# Patient Record
Sex: MALE | Race: WHITE | Employment: OTHER | URBAN - METROPOLITAN AREA
[De-identification: names, ages, dates, MRNs, and addresses within clinical notes are randomized per-mention and may not be internally consistent; named-entity substitution may affect disease eponyms.]

---

## 2017-09-18 ENCOUNTER — HOSPITAL ENCOUNTER (OUTPATIENT)
Facility: HOSPITAL | Age: 62
Setting detail: OBSERVATION
Discharge: HOME | End: 2017-09-19
Attending: EMERGENCY MEDICINE | Admitting: HOSPITALIST

## 2017-09-18 ENCOUNTER — APPOINTMENT (OUTPATIENT)
Dept: ULTRASOUND IMAGING | Facility: HOSPITAL | Age: 62
End: 2017-09-18
Attending: HOSPITALIST

## 2017-09-18 ENCOUNTER — APPOINTMENT (OUTPATIENT)
Dept: MRI IMAGING | Facility: HOSPITAL | Age: 62
End: 2017-09-18
Attending: EMERGENCY MEDICINE

## 2017-09-18 ENCOUNTER — APPOINTMENT (OUTPATIENT)
Dept: GENERAL RADIOLOGY | Facility: HOSPITAL | Age: 62
End: 2017-09-18
Attending: EMERGENCY MEDICINE

## 2017-09-18 DIAGNOSIS — M54.59 INTRACTABLE LOW BACK PAIN: Primary | ICD-10-CM

## 2017-09-18 LAB
ANION GAP SERPL CALC-SCNC: 7 MMOL/L (ref 0–18)
BASOPHILS # BLD: 0 K/UL (ref 0–0.2)
BASOPHILS NFR BLD: 1 %
BUN SERPL-MCNC: 18 MG/DL (ref 8–20)
BUN/CREAT SERPL: 22.8 (ref 10–20)
CALCIUM SERPL-MCNC: 8.6 MG/DL (ref 8.5–10.5)
CHLORIDE SERPL-SCNC: 103 MMOL/L (ref 95–110)
CO2 SERPL-SCNC: 20 MMOL/L (ref 22–32)
CREAT SERPL-MCNC: 0.79 MG/DL (ref 0.5–1.5)
EOSINOPHIL # BLD: 0 K/UL (ref 0–0.7)
EOSINOPHIL NFR BLD: 1 %
ERYTHROCYTE [DISTWIDTH] IN BLOOD BY AUTOMATED COUNT: 13.1 % (ref 11–15)
GLUCOSE SERPL-MCNC: 100 MG/DL (ref 70–99)
HCT VFR BLD AUTO: 47.3 % (ref 41–52)
HGB BLD-MCNC: 15.5 G/DL (ref 13.5–17.5)
LYMPHOCYTES # BLD: 1.2 K/UL (ref 1–4)
LYMPHOCYTES NFR BLD: 16 %
MCH RBC QN AUTO: 30.8 PG (ref 27–32)
MCHC RBC AUTO-ENTMCNC: 32.7 G/DL (ref 32–37)
MCV RBC AUTO: 94 FL (ref 80–100)
MONOCYTES # BLD: 0.5 K/UL (ref 0–1)
MONOCYTES NFR BLD: 7 %
NEUTROPHILS # BLD AUTO: 6 K/UL (ref 1.8–7.7)
NEUTROPHILS NFR BLD: 77 %
OSMOLALITY UR CALC.SUM OF ELEC: 272 MOSM/KG (ref 275–295)
PLATELET # BLD AUTO: 125 K/UL (ref 140–400)
PMV BLD AUTO: 9.2 FL (ref 7.4–10.3)
RBC # BLD AUTO: 5.03 M/UL (ref 4.5–5.9)
SODIUM SERPL-SCNC: 130 MMOL/L (ref 136–144)
WBC # BLD AUTO: 7.8 K/UL (ref 4–11)

## 2017-09-18 PROCEDURE — 85025 COMPLETE CBC W/AUTO DIFF WBC: CPT | Performed by: EMERGENCY MEDICINE

## 2017-09-18 PROCEDURE — 96374 THER/PROPH/DIAG INJ IV PUSH: CPT

## 2017-09-18 PROCEDURE — 72148 MRI LUMBAR SPINE W/O DYE: CPT | Performed by: EMERGENCY MEDICINE

## 2017-09-18 PROCEDURE — 72100 X-RAY EXAM L-S SPINE 2/3 VWS: CPT | Performed by: EMERGENCY MEDICINE

## 2017-09-18 PROCEDURE — 80048 BASIC METABOLIC PNL TOTAL CA: CPT | Performed by: EMERGENCY MEDICINE

## 2017-09-18 PROCEDURE — 99285 EMERGENCY DEPT VISIT HI MDM: CPT

## 2017-09-18 RX ORDER — POLYETHYLENE GLYCOL 3350 17 G/17G
17 POWDER, FOR SOLUTION ORAL DAILY PRN
Status: DISCONTINUED | OUTPATIENT
Start: 2017-09-18 | End: 2017-09-19

## 2017-09-18 RX ORDER — METHYLPREDNISOLONE 4 MG/1
12 TABLET ORAL
Status: COMPLETED | OUTPATIENT
Start: 2017-09-18 | End: 2017-09-18

## 2017-09-18 RX ORDER — HEPARIN SODIUM 5000 [USP'U]/ML
5000 INJECTION, SOLUTION INTRAVENOUS; SUBCUTANEOUS EVERY 8 HOURS SCHEDULED
Status: DISCONTINUED | OUTPATIENT
Start: 2017-09-18 | End: 2017-09-19

## 2017-09-18 RX ORDER — METHYLPREDNISOLONE 4 MG/1
4 TABLET ORAL
Status: DISCONTINUED | OUTPATIENT
Start: 2017-09-23 | End: 2017-09-19

## 2017-09-18 RX ORDER — SODIUM CHLORIDE 0.9 % (FLUSH) 0.9 %
3 SYRINGE (ML) INJECTION AS NEEDED
Status: DISCONTINUED | OUTPATIENT
Start: 2017-09-18 | End: 2017-09-19

## 2017-09-18 RX ORDER — METHYLPREDNISOLONE SODIUM SUCCINATE 125 MG/2ML
60 INJECTION, POWDER, LYOPHILIZED, FOR SOLUTION INTRAMUSCULAR; INTRAVENOUS ONCE
Status: COMPLETED | OUTPATIENT
Start: 2017-09-18 | End: 2017-09-18

## 2017-09-18 RX ORDER — TRAMADOL HYDROCHLORIDE 50 MG/1
25 TABLET ORAL ONCE
Status: COMPLETED | OUTPATIENT
Start: 2017-09-18 | End: 2017-09-18

## 2017-09-18 RX ORDER — HYDROCODONE BITARTRATE AND ACETAMINOPHEN 5; 325 MG/1; MG/1
1 TABLET ORAL EVERY 4 HOURS PRN
Status: DISCONTINUED | OUTPATIENT
Start: 2017-09-18 | End: 2017-09-18

## 2017-09-18 RX ORDER — HYDROMORPHONE HYDROCHLORIDE 1 MG/ML
0.2 INJECTION, SOLUTION INTRAMUSCULAR; INTRAVENOUS; SUBCUTANEOUS EVERY 2 HOUR PRN
Status: DISCONTINUED | OUTPATIENT
Start: 2017-09-18 | End: 2017-09-19

## 2017-09-18 RX ORDER — METHYLPREDNISOLONE 4 MG/1
4 TABLET ORAL 2 TIMES DAILY
Status: DISCONTINUED | OUTPATIENT
Start: 2017-09-22 | End: 2017-09-19

## 2017-09-18 RX ORDER — METHYLPREDNISOLONE 4 MG/1
4 TABLET ORAL
Status: DISCONTINUED | OUTPATIENT
Start: 2017-09-21 | End: 2017-09-19

## 2017-09-18 RX ORDER — TRAMADOL HYDROCHLORIDE 50 MG/1
25 TABLET ORAL EVERY 6 HOURS PRN
Status: DISCONTINUED | OUTPATIENT
Start: 2017-09-18 | End: 2017-09-18

## 2017-09-18 RX ORDER — HYDROCODONE BITARTRATE AND ACETAMINOPHEN 5; 325 MG/1; MG/1
2 TABLET ORAL EVERY 4 HOURS PRN
Status: DISCONTINUED | OUTPATIENT
Start: 2017-09-18 | End: 2017-09-18

## 2017-09-18 RX ORDER — TRAMADOL HYDROCHLORIDE 50 MG/1
50 TABLET ORAL EVERY 6 HOURS PRN
Status: DISCONTINUED | OUTPATIENT
Start: 2017-09-18 | End: 2017-09-19

## 2017-09-18 RX ORDER — METHYLPREDNISOLONE 4 MG/1
4 TABLET ORAL
Status: DISCONTINUED | OUTPATIENT
Start: 2017-09-19 | End: 2017-09-19

## 2017-09-18 RX ORDER — HYDROMORPHONE HYDROCHLORIDE 1 MG/ML
0.5 INJECTION, SOLUTION INTRAMUSCULAR; INTRAVENOUS; SUBCUTANEOUS
Status: DISCONTINUED | OUTPATIENT
Start: 2017-09-18 | End: 2017-09-19

## 2017-09-18 RX ORDER — BISACODYL 10 MG
10 SUPPOSITORY, RECTAL RECTAL
Status: DISCONTINUED | OUTPATIENT
Start: 2017-09-18 | End: 2017-09-19

## 2017-09-18 RX ORDER — TRAMADOL HYDROCHLORIDE 50 MG/1
25 TABLET ORAL EVERY 6 HOURS PRN
Status: DISCONTINUED | OUTPATIENT
Start: 2017-09-18 | End: 2017-09-19

## 2017-09-18 RX ORDER — ONDANSETRON 2 MG/ML
4 INJECTION INTRAMUSCULAR; INTRAVENOUS EVERY 6 HOURS PRN
Status: DISCONTINUED | OUTPATIENT
Start: 2017-09-18 | End: 2017-09-19

## 2017-09-18 RX ORDER — METHYLPREDNISOLONE 4 MG/1
4 TABLET ORAL
Status: DISCONTINUED | OUTPATIENT
Start: 2017-09-20 | End: 2017-09-19

## 2017-09-18 RX ORDER — TRAMADOL HYDROCHLORIDE 50 MG/1
100 TABLET ORAL EVERY 6 HOURS PRN
Status: DISCONTINUED | OUTPATIENT
Start: 2017-09-18 | End: 2017-09-19

## 2017-09-18 RX ORDER — METHYLPREDNISOLONE 4 MG/1
8 TABLET ORAL
Status: DISCONTINUED | OUTPATIENT
Start: 2017-09-19 | End: 2017-09-19

## 2017-09-18 RX ORDER — ACETAMINOPHEN 325 MG/1
650 TABLET ORAL EVERY 4 HOURS PRN
Status: DISCONTINUED | OUTPATIENT
Start: 2017-09-18 | End: 2017-09-19

## 2017-09-18 RX ORDER — DOCUSATE SODIUM 100 MG/1
100 CAPSULE, LIQUID FILLED ORAL 2 TIMES DAILY
Status: DISCONTINUED | OUTPATIENT
Start: 2017-09-18 | End: 2017-09-19

## 2017-09-18 NOTE — PLAN OF CARE
Problem: PAIN - ADULT  Goal: Verbalizes/displays adequate comfort level or patient's stated pain goal  INTERVENTIONS:  - Encourage pt to monitor pain and request assistance  - Assess pain using appropriate pain scale  - Administer analgesics based on type for coordinating discharge planning if the patient needs post-hospital services based on physician/LIP order or complex needs related to functional status, cognitive ability or social support system  Outcome: Not Progressing

## 2017-09-18 NOTE — ED INITIAL ASSESSMENT (HPI)
Patient states he was in his [place] at work reaching for something and his back pain started, states he has hx of back pain in same area, denies numbness or pain radiating anywhere else

## 2017-09-19 ENCOUNTER — SURGERY (OUTPATIENT)
Age: 62
End: 2017-09-19

## 2017-09-19 ENCOUNTER — APPOINTMENT (OUTPATIENT)
Dept: ULTRASOUND IMAGING | Facility: HOSPITAL | Age: 62
End: 2017-09-19
Attending: HOSPITALIST

## 2017-09-19 ENCOUNTER — PRIOR ORIGINAL RECORDS (OUTPATIENT)
Dept: OTHER | Age: 62
End: 2017-09-19

## 2017-09-19 ENCOUNTER — APPOINTMENT (OUTPATIENT)
Dept: GENERAL RADIOLOGY | Facility: HOSPITAL | Age: 62
End: 2017-09-19
Attending: ANESTHESIOLOGY

## 2017-09-19 VITALS
RESPIRATION RATE: 18 BRPM | BODY MASS INDEX: 25.3 KG/M2 | HEIGHT: 65 IN | OXYGEN SATURATION: 98 % | TEMPERATURE: 98 F | SYSTOLIC BLOOD PRESSURE: 127 MMHG | WEIGHT: 151.88 LBS | HEART RATE: 67 BPM | DIASTOLIC BLOOD PRESSURE: 76 MMHG

## 2017-09-19 LAB
ANION GAP SERPL CALC-SCNC: 9 MMOL/L (ref 0–18)
BASOPHILS # BLD: 0 K/UL (ref 0–0.2)
BASOPHILS NFR BLD: 0 %
BUN SERPL-MCNC: 16 MG/DL (ref 8–20)
BUN/CREAT SERPL: 21.3 (ref 10–20)
CALCIUM SERPL-MCNC: 8.9 MG/DL (ref 8.5–10.5)
CHLORIDE SERPL-SCNC: 100 MMOL/L (ref 95–110)
CO2 SERPL-SCNC: 23 MMOL/L (ref 22–32)
CREAT SERPL-MCNC: 0.75 MG/DL (ref 0.5–1.5)
EOSINOPHIL # BLD: 0 K/UL (ref 0–0.7)
EOSINOPHIL NFR BLD: 0 %
ERYTHROCYTE [DISTWIDTH] IN BLOOD BY AUTOMATED COUNT: 12.6 % (ref 11–15)
GLUCOSE SERPL-MCNC: 137 MG/DL (ref 70–99)
HCT VFR BLD AUTO: 42.6 % (ref 41–52)
HGB BLD-MCNC: 14.6 G/DL (ref 13.5–17.5)
LYMPHOCYTES # BLD: 0.5 K/UL (ref 1–4)
LYMPHOCYTES NFR BLD: 5 %
MAGNESIUM SERPL-MCNC: 2.2 MG/DL (ref 1.8–2.5)
MCH RBC QN AUTO: 31 PG (ref 27–32)
MCHC RBC AUTO-ENTMCNC: 34.3 G/DL (ref 32–37)
MCV RBC AUTO: 90.4 FL (ref 80–100)
MONOCYTES # BLD: 0.1 K/UL (ref 0–1)
MONOCYTES NFR BLD: 2 %
NEUTROPHILS # BLD AUTO: 9.2 K/UL (ref 1.8–7.7)
NEUTROPHILS NFR BLD: 93 %
OSMOLALITY UR CALC.SUM OF ELEC: 277 MOSM/KG (ref 275–295)
PLATELET # BLD AUTO: 133 K/UL (ref 140–400)
PMV BLD AUTO: 9.7 FL (ref 7.4–10.3)
POTASSIUM SERPL-SCNC: 4.1 MMOL/L (ref 3.3–5.1)
RBC # BLD AUTO: 4.72 M/UL (ref 4.5–5.9)
SODIUM SERPL-SCNC: 132 MMOL/L (ref 136–144)
WBC # BLD AUTO: 9.8 K/UL (ref 4–11)

## 2017-09-19 PROCEDURE — 80048 BASIC METABOLIC PNL TOTAL CA: CPT | Performed by: HOSPITALIST

## 2017-09-19 PROCEDURE — 83735 ASSAY OF MAGNESIUM: CPT | Performed by: HOSPITALIST

## 2017-09-19 PROCEDURE — B01BZZZ FLUOROSCOPY OF SPINAL CORD: ICD-10-PCS | Performed by: ANESTHESIOLOGY

## 2017-09-19 PROCEDURE — 97161 PT EVAL LOW COMPLEX 20 MIN: CPT

## 2017-09-19 PROCEDURE — 3E0R33Z INTRODUCTION OF ANTI-INFLAMMATORY INTO SPINAL CANAL, PERCUTANEOUS APPROACH: ICD-10-PCS | Performed by: ANESTHESIOLOGY

## 2017-09-19 PROCEDURE — 85025 COMPLETE CBC W/AUTO DIFF WBC: CPT | Performed by: HOSPITALIST

## 2017-09-19 PROCEDURE — 97165 OT EVAL LOW COMPLEX 30 MIN: CPT

## 2017-09-19 PROCEDURE — 76001 XR C-ARM FLUORO >1 HOUR  (CPT=76001): CPT | Performed by: ANESTHESIOLOGY

## 2017-09-19 PROCEDURE — 93880 EXTRACRANIAL BILAT STUDY: CPT | Performed by: HOSPITALIST

## 2017-09-19 RX ORDER — LIDOCAINE HYDROCHLORIDE 10 MG/ML
INJECTION, SOLUTION EPIDURAL; INFILTRATION; INTRACAUDAL; PERINEURAL AS NEEDED
Status: DISCONTINUED | OUTPATIENT
Start: 2017-09-19 | End: 2017-09-19 | Stop reason: HOSPADM

## 2017-09-19 RX ORDER — METHYLPREDNISOLONE 4 MG/1
TABLET ORAL
Qty: 21 TABLET | Refills: 0 | Status: SHIPPED | OUTPATIENT
Start: 2017-09-19 | End: 2017-09-19

## 2017-09-19 RX ORDER — METHYLPREDNISOLONE ACETATE 80 MG/ML
INJECTION, SUSPENSION INTRA-ARTICULAR; INTRALESIONAL; INTRAMUSCULAR; SOFT TISSUE AS NEEDED
Status: DISCONTINUED | OUTPATIENT
Start: 2017-09-19 | End: 2017-09-19 | Stop reason: HOSPADM

## 2017-09-19 RX ORDER — DOCUSATE SODIUM 100 MG/1
100 CAPSULE, LIQUID FILLED ORAL 2 TIMES DAILY
Status: DISCONTINUED | OUTPATIENT
Start: 2017-09-19 | End: 2017-09-19

## 2017-09-19 RX ORDER — PSEUDOEPHEDRINE HCL 30 MG
100 TABLET ORAL 2 TIMES DAILY PRN
Qty: 60 CAPSULE | Refills: 0 | Status: SHIPPED | OUTPATIENT
Start: 2017-09-19 | End: 2017-09-21

## 2017-09-19 RX ORDER — SODIUM CHLORIDE 9 MG/ML
INJECTION, SOLUTION INTRAVENOUS
Status: DISCONTINUED
Start: 2017-09-19 | End: 2017-09-19

## 2017-09-19 RX ORDER — TRAMADOL HYDROCHLORIDE 50 MG/1
25 TABLET ORAL EVERY 6 HOURS PRN
Qty: 30 TABLET | Refills: 0 | Status: SHIPPED | OUTPATIENT
Start: 2017-09-19 | End: 2017-10-03

## 2017-09-19 NOTE — CHRONIC PAIN
Jake Bobby is a 58 man with a 6 year history of low back pain that occurred after a fall. Pain was intermittent and resolved with conservative measures such as rest and anti-inflammatory medications.   He is a  in his own business and that [employer] his intralaminar epidural steroid injection for later today.     As always we appreciate your consult Center for pain management

## 2017-09-19 NOTE — ED PROVIDER NOTES
Patient Seen in: [facility] 5sw/se    History   Patient presents with:  Back Pain (musculoskeletal)    Stated Complaint: back pain    HPI    Patient is here complaining of severe low back pain.   He states he has a history of back discomfort but wa use: Yes           3.5 - 7.0 oz/week     Standard drinks or equivalent: 7 - 14 per week      Review of Systems    Positive for stated complaint: back pain  Other systems are as noted in HPI. Constitutional and vital signs reviewed.       All other systems Glucose 100 (*)     Sodium 130 (*)     CO2 20 (*)     BUN/CREA Ratio 22.8 (*)     Calculated Osmolality 272 (*)     All other components within normal limits   BASIC METABOLIC PANEL (8) - Abnormal; Notable for the following:     Glucose 137 (*)     Sodium is unable to get to the bathroom. I discussed this case with the hospitalist for the [place] [address] and he will be admitted for observation and have an MRI performed.     Pulse Ox: 98%, Normal,     Cardiac Monitor: Pulse Readings from Last 1 Encounte

## 2017-09-19 NOTE — DISCHARGE PLANNING
SW received MDO to eval for home needs, social support and ability to obtain meds. SW met w/ pt to discuss eventual discharge needs. Pt lives at home w/ his mother in [place].  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

## 2017-09-19 NOTE — PHYSICAL THERAPY NOTE
PHYSICAL THERAPY QUICK EVALUATION - INPATIENT    Room Number: 564/564-A  Evaluation Date: 9/19/2017  Presenting Problem: Intractable low back pain (+ disc protrusion L4-5 with severe canal narrowing)  Physician Order: PT Eval and Treat    Problem List  P ASSESSMENT  Ratin  Location: lower back  Management Techniques: Body mechanics; Activity promotion;Repositioning   RANGE OF MOTION AND STRENGTH ASSESSMENT  Upper extremity ROM and strength are within functional limits     Lower extremity ROM is within f provided on back mechanics and sparing techniques with mobility; handouts provided. Patient with verbal understanding stating he had previous knowledge of these from when his mother had surgery.  Patient demonstrates bed mobility, transfers, ambulation, and

## 2017-09-19 NOTE — PROCEDURES
Epidural steroid injection  Lumbar 45 interlaminar  All risk explained and accepted   Sterile prep dress  l 45 identified under fluoroscopic guidance   Local lidocine 1% 5 cc  19 gauge tuohy needle jarett on 1st pass  Neg aspiration neg heme neg  Paraesthesia

## 2017-09-19 NOTE — PLAN OF CARE
Problem: Patient/Family Goals  Goal: Patient/Family Long Term Goal  Patient's Long Term Goal: Return home    Interventions:  - Medication taken as order  -Pain assessment and reassessment  -Fall precautions  -Progress activity as tolerated  - See additiona toileting schedule   Outcome: Progressing  No falls  Safety measures met  Frequent rounding   Calls appropriately     Problem: DISCHARGE PLANNING  Goal: Discharge to home or other facility with appropriate resources  INTERVENTIONS:  - Identify barriers to

## 2017-09-19 NOTE — OCCUPATIONAL THERAPY NOTE
OCCUPATIONAL THERAPY QUICK EVALUATION - INPATIENT    Room Number: 564/564-A  Evaluation Date: 9/19/2017     Type of Evaluation: Initial  Presenting Problem: back pain    Physician Order: IP Consult to Occupational Therapy  Reason for Therapy:  ADL/IADL Dys planning on going back to work next week. I really don't need to go to OP therapy do I? \"    Patient self-stated goal is to return to work w/o restriction    OBJECTIVE  Precautions: Spine  Fall Risk: Standard fall risk    WEIGHT BEARING RESTRICTION  Weight Treatment coordinated w/ PT. Pt received in bed, alert and oriented x 4. On initial contact pt transitioned supine to sit at eob w/ vc to log roll. Pt performing sit to stand transfers w/ modified independence.  Pt ambulating in the anderson w/o ad, no lob or i

## 2017-09-19 NOTE — PROGRESS NOTES
[facility] Hospitalist Progress Note     CC: Hospital Follow up    PCP: Rsoie Clark MD       Assessment/Plan:   Patient is a 58year old male with PMH sig for herniated lumbar disc, cervical spine compression fracture, herniated cervical disc, OA, MR, palpitat pressure 136/83, pulse 79, temperature 98.1 °F (36.7 °C), temperature source Oral, resp. rate 18, height 165.1 cm (5' 5\"), weight 151 lb 14.4 oz (68.9 kg), SpO2 98 %.     Temp:  [97.4 °F (36.3 °C)-98.6 °F (37 °C)] 98.1 °F (36.7 °C)  Pulse:  [58-79] 79  Res potentially contact the transiting right L5 nerve root. Correlate for bilateral L4 and right L5 radiculopathies. Milder degenerative change is present at L2-3 and L3-4.        Us Carotid Doppler Bilat - Diag Img (cpt=93880)    Result Date: 9/19/2017  CONC

## 2017-09-19 NOTE — H&P
[facility] Hospitalist H&P       CC: Patient presents with:  Back Pain (musculoskeletal)       PCP: Clyde Monreal MD    ASSESSMENT / PLAN:   Patient is a 58year old male with PMH sig for herniated lumbar disc, cervical spine compression fracture, herniated cerv when he heard a popping noise and immediately developed severe b/l low back pain. States RLE more difficult to lift due to worsening pressure in his low back when he does so. Otherwise, no overt LE weakness. No changes in ability to urinate.  No saddle anes Systems  Pertinent positives and negatives noted above in HPI, all other systems reviewed and are negative     OBJECTIVE:  /65 (BP Location: Right arm)   Pulse 73   Temp 98.6 °F (37 °C) (Oral)   Resp 20   Ht 165.1 cm (5' 5\")   Wt 151 lb 14.4 oz (68. CREATSERUM 0.79 09/18/2017   BUN 18 09/18/2017    09/18/2017   K  09/18/2017   Comment:   Test not reported due to hemolysis. Test reordered by laboratory.       09/18/2017   CO2 20 09/18/2017    09/18/2017   CA 8.6 09/18/2017

## 2017-09-19 NOTE — PLAN OF CARE
Problem: Patient/Family Goals  Goal: Patient/Family Long Term Goal  Patient's Long Term Goal: Return home    Interventions:  - Medication taken as order  -Pain assessment and reassessment  -Fall precautions  -Progress activity as tolerated  - See additiona socks are on; call light is within reach.     Problem: DISCHARGE PLANNING  Goal: Discharge to home or other facility with appropriate resources  INTERVENTIONS:  - Identify barriers to discharge w/pt and caregiver  - Include patient/family/discharge partner

## 2017-09-20 NOTE — DISCHARGE SUMMARY
[facility] Hospitalist Discharge Summary   Patient ID:  Darian Lopez  [id number]  58year old  2/22/1955    Admit date: 9/18/2017  Discharge date: 9/19/2017  Primary Care Physician: Sarah Pierce MD   Attending Physician: No att. providers found   Consults: f/u as for outpatient PT and with Dr. Macario Holman when insurance issues worked out     B/l minimal carotid stenosis/dizziness with head movements  -per pt has hx carotid stenosis  -carotid u/s here with <50% stenosis b/l     Palpitations  -was on inderal, pt sto TraMADol HCl 50 MG Tabs  Commonly known as:  ULTRAM  Take 0.5 tablets (25 mg total) by mouth every 6 (six) hours as needed for Pain.         CONTINUE taking these medications    Propranolol HCl 10 MG Tabs  Commonly known as:  INDERAL  TAKE 1 TABLET BY DAMARIS

## 2017-09-21 ENCOUNTER — PRIOR ORIGINAL RECORDS (OUTPATIENT)
Dept: OTHER | Age: 62
End: 2017-09-21

## 2017-09-21 PROBLEM — I65.23 CAROTID STENOSIS, BILATERAL: Status: ACTIVE | Noted: 2017-09-21

## 2017-09-22 ENCOUNTER — OFFICE VISIT (OUTPATIENT)
Dept: PHYSICAL THERAPY | Facility: HOSPITAL | Age: 62
End: 2017-09-22
Attending: INTERNAL MEDICINE

## 2017-09-22 DIAGNOSIS — M51.26 HERNIATED LUMBAR INTERVERTEBRAL DISC: ICD-10-CM

## 2017-09-22 PROCEDURE — 97110 THERAPEUTIC EXERCISES: CPT | Performed by: PHYSICAL THERAPIST

## 2017-09-22 PROCEDURE — 97161 PT EVAL LOW COMPLEX 20 MIN: CPT | Performed by: PHYSICAL THERAPIST

## 2017-09-22 NOTE — PROGRESS NOTES
LUMBAR SPINE EVALUATION:   Referring Physician: Dr. Hodan Zaidi  Diagnosis: Herniated lumbar intervertebral disc (M51.26)     Evaluation Date: 9/22/2017  Visit # 1  Scheduled Visits 8  Insurance Authorized visits   Date of Onset: 9/18/17            Patient OBJECTIVE:   Observation/Posture: poor B rounded shoulders, forward head, slouched sitting in chair  Response to OC posture: dec, B  Response to slouch posture:  NE      Strength   Right Left Comments   Hip Flexion (L2)    4/5   4/5    Knee Extension (L3) be seen for 2x/week or a total of 10 visits over a 90 day period. Treatment will include: Manual Therapy; Therapeutic Exercises; Neuromuscular Re-education; Therapeutic Activity; Ultrasound;  Electrical Stim; Traction; Patient education: Home exercise prog

## 2017-09-25 ENCOUNTER — OFFICE VISIT (OUTPATIENT)
Dept: PHYSICAL THERAPY | Facility: HOSPITAL | Age: 62
End: 2017-09-25
Attending: INTERNAL MEDICINE

## 2017-09-25 DIAGNOSIS — M51.26 HERNIATED LUMBAR INTERVERTEBRAL DISC: ICD-10-CM

## 2017-09-25 PROCEDURE — 97530 THERAPEUTIC ACTIVITIES: CPT | Performed by: PHYSICAL THERAPIST

## 2017-09-25 PROCEDURE — 97110 THERAPEUTIC EXERCISES: CPT | Performed by: PHYSICAL THERAPIST

## 2017-09-25 NOTE — PROGRESS NOTES
Dx: Herniated lumbar intervertebral disc (M51.26)              Visit # 2  Fall Risk: standard         Scheduled Visits 8  Precautions: n/a      Insurance Authorized visits   Self pay       Next MD visit: none scheduled     Evaluation Date 9/22/17  [facility]

## 2017-09-28 ENCOUNTER — APPOINTMENT (OUTPATIENT)
Dept: PHYSICAL THERAPY | Facility: HOSPITAL | Age: 62
End: 2017-09-28
Attending: INTERNAL MEDICINE

## 2017-10-09 ENCOUNTER — OFFICE VISIT (OUTPATIENT)
Dept: PHYSICAL THERAPY | Facility: HOSPITAL | Age: 62
End: 2017-10-09
Attending: INTERNAL MEDICINE

## 2017-10-09 ENCOUNTER — PRIOR ORIGINAL RECORDS (OUTPATIENT)
Dept: OTHER | Age: 62
End: 2017-10-09

## 2017-10-09 NOTE — PROGRESS NOTES
Pt arrived almost 2 hours before appt. Pt states that he was wondering if I could take him early and chat. I spoke to pt for 10 minutes.  Pt states that he is feeling pretty good and doing the HEP 2 times per day, but has been slacking on the sitting postur DPT, cert MDT        Electronically signed by therapist: Madeline Schwarz PT

## 2017-12-10 ENCOUNTER — HOSPITAL ENCOUNTER (EMERGENCY)
Facility: HOSPITAL | Age: 62
Discharge: HOME | End: 2017-12-10
Attending: EMERGENCY MEDICINE

## 2017-12-10 ENCOUNTER — APPOINTMENT (OUTPATIENT)
Dept: GENERAL RADIOLOGY | Facility: HOSPITAL | Age: 62
End: 2017-12-10
Attending: EMERGENCY MEDICINE

## 2017-12-10 VITALS
RESPIRATION RATE: 18 BRPM | HEART RATE: 82 BPM | DIASTOLIC BLOOD PRESSURE: 87 MMHG | OXYGEN SATURATION: 97 % | TEMPERATURE: 98 F | SYSTOLIC BLOOD PRESSURE: 128 MMHG

## 2017-12-10 DIAGNOSIS — G89.29 ACUTE EXACERBATION OF CHRONIC LOW BACK PAIN: Primary | ICD-10-CM

## 2017-12-10 DIAGNOSIS — M54.50 ACUTE EXACERBATION OF CHRONIC LOW BACK PAIN: Primary | ICD-10-CM

## 2017-12-10 PROCEDURE — 81003 URINALYSIS AUTO W/O SCOPE: CPT | Performed by: EMERGENCY MEDICINE

## 2017-12-10 PROCEDURE — 72100 X-RAY EXAM L-S SPINE 2/3 VWS: CPT | Performed by: EMERGENCY MEDICINE

## 2017-12-10 PROCEDURE — 81003 URINALYSIS AUTO W/O SCOPE: CPT

## 2017-12-10 PROCEDURE — 99283 EMERGENCY DEPT VISIT LOW MDM: CPT

## 2017-12-10 NOTE — ED PROVIDER NOTES
Patient Seen in: [facility] AND [facility] Emergency Department    History   Patient presents with:  Back Pain (musculoskeletal)    Stated Complaint:     HPI    Patient presents to the emergency department with complaint of back pain.   Patient has history of omer Family History   Problem Relation Age of Onset   • Heart Disorder Father    • Diabetes Father    • Psychiatric Father      dementia   • Heart Disorder Mother      CAD with stent   • Tosin Ramirez Mother        Smoking status: Never Smoker between first and second toe no edema noted able to lift up both legs up against gravity with minimal pain. No tenderness across the low back no extremity edema  Skin:  Warm, dry, well perfused. Good skin turgor. No rashes seen.   Neurology:  Moving all

## 2017-12-10 NOTE — ED NOTES
Pt here for low back pain. Pt states he was seen here in September for a herniated disc in his back. Pt states he has been doing well after receiving injections. Pt states he had to take a tramadol 50 mg today because the back pain was intense.  Pt states h

## 2018-03-22 PROBLEM — I77.9 BILATERAL CAROTID ARTERY DISEASE (HCC): Status: ACTIVE | Noted: 2018-03-22

## 2018-03-22 PROBLEM — I77.9 BILATERAL CAROTID ARTERY DISEASE: Status: ACTIVE | Noted: 2018-03-22

## 2018-03-23 PROCEDURE — 81003 URINALYSIS AUTO W/O SCOPE: CPT | Performed by: INTERNAL MEDICINE

## 2018-03-31 ENCOUNTER — PRIOR ORIGINAL RECORDS (OUTPATIENT)
Dept: OTHER | Age: 63
End: 2018-03-31

## 2018-04-05 LAB
ALBUMIN: 3.7 G/DL
ALBUMIN: 3.7 G/DL
ALKALINE PHOSPHATATE(ALK PHOS): 52 IU/L
ALKALINE PHOSPHATATE(ALK PHOS): 57 IU/L
ALT (SGPT): 30 U/L
AST (SGOT): 18 U/L
BILIRUBIN TOTAL: 0.32 MG/DL
BILIRUBIN TOTAL: 0.63 MG/DL
BUN: 16 MG/DL
BUN: 18 MG/DL
BUN: 21 MG/DL
BUN: 22 MG/DL
CALCIUM: 8.6 MG/DL
CALCIUM: 8.9 MG/DL
CALCIUM: 9 MG/DL
CALCIUM: 9.2 MG/DL
CHLORIDE: 100 MEQ/L
CHLORIDE: 103 MEQ/L
CHLORIDE: 106 MEQ/L
CHLORIDE: 108 MEQ/L
CREATININE, SERUM: 0.75 MG/DL
CREATININE, SERUM: 0.79 MG/DL
CREATININE, SERUM: 0.85 MG/DL
CREATININE, SERUM: 1 MG/DL
GLUCOSE: 100 MG/DL
GLUCOSE: 101 MG/DL
GLUCOSE: 137 MG/DL
GLUCOSE: 93 MG/DL
HEMATOCRIT: 42.3 %
HEMATOCRIT: 42.6 %
HEMATOCRIT: 47.3 %
HEMOGLOBIN: 14.2 G/DL
HEMOGLOBIN: 14.6 G/DL
HEMOGLOBIN: 15.5 G/DL
MAGNESIUM: 1.9 MG/DL
MAGNESIUM: 2.2 MG/DL
PLATELETS: 125 K/UL
PLATELETS: 133 K/UL
PLATELETS: 152 K/UL
POTASSIUM, SERUM: 3.8 MEQ/L
POTASSIUM, SERUM: 4.1 MEQ/L
POTASSIUM, SERUM: 4.3 MEQ/L
PROTEIN, TOTAL: 6.8 G/DL
PROTEIN, TOTAL: 7.1 G/DL
RED BLOOD COUNT: 4.72 X 10-6/U
RED BLOOD COUNT: 4.77 X 10-6/U
RED BLOOD COUNT: 5.03 X 10-6/U
SGOT (AST): 17 IU/L
SGPT (ALT): 25 IU/L
SODIUM: 130 MEQ/L
SODIUM: 132 MEQ/L
SODIUM: 141 MEQ/L
SODIUM: 143 MEQ/L
THYROID STIMULATING HORMONE: 1.02 MLU/L
VITAMIN D 25-OH: 26.17 NG/ML
WHITE BLOOD COUNT: 6.68 X 10-3/U
WHITE BLOOD COUNT: 7.8 X 10-3/U
WHITE BLOOD COUNT: 9.8 X 10-3/U

## 2018-04-14 ENCOUNTER — HOSPITAL (OUTPATIENT)
Dept: OTHER | Age: 63
End: 2018-04-14
Attending: EMERGENCY MEDICINE

## 2018-04-27 ENCOUNTER — PRIOR ORIGINAL RECORDS (OUTPATIENT)
Dept: OTHER | Age: 63
End: 2018-04-27

## 2018-05-07 ENCOUNTER — APPOINTMENT (OUTPATIENT)
Dept: GENERAL RADIOLOGY | Facility: HOSPITAL | Age: 63
End: 2018-05-07
Attending: NURSE PRACTITIONER
Payer: MEDICAID

## 2018-05-07 ENCOUNTER — HOSPITAL ENCOUNTER (EMERGENCY)
Facility: HOSPITAL | Age: 63
Discharge: HOME | End: 2018-05-07
Payer: MEDICAID

## 2018-05-07 VITALS
HEART RATE: 68 BPM | WEIGHT: 150 LBS | TEMPERATURE: 97 F | SYSTOLIC BLOOD PRESSURE: 121 MMHG | OXYGEN SATURATION: 100 % | BODY MASS INDEX: 23.54 KG/M2 | RESPIRATION RATE: 18 BRPM | DIASTOLIC BLOOD PRESSURE: 84 MMHG | HEIGHT: 67 IN

## 2018-05-07 DIAGNOSIS — M54.9 BACK PAIN WITHOUT RADIATION: Primary | ICD-10-CM

## 2018-05-07 PROCEDURE — 72100 X-RAY EXAM L-S SPINE 2/3 VWS: CPT | Performed by: NURSE PRACTITIONER

## 2018-05-07 PROCEDURE — 99285 EMERGENCY DEPT VISIT HI MDM: CPT

## 2018-05-07 RX ORDER — TRAMADOL HYDROCHLORIDE 50 MG/1
50 TABLET ORAL ONCE
Status: COMPLETED | OUTPATIENT
Start: 2018-05-07 | End: 2018-05-07

## 2018-05-07 NOTE — ED PROVIDER NOTES
Patient Seen in: [facility] AND [facility] Emergency Department    History   Patient presents with:  Back Pain (musculoskeletal)    Stated Complaint: lower back pain    HPI    40-year-old male, with a history of chronic lower back pain and disc herniations as we HISTORY      Comment: right foot screw  No date: OTHER SURGICAL HISTORY      Comment: multiple lipoma approx 30 yr ago  2012: OTHER SURGICAL HISTORY      Comment: left knee meniscus  No date: REPAIR ING HERNIA,5+Y/O,REDUCIBL        Smoking status: Never Sm cap refill      ED Course   Labs Reviewed - No data to display    ED Course as of May 07 1848  ------------------------------------------------------------      MDM     Patient is adamant that I speak with Dr. Scotty Marks states he did contact the office and

## 2018-05-07 NOTE — CM/SW NOTE
Met with patient at the bedside to assist in scheduling an appointment for an [facility] for Back Pain.    Patient states he feels like his disc is about to \"POP OUT\" Patient has a long standing history of back pain with [place]

## 2018-05-07 NOTE — ED NOTES
Pt reports hx of lumbar back pain and feels like \"my disc is about to pop out, I need another cortisone shot. 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 Pain is non radiating cms intact.

## 2018-05-07 NOTE — ED INITIAL ASSESSMENT (HPI)
Presents to ED with a c/o lower back pain, hx of herniated lumbar disc states he thinks \"disc is bulging\". Ambulatory in triage.

## 2018-05-08 ENCOUNTER — PRIOR ORIGINAL RECORDS (OUTPATIENT)
Dept: OTHER | Age: 63
End: 2018-05-08

## 2018-05-08 ENCOUNTER — TELEPHONE (OUTPATIENT)
Dept: PAIN CLINIC | Facility: HOSPITAL | Age: 63
End: 2018-05-08

## 2018-05-08 ENCOUNTER — HOSPITAL ENCOUNTER (OUTPATIENT)
Facility: HOSPITAL | Age: 63
Setting detail: OBSERVATION
Discharge: HOME | End: 2018-05-09
Attending: EMERGENCY MEDICINE | Admitting: HOSPITALIST
Payer: MEDICAID

## 2018-05-08 DIAGNOSIS — M54.50 ACUTE EXACERBATION OF CHRONIC LOW BACK PAIN: Primary | ICD-10-CM

## 2018-05-08 DIAGNOSIS — G89.29 ACUTE EXACERBATION OF CHRONIC LOW BACK PAIN: Primary | ICD-10-CM

## 2018-05-08 PROCEDURE — 99285 EMERGENCY DEPT VISIT HI MDM: CPT

## 2018-05-08 PROCEDURE — 96374 THER/PROPH/DIAG INJ IV PUSH: CPT

## 2018-05-08 PROCEDURE — 85025 COMPLETE CBC W/AUTO DIFF WBC: CPT | Performed by: EMERGENCY MEDICINE

## 2018-05-08 PROCEDURE — 96375 TX/PRO/DX INJ NEW DRUG ADDON: CPT

## 2018-05-08 PROCEDURE — 81003 URINALYSIS AUTO W/O SCOPE: CPT | Performed by: EMERGENCY MEDICINE

## 2018-05-08 PROCEDURE — A4216 STERILE WATER/SALINE, 10 ML: HCPCS | Performed by: HOSPITALIST

## 2018-05-08 PROCEDURE — 80048 BASIC METABOLIC PNL TOTAL CA: CPT | Performed by: EMERGENCY MEDICINE

## 2018-05-08 RX ORDER — PROPRANOLOL HYDROCHLORIDE 10 MG/1
10 TABLET ORAL
Status: DISCONTINUED | OUTPATIENT
Start: 2018-05-08 | End: 2018-05-08

## 2018-05-08 RX ORDER — HYDROCODONE BITARTRATE AND ACETAMINOPHEN 5; 325 MG/1; MG/1
1 TABLET ORAL EVERY 6 HOURS PRN
Status: DISCONTINUED | OUTPATIENT
Start: 2018-05-08 | End: 2018-05-09

## 2018-05-08 RX ORDER — SODIUM PHOSPHATE, DIBASIC AND SODIUM PHOSPHATE, MONOBASIC 7; 19 G/133ML; G/133ML
1 ENEMA RECTAL ONCE AS NEEDED
Status: DISCONTINUED | OUTPATIENT
Start: 2018-05-08 | End: 2018-05-09

## 2018-05-08 RX ORDER — ONDANSETRON 2 MG/ML
4 INJECTION INTRAMUSCULAR; INTRAVENOUS EVERY 6 HOURS PRN
Status: DISCONTINUED | OUTPATIENT
Start: 2018-05-08 | End: 2018-05-09

## 2018-05-08 RX ORDER — METHYLPREDNISOLONE 4 MG/1
4 TABLET ORAL
Status: DISCONTINUED | OUTPATIENT
Start: 2018-05-09 | End: 2018-05-08

## 2018-05-08 RX ORDER — SODIUM CHLORIDE 0.9 % (FLUSH) 0.9 %
3 SYRINGE (ML) INJECTION AS NEEDED
Status: DISCONTINUED | OUTPATIENT
Start: 2018-05-08 | End: 2018-05-09

## 2018-05-08 RX ORDER — METHYLPREDNISOLONE 4 MG/1
4 TABLET ORAL
Status: DISCONTINUED | OUTPATIENT
Start: 2018-05-10 | End: 2018-05-08

## 2018-05-08 RX ORDER — METHYLPREDNISOLONE 4 MG/1
4 TABLET ORAL
Status: DISCONTINUED | OUTPATIENT
Start: 2018-05-11 | End: 2018-05-08

## 2018-05-08 RX ORDER — BISACODYL 10 MG
10 SUPPOSITORY, RECTAL RECTAL
Status: DISCONTINUED | OUTPATIENT
Start: 2018-05-08 | End: 2018-05-09

## 2018-05-08 RX ORDER — DIAZEPAM 10 MG/1
5 TABLET ORAL EVERY 6 HOURS
Status: DISCONTINUED | OUTPATIENT
Start: 2018-05-08 | End: 2018-05-09

## 2018-05-08 RX ORDER — ONDANSETRON 2 MG/ML
4 INJECTION INTRAMUSCULAR; INTRAVENOUS ONCE
Status: COMPLETED | OUTPATIENT
Start: 2018-05-08 | End: 2018-05-08

## 2018-05-08 RX ORDER — METHYLPREDNISOLONE 4 MG/1
4 TABLET ORAL
Status: DISCONTINUED | OUTPATIENT
Start: 2018-05-13 | End: 2018-05-08

## 2018-05-08 RX ORDER — DIAZEPAM 10 MG/1
5 TABLET ORAL EVERY 6 HOURS PRN
Status: DISCONTINUED | OUTPATIENT
Start: 2018-05-08 | End: 2018-05-08

## 2018-05-08 RX ORDER — POLYETHYLENE GLYCOL 3350 17 G/17G
17 POWDER, FOR SOLUTION ORAL DAILY PRN
Status: DISCONTINUED | OUTPATIENT
Start: 2018-05-08 | End: 2018-05-09

## 2018-05-08 RX ORDER — METHYLPREDNISOLONE 4 MG/1
12 TABLET ORAL
Status: DISCONTINUED | OUTPATIENT
Start: 2018-05-08 | End: 2018-05-08

## 2018-05-08 RX ORDER — DOCUSATE SODIUM 100 MG/1
100 CAPSULE, LIQUID FILLED ORAL 2 TIMES DAILY
Status: DISCONTINUED | OUTPATIENT
Start: 2018-05-08 | End: 2018-05-09

## 2018-05-08 RX ORDER — METHYLPREDNISOLONE 4 MG/1
4 TABLET ORAL 2 TIMES DAILY
Status: DISCONTINUED | OUTPATIENT
Start: 2018-05-12 | End: 2018-05-08

## 2018-05-08 RX ORDER — LIDOCAINE 50 MG/G
1 PATCH TOPICAL
Status: DISCONTINUED | OUTPATIENT
Start: 2018-05-08 | End: 2018-05-09

## 2018-05-08 RX ORDER — KETOROLAC TROMETHAMINE 30 MG/ML
30 INJECTION, SOLUTION INTRAMUSCULAR; INTRAVENOUS EVERY 6 HOURS
Status: DISCONTINUED | OUTPATIENT
Start: 2018-05-08 | End: 2018-05-09

## 2018-05-08 RX ORDER — METHYLPREDNISOLONE 4 MG/1
8 TABLET ORAL
Status: DISCONTINUED | OUTPATIENT
Start: 2018-05-09 | End: 2018-05-08

## 2018-05-08 RX ORDER — METHYLPREDNISOLONE 4 MG/1
8 TABLET ORAL
Status: DISCONTINUED | OUTPATIENT
Start: 2018-05-08 | End: 2018-05-08

## 2018-05-08 RX ORDER — ACETAMINOPHEN 325 MG/1
650 TABLET ORAL EVERY 6 HOURS PRN
Status: DISCONTINUED | OUTPATIENT
Start: 2018-05-08 | End: 2018-05-09

## 2018-05-08 RX ORDER — HYDROMORPHONE HYDROCHLORIDE 1 MG/ML
0.5 INJECTION, SOLUTION INTRAMUSCULAR; INTRAVENOUS; SUBCUTANEOUS ONCE
Status: COMPLETED | OUTPATIENT
Start: 2018-05-08 | End: 2018-05-08

## 2018-05-08 NOTE — ED PROVIDER NOTES
[facility] Emergency Department    History   Patient presents with:  Back Pain (musculoskeletal)    Stated Complaint: back pain    HPI    The patient is a 22-year-old male with a history of lumbar disc disease who presents with lo Smokeless tobacco: Never Used                      Alcohol use: Yes           3.5 - 7.0 oz/week     Standard drinks or equivalent: 7 - 14 per week     Comment: social       Review of Systems    Positive for stated complaint: back pain  Other systems are as Differential diagnosis includes bulging lumbar disc, stenosis, muscle strain    ED Course     Labs Reviewed   URINALYSIS WITH CULTURE REFLEX - Abnormal; Notable for the following:        Result Value    Ketones Urine Trace (*)     Ascorbic Acid Urine 40  ( Patient unable to ambulate due to severe pain. Unable to see physiatry us for over a week. Patient cannot tolerate the pain.   Will be admitted for IV pain control and further management case discussed with [facility] hospitalist neuro exam and vital signs stable

## 2018-05-08 NOTE — ED INITIAL ASSESSMENT (HPI)
Pt here per [facility] with c/o chronic low back pain. Pt was seen here yesterday for samething.  Denies numbness or tingling

## 2018-05-08 NOTE — PROGRESS NOTES
Patient became very agitated when RN came in with po valium and iv torodol. Patient was upset because MD did not come back like she promised the patient.  Patient refused to take valium due to unsure of how he would react to the medication, and did not want

## 2018-05-08 NOTE — PROGRESS NOTES
Note sent to me in error, and is blank. This is not my patient. I have never seen him. He was referred to physicians at [address] [place] [address] last month.

## 2018-05-08 NOTE — PLAN OF CARE
Problem: Patient Centered Care  Goal: Patient preferences are identified and integrated in the patient's plan of care  Interventions:  - What would you like us to know as we care for you?  Does not want to take medication that he has not had in the past or as appropriate  Outcome: Progressing      Problem: SAFETY ADULT - FALL  Goal: Free from fall injury  INTERVENTIONS:  - Assess pt frequently for physical needs  - Identify cognitive and physical deficits and behaviors that affect risk of falls.   - [place]

## 2018-05-08 NOTE — H&P
[facility] HOSPITALIST HISTORY AND PHYSICAL     CC: Patient presents with:  Back Pain (musculoskeletal)       PCP: Jannie Peñaloza MD    History of Present Illness:   Patient is a 61year old male with PMH sig for DJD here with lower back pain- he reports flares e docusate sodium 100 mg BID   [START ON 5/9/2018] methylPREDNISolone 4 mg TID CC   methylPREDNISolone 12 mg TID CC and HS   diazepam 5 mg Q6H          Smoking status: Never Smoker    Smokeless tobacco: Never Used    Alcohol use Yes  3.5 - 7.0 oz/week    7 bony structures. Minimal vertebral body spurring is noted. Otherwise no significant subluxation, fracture or visible bony lesion. DISC SPACES: There is narrowing of  the L4-L5 disc space. Otherwise no significant disc space narrowing.  SACROILIAC JOINTS: N

## 2018-05-08 NOTE — TELEPHONE ENCOUNTER
Patient called yesterday 5-7 at 430 pm (we were closed) from [facility] ED stating that he was in a lot of pain and wanted someone to see him.

## 2018-05-09 ENCOUNTER — SURGERY (OUTPATIENT)
Age: 63
End: 2018-05-09

## 2018-05-09 ENCOUNTER — APPOINTMENT (OUTPATIENT)
Dept: MRI IMAGING | Facility: HOSPITAL | Age: 63
End: 2018-05-09
Attending: NURSE PRACTITIONER
Payer: MEDICAID

## 2018-05-09 ENCOUNTER — ANESTHESIA (OUTPATIENT)
Dept: SURGERY | Facility: HOSPITAL | Age: 63
End: 2018-05-09

## 2018-05-09 ENCOUNTER — APPOINTMENT (OUTPATIENT)
Dept: GENERAL RADIOLOGY | Facility: HOSPITAL | Age: 63
End: 2018-05-09
Attending: ANESTHESIOLOGY
Payer: MEDICAID

## 2018-05-09 VITALS
HEART RATE: 59 BPM | OXYGEN SATURATION: 98 % | RESPIRATION RATE: 18 BRPM | HEIGHT: 67 IN | SYSTOLIC BLOOD PRESSURE: 115 MMHG | WEIGHT: 148.69 LBS | BODY MASS INDEX: 23.34 KG/M2 | DIASTOLIC BLOOD PRESSURE: 65 MMHG | TEMPERATURE: 98 F

## 2018-05-09 PROCEDURE — 3E0R33Z INTRODUCTION OF ANTI-INFLAMMATORY INTO SPINAL CANAL, PERCUTANEOUS APPROACH: ICD-10-PCS | Performed by: ANESTHESIOLOGY

## 2018-05-09 PROCEDURE — 97116 GAIT TRAINING THERAPY: CPT

## 2018-05-09 PROCEDURE — 72148 MRI LUMBAR SPINE W/O DYE: CPT | Performed by: NURSE PRACTITIONER

## 2018-05-09 PROCEDURE — 77003 FLUOROGUIDE FOR SPINE INJECT: CPT | Performed by: ANESTHESIOLOGY

## 2018-05-09 PROCEDURE — 97162 PT EVAL MOD COMPLEX 30 MIN: CPT

## 2018-05-09 PROCEDURE — 97530 THERAPEUTIC ACTIVITIES: CPT

## 2018-05-09 RX ORDER — METHYLPREDNISOLONE ACETATE 80 MG/ML
INJECTION, SUSPENSION INTRA-ARTICULAR; INTRALESIONAL; INTRAMUSCULAR; SOFT TISSUE AS NEEDED
Status: DISCONTINUED | OUTPATIENT
Start: 2018-05-09 | End: 2018-05-09 | Stop reason: HOSPADM

## 2018-05-09 RX ORDER — LIDOCAINE HYDROCHLORIDE 10 MG/ML
INJECTION, SOLUTION EPIDURAL; INFILTRATION; INTRACAUDAL; PERINEURAL AS NEEDED
Status: DISCONTINUED | OUTPATIENT
Start: 2018-05-09 | End: 2018-05-09 | Stop reason: HOSPADM

## 2018-05-09 NOTE — PLAN OF CARE
Problem: Patient Centered Care  Goal: Patient preferences are identified and integrated in the patient's plan of care  Interventions:  - What would you like us to know as we care for you?  Does not want to take medication that he has not had in the past or as appropriate  Outcome: Progressing      Problem: SAFETY ADULT - FALL  Goal: Free from fall injury  INTERVENTIONS:  - Assess pt frequently for physical needs  - Identify cognitive and physical deficits and behaviors that affect risk of falls.   - [place]

## 2018-05-09 NOTE — PROGRESS NOTES
Patient discharged home today. Patient iv removed. Patient understands to follow up with PCP. Patient asked about the medication dr. Raghav Varma ordered (torodol) so RN did write down the medication so patient can research more about the medication.  RN offered to

## 2018-05-09 NOTE — PHYSICAL THERAPY NOTE
PHYSICAL THERAPY QUICK EVALUATION - INPATIENT    Room Number: 388/537-U  Evaluation Date: 5/9/2018  Presenting Problem: p/w severe LBP  Physician Order: See Comment for Specific Order (back pain)    Problem List  Principal Problem:    Acute exacerbation want one more day. I think I'll be better then. \" \"I'm afraid it's gonna pop out if I do something. \"    OBJECTIVE  Precautions: None  Fall Risk: Standard fall risk    WEIGHT BEARING RESTRICTION  Weight Bearing Restriction: None     PAIN ASSESSMENT  Ratin position in prone if helpful for pain, use hot pack to relax spinal musculature 20-30 at a time. Patient educated on bending strategies, able to perform with good mechanics avoiding straining positions after demo and verbal cues.      Patient End of Session physical therapy this past September. Patient left supine in bed, all needs in place, RN aware. Pt to d/c from acute PT at this time, achieved all goals for safe d/c home.  Pt expressed concerns regarding his insurance coverage, discussed with social work,

## 2018-05-09 NOTE — PLAN OF CARE
Problem: Patient Centered Care  Goal: Patient preferences are identified and integrated in the patient's plan of care  Interventions:  - What would you like us to know as we care for you?  Does not want to take medication that he has not had in the past or injury  INTERVENTIONS:  - Assess pt frequently for physical needs  - Identify cognitive and physical deficits and behaviors that affect risk of falls.   - [place] fall precautions as indicated by assessment.  - Educate pt/family on patient safety includin

## 2018-05-09 NOTE — PROCEDURES
[facility] HOSP - [facility]  Procedure Report    Zoe Jamesrikemal Patient Status:  Observation    1955 MRN [id number]   [place] [address] Attending Quang Cuba MD   Hosp Day # 0 PCP Davi Red MD     Date of Admissio

## 2018-05-09 NOTE — H&P
History & Physical Examination    Patient Name: Jose L Ngay  MRN: [id number]  CSN: [id number]  YOB: 1955    Diagnosis: lumbar radiculopathy    Present Illness: pain med      No prescriptions prior to admission.     Current Facility-Admin cardiovascular condition 11-16-09    ECHO with ef 60%, mild pulmonic and tricuspid insuff, mild MR   • Vitamin D deficiency 4/28/2014     Past Surgical History:  2006: COLONOSCOPY,DIAGNOSTIC      Comment:  normal, EGD as well   No date: OTHER SURGICAL HIST

## 2018-05-09 NOTE — PROGRESS NOTES
Patient stated he has issues sometimes after getting a steroid injection and starts to have palpitations and coughing, patient is worried he will have one of those tonights if dc, Dr. Steph Agarwal was on the unit and RN notified MD aware.

## 2018-05-09 NOTE — PROGRESS NOTES
Patient is sitting at the edge of the bed currently eating, seems comfortable but states he is not able to walk.  Patient will be seen by PT at 1 pm

## 2018-05-09 NOTE — DISCHARGE SUMMARY
[facility] Internal Medicine Discharge Summary   Patient ID:  Solomon Grace  [id number]  61year old  2/22/1955    Admit date: 5/8/2018    Discharge date and time: 5/9/2018     Attending Physician: Ayesha Almaguer MD     Primary Care Physician: Nury Whiteside, ([id number])    Result Date: 5/9/2018  PROCEDURE: MRI SPINE LUMBAR (CPT=72148)  COMPARISON: [facility], XR LUMBAR SPINE (MIN 2 VIEWS) (CPT=72100), 5/07/2018, 17:35. [facility], MRI SPINE LUMBAR ([id number]), 9/18/2017, 15:07. fissure at L4-L5, which results in mild spinal canal stenosis as well as mild to moderate right and mild left lateral recess stenosis at this level. No additional significant neural compromise throughout the lumbar spine.  2. Probable hemangioma within the

## 2018-05-09 NOTE — PROGRESS NOTES
[facility] HOSP - [facility]  Report of Consultation    Jupiter John Xiao Patient Status:  Observation    1955 MRN [id number]   Location [address] [place] [address] Attending Nabil Pedraza MD   Hosp Day # 0 PCP Romy Carlisle MD     Date of Admission:   lipoma approx 30 yr ago  2012: OTHER SURGICAL HISTORY      Comment: left knee meniscus  No date: REPAIR ING HERNIA,5+Y/O,REDUCIBL  Family History   Problem Relation Age of Onset   • Heart Disorder Father    • Diabetes Father    • Psychiatric Father      de luis albertor, answers questions appropriately   Head: normocephalic, atraumatic  Eyes: anicteric; no injection  Ears: no obvious deformities noted   Nose: externally grossly within normal limits, no unusual discharge or rhinorrhea   Throat: lips grossly within hernia     Palpitations     Stenosis, cervical spine     Cervical radiculopathy     Left knee pain     Refused influenza vaccine     Vitamin D deficiency     Intractable low back pain     Carotid stenosis, bilateral     Bilateral carotid artery disease (HC

## 2018-05-12 ENCOUNTER — ANESTHESIA EVENT (OUTPATIENT)
Dept: SURGERY | Facility: HOSPITAL | Age: 63
End: 2018-05-12

## 2018-05-31 PROBLEM — M51.369 BULGE OF LUMBAR DISC WITHOUT MYELOPATHY: Status: ACTIVE | Noted: 2018-05-31

## 2018-05-31 PROBLEM — M51.36 BULGE OF LUMBAR DISC WITHOUT MYELOPATHY: Status: ACTIVE | Noted: 2018-05-31

## 2018-05-31 PROBLEM — M54.16 LUMBAR RADICULITIS: Status: ACTIVE | Noted: 2018-05-31

## 2018-05-31 PROBLEM — M51.26 BULGE OF LUMBAR DISC WITHOUT MYELOPATHY: Status: ACTIVE | Noted: 2018-05-31

## 2018-06-04 ENCOUNTER — HOSPITAL (OUTPATIENT)
Dept: OTHER | Age: 63
End: 2018-06-04
Attending: RADIOLOGY

## 2018-06-12 LAB
BUN: 15 MG/DL
CALCIUM: 9.3 MG/DL
CHLORIDE: 103 MEQ/L
CREATININE, SERUM: 0.87 MG/DL
GLUCOSE: 99 MG/DL
HEMATOCRIT: 43.4 %
HEMOGLOBIN: 14.8 G/DL
PLATELETS: 153 K/UL
POTASSIUM, SERUM: 3.9 MEQ/L
RED BLOOD COUNT: 4.79 X 10-6/U
SODIUM: 138 MEQ/L
WHITE BLOOD COUNT: 7.1 X 10-3/U

## 2018-06-16 ENCOUNTER — HOSPITAL (OUTPATIENT)
Dept: OTHER | Age: 63
End: 2018-06-16
Attending: INTERNAL MEDICINE

## 2018-06-16 ENCOUNTER — PRIOR ORIGINAL RECORDS (OUTPATIENT)
Dept: OTHER | Age: 63
End: 2018-06-16

## 2018-06-16 LAB
CHOLEST SERPL-MCNC: 182 MG/DL
CHOLEST/HDLC SERPL: 2.5 {RATIO}
HDLC SERPL-MCNC: 72 MG/DL
LDLC SERPL CALC-MCNC: 100 MG/DL
LENGTH OF FAST TIME PATIENT: NORMAL HR
NONHDLC SERPL-MCNC: 110 MG/DL
TRIGLYCERIDE (TRIGP): 48 MG/DL

## 2018-06-18 LAB
CHOLESTEROL, TOTAL: 182 MG/DL
HDL CHOLESTEROL: 72 MG/DL
LDL CHOLESTEROL: 100 MG/DL
NON-HDL CHOLESTEROL: 110 MG/DL
TRIGLYCERIDES: 48 MG/DL

## 2018-06-21 ENCOUNTER — PRIOR ORIGINAL RECORDS (OUTPATIENT)
Dept: OTHER | Age: 63
End: 2018-06-21

## 2018-06-21 ENCOUNTER — MYAURORA ACCOUNT LINK (OUTPATIENT)
Dept: OTHER | Age: 63
End: 2018-06-21

## 2018-07-12 ENCOUNTER — HOSPITAL (OUTPATIENT)
Dept: OTHER | Age: 63
End: 2018-07-12
Attending: INTERNAL MEDICINE

## 2018-07-27 ENCOUNTER — PRIOR ORIGINAL RECORDS (OUTPATIENT)
Dept: OTHER | Age: 63
End: 2018-07-27

## 2018-07-27 ENCOUNTER — CHARTING TRANS (OUTPATIENT)
Dept: OTHER | Age: 63
End: 2018-07-27

## 2018-07-30 ENCOUNTER — PRIOR ORIGINAL RECORDS (OUTPATIENT)
Dept: OTHER | Age: 63
End: 2018-07-30

## 2018-10-03 ENCOUNTER — CHARTING TRANS (OUTPATIENT)
Dept: OTHER | Age: 63
End: 2018-10-03

## 2018-10-31 VITALS
BODY MASS INDEX: 24.27 KG/M2 | OXYGEN SATURATION: 98 % | HEIGHT: 66 IN | WEIGHT: 151 LBS | DIASTOLIC BLOOD PRESSURE: 68 MMHG | HEART RATE: 85 BPM | SYSTOLIC BLOOD PRESSURE: 120 MMHG

## 2018-11-26 ENCOUNTER — CHARTING TRANS (OUTPATIENT)
Dept: OTHER | Age: 63
End: 2018-11-26

## 2018-11-27 VITALS
HEIGHT: 66 IN | BODY MASS INDEX: 24.27 KG/M2 | SYSTOLIC BLOOD PRESSURE: 129 MMHG | HEART RATE: 73 BPM | DIASTOLIC BLOOD PRESSURE: 78 MMHG | WEIGHT: 151 LBS

## 2019-01-01 ENCOUNTER — EXTERNAL RECORD (OUTPATIENT)
Dept: HEALTH INFORMATION MANAGEMENT | Facility: OTHER | Age: 64
End: 2019-01-01

## 2019-01-14 VITALS
RESPIRATION RATE: 12 BRPM | BODY MASS INDEX: 24.11 KG/M2 | HEART RATE: 64 BPM | WEIGHT: 150 LBS | DIASTOLIC BLOOD PRESSURE: 74 MMHG | SYSTOLIC BLOOD PRESSURE: 124 MMHG | HEIGHT: 66 IN | TEMPERATURE: 97.2 F

## 2019-02-07 ENCOUNTER — TELEPHONE (OUTPATIENT)
Dept: SCHEDULING | Age: 64
End: 2019-02-07

## 2019-02-13 ENCOUNTER — HOSPITAL (OUTPATIENT)
Dept: OTHER | Age: 64
End: 2019-02-13
Attending: INTERNAL MEDICINE

## 2019-02-13 ENCOUNTER — OFFICE VISIT (OUTPATIENT)
Dept: INTERNAL MEDICINE | Age: 64
End: 2019-02-13

## 2019-02-13 VITALS
SYSTOLIC BLOOD PRESSURE: 114 MMHG | BODY MASS INDEX: 23.91 KG/M2 | WEIGHT: 148.15 LBS | HEART RATE: 72 BPM | OXYGEN SATURATION: 98 % | DIASTOLIC BLOOD PRESSURE: 60 MMHG

## 2019-02-13 DIAGNOSIS — R06.02 SOB (SHORTNESS OF BREATH): Primary | ICD-10-CM

## 2019-02-13 PROCEDURE — 99213 OFFICE O/P EST LOW 20 MIN: CPT | Performed by: INTERNAL MEDICINE

## 2019-02-13 SDOH — HEALTH STABILITY: MENTAL HEALTH: HOW OFTEN DO YOU HAVE A DRINK CONTAINING ALCOHOL?: NEVER

## 2019-02-13 ASSESSMENT — PATIENT HEALTH QUESTIONNAIRE - PHQ9: 2. FEELING DOWN, DEPRESSED OR HOPELESS: NOT AT ALL

## 2019-02-28 VITALS
HEIGHT: 65 IN | HEART RATE: 64 BPM | BODY MASS INDEX: 24.83 KG/M2 | SYSTOLIC BLOOD PRESSURE: 110 MMHG | DIASTOLIC BLOOD PRESSURE: 70 MMHG | WEIGHT: 149 LBS

## 2019-02-28 VITALS
HEIGHT: 65 IN | HEART RATE: 80 BPM | DIASTOLIC BLOOD PRESSURE: 80 MMHG | WEIGHT: 149 LBS | BODY MASS INDEX: 24.83 KG/M2 | SYSTOLIC BLOOD PRESSURE: 130 MMHG

## 2019-03-02 ENCOUNTER — TELEPHONE (OUTPATIENT)
Dept: SCHEDULING | Age: 64
End: 2019-03-02

## 2019-03-04 ENCOUNTER — TELEPHONE (OUTPATIENT)
Dept: SCHEDULING | Age: 64
End: 2019-03-04

## 2019-03-04 ENCOUNTER — TELEPHONE (OUTPATIENT)
Dept: NEUROSURGERY | Age: 64
End: 2019-03-04

## 2019-03-05 ENCOUNTER — PRIOR ORIGINAL RECORDS (OUTPATIENT)
Dept: HEALTH INFORMATION MANAGEMENT | Facility: OTHER | Age: 64
End: 2019-03-05

## 2019-03-05 ENCOUNTER — APPOINTMENT (OUTPATIENT)
Dept: NEUROSURGERY | Age: 64
End: 2019-03-05

## 2019-03-11 ENCOUNTER — TELEPHONE (OUTPATIENT)
Dept: NEUROSURGERY | Age: 64
End: 2019-03-11

## 2019-03-11 ENCOUNTER — APPOINTMENT (OUTPATIENT)
Dept: NEUROSURGERY | Age: 64
End: 2019-03-11

## 2019-03-12 ENCOUNTER — OFFICE VISIT (OUTPATIENT)
Dept: NEUROSURGERY | Age: 64
End: 2019-03-12

## 2019-03-12 DIAGNOSIS — G89.29 CHRONIC BILATERAL LOW BACK PAIN WITHOUT SCIATICA: Primary | ICD-10-CM

## 2019-03-12 DIAGNOSIS — M54.50 CHRONIC BILATERAL LOW BACK PAIN WITHOUT SCIATICA: Primary | ICD-10-CM

## 2019-03-12 PROCEDURE — 99204 OFFICE O/P NEW MOD 45 MIN: CPT | Performed by: PHYSICIAN ASSISTANT

## 2019-03-12 SDOH — HEALTH STABILITY: MENTAL HEALTH: HOW OFTEN DO YOU HAVE A DRINK CONTAINING ALCOHOL?: NEVER

## 2019-03-12 ASSESSMENT — PAIN SCALES - GENERAL: PAINLEVEL: 3-4

## 2019-03-12 ASSESSMENT — ENCOUNTER SYMPTOMS: BACK PAIN: 1

## 2019-03-26 ENCOUNTER — TELEPHONE (OUTPATIENT)
Dept: NEUROSURGERY | Age: 64
End: 2019-03-26

## 2019-03-26 DIAGNOSIS — G89.29 CHRONIC BILATERAL LOW BACK PAIN WITHOUT SCIATICA: Primary | ICD-10-CM

## 2019-03-26 DIAGNOSIS — M54.50 CHRONIC BILATERAL LOW BACK PAIN WITHOUT SCIATICA: Primary | ICD-10-CM

## 2019-04-06 ENCOUNTER — HOSPITAL (OUTPATIENT)
Dept: OTHER | Age: 64
End: 2019-04-06
Attending: PHYSICIAN ASSISTANT

## 2019-04-08 ENCOUNTER — TELEPHONE (OUTPATIENT)
Dept: NEUROSURGERY | Age: 64
End: 2019-04-08

## 2019-04-16 ENCOUNTER — OFFICE VISIT (OUTPATIENT)
Dept: PAIN MANAGEMENT | Age: 64
End: 2019-04-16

## 2019-04-16 DIAGNOSIS — M47.816 FACET ARTHRITIS OF LUMBAR REGION: Primary | ICD-10-CM

## 2019-04-16 PROBLEM — M51.36 LUMBAR DEGENERATIVE DISC DISEASE: Status: ACTIVE | Noted: 2019-04-16

## 2019-04-16 PROBLEM — M51.369 LUMBAR DEGENERATIVE DISC DISEASE: Status: ACTIVE | Noted: 2019-04-16

## 2019-04-16 PROCEDURE — 99204 OFFICE O/P NEW MOD 45 MIN: CPT | Performed by: ANESTHESIOLOGY

## 2019-04-16 SDOH — HEALTH STABILITY: MENTAL HEALTH: HOW OFTEN DO YOU HAVE A DRINK CONTAINING ALCOHOL?: NEVER

## 2019-04-16 ASSESSMENT — PAIN SCALES - GENERAL: PAINLEVEL: 1-2

## 2019-04-17 ASSESSMENT — ENCOUNTER SYMPTOMS
ALLERGIC/IMMUNOLOGIC NEGATIVE: 1
BACK PAIN: 1
CONSTITUTIONAL NEGATIVE: 1
HEMATOLOGIC/LYMPHATIC NEGATIVE: 1
RESPIRATORY NEGATIVE: 1
NERVOUS/ANXIOUS: 1
NEUROLOGICAL NEGATIVE: 1
GASTROINTESTINAL NEGATIVE: 1
ENDOCRINE NEGATIVE: 1
EYES NEGATIVE: 1

## 2019-05-08 ENCOUNTER — HOSPITAL (OUTPATIENT)
Dept: OTHER | Age: 64
End: 2019-05-08

## 2019-06-10 ENCOUNTER — OFFICE VISIT (OUTPATIENT)
Dept: PAIN MANAGEMENT | Age: 64
End: 2019-06-10

## 2019-06-10 DIAGNOSIS — M47.816 FACET ARTHRITIS OF LUMBAR REGION: Primary | ICD-10-CM

## 2019-06-10 PROCEDURE — 99213 OFFICE O/P EST LOW 20 MIN: CPT | Performed by: ANESTHESIOLOGY

## 2019-06-10 ASSESSMENT — PAIN SCALES - GENERAL: PAINLEVEL: 1-2

## 2019-06-22 ENCOUNTER — TELEPHONE (OUTPATIENT)
Dept: SCHEDULING | Age: 64
End: 2019-06-22

## 2019-06-23 PROBLEM — G56.00 CARPAL TUNNEL SYNDROME: Status: ACTIVE | Noted: 2018-07-27

## 2019-06-23 PROBLEM — I77.9 BILATERAL CAROTID ARTERY DISEASE (CMD): Status: ACTIVE | Noted: 2018-03-22

## 2019-06-23 ASSESSMENT — ENCOUNTER SYMPTOMS
HEMOPTYSIS: 0
SUSPICIOUS LESIONS: 0
COUGH: 0
HEMATOCHEZIA: 0
CHILLS: 0
WEIGHT GAIN: 0
BACK PAIN: 1
BRUISES/BLEEDS EASILY: 0
ALLERGIC/IMMUNOLOGIC COMMENTS: NO NEW FOOD ALLERGIES
WEIGHT LOSS: 0
FEVER: 0

## 2019-06-24 ENCOUNTER — OFFICE VISIT (OUTPATIENT)
Dept: CARDIOLOGY | Age: 64
End: 2019-06-24

## 2019-06-24 VITALS
DIASTOLIC BLOOD PRESSURE: 70 MMHG | SYSTOLIC BLOOD PRESSURE: 120 MMHG | HEART RATE: 68 BPM | BODY MASS INDEX: 24.96 KG/M2 | WEIGHT: 150 LBS

## 2019-06-24 DIAGNOSIS — I49.3 PVC'S (PREMATURE VENTRICULAR CONTRACTIONS): ICD-10-CM

## 2019-06-24 DIAGNOSIS — M51.36 LUMBAR DEGENERATIVE DISC DISEASE: ICD-10-CM

## 2019-06-24 DIAGNOSIS — R00.2 PALPITATIONS: Primary | ICD-10-CM

## 2019-06-24 DIAGNOSIS — I77.9 BILATERAL CAROTID ARTERY DISEASE, UNSPECIFIED TYPE (CMD): ICD-10-CM

## 2019-06-24 PROCEDURE — 99214 OFFICE O/P EST MOD 30 MIN: CPT | Performed by: INTERNAL MEDICINE

## 2019-06-24 PROCEDURE — 93000 ELECTROCARDIOGRAM COMPLETE: CPT | Performed by: INTERNAL MEDICINE

## 2019-06-24 SDOH — HEALTH STABILITY: PHYSICAL HEALTH: ON AVERAGE, HOW MANY DAYS PER WEEK DO YOU ENGAGE IN MODERATE TO STRENUOUS EXERCISE (LIKE A BRISK WALK)?: 0 DAYS

## 2019-06-24 SDOH — HEALTH STABILITY: MENTAL HEALTH
STRESS IS WHEN SOMEONE FEELS TENSE, NERVOUS, ANXIOUS, OR CAN'T SLEEP AT NIGHT BECAUSE THEIR MIND IS TROUBLED. HOW STRESSED ARE YOU?: NOT AT ALL

## 2019-06-24 SDOH — HEALTH STABILITY: MENTAL HEALTH: HOW OFTEN DO YOU HAVE A DRINK CONTAINING ALCOHOL?: NEVER

## 2019-06-24 SDOH — HEALTH STABILITY: PHYSICAL HEALTH: ON AVERAGE, HOW MANY MINUTES DO YOU ENGAGE IN EXERCISE AT THIS LEVEL?: 0 MIN

## 2019-07-03 ENCOUNTER — HOSPITAL (OUTPATIENT)
Dept: OTHER | Age: 64
End: 2019-07-03

## 2019-07-03 PROCEDURE — 64493 INJ PARAVERT F JNT L/S 1 LEV: CPT | Performed by: ANESTHESIOLOGY

## 2019-07-03 PROCEDURE — 64494 INJ PARAVERT F JNT L/S 2 LEV: CPT | Performed by: ANESTHESIOLOGY

## 2019-07-03 PROCEDURE — 64495 INJ PARAVERT F JNT L/S 3 LEV: CPT | Performed by: ANESTHESIOLOGY

## 2019-07-15 ENCOUNTER — TELEPHONE (OUTPATIENT)
Dept: CARDIOLOGY | Age: 64
End: 2019-07-15

## 2019-08-19 ENCOUNTER — TELEPHONE (OUTPATIENT)
Dept: CARDIOLOGY | Age: 64
End: 2019-08-19

## 2019-09-09 ENCOUNTER — OFFICE VISIT (OUTPATIENT)
Dept: INTERNAL MEDICINE | Age: 64
End: 2019-09-09

## 2019-09-09 ENCOUNTER — TELEPHONE (OUTPATIENT)
Dept: SCHEDULING | Age: 64
End: 2019-09-09

## 2019-09-09 VITALS
HEIGHT: 65 IN | TEMPERATURE: 98.4 F | WEIGHT: 145 LBS | DIASTOLIC BLOOD PRESSURE: 78 MMHG | SYSTOLIC BLOOD PRESSURE: 126 MMHG | BODY MASS INDEX: 24.16 KG/M2

## 2019-09-09 DIAGNOSIS — G62.9 NEUROPATHY: Primary | ICD-10-CM

## 2019-09-09 DIAGNOSIS — F43.29 STRESS AND ADJUSTMENT REACTION: ICD-10-CM

## 2019-09-09 PROCEDURE — 99213 OFFICE O/P EST LOW 20 MIN: CPT | Performed by: INTERNAL MEDICINE

## 2019-09-11 ENCOUNTER — HOSPITAL (OUTPATIENT)
Dept: OTHER | Age: 64
End: 2019-09-11
Attending: ANESTHESIOLOGY

## 2019-10-15 ENCOUNTER — OFFICE VISIT (OUTPATIENT)
Dept: PAIN MANAGEMENT | Age: 64
End: 2019-10-15

## 2019-10-15 ENCOUNTER — TELEPHONE (OUTPATIENT)
Dept: NEUROSURGERY | Age: 64
End: 2019-10-15

## 2019-10-15 ENCOUNTER — TELEPHONE (OUTPATIENT)
Dept: CARDIOLOGY | Age: 64
End: 2019-10-15

## 2019-10-15 DIAGNOSIS — M47.816 FACET ARTHRITIS OF LUMBAR REGION: Primary | ICD-10-CM

## 2019-10-15 DIAGNOSIS — M51.36 LUMBAR DEGENERATIVE DISC DISEASE: ICD-10-CM

## 2019-10-15 PROCEDURE — 99214 OFFICE O/P EST MOD 30 MIN: CPT | Performed by: ANESTHESIOLOGY

## 2019-10-15 RX ORDER — TRAMADOL HYDROCHLORIDE 50 MG/1
50 TABLET ORAL PRN
COMMUNITY
End: 2020-03-24 | Stop reason: SDUPTHER

## 2019-10-15 ASSESSMENT — PAIN SCALES - GENERAL: PAINLEVEL: 7-8

## 2019-10-23 ENCOUNTER — TELEPHONE (OUTPATIENT)
Dept: PAIN MANAGEMENT | Age: 64
End: 2019-10-23

## 2019-10-23 ENCOUNTER — PREP FOR CASE (OUTPATIENT)
Dept: PAIN MANAGEMENT | Age: 64
End: 2019-10-23

## 2019-10-23 DIAGNOSIS — M51.36 LUMBAR DEGENERATIVE DISC DISEASE: Primary | ICD-10-CM

## 2019-11-01 ENCOUNTER — TELEPHONE (OUTPATIENT)
Dept: PAIN MANAGEMENT | Age: 64
End: 2019-11-01

## 2019-11-06 ENCOUNTER — TELEPHONE (OUTPATIENT)
Dept: SCHEDULING | Age: 64
End: 2019-11-06

## 2019-11-06 ENCOUNTER — HOSPITAL ENCOUNTER (OUTPATIENT)
Dept: GENERAL RADIOLOGY | Age: 64
Discharge: HOME | End: 2019-11-06
Attending: ANESTHESIOLOGY

## 2019-11-06 ENCOUNTER — HOSPITAL ENCOUNTER (OUTPATIENT)
Dept: PAIN MANAGEMENT | Age: 64
Discharge: HOME | End: 2019-11-06
Attending: ANESTHESIOLOGY

## 2019-11-06 VITALS
DIASTOLIC BLOOD PRESSURE: 75 MMHG | RESPIRATION RATE: 16 BRPM | HEART RATE: 58 BPM | OXYGEN SATURATION: 99 % | SYSTOLIC BLOOD PRESSURE: 137 MMHG | TEMPERATURE: 98 F

## 2019-11-06 DIAGNOSIS — R52 PAIN: ICD-10-CM

## 2019-11-06 DIAGNOSIS — M51.36 LUMBAR DEGENERATIVE DISC DISEASE: ICD-10-CM

## 2019-11-06 PROCEDURE — 64493 INJ PARAVERT F JNT L/S 1 LEV: CPT | Performed by: ANESTHESIOLOGY

## 2019-11-06 PROCEDURE — 76000 FLUOROSCOPY <1 HR PHYS/QHP: CPT

## 2019-11-06 PROCEDURE — 10002801 HB RX 250 W/O HCPCS: Performed by: ANESTHESIOLOGY

## 2019-11-06 PROCEDURE — 64495 INJ PARAVERT F JNT L/S 3 LEV: CPT | Performed by: ANESTHESIOLOGY

## 2019-11-06 PROCEDURE — 10002800 HB RX 250 W HCPCS: Performed by: ANESTHESIOLOGY

## 2019-11-06 PROCEDURE — 13000068 HB PAIN MANAGEMENT GROUP 3

## 2019-11-06 PROCEDURE — 10002805 HB CONTRAST AGENT: Performed by: ANESTHESIOLOGY

## 2019-11-06 PROCEDURE — 64494 INJ PARAVERT F JNT L/S 2 LEV: CPT | Performed by: ANESTHESIOLOGY

## 2019-11-06 RX ORDER — METHYLPREDNISOLONE ACETATE 80 MG/ML
80 INJECTION, SUSPENSION INTRA-ARTICULAR; INTRALESIONAL; INTRAMUSCULAR; SOFT TISSUE ONCE
Status: COMPLETED | OUTPATIENT
Start: 2019-11-06 | End: 2019-11-06

## 2019-11-06 RX ORDER — ROPIVACAINE HYDROCHLORIDE 2 MG/ML
8 INJECTION, SOLUTION EPIDURAL; INFILTRATION; PERINEURAL ONCE
Status: COMPLETED | OUTPATIENT
Start: 2019-11-06 | End: 2019-11-06

## 2019-11-06 RX ADMIN — ROPIVACAINE HYDROCHLORIDE 16 MG: 2 INJECTION, SOLUTION EPIDURAL; INFILTRATION at 09:27

## 2019-11-06 RX ADMIN — METHYLPREDNISOLONE ACETATE 40 MG: 80 INJECTION, SUSPENSION INTRA-ARTICULAR; INTRALESIONAL; INTRAMUSCULAR; SOFT TISSUE at 09:26

## 2019-11-06 RX ADMIN — IOHEXOL 1 ML: 300 INJECTION, SOLUTION INTRAVENOUS at 09:27

## 2019-11-06 SDOH — HEALTH STABILITY: MENTAL HEALTH: HOW OFTEN DO YOU HAVE A DRINK CONTAINING ALCOHOL?: NEVER

## 2019-11-06 ASSESSMENT — PAIN SCALES - GENERAL: PAINLEVEL_OUTOF10: 6

## 2019-11-06 ASSESSMENT — LIFESTYLE VARIABLES: HOW OFTEN DO YOU HAVE A DRINK CONTAINING ALCOHOL: 2 TO 4 TIMES PER MONTH

## 2019-12-18 ENCOUNTER — HOSPITAL (OUTPATIENT)
Dept: OTHER | Age: 64
End: 2019-12-18

## 2019-12-18 ENCOUNTER — TELEPHONE (OUTPATIENT)
Dept: NEUROSURGERY | Age: 64
End: 2019-12-18

## 2019-12-18 ENCOUNTER — TELEPHONE (OUTPATIENT)
Dept: PAIN MANAGEMENT | Age: 64
End: 2019-12-18

## 2019-12-19 ENCOUNTER — TELEPHONE (OUTPATIENT)
Dept: SCHEDULING | Age: 64
End: 2019-12-19

## 2019-12-19 ENCOUNTER — OFFICE VISIT (OUTPATIENT)
Dept: INTERNAL MEDICINE | Age: 64
End: 2019-12-19

## 2019-12-19 VITALS
HEIGHT: 65 IN | WEIGHT: 145 LBS | TEMPERATURE: 98 F | DIASTOLIC BLOOD PRESSURE: 64 MMHG | BODY MASS INDEX: 24.16 KG/M2 | HEART RATE: 78 BPM | SYSTOLIC BLOOD PRESSURE: 112 MMHG

## 2019-12-19 DIAGNOSIS — M85.88 OSTEOPENIA OF LUMBAR SPINE: Primary | ICD-10-CM

## 2019-12-19 PROCEDURE — 99213 OFFICE O/P EST LOW 20 MIN: CPT | Performed by: INTERNAL MEDICINE

## 2019-12-19 RX ORDER — CELECOXIB 200 MG/1
200 CAPSULE ORAL DAILY
Qty: 30 CAPSULE | Refills: 5 | Status: SHIPPED | OUTPATIENT
Start: 2019-12-19 | End: 2020-09-10 | Stop reason: CLARIF

## 2019-12-19 RX ORDER — TRAMADOL HYDROCHLORIDE 50 MG/1
50 TABLET ORAL EVERY 6 HOURS PRN
Qty: 30 TABLET | Refills: 0 | Status: SHIPPED | OUTPATIENT
Start: 2019-12-19 | End: 2020-09-10 | Stop reason: CLARIF

## 2019-12-19 RX ORDER — CELECOXIB 100 MG/1
100 CAPSULE ORAL DAILY
Qty: 30 CAPSULE | Refills: 5 | Status: SHIPPED | OUTPATIENT
Start: 2019-12-19 | End: 2020-09-10 | Stop reason: CLARIF

## 2019-12-19 SDOH — HEALTH STABILITY: MENTAL HEALTH: HOW OFTEN DO YOU HAVE A DRINK CONTAINING ALCOHOL?: NEVER

## 2019-12-19 ASSESSMENT — PATIENT HEALTH QUESTIONNAIRE - PHQ9
1. LITTLE INTEREST OR PLEASURE IN DOING THINGS: NOT AT ALL
SUM OF ALL RESPONSES TO PHQ9 QUESTIONS 1 AND 2: 0
2. FEELING DOWN, DEPRESSED OR HOPELESS: NOT AT ALL
SUM OF ALL RESPONSES TO PHQ9 QUESTIONS 1 AND 2: 0

## 2019-12-20 ENCOUNTER — TELEPHONE (OUTPATIENT)
Dept: SCHEDULING | Age: 64
End: 2019-12-20

## 2019-12-20 ENCOUNTER — APPOINTMENT (OUTPATIENT)
Dept: NEUROSURGERY | Age: 64
End: 2019-12-20

## 2019-12-23 ENCOUNTER — TELEPHONE (OUTPATIENT)
Dept: SCHEDULING | Age: 64
End: 2019-12-23

## 2019-12-24 ENCOUNTER — TELEPHONE (OUTPATIENT)
Dept: INTERNAL MEDICINE | Age: 64
End: 2019-12-24

## 2019-12-24 ENCOUNTER — TELEPHONE (OUTPATIENT)
Dept: SCHEDULING | Age: 64
End: 2019-12-24

## 2020-01-01 ENCOUNTER — EXTERNAL RECORD (OUTPATIENT)
Dept: HEALTH INFORMATION MANAGEMENT | Facility: OTHER | Age: 65
End: 2020-01-01

## 2020-01-06 ENCOUNTER — TELEPHONE (OUTPATIENT)
Dept: SCHEDULING | Age: 65
End: 2020-01-06

## 2020-01-07 ENCOUNTER — TELEPHONE (OUTPATIENT)
Dept: SCHEDULING | Age: 65
End: 2020-01-07

## 2020-01-07 ENCOUNTER — HOSPITAL (OUTPATIENT)
Dept: OTHER | Age: 65
End: 2020-01-07

## 2020-01-08 ENCOUNTER — TELEPHONE (OUTPATIENT)
Dept: NEUROSURGERY | Age: 65
End: 2020-01-08

## 2020-01-08 ENCOUNTER — TELEPHONE (OUTPATIENT)
Dept: SCHEDULING | Age: 65
End: 2020-01-08

## 2020-01-10 ENCOUNTER — TELEPHONE (OUTPATIENT)
Dept: SCHEDULING | Age: 65
End: 2020-01-10

## 2020-01-10 ENCOUNTER — TELEPHONE (OUTPATIENT)
Dept: NEUROSURGERY | Age: 65
End: 2020-01-10

## 2020-01-10 ENCOUNTER — OFFICE VISIT (OUTPATIENT)
Dept: NEUROSURGERY | Age: 65
End: 2020-01-10

## 2020-01-10 ENCOUNTER — TELEPHONE (OUTPATIENT)
Dept: PAIN MANAGEMENT | Age: 65
End: 2020-01-10

## 2020-01-10 VITALS
TEMPERATURE: 97.4 F | SYSTOLIC BLOOD PRESSURE: 138 MMHG | HEART RATE: 60 BPM | RESPIRATION RATE: 16 BRPM | DIASTOLIC BLOOD PRESSURE: 81 MMHG

## 2020-01-10 DIAGNOSIS — M51.26 HNP (HERNIATED NUCLEUS PULPOSUS), LUMBAR: ICD-10-CM

## 2020-01-10 DIAGNOSIS — M54.50 CHRONIC BILATERAL LOW BACK PAIN WITHOUT SCIATICA: Primary | ICD-10-CM

## 2020-01-10 DIAGNOSIS — G89.29 CHRONIC BILATERAL LOW BACK PAIN WITHOUT SCIATICA: Primary | ICD-10-CM

## 2020-01-10 PROCEDURE — 99213 OFFICE O/P EST LOW 20 MIN: CPT | Performed by: PHYSICIAN ASSISTANT

## 2020-01-10 RX ORDER — NAPROXEN 500 MG/1
500 TABLET ORAL 2 TIMES DAILY WITH MEALS
COMMUNITY
End: 2020-03-24 | Stop reason: SDUPTHER

## 2020-01-10 RX ORDER — FAMOTIDINE 20 MG/1
10-20 TABLET, FILM COATED ORAL 2 TIMES DAILY PRN
Status: ON HOLD | COMMUNITY
End: 2020-09-30

## 2020-01-10 ASSESSMENT — ENCOUNTER SYMPTOMS
BACK PAIN: 1
WEAKNESS: 0
NUMBNESS: 0

## 2020-01-13 ENCOUNTER — TELEPHONE (OUTPATIENT)
Dept: INTERNAL MEDICINE | Age: 65
End: 2020-01-13

## 2020-01-13 ENCOUNTER — TELEPHONE (OUTPATIENT)
Dept: SCHEDULING | Age: 65
End: 2020-01-13

## 2020-01-13 RX ORDER — NAPROXEN 500 MG/1
500 TABLET ORAL 2 TIMES DAILY PRN
Qty: 30 TABLET | Refills: 1 | Status: SHIPPED | OUTPATIENT
Start: 2020-01-13 | End: 2020-02-10 | Stop reason: SDUPTHER

## 2020-01-14 ENCOUNTER — HOSPITAL (OUTPATIENT)
Dept: OTHER | Age: 65
End: 2020-01-14
Attending: PHYSICIAN ASSISTANT

## 2020-01-14 ENCOUNTER — OFFICE VISIT (OUTPATIENT)
Dept: INTERNAL MEDICINE | Age: 65
End: 2020-01-14

## 2020-01-14 VITALS
BODY MASS INDEX: 23.76 KG/M2 | HEIGHT: 65 IN | DIASTOLIC BLOOD PRESSURE: 64 MMHG | HEART RATE: 70 BPM | SYSTOLIC BLOOD PRESSURE: 110 MMHG | OXYGEN SATURATION: 94 % | TEMPERATURE: 98 F | WEIGHT: 142.64 LBS

## 2020-01-14 DIAGNOSIS — M54.50 CHRONIC BILATERAL LOW BACK PAIN WITHOUT SCIATICA: Primary | ICD-10-CM

## 2020-01-14 DIAGNOSIS — G89.29 CHRONIC BILATERAL LOW BACK PAIN WITHOUT SCIATICA: Primary | ICD-10-CM

## 2020-01-14 PROCEDURE — 99213 OFFICE O/P EST LOW 20 MIN: CPT | Performed by: INTERNAL MEDICINE

## 2020-01-14 SDOH — HEALTH STABILITY: MENTAL HEALTH: HOW OFTEN DO YOU HAVE A DRINK CONTAINING ALCOHOL?: NEVER

## 2020-01-16 ENCOUNTER — TELEPHONE (OUTPATIENT)
Dept: NEUROSURGERY | Age: 65
End: 2020-01-16

## 2020-01-16 ENCOUNTER — TELEPHONE (OUTPATIENT)
Dept: SCHEDULING | Age: 65
End: 2020-01-16

## 2020-01-17 ENCOUNTER — TELEPHONE (OUTPATIENT)
Dept: SCHEDULING | Age: 65
End: 2020-01-17

## 2020-01-20 ENCOUNTER — TELEPHONE (OUTPATIENT)
Dept: NEUROSURGERY | Age: 65
End: 2020-01-20

## 2020-01-20 ENCOUNTER — TELEPHONE (OUTPATIENT)
Dept: SCHEDULING | Age: 65
End: 2020-01-20

## 2020-01-20 ENCOUNTER — HOSPITAL (OUTPATIENT)
Dept: OTHER | Age: 65
End: 2020-01-20
Attending: INTERNAL MEDICINE

## 2020-01-21 ENCOUNTER — TELEPHONE (OUTPATIENT)
Dept: NEUROSURGERY | Age: 65
End: 2020-01-21

## 2020-01-22 ENCOUNTER — TELEPHONE (OUTPATIENT)
Dept: PAIN MANAGEMENT | Age: 65
End: 2020-01-22

## 2020-01-22 ENCOUNTER — IMAGING SERVICES (OUTPATIENT)
Dept: GENERAL RADIOLOGY | Age: 65
End: 2020-01-22

## 2020-01-22 ENCOUNTER — OFFICE VISIT (OUTPATIENT)
Dept: NEUROSURGERY | Age: 65
End: 2020-01-22

## 2020-01-22 ENCOUNTER — TELEPHONE (OUTPATIENT)
Dept: NEUROSURGERY | Age: 65
End: 2020-01-22

## 2020-01-22 ENCOUNTER — APPOINTMENT (OUTPATIENT)
Dept: NEUROSURGERY | Age: 65
End: 2020-01-22

## 2020-01-22 VITALS
RESPIRATION RATE: 16 BRPM | DIASTOLIC BLOOD PRESSURE: 87 MMHG | HEIGHT: 65 IN | TEMPERATURE: 97.6 F | SYSTOLIC BLOOD PRESSURE: 148 MMHG | WEIGHT: 150 LBS | HEART RATE: 70 BPM | BODY MASS INDEX: 24.99 KG/M2

## 2020-01-22 DIAGNOSIS — M54.50 CHRONIC BILATERAL LOW BACK PAIN WITHOUT SCIATICA: Primary | ICD-10-CM

## 2020-01-22 DIAGNOSIS — G89.29 CHRONIC BILATERAL LOW BACK PAIN WITHOUT SCIATICA: Primary | ICD-10-CM

## 2020-01-22 PROCEDURE — 72110 X-RAY EXAM L-2 SPINE 4/>VWS: CPT | Performed by: INTERNAL MEDICINE

## 2020-01-22 PROCEDURE — 99213 OFFICE O/P EST LOW 20 MIN: CPT | Performed by: NEUROLOGICAL SURGERY

## 2020-01-23 ENCOUNTER — TELEPHONE (OUTPATIENT)
Dept: SCHEDULING | Age: 65
End: 2020-01-23

## 2020-01-23 ENCOUNTER — E-ADVICE (OUTPATIENT)
Dept: INTERNAL MEDICINE | Age: 65
End: 2020-01-23

## 2020-01-23 ENCOUNTER — TELEPHONE (OUTPATIENT)
Dept: PAIN MANAGEMENT | Age: 65
End: 2020-01-23

## 2020-01-23 DIAGNOSIS — M85.88 OSTEOPENIA OF LUMBAR SPINE: Primary | ICD-10-CM

## 2020-01-24 ENCOUNTER — TELEPHONE (OUTPATIENT)
Dept: NEUROSURGERY | Age: 65
End: 2020-01-24

## 2020-01-24 ENCOUNTER — ANTI-COAG (OUTPATIENT)
Dept: LAB | Age: 65
End: 2020-01-24

## 2020-01-24 DIAGNOSIS — M85.88 OSTEOPENIA OF LUMBAR SPINE: ICD-10-CM

## 2020-01-24 LAB — 25(OH)D3+25(OH)D2 SERPL-MCNC: 16.2 NG/ML (ref 30–100)

## 2020-01-24 PROCEDURE — 36415 COLL VENOUS BLD VENIPUNCTURE: CPT | Performed by: INTERNAL MEDICINE

## 2020-01-24 PROCEDURE — 82306 VITAMIN D 25 HYDROXY: CPT | Performed by: INTERNAL MEDICINE

## 2020-01-27 ENCOUNTER — TELEPHONE (OUTPATIENT)
Dept: PAIN MANAGEMENT | Age: 65
End: 2020-01-27

## 2020-01-27 DIAGNOSIS — Z00.00 LABORATORY EXAMINATION ORDERED AS PART OF A ROUTINE GENERAL MEDICAL EXAMINATION: ICD-10-CM

## 2020-01-27 DIAGNOSIS — M51.26 HNP (HERNIATED NUCLEUS PULPOSUS), LUMBAR: ICD-10-CM

## 2020-01-27 DIAGNOSIS — M47.816 FACET ARTHRITIS OF LUMBAR REGION: ICD-10-CM

## 2020-01-27 DIAGNOSIS — M51.36 LUMBAR DEGENERATIVE DISC DISEASE: Primary | ICD-10-CM

## 2020-01-28 ENCOUNTER — HOSPITAL (OUTPATIENT)
Dept: OTHER | Age: 65
End: 2020-01-28
Attending: NEUROLOGICAL SURGERY

## 2020-01-29 LAB
ALBUMIN SERPL-MCNC: 4.5 G/DL (ref 3.6–5.1)
ALBUMIN/GLOB SERPL: 1.8 {RATIO} (ref 1–2.4)
ALP SERPL-CCNC: 53 UNITS/L (ref 45–117)
ALT SERPL-CCNC: 22 UNITS/L
ANION GAP SERPL CALC-SCNC: 11 MMOL/L (ref 10–20)
AST SERPL-CCNC: 16 UNITS/L
BILIRUB SERPL-MCNC: 0.5 MG/DL (ref 0.2–1)
BUN SERPL-MCNC: 29 MG/DL (ref 6–20)
BUN/CREAT SERPL: 34 (ref 7–25)
CALCIUM SERPL-MCNC: 9.1 MG/DL (ref 8.4–10.2)
CHLORIDE SERPL-SCNC: 106 MMOL/L (ref 98–107)
CO2 SERPL-SCNC: 26 MMOL/L (ref 21–32)
CREAT SERPL-MCNC: 0.86 MG/DL (ref 0.67–1.17)
FASTING STATUS PATIENT QL REPORTED: ABNORMAL HRS
GLOBULIN SER-MCNC: 2.5 G/DL (ref 2–4)
GLUCOSE SERPL-MCNC: 127 MG/DL (ref 65–99)
POTASSIUM SERPL-SCNC: 4.3 MMOL/L (ref 3.4–5.1)
PROT SERPL-MCNC: 7 G/DL (ref 6.4–8.2)
SODIUM SERPL-SCNC: 139 MMOL/L (ref 135–145)

## 2020-01-29 PROCEDURE — 80053 COMPREHEN METABOLIC PANEL: CPT | Performed by: INTERNAL MEDICINE

## 2020-01-30 ENCOUNTER — TELEPHONE (OUTPATIENT)
Dept: SCHEDULING | Age: 65
End: 2020-01-30

## 2020-01-31 ENCOUNTER — OFFICE VISIT (OUTPATIENT)
Dept: PAIN MANAGEMENT | Age: 65
End: 2020-01-31

## 2020-01-31 DIAGNOSIS — M51.36 LUMBAR DEGENERATIVE DISC DISEASE: Primary | ICD-10-CM

## 2020-01-31 PROCEDURE — 99213 OFFICE O/P EST LOW 20 MIN: CPT | Performed by: ANESTHESIOLOGY

## 2020-01-31 ASSESSMENT — PAIN SCALES - GENERAL: PAINLEVEL: 1-2

## 2020-02-06 ENCOUNTER — TELEPHONE (OUTPATIENT)
Dept: SCHEDULING | Age: 65
End: 2020-02-06

## 2020-02-06 ENCOUNTER — TELEPHONE (OUTPATIENT)
Dept: NEUROSURGERY | Age: 65
End: 2020-02-06

## 2020-02-06 ENCOUNTER — TELEPHONE (OUTPATIENT)
Dept: PAIN MANAGEMENT | Age: 65
End: 2020-02-06

## 2020-02-07 ENCOUNTER — TELEPHONE (OUTPATIENT)
Dept: SCHEDULING | Age: 65
End: 2020-02-07

## 2020-02-11 RX ORDER — NAPROXEN 500 MG/1
500 TABLET ORAL 2 TIMES DAILY WITH MEALS
OUTPATIENT
Start: 2020-02-11

## 2020-02-11 RX ORDER — NAPROXEN 500 MG/1
TABLET ORAL
Qty: 30 TABLET | Refills: 0 | Status: SHIPPED | OUTPATIENT
Start: 2020-02-11 | End: 2020-03-24

## 2020-02-12 ENCOUNTER — PREP FOR CASE (OUTPATIENT)
Dept: PAIN MANAGEMENT | Age: 65
End: 2020-02-12

## 2020-02-12 ENCOUNTER — OFFICE VISIT (OUTPATIENT)
Dept: PAIN MANAGEMENT | Age: 65
End: 2020-02-12

## 2020-02-12 DIAGNOSIS — M47.816 FACET ARTHRITIS OF LUMBAR REGION: Primary | ICD-10-CM

## 2020-02-12 DIAGNOSIS — M51.36 LUMBAR DEGENERATIVE DISC DISEASE: Primary | ICD-10-CM

## 2020-02-12 PROCEDURE — 99214 OFFICE O/P EST MOD 30 MIN: CPT | Performed by: ANESTHESIOLOGY

## 2020-02-12 ASSESSMENT — PAIN SCALES - GENERAL: PAINLEVEL: 1-2

## 2020-02-13 ENCOUNTER — TELEPHONE (OUTPATIENT)
Dept: PAIN MANAGEMENT | Age: 65
End: 2020-02-13

## 2020-02-14 ENCOUNTER — TELEPHONE (OUTPATIENT)
Dept: PAIN MANAGEMENT | Age: 65
End: 2020-02-14

## 2020-02-18 ENCOUNTER — TELEPHONE (OUTPATIENT)
Dept: PAIN MANAGEMENT | Age: 65
End: 2020-02-18

## 2020-02-19 ENCOUNTER — APPOINTMENT (OUTPATIENT)
Dept: PAIN MANAGEMENT | Age: 65
End: 2020-02-19
Attending: ANESTHESIOLOGY

## 2020-02-26 ENCOUNTER — TELEPHONE (OUTPATIENT)
Dept: PAIN MANAGEMENT | Age: 65
End: 2020-02-26

## 2020-02-28 ENCOUNTER — TELEPHONE (OUTPATIENT)
Dept: PAIN MANAGEMENT | Age: 65
End: 2020-02-28

## 2020-03-04 ENCOUNTER — HOSPITAL ENCOUNTER (OUTPATIENT)
Dept: GENERAL RADIOLOGY | Age: 65
Discharge: HOME | End: 2020-03-04
Attending: ANESTHESIOLOGY

## 2020-03-04 ENCOUNTER — HOSPITAL ENCOUNTER (OUTPATIENT)
Dept: PAIN MANAGEMENT | Age: 65
Discharge: HOME | End: 2020-03-04
Attending: ANESTHESIOLOGY

## 2020-03-04 VITALS
RESPIRATION RATE: 16 BRPM | TEMPERATURE: 97.7 F | OXYGEN SATURATION: 100 % | SYSTOLIC BLOOD PRESSURE: 134 MMHG | DIASTOLIC BLOOD PRESSURE: 86 MMHG | HEART RATE: 62 BPM

## 2020-03-04 DIAGNOSIS — M47.816 FACET ARTHRITIS OF LUMBAR REGION: ICD-10-CM

## 2020-03-04 DIAGNOSIS — R52 PAIN: ICD-10-CM

## 2020-03-04 PROCEDURE — 13000068 HB PAIN MANAGEMENT GROUP 3

## 2020-03-04 PROCEDURE — 10002801 HB RX 250 W/O HCPCS: Performed by: ANESTHESIOLOGY

## 2020-03-04 PROCEDURE — 64494 INJ PARAVERT F JNT L/S 2 LEV: CPT | Performed by: ANESTHESIOLOGY

## 2020-03-04 PROCEDURE — 64493 INJ PARAVERT F JNT L/S 1 LEV: CPT | Performed by: ANESTHESIOLOGY

## 2020-03-04 PROCEDURE — 10002800 HB RX 250 W HCPCS: Performed by: ANESTHESIOLOGY

## 2020-03-04 PROCEDURE — 10002805 HB CONTRAST AGENT: Performed by: ANESTHESIOLOGY

## 2020-03-04 PROCEDURE — 76000 FLUOROSCOPY <1 HR PHYS/QHP: CPT

## 2020-03-04 PROCEDURE — 64495 INJ PARAVERT F JNT L/S 3 LEV: CPT | Performed by: ANESTHESIOLOGY

## 2020-03-04 RX ORDER — ROPIVACAINE HYDROCHLORIDE 2 MG/ML
3 INJECTION, SOLUTION EPIDURAL; INFILTRATION; PERINEURAL ONCE
Status: COMPLETED | OUTPATIENT
Start: 2020-03-04 | End: 2020-03-04

## 2020-03-04 RX ORDER — MELATONIN
2000 DAILY
COMMUNITY
End: 2020-10-16 | Stop reason: SDUPTHER

## 2020-03-04 RX ORDER — METHYLPREDNISOLONE ACETATE 80 MG/ML
80 INJECTION, SUSPENSION INTRA-ARTICULAR; INTRALESIONAL; INTRAMUSCULAR; SOFT TISSUE ONCE
Status: COMPLETED | OUTPATIENT
Start: 2020-03-04 | End: 2020-03-04

## 2020-03-04 RX ADMIN — IOHEXOL 0.5 ML: 300 INJECTION, SOLUTION INTRAVENOUS at 09:37

## 2020-03-04 RX ADMIN — ROPIVACAINE HYDROCHLORIDE 6 MG: 2 INJECTION, SOLUTION EPIDURAL; INFILTRATION at 09:38

## 2020-03-04 RX ADMIN — METHYLPREDNISOLONE ACETATE 80 MG: 80 INJECTION, SUSPENSION INTRA-ARTICULAR; INTRALESIONAL; INTRAMUSCULAR; SOFT TISSUE at 09:39

## 2020-03-04 SDOH — HEALTH STABILITY: MENTAL HEALTH: HOW OFTEN DO YOU HAVE A DRINK CONTAINING ALCOHOL?: NEVER

## 2020-03-04 ASSESSMENT — PAIN SCALES - PAIN ASSESSMENT IN ADVANCED DEMENTIA (PAINAD)
CONSOLABILITY: NO NEED TO CONSOLE
BODYLANGUAGE: TENSE, DISTRESSED, FIDGETING

## 2020-03-04 ASSESSMENT — PAIN SCALES - GENERAL
PAINLEVEL_OUTOF10: 4
PAINLEVEL_OUTOF10: 0
PAINLEVEL_OUTOF10: 3

## 2020-03-04 ASSESSMENT — LIFESTYLE VARIABLES
HOW MANY STANDARD DRINKS CONTAINING ALCOHOL DO YOU HAVE ON A TYPICAL DAY: 0,1 OR 2
AUDIT-C TOTAL SCORE: 2
HOW OFTEN DO YOU HAVE A DRINK CONTAINING ALCOHOL: 2 TO 4 TIMES PER MONTH

## 2020-03-10 ENCOUNTER — HOSPITAL ENCOUNTER (OUTPATIENT)
Dept: PHYSICAL MEDICINE AND REHAB | Age: 65
Discharge: STILL ADMITTED OR ADMINISTRATIVE | End: 2020-03-10
Attending: NEUROLOGICAL SURGERY

## 2020-03-10 PROCEDURE — 97110 THERAPEUTIC EXERCISES: CPT | Performed by: PHYSICAL THERAPIST

## 2020-03-10 PROCEDURE — 97140 MANUAL THERAPY 1/> REGIONS: CPT | Performed by: PHYSICAL THERAPIST

## 2020-03-10 PROCEDURE — G0283 ELEC STIM OTHER THAN WOUND: HCPCS | Performed by: PHYSICAL THERAPIST

## 2020-03-10 ASSESSMENT — ENCOUNTER SYMPTOMS: PAIN SEVERITY NOW: 2

## 2020-03-13 ENCOUNTER — HOSPITAL ENCOUNTER (OUTPATIENT)
Dept: PHYSICAL MEDICINE AND REHAB | Age: 65
Discharge: STILL ADMITTED OR ADMINISTRATIVE | End: 2020-03-13
Attending: NEUROLOGICAL SURGERY

## 2020-03-13 DIAGNOSIS — G89.29 CHRONIC BILATERAL LOW BACK PAIN WITHOUT SCIATICA: ICD-10-CM

## 2020-03-13 DIAGNOSIS — M54.50 CHRONIC BILATERAL LOW BACK PAIN WITHOUT SCIATICA: ICD-10-CM

## 2020-03-13 PROCEDURE — G0283 ELEC STIM OTHER THAN WOUND: HCPCS | Performed by: PHYSICAL THERAPIST

## 2020-03-13 PROCEDURE — 97110 THERAPEUTIC EXERCISES: CPT | Performed by: PHYSICAL THERAPIST

## 2020-03-13 ASSESSMENT — ENCOUNTER SYMPTOMS: PAIN SEVERITY NOW: 2

## 2020-03-17 ENCOUNTER — HOSPITAL ENCOUNTER (OUTPATIENT)
Dept: PHYSICAL MEDICINE AND REHAB | Age: 65
Discharge: STILL ADMITTED OR ADMINISTRATIVE | End: 2020-03-17

## 2020-03-17 DIAGNOSIS — M51.36 LUMBAR DEGENERATIVE DISC DISEASE: ICD-10-CM

## 2020-03-17 PROCEDURE — G0283 ELEC STIM OTHER THAN WOUND: HCPCS | Performed by: PHYSICAL THERAPIST

## 2020-03-17 PROCEDURE — 97110 THERAPEUTIC EXERCISES: CPT | Performed by: PHYSICAL THERAPIST

## 2020-03-17 ASSESSMENT — ENCOUNTER SYMPTOMS
QUALITY: ACHE
PAIN SEVERITY NOW: 4
QUALITY: RADIATING
PAIN SCALE AT HIGHEST: 7
SUBJECTIVE PAIN PROGRESSION: IMPROVED
ALLEVIATING FACTORS: CHANGE IN POSITION
PAIN SCALE AT LOWEST: 0

## 2020-03-19 ENCOUNTER — HOSPITAL ENCOUNTER (OUTPATIENT)
Dept: PHYSICAL MEDICINE AND REHAB | Age: 65
Discharge: STILL ADMITTED OR ADMINISTRATIVE | End: 2020-03-19

## 2020-03-19 DIAGNOSIS — M54.50 CHRONIC BILATERAL LOW BACK PAIN WITHOUT SCIATICA: ICD-10-CM

## 2020-03-19 DIAGNOSIS — G89.29 CHRONIC BILATERAL LOW BACK PAIN WITHOUT SCIATICA: ICD-10-CM

## 2020-03-19 PROCEDURE — 97110 THERAPEUTIC EXERCISES: CPT | Performed by: PHYSICAL THERAPIST

## 2020-03-19 PROCEDURE — G0283 ELEC STIM OTHER THAN WOUND: HCPCS | Performed by: PHYSICAL THERAPIST

## 2020-03-19 ASSESSMENT — ENCOUNTER SYMPTOMS: PAIN SEVERITY NOW: 2

## 2020-03-24 ENCOUNTER — APPOINTMENT (OUTPATIENT)
Dept: PHYSICAL MEDICINE AND REHAB | Age: 65
End: 2020-03-24

## 2020-03-25 ENCOUNTER — OFFICE VISIT (OUTPATIENT)
Dept: PAIN MANAGEMENT | Age: 65
End: 2020-03-25

## 2020-03-25 DIAGNOSIS — M51.36 LUMBAR DEGENERATIVE DISC DISEASE: Primary | ICD-10-CM

## 2020-03-25 PROCEDURE — 99441 TELEPHONE E&M BY PHYSICIAN EST PT NOT ORIG PREV 7 DAYS 5-10 MIN: CPT | Performed by: ANESTHESIOLOGY

## 2020-03-26 ENCOUNTER — HOSPITAL ENCOUNTER (OUTPATIENT)
Dept: PHYSICAL MEDICINE AND REHAB | Age: 65
Discharge: STILL ADMITTED OR ADMINISTRATIVE | End: 2020-03-26

## 2020-03-26 DIAGNOSIS — M54.50 CHRONIC BILATERAL LOW BACK PAIN WITHOUT SCIATICA: ICD-10-CM

## 2020-03-26 DIAGNOSIS — G89.29 CHRONIC BILATERAL LOW BACK PAIN WITHOUT SCIATICA: ICD-10-CM

## 2020-03-26 PROCEDURE — G0283 ELEC STIM OTHER THAN WOUND: HCPCS | Performed by: PHYSICAL THERAPIST

## 2020-03-26 PROCEDURE — 97530 THERAPEUTIC ACTIVITIES: CPT | Performed by: PHYSICAL THERAPIST

## 2020-03-26 ASSESSMENT — ENCOUNTER SYMPTOMS: PAIN SEVERITY NOW: 4

## 2020-04-09 ENCOUNTER — APPOINTMENT (OUTPATIENT)
Dept: PHYSICAL MEDICINE AND REHAB | Age: 65
End: 2020-04-09
Attending: NEUROLOGICAL SURGERY

## 2020-04-09 ENCOUNTER — HOSPITAL ENCOUNTER (OUTPATIENT)
Dept: PHYSICAL MEDICINE AND REHAB | Age: 65
Discharge: STILL ADMITTED OR ADMINISTRATIVE | End: 2020-04-09
Attending: NEUROLOGICAL SURGERY

## 2020-04-09 PROCEDURE — 97110 THERAPEUTIC EXERCISES: CPT | Performed by: PHYSICAL THERAPIST

## 2020-04-09 PROCEDURE — 97535 SELF CARE MNGMENT TRAINING: CPT | Performed by: PHYSICAL THERAPIST

## 2020-04-09 ASSESSMENT — ENCOUNTER SYMPTOMS: PAIN SEVERITY NOW: 0

## 2020-04-14 ENCOUNTER — TELEPHONE (OUTPATIENT)
Dept: NEUROSURGERY | Age: 65
End: 2020-04-14

## 2020-04-14 ENCOUNTER — TELEPHONE (OUTPATIENT)
Dept: SCHEDULING | Age: 65
End: 2020-04-14

## 2020-04-15 ENCOUNTER — OFFICE VISIT (OUTPATIENT)
Dept: INTERNAL MEDICINE | Age: 65
End: 2020-04-15

## 2020-04-15 DIAGNOSIS — E55.9 VITAMIN D DEFICIENCY: ICD-10-CM

## 2020-04-15 DIAGNOSIS — M48.02 CERVICAL SPINAL STENOSIS: Primary | ICD-10-CM

## 2020-04-15 PROCEDURE — 99442 TELEPHONE E&M BY PHYSICIAN EST PT NOT ORIG PREV 7 DAYS 11-20 MIN: CPT | Performed by: INTERNAL MEDICINE

## 2020-04-15 SDOH — HEALTH STABILITY: PHYSICAL HEALTH: ON AVERAGE, HOW MANY DAYS PER WEEK DO YOU ENGAGE IN MODERATE TO STRENUOUS EXERCISE (LIKE A BRISK WALK)?: 0 DAYS

## 2020-04-15 SDOH — HEALTH STABILITY: MENTAL HEALTH: HOW OFTEN DO YOU HAVE A DRINK CONTAINING ALCOHOL?: NEVER

## 2020-04-15 SDOH — HEALTH STABILITY: PHYSICAL HEALTH: ON AVERAGE, HOW MANY MINUTES DO YOU ENGAGE IN EXERCISE AT THIS LEVEL?: 0 MIN

## 2020-04-23 ENCOUNTER — NURSE TRIAGE (OUTPATIENT)
Dept: SCHEDULING | Age: 65
End: 2020-04-23

## 2020-04-23 ENCOUNTER — TELEPHONE (OUTPATIENT)
Dept: SCHEDULING | Age: 65
End: 2020-04-23

## 2020-04-24 ENCOUNTER — TELEPHONE (OUTPATIENT)
Dept: SCHEDULING | Age: 65
End: 2020-04-24

## 2020-04-25 ENCOUNTER — HOSPITAL ENCOUNTER (OUTPATIENT)
Dept: MRI IMAGING | Age: 65
Discharge: HOME | End: 2020-04-25
Attending: INTERNAL MEDICINE

## 2020-04-25 ENCOUNTER — LAB SERVICES (OUTPATIENT)
Dept: LAB | Age: 65
End: 2020-04-25

## 2020-04-25 DIAGNOSIS — E55.9 VITAMIN D DEFICIENCY: ICD-10-CM

## 2020-04-25 DIAGNOSIS — M48.02 CERVICAL SPINAL STENOSIS: ICD-10-CM

## 2020-04-25 LAB — 25(OH)D3+25(OH)D2 SERPL-MCNC: 24.6 NG/ML (ref 30–100)

## 2020-04-25 PROCEDURE — 72141 MRI NECK SPINE W/O DYE: CPT

## 2020-04-27 ENCOUNTER — TELEPHONE (OUTPATIENT)
Dept: SCHEDULING | Age: 65
End: 2020-04-27

## 2020-04-28 ENCOUNTER — TELEPHONE (OUTPATIENT)
Dept: NEUROSURGERY | Age: 65
End: 2020-04-28

## 2020-04-28 DIAGNOSIS — R29.898 DECREASED STRENGTH OF UPPER EXTREMITY: Primary | ICD-10-CM

## 2020-04-30 ENCOUNTER — APPOINTMENT (OUTPATIENT)
Dept: PHYSICAL MEDICINE AND REHAB | Age: 65
End: 2020-04-30
Attending: NEUROLOGICAL SURGERY

## 2020-05-06 ENCOUNTER — HOSPITAL ENCOUNTER (OUTPATIENT)
Dept: ULTRASOUND IMAGING | Age: 65
Discharge: HOME | End: 2020-05-06
Attending: INTERNAL MEDICINE

## 2020-05-06 ENCOUNTER — TELEPHONE (OUTPATIENT)
Dept: CARDIOLOGY | Age: 65
End: 2020-05-06

## 2020-05-06 ENCOUNTER — APPOINTMENT (OUTPATIENT)
Dept: ULTRASOUND IMAGING | Age: 65
End: 2020-05-06
Attending: INTERNAL MEDICINE

## 2020-05-06 DIAGNOSIS — I77.9 BILATERAL CAROTID ARTERY DISEASE (CMD): ICD-10-CM

## 2020-05-06 PROBLEM — R42 DIZZINESS: Status: ACTIVE | Noted: 2020-05-06

## 2020-05-06 PROCEDURE — 93880 EXTRACRANIAL BILAT STUDY: CPT

## 2020-05-19 ENCOUNTER — APPOINTMENT (OUTPATIENT)
Dept: MRI IMAGING | Age: 65
End: 2020-05-19
Attending: INTERNAL MEDICINE

## 2020-06-22 ENCOUNTER — TELEPHONE (OUTPATIENT)
Dept: SCHEDULING | Age: 65
End: 2020-06-22

## 2020-06-29 ENCOUNTER — APPOINTMENT (OUTPATIENT)
Dept: CARDIOLOGY | Age: 65
End: 2020-06-29

## 2020-08-05 NOTE — ED INITIAL ASSESSMENT (HPI)
Low back pain PATIENT INSTRUCTIONS    Treatment:  After Injection Instruction  You have been given an injection (shot) at the [facility] department.  Injections are given into joints, tendons, or soft tissue for the treatment of pain and inflammation.  The medicine is a corticosteroid, but is often called a steroid or cortisone shot.  The steroid used may be Dexamethasone or Depo-Medrol.  A fast acting pain killer such as Lidocaine may be injected with the steroid to dull the pain for a few hours.  A long acting anesthetic, such as Marcaine, may also be injected with the steroid.  It may last up to 30 hours or wear off as soon as 6 hours.      · The steroid begins to work in 24 to 48 hours and may take 2 weeks to reach full effect.    · Take all of your regular medications, including pain medicine (unless instructed otherwise).    · Some increased pain may occur in the first 24 hours after the injection.  You may apply a cold pack or ice to the injected area for 15 to 20 minutes every 1 to 2 hours for 24 hours to lessen the pain.    · For people with diabetes, your blood sugar may be increased for 1 to 2 days. If you have any questions regarding your blood sugar, please contact your primary care physician.      Call your Orthopaedic physician if you develop any of the following symptoms:  · Fever  · Increased redness of injection site  · If it is not better in 2 weeks  ·     Follow-Up:  Please make an appointment with  Dr. Antwon Foster in 6 WEEKS          Referral:       Time left: 8/5/2020 10:21 AM     Next appointment:        Location:        Provider:         Please note: 24 hour notice for cancellation of appointment is required.    You may receive a survey in the mail, or via the e-mail address that you have provided.  We would appreciate if you could fill out the survey and provide us with any feedback on your experience regarding your visit today. Thank you for allowing us to provide you with your  health care needs.     Do not hesitate to call if you are experiencing severe pain, worsening or change in your pain, have symptoms of infection (fever, warmth, redness, increased drainage), or have any other problem that concerns you ~ [phone] (or [phone] after hours).    Please remember when requesting refills on pain medication that the request should be made by Thursday at the latest. [facility] is open Monday-Friday, 8am-5pm, and closed on the weekends.  No narcotic refills will be filled after hours.    Additional Educational Resources:  For additional resources regarding your symptoms, diagnosis, or further health information, please visit the Health Resources section on [email or web address] or the Online Health Resources section in [email or web address].

## 2020-08-19 ENCOUNTER — TELEPHONE (OUTPATIENT)
Dept: SCHEDULING | Age: 65
End: 2020-08-19

## 2020-08-19 ENCOUNTER — OFFICE VISIT (OUTPATIENT)
Dept: INTERNAL MEDICINE | Age: 65
End: 2020-08-19

## 2020-08-19 ENCOUNTER — HOSPITAL ENCOUNTER (OUTPATIENT)
Dept: GENERAL RADIOLOGY | Age: 65
Discharge: HOME | End: 2020-08-19

## 2020-08-19 VITALS
SYSTOLIC BLOOD PRESSURE: 110 MMHG | WEIGHT: 149.25 LBS | BODY MASS INDEX: 24.87 KG/M2 | DIASTOLIC BLOOD PRESSURE: 64 MMHG | TEMPERATURE: 96.3 F | HEIGHT: 65 IN | HEART RATE: 71 BPM | OXYGEN SATURATION: 97 %

## 2020-08-19 DIAGNOSIS — Z12.12 SCREENING FOR COLORECTAL CANCER: Primary | ICD-10-CM

## 2020-08-19 DIAGNOSIS — Z12.11 SCREENING FOR COLORECTAL CANCER: Primary | ICD-10-CM

## 2020-08-19 DIAGNOSIS — R07.89 MUSCULAR CHEST PAIN: ICD-10-CM

## 2020-08-19 PROCEDURE — 99213 OFFICE O/P EST LOW 20 MIN: CPT | Performed by: INTERNAL MEDICINE

## 2020-08-19 PROCEDURE — 71046 X-RAY EXAM CHEST 2 VIEWS: CPT

## 2020-08-19 RX ORDER — NAPROXEN 500 MG/1
500 TABLET ORAL 2 TIMES DAILY
Qty: 20 TABLET | Refills: 2 | OUTPATIENT
Start: 2020-08-19 | End: 2020-09-16

## 2020-08-19 SDOH — HEALTH STABILITY: MENTAL HEALTH: HOW OFTEN DO YOU HAVE A DRINK CONTAINING ALCOHOL?: NEVER

## 2020-08-19 SDOH — HEALTH STABILITY: PHYSICAL HEALTH: ON AVERAGE, HOW MANY DAYS PER WEEK DO YOU ENGAGE IN MODERATE TO STRENUOUS EXERCISE (LIKE A BRISK WALK)?: 7 DAYS

## 2020-08-19 SDOH — HEALTH STABILITY: PHYSICAL HEALTH: ON AVERAGE, HOW MANY MINUTES DO YOU ENGAGE IN EXERCISE AT THIS LEVEL?: 30 MIN

## 2020-08-19 ASSESSMENT — ACTIVITIES OF DAILY LIVING (ADL)
ADL_SHORT_OF_BREATH: NO
SENSORY_SUPPORT_DEVICES: EYEGLASSES
NEEDS_ASSIST: NO
ADL_SCORE: 12
RECENT_DECLINE_ADL: NO
ADL_BEFORE_ADMISSION: INDEPENDENT

## 2020-08-19 ASSESSMENT — PATIENT HEALTH QUESTIONNAIRE - PHQ9
1. LITTLE INTEREST OR PLEASURE IN DOING THINGS: NOT AT ALL
2. FEELING DOWN, DEPRESSED OR HOPELESS: NOT AT ALL
CLINICAL INTERPRETATION OF PHQ2 SCORE: NO FURTHER SCREENING NEEDED
SUM OF ALL RESPONSES TO PHQ9 QUESTIONS 1 AND 2: 0
CLINICAL INTERPRETATION OF PHQ9 SCORE: NO FURTHER SCREENING NEEDED
SUM OF ALL RESPONSES TO PHQ9 QUESTIONS 1 AND 2: 0

## 2020-08-19 ASSESSMENT — COGNITIVE AND FUNCTIONAL STATUS - GENERAL
ARE YOU DEAF OR DO YOU HAVE SERIOUS DIFFICULTY  HEARING: NO
ARE YOU BLIND OR DO YOU HAVE SERIOUS DIFFICULTY SEEING, EVEN WHEN WEARING GLASSES: NO

## 2020-09-01 ENCOUNTER — TELEPHONE (OUTPATIENT)
Dept: SCHEDULING | Age: 65
End: 2020-09-01

## 2020-09-06 PROBLEM — R73.9 HYPERGLYCEMIA: Status: ACTIVE | Noted: 2020-09-06

## 2020-09-06 PROBLEM — R93.89 ABNORMAL CHEST X-RAY: Status: ACTIVE | Noted: 2020-09-06

## 2020-09-06 ASSESSMENT — ENCOUNTER SYMPTOMS
SUSPICIOUS LESIONS: 0
BRUISES/BLEEDS EASILY: 0
HEMOPTYSIS: 0
WEIGHT LOSS: 0
FEVER: 0
ALLERGIC/IMMUNOLOGIC COMMENTS: NO NEW FOOD ALLERGIES
COUGH: 0
BACK PAIN: 1
CHILLS: 0
HEMATOCHEZIA: 0
WEIGHT GAIN: 0

## 2020-09-09 ENCOUNTER — TELEPHONE (OUTPATIENT)
Dept: CARDIOLOGY | Age: 65
End: 2020-09-09

## 2020-09-09 ENCOUNTER — LAB SERVICES (OUTPATIENT)
Dept: LAB | Age: 65
End: 2020-09-09

## 2020-09-09 DIAGNOSIS — I49.3 PVC'S (PREMATURE VENTRICULAR CONTRACTIONS): ICD-10-CM

## 2020-09-09 DIAGNOSIS — I77.9 BILATERAL CAROTID ARTERY DISEASE, UNSPECIFIED TYPE (CMD): ICD-10-CM

## 2020-09-09 DIAGNOSIS — R00.2 PALPITATIONS: ICD-10-CM

## 2020-09-09 LAB
ALBUMIN SERPL-MCNC: 3.8 G/DL (ref 3.6–5.1)
ALBUMIN/GLOB SERPL: 1.2 {RATIO} (ref 1–2.4)
ALP SERPL-CCNC: 87 UNITS/L (ref 45–117)
ALT SERPL-CCNC: 25 UNITS/L
ANION GAP SERPL CALC-SCNC: 8 MMOL/L (ref 10–20)
AST SERPL-CCNC: 18 UNITS/L
BASOPHILS # BLD: 0 K/MCL (ref 0–0.3)
BASOPHILS NFR BLD: 0 %
BILIRUB SERPL-MCNC: 0.3 MG/DL (ref 0.2–1)
BUN SERPL-MCNC: 18 MG/DL (ref 6–20)
BUN/CREAT SERPL: 22 (ref 7–25)
CALCIUM SERPL-MCNC: 8.9 MG/DL (ref 8.4–10.2)
CHLORIDE SERPL-SCNC: 107 MMOL/L (ref 98–107)
CHOLEST SERPL-MCNC: 165 MG/DL
CHOLEST/HDLC SERPL: 3 {RATIO}
CO2 SERPL-SCNC: 30 MMOL/L (ref 21–32)
CREAT SERPL-MCNC: 0.84 MG/DL (ref 0.67–1.17)
DIFFERENTIAL METHOD BLD: NORMAL
EOSINOPHIL # BLD: 0.1 K/MCL (ref 0.1–0.5)
EOSINOPHIL NFR BLD: 1 %
ERYTHROCYTE [DISTWIDTH] IN BLOOD: 12.2 % (ref 11–15)
GLOBULIN SER-MCNC: 3.2 G/DL (ref 2–4)
GLUCOSE SERPL-MCNC: 94 MG/DL (ref 65–99)
HCT VFR BLD CALC: 41.9 % (ref 39–51)
HDLC SERPL-MCNC: 55 MG/DL
HGB BLD-MCNC: 13.9 G/DL (ref 13–17)
IMM GRANULOCYTES # BLD AUTO: 0 K/MCL (ref 0–0.2)
IMM GRANULOCYTES NFR BLD: 0 %
LDLC SERPL CALC-MCNC: 91 MG/DL
LENGTH OF FAST TIME PATIENT: 12 HRS
LENGTH OF FAST TIME PATIENT: 12 HRS
LYMPHOCYTES # BLD: 1.3 K/MCL (ref 1–4)
LYMPHOCYTES NFR BLD: 13 %
MCH RBC QN AUTO: 30.1 PG (ref 26–34)
MCHC RBC AUTO-ENTMCNC: 33.2 G/DL (ref 32–36.5)
MCV RBC AUTO: 90.7 FL (ref 78–100)
MONOCYTES # BLD: 0.6 K/MCL (ref 0.3–0.9)
MONOCYTES NFR BLD: 7 %
NEUTROPHILS # BLD: 7.7 K/MCL (ref 1.8–7.7)
NEUTROPHILS NFR BLD: 79 %
NONHDLC SERPL-MCNC: 110 MG/DL
NRBC BLD MANUAL-RTO: 0 /100 WBC
PLATELET # BLD: 150 K/MCL (ref 140–450)
POTASSIUM SERPL-SCNC: 4.5 MMOL/L (ref 3.4–5.1)
PROT SERPL-MCNC: 7 G/DL (ref 6.4–8.2)
RBC # BLD: 4.62 MIL/MCL (ref 4.5–5.9)
SODIUM SERPL-SCNC: 141 MMOL/L (ref 135–145)
TRIGL SERPL-MCNC: 97 MG/DL
WBC # BLD: 9.8 K/MCL (ref 4.2–11)

## 2020-09-10 ENCOUNTER — OFFICE VISIT (OUTPATIENT)
Dept: CARDIOLOGY | Age: 65
End: 2020-09-10

## 2020-09-10 VITALS
WEIGHT: 145 LBS | RESPIRATION RATE: 16 BRPM | HEART RATE: 80 BPM | DIASTOLIC BLOOD PRESSURE: 80 MMHG | SYSTOLIC BLOOD PRESSURE: 132 MMHG | BODY MASS INDEX: 24.16 KG/M2 | HEIGHT: 65 IN

## 2020-09-10 DIAGNOSIS — R93.89 ABNORMAL CHEST X-RAY: ICD-10-CM

## 2020-09-10 DIAGNOSIS — I49.3 PVC'S (PREMATURE VENTRICULAR CONTRACTIONS): ICD-10-CM

## 2020-09-10 DIAGNOSIS — I25.10 ATHEROSCLEROSIS OF NATIVE CORONARY ARTERY OF NATIVE HEART WITHOUT ANGINA PECTORIS: ICD-10-CM

## 2020-09-10 DIAGNOSIS — R42 DIZZINESS: ICD-10-CM

## 2020-09-10 DIAGNOSIS — R73.9 HYPERGLYCEMIA: ICD-10-CM

## 2020-09-10 DIAGNOSIS — I77.9 BILATERAL CAROTID ARTERY DISEASE, UNSPECIFIED TYPE (CMD): ICD-10-CM

## 2020-09-10 DIAGNOSIS — R93.1 ELEVATED CORONARY ARTERY CALCIUM SCORE: ICD-10-CM

## 2020-09-10 DIAGNOSIS — R00.2 PALPITATIONS: Primary | ICD-10-CM

## 2020-09-10 PROCEDURE — 99214 OFFICE O/P EST MOD 30 MIN: CPT | Performed by: INTERNAL MEDICINE

## 2020-09-10 SDOH — HEALTH STABILITY: PHYSICAL HEALTH: ON AVERAGE, HOW MANY DAYS PER WEEK DO YOU ENGAGE IN MODERATE TO STRENUOUS EXERCISE (LIKE A BRISK WALK)?: 0 DAYS

## 2020-09-10 SDOH — HEALTH STABILITY: PHYSICAL HEALTH: ON AVERAGE, HOW MANY MINUTES DO YOU ENGAGE IN EXERCISE AT THIS LEVEL?: 0 MIN

## 2020-09-10 SDOH — HEALTH STABILITY: MENTAL HEALTH: HOW OFTEN DO YOU HAVE A DRINK CONTAINING ALCOHOL?: NEVER

## 2020-09-10 ASSESSMENT — PATIENT HEALTH QUESTIONNAIRE - PHQ9
2. FEELING DOWN, DEPRESSED OR HOPELESS: NOT AT ALL
CLINICAL INTERPRETATION OF PHQ9 SCORE: NO FURTHER SCREENING NEEDED
SUM OF ALL RESPONSES TO PHQ9 QUESTIONS 1 AND 2: 0
SUM OF ALL RESPONSES TO PHQ9 QUESTIONS 1 AND 2: 0
CLINICAL INTERPRETATION OF PHQ2 SCORE: NO FURTHER SCREENING NEEDED
1. LITTLE INTEREST OR PLEASURE IN DOING THINGS: NOT AT ALL

## 2020-09-11 ENCOUNTER — APPOINTMENT (OUTPATIENT)
Dept: CARDIOLOGY | Age: 65
End: 2020-09-11

## 2020-09-16 ENCOUNTER — APPOINTMENT (OUTPATIENT)
Dept: CT IMAGING | Age: 65
End: 2020-09-16
Attending: EMERGENCY MEDICINE

## 2020-09-16 ENCOUNTER — HOSPITAL ENCOUNTER (OUTPATIENT)
Dept: PAIN MANAGEMENT | Age: 65
Discharge: HOME | End: 2020-09-16
Attending: ANESTHESIOLOGY

## 2020-09-16 ENCOUNTER — HOSPITAL ENCOUNTER (EMERGENCY)
Age: 65
Discharge: HOME | End: 2020-09-16
Attending: EMERGENCY MEDICINE

## 2020-09-16 ENCOUNTER — PREP FOR CASE (OUTPATIENT)
Dept: PAIN MANAGEMENT | Age: 65
End: 2020-09-16

## 2020-09-16 ENCOUNTER — HOSPITAL ENCOUNTER (OUTPATIENT)
Dept: GENERAL RADIOLOGY | Age: 65
Discharge: HOME | End: 2020-09-16
Attending: ANESTHESIOLOGY

## 2020-09-16 ENCOUNTER — OFFICE VISIT (OUTPATIENT)
Dept: PAIN MANAGEMENT | Age: 65
End: 2020-09-16

## 2020-09-16 ENCOUNTER — TELEPHONE (OUTPATIENT)
Dept: PAIN MANAGEMENT | Age: 65
End: 2020-09-16

## 2020-09-16 VITALS
RESPIRATION RATE: 16 BRPM | SYSTOLIC BLOOD PRESSURE: 135 MMHG | HEART RATE: 87 BPM | TEMPERATURE: 98.2 F | OXYGEN SATURATION: 98 % | DIASTOLIC BLOOD PRESSURE: 73 MMHG

## 2020-09-16 VITALS
OXYGEN SATURATION: 98 % | RESPIRATION RATE: 16 BRPM | TEMPERATURE: 98.2 F | DIASTOLIC BLOOD PRESSURE: 73 MMHG | HEART RATE: 87 BPM | SYSTOLIC BLOOD PRESSURE: 135 MMHG

## 2020-09-16 VITALS
TEMPERATURE: 98.2 F | DIASTOLIC BLOOD PRESSURE: 88 MMHG | WEIGHT: 147.71 LBS | BODY MASS INDEX: 24.58 KG/M2 | SYSTOLIC BLOOD PRESSURE: 134 MMHG | RESPIRATION RATE: 18 BRPM | HEART RATE: 89 BPM | OXYGEN SATURATION: 98 %

## 2020-09-16 DIAGNOSIS — M54.50 ACUTE LOW BACK PAIN WITHOUT SCIATICA, UNSPECIFIED BACK PAIN LATERALITY: ICD-10-CM

## 2020-09-16 DIAGNOSIS — M51.26 LUMBAR HERNIATED DISC: Primary | ICD-10-CM

## 2020-09-16 DIAGNOSIS — M51.36 LUMBAR DEGENERATIVE DISC DISEASE: Primary | ICD-10-CM

## 2020-09-16 DIAGNOSIS — M54.16 LUMBAR RADICULOPATHY: Primary | ICD-10-CM

## 2020-09-16 DIAGNOSIS — R52 PAIN: ICD-10-CM

## 2020-09-16 LAB
ANION GAP SERPL CALC-SCNC: 10 MMOL/L (ref 10–20)
BASOPHILS # BLD: 0 K/MCL (ref 0–0.3)
BASOPHILS NFR BLD: 1 %
BUN SERPL-MCNC: 21 MG/DL (ref 6–20)
BUN/CREAT SERPL: 21 (ref 7–25)
CALCIUM SERPL-MCNC: 8.9 MG/DL (ref 8.4–10.2)
CHLORIDE SERPL-SCNC: 106 MMOL/L (ref 98–107)
CO2 SERPL-SCNC: 28 MMOL/L (ref 21–32)
CREAT SERPL-MCNC: 0.99 MG/DL (ref 0.67–1.17)
DIFFERENTIAL METHOD BLD: NORMAL
EOSINOPHIL # BLD: 0.2 K/MCL (ref 0.1–0.5)
EOSINOPHIL NFR BLD: 2 %
ERYTHROCYTE [DISTWIDTH] IN BLOOD: 12.3 % (ref 11–15)
GLUCOSE SERPL-MCNC: 100 MG/DL (ref 65–99)
HCT VFR BLD CALC: 41.6 % (ref 39–51)
HGB BLD-MCNC: 14.1 G/DL (ref 13–17)
IMM GRANULOCYTES # BLD AUTO: 0 K/MCL (ref 0–0.2)
IMM GRANULOCYTES NFR BLD: 0 %
LYMPHOCYTES # BLD: 1.4 K/MCL (ref 1–4)
LYMPHOCYTES NFR BLD: 22 %
MCH RBC QN AUTO: 30.4 PG (ref 26–34)
MCHC RBC AUTO-ENTMCNC: 33.9 G/DL (ref 32–36.5)
MCV RBC AUTO: 89.7 FL (ref 78–100)
MONOCYTES # BLD: 0.6 K/MCL (ref 0.3–0.9)
MONOCYTES NFR BLD: 10 %
NEUTROPHILS # BLD: 4.1 K/MCL (ref 1.8–7.7)
NEUTROPHILS NFR BLD: 65 %
NRBC BLD MANUAL-RTO: 0 /100 WBC
PLATELET # BLD: 146 K/MCL (ref 140–450)
POTASSIUM SERPL-SCNC: 3.7 MMOL/L (ref 3.4–5.1)
RBC # BLD: 4.64 MIL/MCL (ref 4.5–5.9)
SODIUM SERPL-SCNC: 140 MMOL/L (ref 135–145)
WBC # BLD: 6.3 K/MCL (ref 4.2–11)

## 2020-09-16 PROCEDURE — 72131 CT LUMBAR SPINE W/O DYE: CPT

## 2020-09-16 PROCEDURE — 10002800 HB RX 250 W HCPCS: Performed by: EMERGENCY MEDICINE

## 2020-09-16 PROCEDURE — 99284 EMERGENCY DEPT VISIT MOD MDM: CPT

## 2020-09-16 PROCEDURE — 80048 BASIC METABOLIC PNL TOTAL CA: CPT

## 2020-09-16 PROCEDURE — 96375 TX/PRO/DX INJ NEW DRUG ADDON: CPT

## 2020-09-16 PROCEDURE — 85025 COMPLETE CBC W/AUTO DIFF WBC: CPT

## 2020-09-16 PROCEDURE — 99213 OFFICE O/P EST LOW 20 MIN: CPT | Performed by: ANESTHESIOLOGY

## 2020-09-16 PROCEDURE — 96374 THER/PROPH/DIAG INJ IV PUSH: CPT

## 2020-09-16 RX ORDER — ORPHENADRINE CITRATE 30 MG/ML
60 INJECTION INTRAMUSCULAR; INTRAVENOUS EVERY 12 HOURS PRN
Status: DISCONTINUED | OUTPATIENT
Start: 2020-09-16 | End: 2020-09-16 | Stop reason: HOSPADM

## 2020-09-16 RX ORDER — DEXAMETHASONE SODIUM PHOSPHATE 10 MG/ML
10 INJECTION, SOLUTION INTRAMUSCULAR; INTRAVENOUS ONCE
Status: COMPLETED | OUTPATIENT
Start: 2020-09-16 | End: 2020-09-16

## 2020-09-16 RX ORDER — TRAMADOL HYDROCHLORIDE 50 MG/1
50 TABLET ORAL EVERY 6 HOURS PRN
Qty: 20 TABLET | Refills: 0 | Status: SHIPPED | OUTPATIENT
Start: 2020-09-16 | End: 2021-12-27

## 2020-09-16 RX ORDER — BACLOFEN 10 MG/1
10 TABLET ORAL 3 TIMES DAILY PRN
Qty: 20 TABLET | Refills: 0 | Status: SHIPPED | OUTPATIENT
Start: 2020-09-16 | End: 2020-10-07 | Stop reason: ALTCHOICE

## 2020-09-16 RX ORDER — NAPROXEN 500 MG/1
500 TABLET ORAL 2 TIMES DAILY PRN
Qty: 20 TABLET | Refills: 0 | Status: SHIPPED | OUTPATIENT
Start: 2020-09-16 | End: 2020-11-13 | Stop reason: SDUPTHER

## 2020-09-16 RX ORDER — METHYLPREDNISOLONE ACETATE 80 MG/ML
80 INJECTION, SUSPENSION INTRA-ARTICULAR; INTRALESIONAL; INTRAMUSCULAR; SOFT TISSUE ONCE
Status: DISCONTINUED | OUTPATIENT
Start: 2020-09-16 | End: 2020-09-18 | Stop reason: HOSPADM

## 2020-09-16 RX ADMIN — DEXAMETHASONE SODIUM PHOSPHATE 10 MG: 10 INJECTION, SOLUTION INTRAMUSCULAR; INTRAVENOUS at 05:29

## 2020-09-16 RX ADMIN — ORPHENADRINE CITRATE 60 MG: 30 INJECTION INTRAMUSCULAR; INTRAVENOUS at 05:29

## 2020-09-16 RX ADMIN — KETOROLAC TROMETHAMINE 30 MG: 30 INJECTION, SOLUTION INTRAMUSCULAR; INTRAVENOUS at 05:29

## 2020-09-16 RX ADMIN — FENTANYL CITRATE 100 MCG: 50 INJECTION INTRAMUSCULAR; INTRAVENOUS at 06:44

## 2020-09-16 SDOH — HEALTH STABILITY: MENTAL HEALTH: HOW OFTEN DO YOU HAVE A DRINK CONTAINING ALCOHOL?: NEVER

## 2020-09-16 ASSESSMENT — PAIN DESCRIPTION - PAIN TYPE: TYPE: CHRONIC PAIN

## 2020-09-16 ASSESSMENT — PAIN SCALES - GENERAL
PAINLEVEL_OUTOF10: 8
PAINLEVEL_OUTOF10: 10

## 2020-09-17 ENCOUNTER — TELEPHONE (OUTPATIENT)
Dept: SCHEDULING | Age: 65
End: 2020-09-17

## 2020-09-20 ENCOUNTER — TELEPHONE (OUTPATIENT)
Dept: SCHEDULING | Age: 65
End: 2020-09-20

## 2020-09-22 ENCOUNTER — TELEPHONE (OUTPATIENT)
Dept: PAIN MANAGEMENT | Age: 65
End: 2020-09-22

## 2020-09-23 ENCOUNTER — HOSPITAL ENCOUNTER (OUTPATIENT)
Dept: GENERAL RADIOLOGY | Age: 65
Discharge: HOME | End: 2020-09-23
Attending: ANESTHESIOLOGY

## 2020-09-23 ENCOUNTER — HOSPITAL ENCOUNTER (OUTPATIENT)
Dept: PAIN MANAGEMENT | Age: 65
Discharge: HOME | End: 2020-09-23
Attending: ANESTHESIOLOGY

## 2020-09-23 ENCOUNTER — TELEPHONE (OUTPATIENT)
Dept: PAIN MANAGEMENT | Age: 65
End: 2020-09-23

## 2020-09-23 VITALS
RESPIRATION RATE: 14 BRPM | SYSTOLIC BLOOD PRESSURE: 133 MMHG | DIASTOLIC BLOOD PRESSURE: 72 MMHG | HEART RATE: 69 BPM | TEMPERATURE: 98.4 F | OXYGEN SATURATION: 99 %

## 2020-09-23 DIAGNOSIS — R52 PAIN: ICD-10-CM

## 2020-09-23 DIAGNOSIS — M54.16 LUMBAR RADICULOPATHY: ICD-10-CM

## 2020-09-23 PROCEDURE — 62323 NJX INTERLAMINAR LMBR/SAC: CPT | Performed by: ANESTHESIOLOGY

## 2020-09-23 PROCEDURE — 76000 FLUOROSCOPY <1 HR PHYS/QHP: CPT

## 2020-09-23 PROCEDURE — 10002801 HB RX 250 W/O HCPCS: Performed by: ANESTHESIOLOGY

## 2020-09-23 PROCEDURE — 10002805 HB CONTRAST AGENT: Performed by: ANESTHESIOLOGY

## 2020-09-23 PROCEDURE — 13000067 HB PAIN MANAGEMENT GROUP 2

## 2020-09-23 RX ORDER — METHYLPREDNISOLONE ACETATE 80 MG/ML
80 INJECTION, SUSPENSION INTRA-ARTICULAR; INTRALESIONAL; INTRAMUSCULAR; SOFT TISSUE ONCE
Status: COMPLETED | OUTPATIENT
Start: 2020-09-23 | End: 2020-09-23

## 2020-09-23 RX ADMIN — METHYLPREDNISOLONE ACETATE 80 MG: 80 INJECTION, SUSPENSION INTRA-ARTICULAR; INTRALESIONAL; INTRAMUSCULAR; SOFT TISSUE at 12:54

## 2020-09-23 RX ADMIN — IOHEXOL 0.5 ML: 300 INJECTION, SOLUTION INTRAVENOUS at 12:51

## 2020-09-23 SDOH — HEALTH STABILITY: MENTAL HEALTH: HOW OFTEN DO YOU HAVE A DRINK CONTAINING ALCOHOL?: NEVER

## 2020-09-23 ASSESSMENT — PAIN SCALES - GENERAL
PAINLEVEL_OUTOF10: 8
PAINLEVEL_OUTOF10: 8

## 2020-09-25 ENCOUNTER — TELEPHONE (OUTPATIENT)
Dept: PAIN MANAGEMENT | Age: 65
End: 2020-09-25

## 2020-09-29 ENCOUNTER — APPOINTMENT (OUTPATIENT)
Dept: CT IMAGING | Age: 65
End: 2020-09-29
Attending: EMERGENCY MEDICINE

## 2020-09-29 ENCOUNTER — HOSPITAL ENCOUNTER (OUTPATIENT)
Age: 65
Setting detail: OBSERVATION
Discharge: HOME | End: 2020-09-30
Attending: EMERGENCY MEDICINE | Admitting: INTERNAL MEDICINE

## 2020-09-29 ENCOUNTER — APPOINTMENT (OUTPATIENT)
Dept: GENERAL RADIOLOGY | Age: 65
End: 2020-09-29
Attending: EMERGENCY MEDICINE

## 2020-09-29 DIAGNOSIS — R00.2 PALPITATIONS: Primary | ICD-10-CM

## 2020-09-29 LAB
ANION GAP SERPL CALC-SCNC: 12 MMOL/L (ref 10–20)
BASOPHILS # BLD: 0.1 K/MCL (ref 0–0.3)
BASOPHILS NFR BLD: 1 %
BUN SERPL-MCNC: 21 MG/DL (ref 6–20)
BUN/CREAT SERPL: 25 (ref 7–25)
CALCIUM SERPL-MCNC: 9 MG/DL (ref 8.4–10.2)
CHLORIDE SERPL-SCNC: 106 MMOL/L (ref 98–107)
CO2 SERPL-SCNC: 27 MMOL/L (ref 21–32)
CREAT SERPL-MCNC: 0.85 MG/DL (ref 0.67–1.17)
D DIMER PPP FEU-MCNC: 0.53 MG/L (FEU)
DIFFERENTIAL METHOD BLD: NORMAL
EOSINOPHIL # BLD: 0.1 K/MCL (ref 0.1–0.5)
EOSINOPHIL NFR BLD: 1 %
ERYTHROCYTE [DISTWIDTH] IN BLOOD: 12.1 % (ref 11–15)
GLUCOSE SERPL-MCNC: 103 MG/DL (ref 65–99)
HCT VFR BLD CALC: 41.1 % (ref 39–51)
HGB BLD-MCNC: 13.9 G/DL (ref 13–17)
IMM GRANULOCYTES # BLD AUTO: 0 K/MCL (ref 0–0.2)
IMM GRANULOCYTES NFR BLD: 0 %
LYMPHOCYTES # BLD: 1.8 K/MCL (ref 1–4)
LYMPHOCYTES NFR BLD: 22 %
MAGNESIUM SERPL-MCNC: 2.2 MG/DL (ref 1.7–2.4)
MCH RBC QN AUTO: 29.8 PG (ref 26–34)
MCHC RBC AUTO-ENTMCNC: 33.8 G/DL (ref 32–36.5)
MCV RBC AUTO: 88 FL (ref 78–100)
MONOCYTES # BLD: 0.6 K/MCL (ref 0.3–0.9)
MONOCYTES NFR BLD: 7 %
NEUTROPHILS # BLD: 5.8 K/MCL (ref 1.8–7.7)
NEUTROPHILS NFR BLD: 69 %
NRBC BLD MANUAL-RTO: 0 /100 WBC
PLATELET # BLD: 162 K/MCL (ref 140–450)
POTASSIUM SERPL-SCNC: 3.6 MMOL/L (ref 3.4–5.1)
RBC # BLD: 4.67 MIL/MCL (ref 4.5–5.9)
SODIUM SERPL-SCNC: 141 MMOL/L (ref 135–145)
TROPONIN I SERPL HS-MCNC: <0.02 NG/ML
WBC # BLD: 8.3 K/MCL (ref 4.2–11)

## 2020-09-29 PROCEDURE — G0378 HOSPITAL OBSERVATION PER HR: HCPCS

## 2020-09-29 PROCEDURE — 71275 CT ANGIOGRAPHY CHEST: CPT

## 2020-09-29 PROCEDURE — 84484 ASSAY OF TROPONIN QUANT: CPT

## 2020-09-29 PROCEDURE — 36415 COLL VENOUS BLD VENIPUNCTURE: CPT

## 2020-09-29 PROCEDURE — 83735 ASSAY OF MAGNESIUM: CPT

## 2020-09-29 PROCEDURE — 10002805 HB CONTRAST AGENT: Performed by: EMERGENCY MEDICINE

## 2020-09-29 PROCEDURE — 71045 X-RAY EXAM CHEST 1 VIEW: CPT

## 2020-09-29 PROCEDURE — 80048 BASIC METABOLIC PNL TOTAL CA: CPT

## 2020-09-29 PROCEDURE — 87635 SARS-COV-2 COVID-19 AMP PRB: CPT

## 2020-09-29 PROCEDURE — 85379 FIBRIN DEGRADATION QUANT: CPT

## 2020-09-29 PROCEDURE — 85025 COMPLETE CBC W/AUTO DIFF WBC: CPT

## 2020-09-29 PROCEDURE — C9803 HOPD COVID-19 SPEC COLLECT: HCPCS

## 2020-09-29 PROCEDURE — 99220 INITIAL OBSERVATION CARE,LEVL III: CPT | Performed by: INTERNAL MEDICINE

## 2020-09-29 PROCEDURE — 99285 EMERGENCY DEPT VISIT HI MDM: CPT

## 2020-09-29 PROCEDURE — 93005 ELECTROCARDIOGRAM TRACING: CPT | Performed by: EMERGENCY MEDICINE

## 2020-09-29 RX ADMIN — IOHEXOL 70 ML: 350 INJECTION, SOLUTION INTRAVENOUS at 21:30

## 2020-09-29 SDOH — HEALTH STABILITY: MENTAL HEALTH: HOW OFTEN DO YOU HAVE A DRINK CONTAINING ALCOHOL?: NEVER

## 2020-09-29 ASSESSMENT — PAIN SCALES - GENERAL: PAINLEVEL_OUTOF10: 0

## 2020-09-29 ASSESSMENT — ENCOUNTER SYMPTOMS
FEVER: 0
WEAKNESS: 0
SHORTNESS OF BREATH: 0
HALLUCINATIONS: 0
ABDOMINAL PAIN: 0
HEADACHES: 0
NERVOUS/ANXIOUS: 1
SORE THROAT: 0
DIARRHEA: 0
CHILLS: 0
EYE PAIN: 0
WHEEZING: 0
CONFUSION: 0
COUGH: 0
NAUSEA: 0
LIGHT-HEADEDNESS: 1
VOMITING: 0
NUMBNESS: 0
DIZZINESS: 0

## 2020-09-30 ENCOUNTER — APPOINTMENT (OUTPATIENT)
Dept: CARDIOLOGY | Age: 65
End: 2020-09-30
Attending: INTERNAL MEDICINE

## 2020-09-30 ENCOUNTER — APPOINTMENT (OUTPATIENT)
Dept: CARDIOLOGY | Age: 65
End: 2020-09-30
Attending: NURSE PRACTITIONER

## 2020-09-30 VITALS
BODY MASS INDEX: 24.02 KG/M2 | DIASTOLIC BLOOD PRESSURE: 71 MMHG | HEIGHT: 65 IN | SYSTOLIC BLOOD PRESSURE: 118 MMHG | HEART RATE: 65 BPM | WEIGHT: 144.18 LBS | RESPIRATION RATE: 16 BRPM | OXYGEN SATURATION: 96 % | TEMPERATURE: 98.4 F

## 2020-09-30 LAB
ANION GAP SERPL CALC-SCNC: 8 MMOL/L (ref 10–20)
ATRIAL RATE (BPM): 86
BASOPHILS # BLD: 0.1 K/MCL (ref 0–0.3)
BASOPHILS NFR BLD: 1 %
BUN SERPL-MCNC: 18 MG/DL (ref 6–20)
BUN/CREAT SERPL: 22 (ref 7–25)
CALCIUM SERPL-MCNC: 8.9 MG/DL (ref 8.4–10.2)
CHLORIDE SERPL-SCNC: 106 MMOL/L (ref 98–107)
CO2 SERPL-SCNC: 28 MMOL/L (ref 21–32)
CREAT SERPL-MCNC: 0.81 MG/DL (ref 0.67–1.17)
DIFFERENTIAL METHOD BLD: NORMAL
EOSINOPHIL # BLD: 0.1 K/MCL (ref 0.1–0.5)
EOSINOPHIL NFR BLD: 1 %
ERYTHROCYTE [DISTWIDTH] IN BLOOD: 12.2 % (ref 11–15)
GLUCOSE SERPL-MCNC: 88 MG/DL (ref 65–99)
HCT VFR BLD CALC: 40.8 % (ref 39–51)
HGB BLD-MCNC: 13.6 G/DL (ref 13–17)
IMM GRANULOCYTES # BLD AUTO: 0 K/MCL (ref 0–0.2)
IMM GRANULOCYTES NFR BLD: 0 %
LYMPHOCYTES # BLD: 1.7 K/MCL (ref 1–4)
LYMPHOCYTES NFR BLD: 25 %
MCH RBC QN AUTO: 30 PG (ref 26–34)
MCHC RBC AUTO-ENTMCNC: 33.3 G/DL (ref 32–36.5)
MCV RBC AUTO: 90.1 FL (ref 78–100)
MONOCYTES # BLD: 0.5 K/MCL (ref 0.3–0.9)
MONOCYTES NFR BLD: 8 %
NEUTROPHILS # BLD: 4.5 K/MCL (ref 1.8–7.7)
NEUTROPHILS NFR BLD: 65 %
NRBC BLD MANUAL-RTO: 0 /100 WBC
P AXIS (DEGREES): 73
PLATELET # BLD: 150 K/MCL (ref 140–450)
POTASSIUM SERPL-SCNC: 3.8 MMOL/L (ref 3.4–5.1)
PR-INTERVAL (MSEC): 142
QRS-INTERVAL (MSEC): 92
QT-INTERVAL (MSEC): 358
QTC: 428
R AXIS (DEGREES): -48
RBC # BLD: 4.53 MIL/MCL (ref 4.5–5.9)
REPORT TEXT: NORMAL
SARS-COV-2 RNA RESP QL NAA+PROBE: NOT DETECTED
SERVICE CMNT-IMP: NORMAL
SODIUM SERPL-SCNC: 138 MMOL/L (ref 135–145)
SPECIMEN SOURCE: NORMAL
T AXIS (DEGREES): 44
VENTRICULAR RATE EKG/MIN (BPM): 86
WBC # BLD: 6.9 K/MCL (ref 4.2–11)

## 2020-09-30 PROCEDURE — 93270 REMOTE 30 DAY ECG REV/REPORT: CPT

## 2020-09-30 PROCEDURE — 85025 COMPLETE CBC W/AUTO DIFF WBC: CPT

## 2020-09-30 PROCEDURE — 80048 BASIC METABOLIC PNL TOTAL CA: CPT

## 2020-09-30 PROCEDURE — G0378 HOSPITAL OBSERVATION PER HR: HCPCS

## 2020-09-30 PROCEDURE — 93306 TTE W/DOPPLER COMPLETE: CPT

## 2020-09-30 PROCEDURE — 36415 COLL VENOUS BLD VENIPUNCTURE: CPT

## 2020-09-30 PROCEDURE — 93306 TTE W/DOPPLER COMPLETE: CPT | Performed by: INTERNAL MEDICINE

## 2020-09-30 PROCEDURE — 99217 OBSERVATION CARE DISCHARGE: CPT | Performed by: INTERNAL MEDICINE

## 2020-09-30 RX ORDER — PROCHLORPERAZINE MALEATE 5 MG/1
5 TABLET ORAL EVERY 4 HOURS PRN
Status: DISCONTINUED | OUTPATIENT
Start: 2020-09-30 | End: 2020-09-30 | Stop reason: HOSPADM

## 2020-09-30 RX ORDER — ENOXAPARIN SODIUM 100 MG/ML
40 INJECTION SUBCUTANEOUS DAILY
Status: DISCONTINUED | OUTPATIENT
Start: 2020-09-30 | End: 2020-09-30 | Stop reason: HOSPADM

## 2020-09-30 RX ORDER — ONDANSETRON 2 MG/ML
4 INJECTION INTRAMUSCULAR; INTRAVENOUS 2 TIMES DAILY PRN
Status: DISCONTINUED | OUTPATIENT
Start: 2020-09-30 | End: 2020-09-30 | Stop reason: HOSPADM

## 2020-09-30 RX ORDER — 0.9 % SODIUM CHLORIDE 0.9 %
2 VIAL (ML) INJECTION EVERY 12 HOURS SCHEDULED
Status: DISCONTINUED | OUTPATIENT
Start: 2020-09-30 | End: 2020-09-30 | Stop reason: HOSPADM

## 2020-09-30 RX ORDER — ACETAMINOPHEN 325 MG/1
650 TABLET ORAL EVERY 4 HOURS PRN
Status: DISCONTINUED | OUTPATIENT
Start: 2020-09-30 | End: 2020-09-30 | Stop reason: HOSPADM

## 2020-09-30 RX ORDER — TRAZODONE HYDROCHLORIDE 50 MG/1
50 TABLET ORAL NIGHTLY PRN
Status: DISCONTINUED | OUTPATIENT
Start: 2020-09-30 | End: 2020-09-30 | Stop reason: HOSPADM

## 2020-09-30 SDOH — HEALTH STABILITY: MENTAL HEALTH: HOW OFTEN DO YOU HAVE A DRINK CONTAINING ALCOHOL?: NEVER

## 2020-09-30 ASSESSMENT — ACTIVITIES OF DAILY LIVING (ADL)
DESCRIBE HOW PAIN IMPACTS YOUR LIFE: NONE/CAN MANAGE
ADL_SCORE: 12
ADL_SHORT_OF_BREATH: NO
CHRONIC_PAIN_PRESENT: YES, CHRONIC
RECENT_DECLINE_ADL: NO
ADL_BEFORE_ADMISSION: INDEPENDENT

## 2020-09-30 ASSESSMENT — LIFESTYLE VARIABLES
HOW OFTEN DO YOU HAVE A DRINK CONTAINING ALCOHOL: 2 TO 4 TIMES PER MONTH
AUDIT-C TOTAL SCORE: 2
ALCOHOL_USE_STATUS: NO OR LOW RISK WITH VALIDATED TOOL
HOW OFTEN DO YOU HAVE 6 OR MORE DRINKS ON ONE OCCASION: NEVER
HOW MANY STANDARD DRINKS CONTAINING ALCOHOL DO YOU HAVE ON A TYPICAL DAY: 0,1 OR 2

## 2020-09-30 ASSESSMENT — PATIENT HEALTH QUESTIONNAIRE - PHQ9
SUM OF ALL RESPONSES TO PHQ9 QUESTIONS 1 AND 2: 2
CLINICAL INTERPRETATION OF PHQ2 SCORE: NO FURTHER SCREENING NEEDED
IS PATIENT ABLE TO COMPLETE PHQ2 OR PHQ9: YES
SUM OF ALL RESPONSES TO PHQ9 QUESTIONS 1 AND 2: 2
CLINICAL INTERPRETATION OF PHQ9 SCORE: NO FURTHER SCREENING NEEDED
2. FEELING DOWN, DEPRESSED OR HOPELESS: SEVERAL DAYS
1. LITTLE INTEREST OR PLEASURE IN DOING THINGS: SEVERAL DAYS

## 2020-09-30 ASSESSMENT — COGNITIVE AND FUNCTIONAL STATUS - GENERAL
DO YOU HAVE DIFFICULTY DRESSING OR BATHING: NO
ARE YOU DEAF OR DO YOU HAVE SERIOUS DIFFICULTY  HEARING: NO
BECAUSE OF A PHYSICAL, MENTAL, OR EMOTIONAL CONDITION, DO YOU HAVE SERIOUS DIFFICULTY CONCENTRATING, REMEMBERING OR MAKING DECISIONS: NO
DO YOU HAVE SERIOUS DIFFICULTY WALKING OR CLIMBING STAIRS: NO
BECAUSE OF A PHYSICAL, MENTAL, OR EMOTIONAL CONDITION, DO YOU HAVE DIFFICULTY DOING ERRANDS ALONE: NO
ARE YOU BLIND OR DO YOU HAVE SERIOUS DIFFICULTY SEEING, EVEN WHEN WEARING GLASSES: NO

## 2020-09-30 ASSESSMENT — COLUMBIA-SUICIDE SEVERITY RATING SCALE - C-SSRS
6. HAVE YOU EVER DONE ANYTHING, STARTED TO DO ANYTHING, OR PREPARED TO DO ANYTHING TO END YOUR LIFE?: NO
2. HAVE YOU ACTUALLY HAD ANY THOUGHTS OF KILLING YOURSELF?: NO
1. WITHIN THE PAST MONTH, HAVE YOU WISHED YOU WERE DEAD OR WISHED YOU COULD GO TO SLEEP AND NOT WAKE UP?: NO
IS THE PATIENT ABLE TO COMPLETE C-SSRS: YES

## 2020-09-30 ASSESSMENT — PAIN SCALES - GENERAL
PAINLEVEL_OUTOF10: 0
PAINLEVEL_OUTOF10: 0

## 2020-10-01 ENCOUNTER — TELEPHONE (OUTPATIENT)
Dept: SCHEDULING | Age: 65
End: 2020-10-01

## 2020-10-02 ENCOUNTER — TELEPHONE (OUTPATIENT)
Dept: CARDIOLOGY | Age: 65
End: 2020-10-02

## 2020-10-02 ENCOUNTER — TELEPHONE (OUTPATIENT)
Dept: SCHEDULING | Age: 65
End: 2020-10-02

## 2020-10-06 ENCOUNTER — TELEPHONE (OUTPATIENT)
Dept: CARDIOLOGY | Age: 65
End: 2020-10-06

## 2020-10-06 PROBLEM — M48.061 SPINAL STENOSIS OF LUMBAR REGION: Status: ACTIVE | Noted: 2018-04-03

## 2020-10-07 ENCOUNTER — OFFICE VISIT (OUTPATIENT)
Dept: INTERNAL MEDICINE | Age: 65
End: 2020-10-07

## 2020-10-07 ENCOUNTER — TELEPHONE (OUTPATIENT)
Dept: SCHEDULING | Age: 65
End: 2020-10-07

## 2020-10-07 VITALS
OXYGEN SATURATION: 95 % | HEART RATE: 97 BPM | WEIGHT: 141.98 LBS | BODY MASS INDEX: 23.65 KG/M2 | DIASTOLIC BLOOD PRESSURE: 62 MMHG | HEIGHT: 65 IN | TEMPERATURE: 98.2 F | SYSTOLIC BLOOD PRESSURE: 120 MMHG

## 2020-10-07 DIAGNOSIS — H93.13 TINNITUS OF BOTH EARS: ICD-10-CM

## 2020-10-07 DIAGNOSIS — I49.3 PVC'S (PREMATURE VENTRICULAR CONTRACTIONS): Primary | ICD-10-CM

## 2020-10-07 DIAGNOSIS — Z28.21 REFUSED INFLUENZA VACCINE: ICD-10-CM

## 2020-10-07 PROCEDURE — 99214 OFFICE O/P EST MOD 30 MIN: CPT | Performed by: INTERNAL MEDICINE

## 2020-10-07 ASSESSMENT — ENCOUNTER SYMPTOMS
PSYCHIATRIC NEGATIVE: 1
EYES NEGATIVE: 1
CONSTITUTIONAL NEGATIVE: 1
ENDOCRINE NEGATIVE: 1
NEUROLOGICAL NEGATIVE: 1
HEMATOLOGIC/LYMPHATIC NEGATIVE: 1
ALLERGIC/IMMUNOLOGIC NEGATIVE: 1
RESPIRATORY NEGATIVE: 1
GASTROINTESTINAL NEGATIVE: 1

## 2020-10-07 ASSESSMENT — PATIENT HEALTH QUESTIONNAIRE - PHQ9
CLINICAL INTERPRETATION OF PHQ2 SCORE: NO FURTHER SCREENING NEEDED
CLINICAL INTERPRETATION OF PHQ9 SCORE: NO FURTHER SCREENING NEEDED
SUM OF ALL RESPONSES TO PHQ9 QUESTIONS 1 AND 2: 0
1. LITTLE INTEREST OR PLEASURE IN DOING THINGS: NOT AT ALL
SUM OF ALL RESPONSES TO PHQ9 QUESTIONS 1 AND 2: 0
2. FEELING DOWN, DEPRESSED OR HOPELESS: NOT AT ALL

## 2020-10-10 ENCOUNTER — TELEPHONE (OUTPATIENT)
Dept: SCHEDULING | Age: 65
End: 2020-10-10

## 2020-10-16 ENCOUNTER — TELEPHONE (OUTPATIENT)
Dept: INTERNAL MEDICINE | Age: 65
End: 2020-10-16

## 2020-10-16 RX ORDER — MELATONIN
2000 DAILY
Qty: 60 TABLET | Refills: 3 | Status: SHIPPED | OUTPATIENT
Start: 2020-10-16 | End: 2021-03-09 | Stop reason: SDUPTHER

## 2020-10-16 RX ORDER — MELATONIN
2000 DAILY
Qty: 60 TABLET | Refills: 3 | Status: SHIPPED | OUTPATIENT
Start: 2020-10-16 | End: 2020-10-16 | Stop reason: SDUPTHER

## 2020-10-24 ENCOUNTER — TELEPHONE (OUTPATIENT)
Dept: SCHEDULING | Age: 65
End: 2020-10-24

## 2020-11-10 ENCOUNTER — TELEPHONE (OUTPATIENT)
Dept: CARDIOLOGY | Age: 65
End: 2020-11-10

## 2020-11-11 RX ORDER — PROPRANOLOL HYDROCHLORIDE 10 MG/1
10 TABLET ORAL DAILY PRN
Qty: 30 TABLET | Refills: 0 | Status: SHIPPED | OUTPATIENT
Start: 2020-11-11 | End: 2020-12-11

## 2020-11-13 ENCOUNTER — TELEPHONE (OUTPATIENT)
Dept: SCHEDULING | Age: 65
End: 2020-11-13

## 2020-11-13 RX ORDER — NAPROXEN 500 MG/1
500 TABLET ORAL 2 TIMES DAILY PRN
Qty: 30 TABLET | Refills: 2 | Status: SHIPPED | OUTPATIENT
Start: 2020-11-13 | End: 2020-11-14 | Stop reason: DRUGHIGH

## 2020-11-14 ENCOUNTER — TELEPHONE (OUTPATIENT)
Dept: SCHEDULING | Age: 65
End: 2020-11-14

## 2020-11-14 ENCOUNTER — E-ADVICE (OUTPATIENT)
Dept: INTERNAL MEDICINE | Age: 65
End: 2020-11-14

## 2020-11-14 DIAGNOSIS — Z20.822 SUSPECTED COVID-19 VIRUS INFECTION: Primary | ICD-10-CM

## 2020-11-14 RX ORDER — NAPROXEN 250 MG/1
250 TABLET ORAL 2 TIMES DAILY WITH MEALS
Qty: 30 TABLET | Refills: 3 | Status: SHIPPED | OUTPATIENT
Start: 2020-11-14 | End: 2021-06-01

## 2020-11-16 ENCOUNTER — TELEPHONE (OUTPATIENT)
Dept: SCHEDULING | Age: 65
End: 2020-11-16

## 2020-11-30 ENCOUNTER — TELEPHONE (OUTPATIENT)
Dept: CARDIOLOGY | Age: 65
End: 2020-11-30

## 2020-11-30 DIAGNOSIS — R42 DIZZINESS: ICD-10-CM

## 2020-11-30 DIAGNOSIS — R00.2 PALPITATIONS: Primary | ICD-10-CM

## 2020-12-11 RX ORDER — PROPRANOLOL HYDROCHLORIDE 10 MG/1
TABLET ORAL
Qty: 30 TABLET | Refills: 0 | Status: SHIPPED | OUTPATIENT
Start: 2020-12-11 | End: 2021-01-06 | Stop reason: SDUPTHER

## 2020-12-14 ENCOUNTER — LAB SERVICES (OUTPATIENT)
Dept: LAB | Age: 65
End: 2020-12-14

## 2020-12-14 DIAGNOSIS — R42 DIZZINESS: ICD-10-CM

## 2020-12-14 DIAGNOSIS — R73.9 HYPERGLYCEMIA: ICD-10-CM

## 2020-12-14 DIAGNOSIS — I77.9 BILATERAL CAROTID ARTERY DISEASE, UNSPECIFIED TYPE (CMD): ICD-10-CM

## 2020-12-14 DIAGNOSIS — R93.1 ELEVATED CORONARY ARTERY CALCIUM SCORE: ICD-10-CM

## 2020-12-14 DIAGNOSIS — I25.10 ATHEROSCLEROSIS OF NATIVE CORONARY ARTERY OF NATIVE HEART WITHOUT ANGINA PECTORIS: ICD-10-CM

## 2020-12-14 DIAGNOSIS — R00.2 PALPITATIONS: ICD-10-CM

## 2020-12-14 DIAGNOSIS — R93.89 ABNORMAL CHEST X-RAY: ICD-10-CM

## 2020-12-14 LAB
ALT SERPL-CCNC: 23 UNITS/L
ANION GAP SERPL CALC-SCNC: 6 MMOL/L (ref 10–20)
AST SERPL-CCNC: 20 UNITS/L
BUN SERPL-MCNC: 20 MG/DL (ref 6–20)
BUN/CREAT SERPL: 23 (ref 7–25)
CALCIUM SERPL-MCNC: 8.4 MG/DL (ref 8.4–10.2)
CHLORIDE SERPL-SCNC: 109 MMOL/L (ref 98–107)
CHOLEST SERPL-MCNC: 161 MG/DL
CHOLEST/HDLC SERPL: 2.6 {RATIO}
CO2 SERPL-SCNC: 29 MMOL/L (ref 21–32)
CREAT SERPL-MCNC: 0.87 MG/DL (ref 0.67–1.17)
FASTING DURATION TIME PATIENT: 12 HOURS
FASTING DURATION TIME PATIENT: 12 HOURS
GFR SERPLBLD BASED ON 1.73 SQ M-ARVRAT: >90 ML/MIN/1.73M2
GLUCOSE SERPL-MCNC: 101 MG/DL (ref 65–99)
HDLC SERPL-MCNC: 62 MG/DL
LDLC SERPL CALC-MCNC: 89 MG/DL
NONHDLC SERPL-MCNC: 99 MG/DL
POTASSIUM SERPL-SCNC: 4.1 MMOL/L (ref 3.4–5.1)
SODIUM SERPL-SCNC: 140 MMOL/L (ref 135–145)
TRIGL SERPL-MCNC: 51 MG/DL

## 2020-12-14 PROCEDURE — 84450 TRANSFERASE (AST) (SGOT): CPT | Performed by: CLINICAL MEDICAL LABORATORY

## 2020-12-14 PROCEDURE — 36415 COLL VENOUS BLD VENIPUNCTURE: CPT | Performed by: CLINICAL MEDICAL LABORATORY

## 2020-12-14 PROCEDURE — 84460 ALANINE AMINO (ALT) (SGPT): CPT | Performed by: CLINICAL MEDICAL LABORATORY

## 2020-12-14 PROCEDURE — 80048 BASIC METABOLIC PNL TOTAL CA: CPT | Performed by: CLINICAL MEDICAL LABORATORY

## 2020-12-14 PROCEDURE — 80061 LIPID PANEL: CPT | Performed by: CLINICAL MEDICAL LABORATORY

## 2020-12-15 ASSESSMENT — ENCOUNTER SYMPTOMS
HEMOPTYSIS: 0
ALLERGIC/IMMUNOLOGIC COMMENTS: NO NEW FOOD ALLERGIES
COUGH: 0
SUSPICIOUS LESIONS: 0
HEMATOCHEZIA: 0
BACK PAIN: 1
CHILLS: 0
FEVER: 0
WEIGHT GAIN: 0
BRUISES/BLEEDS EASILY: 0
WEIGHT LOSS: 0

## 2020-12-17 ENCOUNTER — OFFICE VISIT (OUTPATIENT)
Dept: CARDIOLOGY | Age: 65
End: 2020-12-17

## 2020-12-17 ENCOUNTER — TELEPHONE (OUTPATIENT)
Dept: CARDIOLOGY | Age: 65
End: 2020-12-17

## 2020-12-17 VITALS
HEART RATE: 72 BPM | WEIGHT: 143 LBS | BODY MASS INDEX: 22.98 KG/M2 | DIASTOLIC BLOOD PRESSURE: 60 MMHG | SYSTOLIC BLOOD PRESSURE: 104 MMHG | RESPIRATION RATE: 16 BRPM | HEIGHT: 66 IN

## 2020-12-17 DIAGNOSIS — R73.9 HYPERGLYCEMIA: ICD-10-CM

## 2020-12-17 DIAGNOSIS — I49.3 PVC'S (PREMATURE VENTRICULAR CONTRACTIONS): Primary | ICD-10-CM

## 2020-12-17 DIAGNOSIS — I77.9 BILATERAL CAROTID ARTERY DISEASE, UNSPECIFIED TYPE (CMD): ICD-10-CM

## 2020-12-17 DIAGNOSIS — I25.10 ATHEROSCLEROSIS OF NATIVE CORONARY ARTERY OF NATIVE HEART WITHOUT ANGINA PECTORIS: ICD-10-CM

## 2020-12-17 DIAGNOSIS — R93.1 ELEVATED CORONARY ARTERY CALCIUM SCORE: ICD-10-CM

## 2020-12-17 PROCEDURE — 99214 OFFICE O/P EST MOD 30 MIN: CPT | Performed by: INTERNAL MEDICINE

## 2020-12-17 SDOH — HEALTH STABILITY: MENTAL HEALTH: HOW OFTEN DO YOU HAVE A DRINK CONTAINING ALCOHOL?: NEVER

## 2020-12-17 ASSESSMENT — PATIENT HEALTH QUESTIONNAIRE - PHQ9
SUM OF ALL RESPONSES TO PHQ9 QUESTIONS 1 AND 2: 0
CLINICAL INTERPRETATION OF PHQ9 SCORE: NO FURTHER SCREENING NEEDED
CLINICAL INTERPRETATION OF PHQ2 SCORE: NO FURTHER SCREENING NEEDED
SUM OF ALL RESPONSES TO PHQ9 QUESTIONS 1 AND 2: 0
2. FEELING DOWN, DEPRESSED OR HOPELESS: NOT AT ALL
1. LITTLE INTEREST OR PLEASURE IN DOING THINGS: NOT AT ALL

## 2020-12-19 ENCOUNTER — TELEPHONE (OUTPATIENT)
Dept: SCHEDULING | Age: 65
End: 2020-12-19

## 2021-01-01 ENCOUNTER — EXTERNAL RECORD (OUTPATIENT)
Dept: HEALTH INFORMATION MANAGEMENT | Facility: OTHER | Age: 66
End: 2021-01-01

## 2021-01-13 RX ORDER — PROPRANOLOL HYDROCHLORIDE 10 MG/1
10 TABLET ORAL DAILY PRN
Qty: 30 TABLET | Refills: 11 | Status: SHIPPED | OUTPATIENT
Start: 2021-01-13 | End: 2022-03-11

## 2021-01-13 RX ORDER — PROPRANOLOL HYDROCHLORIDE 10 MG/1
TABLET ORAL
Qty: 30 TABLET | Refills: 0 | OUTPATIENT
Start: 2021-01-13

## 2021-02-08 ENCOUNTER — TELEPHONE (OUTPATIENT)
Dept: NEUROLOGY | Facility: CLINIC | Age: 66
End: 2021-02-08

## 2021-02-08 ENCOUNTER — OFFICE VISIT (OUTPATIENT)
Dept: NEUROLOGY | Facility: CLINIC | Age: 66
End: 2021-02-08
Payer: MEDICARE

## 2021-02-08 VITALS
HEIGHT: 66 IN | HEART RATE: 74 BPM | RESPIRATION RATE: 16 BRPM | SYSTOLIC BLOOD PRESSURE: 122 MMHG | DIASTOLIC BLOOD PRESSURE: 70 MMHG | WEIGHT: 141 LBS | BODY MASS INDEX: 22.66 KG/M2

## 2021-02-08 DIAGNOSIS — I65.23 CAROTID STENOSIS, BILATERAL: Primary | ICD-10-CM

## 2021-02-08 DIAGNOSIS — R42 LIGHTHEADEDNESS: ICD-10-CM

## 2021-02-08 DIAGNOSIS — H90.42 SENSORINEURAL HEARING LOSS (SNHL) OF LEFT EAR WITH UNRESTRICTED HEARING OF RIGHT EAR: ICD-10-CM

## 2021-02-08 DIAGNOSIS — R42 DIZZINESS: ICD-10-CM

## 2021-02-08 PROCEDURE — 99204 OFFICE O/P NEW MOD 45 MIN: CPT | Performed by: OTHER

## 2021-02-08 RX ORDER — PROPRANOLOL HYDROCHLORIDE 10 MG/1
10 TABLET ORAL 3 TIMES DAILY
COMMUNITY

## 2021-02-08 NOTE — TELEPHONE ENCOUNTER
Pt saw Dr. Shayy Ibrahim for an office visit today and has more symptoms and MRI questions that he forget to address during the visit. Pt wants to let Dr. Shayy Ibrahim know. Call pt to discuss.

## 2021-02-08 NOTE — PROGRESS NOTES
72 R[facility]      {Chief complaint:  He gets lightheaded    Any \"negative situations\" and also could be positional.   The feeling slowly creeps in and thinks he is going to blackout and sometimes associated with \"fullness in his head\". Has not passed out. Procedure Laterality Date   • Colonoscopy,diagnostic  2006     normal, EGD as well    • Other surgical history      right foot screw   • Other surgical history      multiple lipoma approx 30 yr ago   • Other surgical history  2012    left knee meniscus   • Rich  2/8/2021, Time completed 9:36 AM

## 2021-02-08 NOTE — TELEPHONE ENCOUNTER
S. Pt wanted to add additional information from visit. B. Dx: Carotid stenosis    A: Wants to confirm you are imaging the executive part of the brain-that looks at the multitasking area. He thinks the inner ear the Hypercolsus.   His PT exercises aggrav

## 2021-02-11 DIAGNOSIS — Z23 NEED FOR VACCINATION: ICD-10-CM

## 2021-02-16 ENCOUNTER — HOSPITAL ENCOUNTER (OUTPATIENT)
Dept: MRI IMAGING | Age: 66
Discharge: HOME | End: 2021-02-16
Attending: Other
Payer: MEDICARE

## 2021-02-16 DIAGNOSIS — I65.23 CAROTID STENOSIS, BILATERAL: ICD-10-CM

## 2021-02-16 DIAGNOSIS — H90.42 SENSORINEURAL HEARING LOSS (SNHL) OF LEFT EAR WITH UNRESTRICTED HEARING OF RIGHT EAR: ICD-10-CM

## 2021-02-16 DIAGNOSIS — R42 LIGHTHEADEDNESS: ICD-10-CM

## 2021-02-16 DIAGNOSIS — R42 DIZZINESS: ICD-10-CM

## 2021-02-16 LAB — CREAT BLD-MCNC: 0.8 MG/DL

## 2021-02-16 PROCEDURE — A9575 INJ GADOTERATE MEGLUMI 0.1ML: HCPCS | Performed by: OTHER

## 2021-02-16 PROCEDURE — 70553 MRI BRAIN STEM W/O & W/DYE: CPT | Performed by: OTHER

## 2021-02-16 PROCEDURE — 70549 MR ANGIOGRAPH NECK W/O&W/DYE: CPT | Performed by: OTHER

## 2021-02-16 PROCEDURE — 82565 ASSAY OF CREATININE: CPT

## 2021-02-19 ENCOUNTER — TELEPHONE (OUTPATIENT)
Dept: NEUROLOGY | Facility: CLINIC | Age: 66
End: 2021-02-19

## 2021-02-19 ENCOUNTER — OFFICE VISIT (OUTPATIENT)
Dept: NEUROLOGY | Facility: CLINIC | Age: 66
End: 2021-02-19
Payer: MEDICARE

## 2021-02-19 VITALS
HEART RATE: 74 BPM | DIASTOLIC BLOOD PRESSURE: 70 MMHG | RESPIRATION RATE: 16 BRPM | SYSTOLIC BLOOD PRESSURE: 120 MMHG | WEIGHT: 141 LBS | BODY MASS INDEX: 22.66 KG/M2 | HEIGHT: 66 IN

## 2021-02-19 DIAGNOSIS — R42 DIZZINESS: ICD-10-CM

## 2021-02-19 DIAGNOSIS — R20.9 SKIN SENSATION DISTURBANCE: ICD-10-CM

## 2021-02-19 DIAGNOSIS — R42 LIGHTHEADEDNESS: Primary | ICD-10-CM

## 2021-02-19 PROCEDURE — 99213 OFFICE O/P EST LOW 20 MIN: CPT | Performed by: OTHER

## 2021-02-19 NOTE — PROGRESS NOTES
[address] [place] [address] with [facility]  2/19/2021    2:19 PM      Since his last visit, his sensation of burning in now only in his nose lucy oral region.   Still gets aural hypersensitivyt when hyperextending his

## 2021-02-19 NOTE — TELEPHONE ENCOUNTER
Pt's 1998 imaging showed two bone spurs \"poking\" his carotid artery. He forgot to ask at today's visit if the recent MRA showed the same thing. Please call pt.

## 2021-03-16 ENCOUNTER — TELEPHONE (OUTPATIENT)
Dept: NEUROLOGY | Facility: CLINIC | Age: 66
End: 2021-03-16

## 2021-03-16 NOTE — TELEPHONE ENCOUNTER
Patient is calling because Dr. Ilene Rodriguez referred him to Dr. Neida Rubi (orthopedic surgeon). He is concerned that he is seeing ortho surgeon versus a neurosurgeon. Please call to clarify most appropriate surgeon to schedule with.

## 2021-03-16 NOTE — TELEPHONE ENCOUNTER
RN spoke to the patient and reassured him he should follow the recommendation from Dr. Annette Casas. Advised if he wants him to see a different specialist he will advise accordingly.

## 2021-03-17 NOTE — TELEPHONE ENCOUNTER
Pt called again and stated orthopedic surgeon may have looked at old images. Call pt on 3/18/21 to clarify and if has any questions and concerns.

## 2021-03-17 NOTE — TELEPHONE ENCOUNTER
Pt saw orthopedic surgeon and getting conflicting reports that he does not have a herniated disc. Pt is frustrated and confused of his actual diagnosis. Call pt tomorrow on 3/18/21 to discuss.

## 2021-03-31 ENCOUNTER — TELEPHONE (OUTPATIENT)
Dept: SCHEDULING | Age: 66
End: 2021-03-31

## 2021-04-02 ENCOUNTER — LAB SERVICES (OUTPATIENT)
Dept: LAB | Age: 66
End: 2021-04-02

## 2021-04-02 PROCEDURE — 86769 SARS-COV-2 COVID-19 ANTIBODY: CPT | Performed by: INTERNAL MEDICINE

## 2021-04-02 PROCEDURE — 36415 COLL VENOUS BLD VENIPUNCTURE: CPT | Performed by: INTERNAL MEDICINE

## 2021-04-03 LAB
SARS-COV-2 IGG SERPL QL IA: POSITIVE
SARS-COV-2 N IGG SERPL QL IA: 1.51

## 2021-04-08 ENCOUNTER — TELEPHONE (OUTPATIENT)
Dept: SCHEDULING | Age: 66
End: 2021-04-08

## 2021-04-09 ENCOUNTER — TELEPHONE (OUTPATIENT)
Dept: SCHEDULING | Age: 66
End: 2021-04-09

## 2021-04-12 ENCOUNTER — TELEPHONE (OUTPATIENT)
Dept: SCHEDULING | Age: 66
End: 2021-04-12

## 2021-04-21 ENCOUNTER — TELEPHONE (OUTPATIENT)
Dept: SCHEDULING | Age: 66
End: 2021-04-21

## 2021-04-26 ENCOUNTER — LAB SERVICES (OUTPATIENT)
Dept: LAB | Age: 66
End: 2021-04-26

## 2021-04-26 ENCOUNTER — TELEPHONE (OUTPATIENT)
Dept: SCHEDULING | Age: 66
End: 2021-04-26

## 2021-04-26 ENCOUNTER — OFFICE VISIT (OUTPATIENT)
Dept: INTERNAL MEDICINE | Age: 66
End: 2021-04-26

## 2021-04-26 VITALS
WEIGHT: 148 LBS | OXYGEN SATURATION: 96 % | HEART RATE: 68 BPM | TEMPERATURE: 99.1 F | DIASTOLIC BLOOD PRESSURE: 72 MMHG | BODY MASS INDEX: 23.89 KG/M2 | SYSTOLIC BLOOD PRESSURE: 124 MMHG | RESPIRATION RATE: 16 BRPM

## 2021-04-26 DIAGNOSIS — T14.8XXA SKIN ABRASION: Primary | ICD-10-CM

## 2021-04-26 DIAGNOSIS — R25.2 CRAMPS, MUSCLE, GENERAL: ICD-10-CM

## 2021-04-26 LAB
ALBUMIN SERPL-MCNC: 3.8 G/DL (ref 3.6–5.1)
ALBUMIN/GLOB SERPL: 1.1 {RATIO} (ref 1–2.4)
ALP SERPL-CCNC: 62 UNITS/L (ref 45–117)
ALT SERPL-CCNC: 23 UNITS/L
ANION GAP SERPL CALC-SCNC: 9 MMOL/L (ref 10–20)
AST SERPL-CCNC: 20 UNITS/L
BASOPHILS # BLD: 0.1 K/MCL (ref 0–0.3)
BASOPHILS NFR BLD: 1 %
BILIRUB SERPL-MCNC: 0.4 MG/DL (ref 0.2–1)
BUN SERPL-MCNC: 24 MG/DL (ref 6–20)
BUN/CREAT SERPL: 24 (ref 7–25)
CALCIUM SERPL-MCNC: 9.3 MG/DL (ref 8.4–10.2)
CHLORIDE SERPL-SCNC: 109 MMOL/L (ref 98–107)
CO2 SERPL-SCNC: 26 MMOL/L (ref 21–32)
CREAT SERPL-MCNC: 0.98 MG/DL (ref 0.67–1.17)
DEPRECATED RDW RBC: 40.6 FL (ref 39–50)
EOSINOPHIL # BLD: 0.1 K/MCL (ref 0–0.5)
EOSINOPHIL NFR BLD: 2 %
ERYTHROCYTE [DISTWIDTH] IN BLOOD: 12.1 % (ref 11–15)
FASTING DURATION TIME PATIENT: 0 HOURS
GFR SERPLBLD BASED ON 1.73 SQ M-ARVRAT: 80 ML/MIN/1.73M2
GLOBULIN SER-MCNC: 3.4 G/DL (ref 2–4)
GLUCOSE SERPL-MCNC: 93 MG/DL (ref 65–99)
HCT VFR BLD CALC: 42 % (ref 39–51)
HGB BLD-MCNC: 14.2 G/DL (ref 13–17)
IMM GRANULOCYTES # BLD AUTO: 0 K/MCL (ref 0–0.2)
IMM GRANULOCYTES # BLD: 0 %
LYMPHOCYTES # BLD: 1.4 K/MCL (ref 1–4)
LYMPHOCYTES NFR BLD: 23 %
MCH RBC QN AUTO: 30.8 PG (ref 26–34)
MCHC RBC AUTO-ENTMCNC: 33.8 G/DL (ref 32–36.5)
MCV RBC AUTO: 91.1 FL (ref 78–100)
MONOCYTES # BLD: 0.5 K/MCL (ref 0.3–0.9)
MONOCYTES NFR BLD: 9 %
NEUTROPHILS # BLD: 4.1 K/MCL (ref 1.8–7.7)
NEUTROPHILS NFR BLD: 65 %
NRBC BLD MANUAL-RTO: 0 /100 WBC
PLATELET # BLD AUTO: 148 K/MCL (ref 140–450)
POTASSIUM SERPL-SCNC: 4.1 MMOL/L (ref 3.4–5.1)
PROT SERPL-MCNC: 7.2 G/DL (ref 6.4–8.2)
RBC # BLD: 4.61 MIL/MCL (ref 4.5–5.9)
SODIUM SERPL-SCNC: 140 MMOL/L (ref 135–145)
WBC # BLD: 6.1 K/MCL (ref 4.2–11)

## 2021-04-26 PROCEDURE — 90471 IMMUNIZATION ADMIN: CPT | Performed by: INTERNAL MEDICINE

## 2021-04-26 PROCEDURE — 85025 COMPLETE CBC W/AUTO DIFF WBC: CPT | Performed by: INTERNAL MEDICINE

## 2021-04-26 PROCEDURE — 90715 TDAP VACCINE 7 YRS/> IM: CPT

## 2021-04-26 PROCEDURE — 80053 COMPREHEN METABOLIC PANEL: CPT | Performed by: INTERNAL MEDICINE

## 2021-04-26 PROCEDURE — 99213 OFFICE O/P EST LOW 20 MIN: CPT | Performed by: INTERNAL MEDICINE

## 2021-04-26 PROCEDURE — 36415 COLL VENOUS BLD VENIPUNCTURE: CPT | Performed by: INTERNAL MEDICINE

## 2021-04-26 RX ORDER — PROPRANOLOL HYDROCHLORIDE 10 MG/1
10 TABLET ORAL
COMMUNITY
End: 2021-06-01

## 2021-05-18 ENCOUNTER — TELEPHONE (OUTPATIENT)
Dept: SCHEDULING | Age: 66
End: 2021-05-18

## 2021-05-26 VITALS
SYSTOLIC BLOOD PRESSURE: 120 MMHG | BODY MASS INDEX: 24.99 KG/M2 | BODY MASS INDEX: 24.99 KG/M2 | WEIGHT: 150 LBS | DIASTOLIC BLOOD PRESSURE: 70 MMHG | HEIGHT: 65 IN | SYSTOLIC BLOOD PRESSURE: 120 MMHG | HEART RATE: 66 BPM | WEIGHT: 150 LBS | DIASTOLIC BLOOD PRESSURE: 72 MMHG | DIASTOLIC BLOOD PRESSURE: 66 MMHG | SYSTOLIC BLOOD PRESSURE: 152 MMHG | TEMPERATURE: 97.7 F | HEIGHT: 65 IN | DIASTOLIC BLOOD PRESSURE: 67 MMHG | HEIGHT: 65 IN | HEIGHT: 65 IN | HEART RATE: 72 BPM | WEIGHT: 150 LBS | BODY MASS INDEX: 24.99 KG/M2 | SYSTOLIC BLOOD PRESSURE: 115 MMHG | HEART RATE: 71 BPM | DIASTOLIC BLOOD PRESSURE: 70 MMHG | HEART RATE: 80 BPM | SYSTOLIC BLOOD PRESSURE: 108 MMHG | WEIGHT: 150 LBS | WEIGHT: 145 LBS | HEART RATE: 78 BPM | BODY MASS INDEX: 24.16 KG/M2 | HEIGHT: 65 IN | DIASTOLIC BLOOD PRESSURE: 83 MMHG | HEART RATE: 74 BPM | HEIGHT: 65 IN | OXYGEN SATURATION: 99 % | SYSTOLIC BLOOD PRESSURE: 116 MMHG | BODY MASS INDEX: 24.99 KG/M2 | WEIGHT: 150 LBS | BODY MASS INDEX: 24.99 KG/M2 | RESPIRATION RATE: 16 BRPM | TEMPERATURE: 98 F

## 2021-06-01 ENCOUNTER — TELEPHONE (OUTPATIENT)
Dept: SCHEDULING | Age: 66
End: 2021-06-01

## 2021-06-01 ENCOUNTER — OFFICE VISIT (OUTPATIENT)
Dept: INTERNAL MEDICINE | Age: 66
End: 2021-06-01

## 2021-06-01 ENCOUNTER — HOSPITAL ENCOUNTER (OUTPATIENT)
Dept: GENERAL RADIOLOGY | Age: 66
Discharge: HOME | End: 2021-06-01

## 2021-06-01 VITALS
SYSTOLIC BLOOD PRESSURE: 100 MMHG | HEART RATE: 61 BPM | OXYGEN SATURATION: 99 % | BODY MASS INDEX: 23.4 KG/M2 | TEMPERATURE: 97.6 F | WEIGHT: 145 LBS | DIASTOLIC BLOOD PRESSURE: 66 MMHG

## 2021-06-01 DIAGNOSIS — Z91.048 ALLERGY TO MOLD: ICD-10-CM

## 2021-06-01 DIAGNOSIS — R05.9 COUGH: ICD-10-CM

## 2021-06-01 DIAGNOSIS — Z91.048 ALLERGY TO MOLD: Primary | ICD-10-CM

## 2021-06-01 PROCEDURE — 71046 X-RAY EXAM CHEST 2 VIEWS: CPT

## 2021-06-01 PROCEDURE — 99213 OFFICE O/P EST LOW 20 MIN: CPT | Performed by: INTERNAL MEDICINE

## 2021-06-02 ENCOUNTER — TELEPHONE (OUTPATIENT)
Dept: FAMILY MEDICINE | Age: 66
End: 2021-06-02

## 2021-06-03 ENCOUNTER — TELEPHONE (OUTPATIENT)
Dept: CARDIOLOGY | Age: 66
End: 2021-06-03

## 2021-06-08 ASSESSMENT — ENCOUNTER SYMPTOMS
HEMATOCHEZIA: 0
FEVER: 0
BRUISES/BLEEDS EASILY: 0
CHILLS: 0
SUSPICIOUS LESIONS: 0
COUGH: 0
WEIGHT LOSS: 0
ALLERGIC/IMMUNOLOGIC COMMENTS: NO NEW FOOD ALLERGIES
BACK PAIN: 1
HEMOPTYSIS: 0
WEIGHT GAIN: 0

## 2021-06-10 ENCOUNTER — LAB SERVICES (OUTPATIENT)
Dept: LAB | Age: 66
End: 2021-06-10

## 2021-06-10 ENCOUNTER — OFFICE VISIT (OUTPATIENT)
Dept: CARDIOLOGY | Age: 66
End: 2021-06-10

## 2021-06-10 ENCOUNTER — TELEPHONE (OUTPATIENT)
Dept: INTERNAL MEDICINE | Age: 66
End: 2021-06-10

## 2021-06-10 VITALS
HEART RATE: 72 BPM | WEIGHT: 146 LBS | RESPIRATION RATE: 16 BRPM | DIASTOLIC BLOOD PRESSURE: 64 MMHG | SYSTOLIC BLOOD PRESSURE: 120 MMHG | BODY MASS INDEX: 23.46 KG/M2 | HEIGHT: 66 IN

## 2021-06-10 DIAGNOSIS — I77.9 BILATERAL CAROTID ARTERY DISEASE, UNSPECIFIED TYPE (CMD): Primary | ICD-10-CM

## 2021-06-10 DIAGNOSIS — I25.10 ATHEROSCLEROSIS OF NATIVE CORONARY ARTERY OF NATIVE HEART WITHOUT ANGINA PECTORIS: ICD-10-CM

## 2021-06-10 DIAGNOSIS — G56.00 CARPAL TUNNEL SYNDROME, UNSPECIFIED LATERALITY: ICD-10-CM

## 2021-06-10 PROCEDURE — 99214 OFFICE O/P EST MOD 30 MIN: CPT | Performed by: INTERNAL MEDICINE

## 2021-06-10 PROCEDURE — 87081 CULTURE SCREEN ONLY: CPT | Performed by: INTERNAL MEDICINE

## 2021-06-10 SDOH — HEALTH STABILITY: PHYSICAL HEALTH: ON AVERAGE, HOW MANY DAYS PER WEEK DO YOU ENGAGE IN MODERATE TO STRENUOUS EXERCISE (LIKE A BRISK WALK)?: 0 DAYS

## 2021-06-10 ASSESSMENT — PATIENT HEALTH QUESTIONNAIRE - PHQ9
CLINICAL INTERPRETATION OF PHQ9 SCORE: NO FURTHER SCREENING NEEDED
2. FEELING DOWN, DEPRESSED OR HOPELESS: NOT AT ALL
1. LITTLE INTEREST OR PLEASURE IN DOING THINGS: NOT AT ALL
SUM OF ALL RESPONSES TO PHQ9 QUESTIONS 1 AND 2: 0
SUM OF ALL RESPONSES TO PHQ9 QUESTIONS 1 AND 2: 0
CLINICAL INTERPRETATION OF PHQ2 SCORE: NO FURTHER SCREENING NEEDED

## 2021-06-12 LAB — S PYO SPEC QL CULT: NORMAL

## 2021-06-16 ENCOUNTER — TELEPHONE (OUTPATIENT)
Dept: SCHEDULING | Age: 66
End: 2021-06-16

## 2021-06-17 RX ORDER — AZITHROMYCIN 250 MG/1
TABLET, FILM COATED ORAL
Qty: 6 TABLET | Refills: 0 | Status: SHIPPED | OUTPATIENT
Start: 2021-06-17 | End: 2021-06-22

## 2021-06-21 ENCOUNTER — TELEPHONE (OUTPATIENT)
Dept: SCHEDULING | Age: 66
End: 2021-06-21

## 2021-06-21 DIAGNOSIS — J39.2 THROAT IRRITATION: Primary | ICD-10-CM

## 2021-07-05 ENCOUNTER — HOSPITAL ENCOUNTER (OUTPATIENT)
Dept: CT IMAGING | Age: 66
Discharge: HOME | End: 2021-07-05
Attending: SPECIALIST

## 2021-07-05 DIAGNOSIS — J32.9 SINUSITIS: ICD-10-CM

## 2021-07-05 PROCEDURE — 70486 CT MAXILLOFACIAL W/O DYE: CPT

## 2021-07-06 ENCOUNTER — TELEPHONE (OUTPATIENT)
Dept: NEUROLOGY | Facility: CLINIC | Age: 66
End: 2021-07-06

## 2021-07-06 NOTE — TELEPHONE ENCOUNTER
Pt has questions to address in regards to seeing an ear specialist in the past.  Pt also has other questions. Pt asked to speak with Dr. Daniela Horne.     Call pt

## 2021-07-07 ENCOUNTER — OFFICE VISIT (OUTPATIENT)
Dept: NEUROLOGY | Facility: CLINIC | Age: 66
End: 2021-07-07
Payer: MEDICARE

## 2021-07-07 VITALS
HEIGHT: 66 IN | DIASTOLIC BLOOD PRESSURE: 73 MMHG | WEIGHT: 143 LBS | HEART RATE: 83 BPM | BODY MASS INDEX: 22.98 KG/M2 | SYSTOLIC BLOOD PRESSURE: 121 MMHG | RESPIRATION RATE: 16 BRPM

## 2021-07-07 DIAGNOSIS — M54.59 INTRACTABLE LOW BACK PAIN: ICD-10-CM

## 2021-07-07 DIAGNOSIS — R42 LIGHTHEADEDNESS: Primary | ICD-10-CM

## 2021-07-07 PROCEDURE — 99213 OFFICE O/P EST LOW 20 MIN: CPT | Performed by: OTHER

## 2021-07-07 NOTE — PROGRESS NOTES
[facility] with Valarie Hagen  2/22/1955  Primary Care Provider:  Baljinder Vasquez MD    7/7/2021  Accompanied visit:      (x) No.      77year old yo patient being seen for:   Ruth Martel changes    NEURO  NEURO  Able to relate events with fluent speech and intact comprehension  CN 2-12 OK  No motor and sensory focal deficit  Gait and coordination OK  Reflexes symmetric and no pathologic reflexes seen          INTERPRETATION of RELEVANT LAB writing.   Services rendered are only within the scope of direct medical care

## 2021-09-10 ENCOUNTER — TELEPHONE (OUTPATIENT)
Dept: SCHEDULING | Age: 66
End: 2021-09-10

## 2021-09-10 ENCOUNTER — LAB SERVICES (OUTPATIENT)
Dept: LAB | Age: 66
End: 2021-09-10

## 2021-09-10 ENCOUNTER — OFFICE VISIT (OUTPATIENT)
Dept: INTERNAL MEDICINE | Age: 66
End: 2021-09-10

## 2021-09-10 VITALS
RESPIRATION RATE: 16 BRPM | BODY MASS INDEX: 23.63 KG/M2 | HEIGHT: 66 IN | WEIGHT: 147 LBS | OXYGEN SATURATION: 97 % | DIASTOLIC BLOOD PRESSURE: 68 MMHG | TEMPERATURE: 97.6 F | HEART RATE: 91 BPM | SYSTOLIC BLOOD PRESSURE: 118 MMHG

## 2021-09-10 DIAGNOSIS — R50.9 FEVER, UNSPECIFIED FEVER CAUSE: Primary | ICD-10-CM

## 2021-09-10 DIAGNOSIS — R35.0 FREQUENCY OF URINATION: ICD-10-CM

## 2021-09-10 DIAGNOSIS — R05.9 COUGH: ICD-10-CM

## 2021-09-10 LAB
APPEARANCE UR: CLEAR
BILIRUB UR QL STRIP: NEGATIVE
COLOR UR: YELLOW
GLUCOSE UR STRIP-MCNC: NEGATIVE MG/DL
HGB UR QL STRIP: NEGATIVE
KETONES UR STRIP-MCNC: NEGATIVE MG/DL
LEUKOCYTE ESTERASE UR QL STRIP: NEGATIVE
NITRITE UR QL STRIP: NEGATIVE
PH UR STRIP: 6 [PH] (ref 5–7)
PROT UR STRIP-MCNC: NEGATIVE MG/DL
SP GR UR STRIP: 1.02 (ref 1–1.03)
UROBILINOGEN UR STRIP-MCNC: 0.2 MG/DL

## 2021-09-10 PROCEDURE — 99213 OFFICE O/P EST LOW 20 MIN: CPT | Performed by: INTERNAL MEDICINE

## 2021-09-10 PROCEDURE — 81003 URINALYSIS AUTO W/O SCOPE: CPT | Performed by: INTERNAL MEDICINE

## 2021-09-10 RX ORDER — AZITHROMYCIN 250 MG/1
TABLET, FILM COATED ORAL
Qty: 6 TABLET | Refills: 0 | Status: SHIPPED | OUTPATIENT
Start: 2021-09-10 | End: 2021-09-15

## 2021-09-10 ASSESSMENT — PAIN SCALES - GENERAL: PAINLEVEL: 0

## 2021-09-13 ENCOUNTER — TELEPHONE (OUTPATIENT)
Dept: SCHEDULING | Age: 66
End: 2021-09-13

## 2021-10-06 ENCOUNTER — TELEPHONE (OUTPATIENT)
Dept: SCHEDULING | Age: 66
End: 2021-10-06

## 2021-10-08 ENCOUNTER — OFFICE VISIT (OUTPATIENT)
Dept: INTERNAL MEDICINE | Age: 66
End: 2021-10-08

## 2021-10-08 ENCOUNTER — LAB SERVICES (OUTPATIENT)
Dept: LAB | Age: 66
End: 2021-10-08

## 2021-10-08 VITALS
DIASTOLIC BLOOD PRESSURE: 66 MMHG | OXYGEN SATURATION: 98 % | RESPIRATION RATE: 14 BRPM | TEMPERATURE: 96.8 F | HEART RATE: 64 BPM | SYSTOLIC BLOOD PRESSURE: 126 MMHG | HEIGHT: 66 IN | WEIGHT: 146.39 LBS | BODY MASS INDEX: 23.53 KG/M2

## 2021-10-08 DIAGNOSIS — Z00.00 PHYSICAL EXAM, ROUTINE: Primary | ICD-10-CM

## 2021-10-08 DIAGNOSIS — K21.9 GASTROESOPHAGEAL REFLUX DISEASE WITHOUT ESOPHAGITIS: ICD-10-CM

## 2021-10-08 DIAGNOSIS — Z00.00 PHYSICAL EXAM, ROUTINE: ICD-10-CM

## 2021-10-08 DIAGNOSIS — Z12.11 SPECIAL SCREENING FOR MALIGNANT NEOPLASMS, COLON: ICD-10-CM

## 2021-10-08 LAB
ALBUMIN SERPL-MCNC: 3.6 G/DL (ref 3.6–5.1)
ALBUMIN/GLOB SERPL: 1.1 {RATIO} (ref 1–2.4)
ALP SERPL-CCNC: 58 UNITS/L (ref 45–117)
ALT SERPL-CCNC: 23 UNITS/L
ANION GAP SERPL CALC-SCNC: 5 MMOL/L (ref 10–20)
APPEARANCE UR: CLEAR
AST SERPL-CCNC: 18 UNITS/L
BACTERIA #/AREA URNS HPF: NORMAL /HPF
BASOPHILS # BLD: 0 K/MCL (ref 0–0.3)
BASOPHILS NFR BLD: 1 %
BILIRUB SERPL-MCNC: 0.5 MG/DL (ref 0.2–1)
BILIRUB UR QL STRIP: NEGATIVE
BUN SERPL-MCNC: 18 MG/DL (ref 6–20)
BUN/CREAT SERPL: 23 (ref 7–25)
CALCIUM SERPL-MCNC: 8.8 MG/DL (ref 8.4–10.2)
CHLORIDE SERPL-SCNC: 109 MMOL/L (ref 98–107)
CHOLEST SERPL-MCNC: 166 MG/DL
CHOLEST/HDLC SERPL: 2.6 {RATIO}
CO2 SERPL-SCNC: 28 MMOL/L (ref 21–32)
COLOR UR: YELLOW
CREAT SERPL-MCNC: 0.79 MG/DL (ref 0.67–1.17)
DEPRECATED RDW RBC: 41.1 FL (ref 39–50)
EOSINOPHIL # BLD: 0.2 K/MCL (ref 0–0.5)
EOSINOPHIL NFR BLD: 4 %
ERYTHROCYTE [DISTWIDTH] IN BLOOD: 12.3 % (ref 11–15)
FASTING DURATION TIME PATIENT: 11 HOURS (ref 0–999)
FASTING DURATION TIME PATIENT: 11 HOURS (ref 0–999)
GFR SERPLBLD BASED ON 1.73 SQ M-ARVRAT: >90 ML/MIN
GLOBULIN SER-MCNC: 3.2 G/DL (ref 2–4)
GLUCOSE SERPL-MCNC: 91 MG/DL (ref 70–99)
GLUCOSE UR STRIP-MCNC: NEGATIVE MG/DL
HCT VFR BLD CALC: 41.6 % (ref 39–51)
HDLC SERPL-MCNC: 63 MG/DL
HGB BLD-MCNC: 13.8 G/DL (ref 13–17)
HGB UR QL STRIP: NEGATIVE
HYALINE CASTS #/AREA URNS LPF: NORMAL /LPF
IMM GRANULOCYTES # BLD AUTO: 0 K/MCL (ref 0–0.2)
IMM GRANULOCYTES # BLD: 0 %
KETONES UR STRIP-MCNC: NEGATIVE MG/DL
LDLC SERPL CALC-MCNC: 90 MG/DL
LEUKOCYTE ESTERASE UR QL STRIP: NEGATIVE
LYMPHOCYTES # BLD: 1 K/MCL (ref 1–4)
LYMPHOCYTES NFR BLD: 21 %
MCH RBC QN AUTO: 30.5 PG (ref 26–34)
MCHC RBC AUTO-ENTMCNC: 33.2 G/DL (ref 32–36.5)
MCV RBC AUTO: 91.8 FL (ref 78–100)
MONOCYTES # BLD: 0.4 K/MCL (ref 0.3–0.9)
MONOCYTES NFR BLD: 9 %
MUCOUS THREADS URNS QL MICRO: PRESENT
NEUTROPHILS # BLD: 3.2 K/MCL (ref 1.8–7.7)
NEUTROPHILS NFR BLD: 65 %
NITRITE UR QL STRIP: NEGATIVE
NONHDLC SERPL-MCNC: 103 MG/DL
NRBC BLD MANUAL-RTO: 0 /100 WBC
PH UR STRIP: 5 [PH] (ref 5–7)
PLATELET # BLD AUTO: 139 K/MCL (ref 140–450)
POTASSIUM SERPL-SCNC: 4.3 MMOL/L (ref 3.4–5.1)
PROT SERPL-MCNC: 6.8 G/DL (ref 6.4–8.2)
PROT UR STRIP-MCNC: NEGATIVE MG/DL
PSA SERPL-MCNC: 5.46 NG/ML
RBC # BLD: 4.53 MIL/MCL (ref 4.5–5.9)
RBC #/AREA URNS HPF: NORMAL /HPF
S PYO AG THROAT QL: NEGATIVE
SODIUM SERPL-SCNC: 138 MMOL/L (ref 135–145)
SP GR UR STRIP: 1.02 (ref 1–1.03)
SQUAMOUS #/AREA URNS HPF: NORMAL /HPF
TRIGL SERPL-MCNC: 63 MG/DL
TSH SERPL-ACNC: 1.04 MCUNITS/ML (ref 0.35–5)
UROBILINOGEN UR STRIP-MCNC: 0.2 MG/DL
WBC # BLD: 4.9 K/MCL (ref 4.2–11)
WBC #/AREA URNS HPF: NORMAL /HPF

## 2021-10-08 PROCEDURE — 80053 COMPREHEN METABOLIC PANEL: CPT | Performed by: INTERNAL MEDICINE

## 2021-10-08 PROCEDURE — 85025 COMPLETE CBC W/AUTO DIFF WBC: CPT | Performed by: INTERNAL MEDICINE

## 2021-10-08 PROCEDURE — 82270 OCCULT BLOOD FECES: CPT | Performed by: INTERNAL MEDICINE

## 2021-10-08 PROCEDURE — 81003 URINALYSIS AUTO W/O SCOPE: CPT | Performed by: INTERNAL MEDICINE

## 2021-10-08 PROCEDURE — G0438 PPPS, INITIAL VISIT: HCPCS | Performed by: INTERNAL MEDICINE

## 2021-10-08 PROCEDURE — 36415 COLL VENOUS BLD VENIPUNCTURE: CPT | Performed by: INTERNAL MEDICINE

## 2021-10-08 PROCEDURE — G0103 PSA SCREENING: HCPCS | Performed by: INTERNAL MEDICINE

## 2021-10-08 PROCEDURE — 84443 ASSAY THYROID STIM HORMONE: CPT | Performed by: INTERNAL MEDICINE

## 2021-10-08 PROCEDURE — 80061 LIPID PANEL: CPT | Performed by: INTERNAL MEDICINE

## 2021-10-08 ASSESSMENT — PAIN SCALES - GENERAL: PAINLEVEL: 0

## 2021-10-08 ASSESSMENT — COGNITIVE AND FUNCTIONAL STATUS - GENERAL
DO YOU HAVE SERIOUS DIFFICULTY WALKING OR CLIMBING STAIRS: NO
BECAUSE OF A PHYSICAL, MENTAL, OR EMOTIONAL CONDITION, DO YOU HAVE DIFFICULTY DOING ERRANDS ALONE: NO
DO YOU HAVE DIFFICULTY DRESSING OR BATHING: NO
BECAUSE OF A PHYSICAL, MENTAL, OR EMOTIONAL CONDITION, DO YOU HAVE SERIOUS DIFFICULTY CONCENTRATING, REMEMBERING OR MAKING DECISIONS: NO

## 2021-10-29 ENCOUNTER — TELEPHONE (OUTPATIENT)
Dept: SCHEDULING | Age: 66
End: 2021-10-29

## 2021-11-04 ENCOUNTER — TELEPHONE (OUTPATIENT)
Dept: SCHEDULING | Age: 66
End: 2021-11-04

## 2021-11-04 ENCOUNTER — APPOINTMENT (OUTPATIENT)
Dept: INTERNAL MEDICINE | Age: 66
End: 2021-11-04

## 2021-11-05 ENCOUNTER — OFFICE VISIT (OUTPATIENT)
Dept: INTERNAL MEDICINE | Age: 66
End: 2021-11-05

## 2021-11-05 ENCOUNTER — TELEPHONE (OUTPATIENT)
Dept: SCHEDULING | Age: 66
End: 2021-11-05

## 2021-11-05 VITALS
TEMPERATURE: 96.1 F | RESPIRATION RATE: 16 BRPM | OXYGEN SATURATION: 98 % | WEIGHT: 144 LBS | SYSTOLIC BLOOD PRESSURE: 140 MMHG | BODY MASS INDEX: 23.14 KG/M2 | HEIGHT: 66 IN | DIASTOLIC BLOOD PRESSURE: 70 MMHG | HEART RATE: 66 BPM

## 2021-11-05 DIAGNOSIS — N28.1 BILATERAL RENAL CYSTS: ICD-10-CM

## 2021-11-05 DIAGNOSIS — M54.50 CHRONIC MIDLINE LOW BACK PAIN WITHOUT SCIATICA: Primary | ICD-10-CM

## 2021-11-05 DIAGNOSIS — G89.29 CHRONIC MIDLINE LOW BACK PAIN WITHOUT SCIATICA: Primary | ICD-10-CM

## 2021-11-05 PROCEDURE — 99213 OFFICE O/P EST LOW 20 MIN: CPT | Performed by: INTERNAL MEDICINE

## 2021-11-05 RX ORDER — PSEUDOEPHEDRINE HCL 30 MG
100 TABLET ORAL
COMMUNITY
Start: 2021-11-03 | End: 2021-12-27

## 2021-11-05 RX ORDER — SENNOSIDES A AND B 8.6 MG/1
1-2 TABLET, FILM COATED ORAL
COMMUNITY
Start: 2021-11-03 | End: 2021-12-27

## 2021-11-05 RX ORDER — HYDROCODONE BITARTRATE AND ACETAMINOPHEN 5; 325 MG/1; MG/1
1 TABLET ORAL EVERY 6 HOURS PRN
COMMUNITY
Start: 2021-11-03 | End: 2021-12-27

## 2021-11-05 ASSESSMENT — PATIENT HEALTH QUESTIONNAIRE - PHQ9
1. LITTLE INTEREST OR PLEASURE IN DOING THINGS: NOT AT ALL
SUM OF ALL RESPONSES TO PHQ9 QUESTIONS 1 AND 2: 0
SUM OF ALL RESPONSES TO PHQ9 QUESTIONS 1 AND 2: 0
2. FEELING DOWN, DEPRESSED OR HOPELESS: NOT AT ALL
SUM OF ALL RESPONSES TO PHQ9 QUESTIONS 1 AND 2: 0
CLINICAL INTERPRETATION OF PHQ9 SCORE: NO FURTHER SCREENING NEEDED
CLINICAL INTERPRETATION OF PHQ2 SCORE: NO FURTHER SCREENING NEEDED

## 2021-11-05 ASSESSMENT — PAIN SCALES - GENERAL: PAINLEVEL: 8

## 2021-11-08 ENCOUNTER — TELEPHONE (OUTPATIENT)
Dept: SCHEDULING | Age: 66
End: 2021-11-08

## 2021-11-09 ENCOUNTER — TELEPHONE (OUTPATIENT)
Dept: SCHEDULING | Age: 66
End: 2021-11-09

## 2021-11-09 DIAGNOSIS — M54.50 CHRONIC BILATERAL LOW BACK PAIN WITHOUT SCIATICA: Primary | ICD-10-CM

## 2021-11-09 DIAGNOSIS — G89.29 CHRONIC BILATERAL LOW BACK PAIN WITHOUT SCIATICA: Primary | ICD-10-CM

## 2021-11-09 DIAGNOSIS — M51.36 LUMBAR DEGENERATIVE DISC DISEASE: ICD-10-CM

## 2021-11-09 DIAGNOSIS — M48.061 SPINAL STENOSIS OF LUMBAR REGION, UNSPECIFIED WHETHER NEUROGENIC CLAUDICATION PRESENT: ICD-10-CM

## 2021-11-09 RX ORDER — HYDROCODONE BITARTRATE AND ACETAMINOPHEN 5; 325 MG/1; MG/1
1 TABLET ORAL EVERY 6 HOURS PRN
Qty: 20 TABLET | Status: CANCELLED | OUTPATIENT
Start: 2021-11-09

## 2021-11-10 ENCOUNTER — TELEPHONE (OUTPATIENT)
Dept: SCHEDULING | Age: 66
End: 2021-11-10

## 2021-11-11 ENCOUNTER — TELEPHONE (OUTPATIENT)
Dept: SCHEDULING | Age: 66
End: 2021-11-11

## 2021-11-16 ENCOUNTER — TELEPHONE (OUTPATIENT)
Dept: SCHEDULING | Age: 66
End: 2021-11-16

## 2021-11-30 ENCOUNTER — TELEPHONE (OUTPATIENT)
Dept: CARDIOLOGY | Age: 66
End: 2021-11-30

## 2021-12-21 ENCOUNTER — LAB SERVICES (OUTPATIENT)
Dept: LAB | Age: 66
End: 2021-12-21

## 2021-12-21 DIAGNOSIS — I25.10 ATHEROSCLEROSIS OF NATIVE CORONARY ARTERY OF NATIVE HEART WITHOUT ANGINA PECTORIS: ICD-10-CM

## 2021-12-21 DIAGNOSIS — I77.9 BILATERAL CAROTID ARTERY DISEASE, UNSPECIFIED TYPE (CMD): ICD-10-CM

## 2021-12-21 LAB
CHOLEST SERPL-MCNC: 210 MG/DL
CHOLEST/HDLC SERPL: 2.7 {RATIO}
FASTING DURATION TIME PATIENT: 12 HOURS (ref 0–999)
HDLC SERPL-MCNC: 79 MG/DL
LDLC SERPL CALC-MCNC: 120 MG/DL
NONHDLC SERPL-MCNC: 131 MG/DL
TRIGL SERPL-MCNC: 57 MG/DL

## 2021-12-21 PROCEDURE — 80061 LIPID PANEL: CPT | Performed by: PSYCHIATRY & NEUROLOGY

## 2021-12-21 PROCEDURE — 36415 COLL VENOUS BLD VENIPUNCTURE: CPT | Performed by: PSYCHIATRY & NEUROLOGY

## 2021-12-24 ENCOUNTER — HOSPITAL ENCOUNTER (OUTPATIENT)
Dept: ULTRASOUND IMAGING | Age: 66
Discharge: HOME | End: 2021-12-24
Attending: INTERNAL MEDICINE

## 2021-12-24 DIAGNOSIS — N28.1 BILATERAL RENAL CYSTS: ICD-10-CM

## 2021-12-24 PROCEDURE — 76775 US EXAM ABDO BACK WALL LIM: CPT

## 2021-12-24 ASSESSMENT — ENCOUNTER SYMPTOMS
HEMOPTYSIS: 0
HEMATOCHEZIA: 0
WEIGHT LOSS: 0
SUSPICIOUS LESIONS: 0
BRUISES/BLEEDS EASILY: 0
CHILLS: 0
FEVER: 0
ALLERGIC/IMMUNOLOGIC COMMENTS: NO NEW FOOD ALLERGIES
WEIGHT GAIN: 0
BACK PAIN: 1
COUGH: 0

## 2021-12-27 ENCOUNTER — OFFICE VISIT (OUTPATIENT)
Dept: CARDIOLOGY | Age: 66
End: 2021-12-27

## 2021-12-27 VITALS
BODY MASS INDEX: 23.14 KG/M2 | DIASTOLIC BLOOD PRESSURE: 62 MMHG | WEIGHT: 144 LBS | SYSTOLIC BLOOD PRESSURE: 120 MMHG | HEART RATE: 64 BPM

## 2021-12-27 DIAGNOSIS — R93.1 ELEVATED CORONARY ARTERY CALCIUM SCORE: ICD-10-CM

## 2021-12-27 DIAGNOSIS — R73.9 HYPERGLYCEMIA: Primary | ICD-10-CM

## 2021-12-27 DIAGNOSIS — I25.10 ATHEROSCLEROSIS OF NATIVE CORONARY ARTERY OF NATIVE HEART WITHOUT ANGINA PECTORIS: ICD-10-CM

## 2021-12-27 PROCEDURE — 99214 OFFICE O/P EST MOD 30 MIN: CPT | Performed by: INTERNAL MEDICINE

## 2021-12-27 SDOH — HEALTH STABILITY: PHYSICAL HEALTH: ON AVERAGE, HOW MANY MINUTES DO YOU ENGAGE IN EXERCISE AT THIS LEVEL?: 0 MIN

## 2021-12-27 SDOH — HEALTH STABILITY: PHYSICAL HEALTH: ON AVERAGE, HOW MANY DAYS PER WEEK DO YOU ENGAGE IN MODERATE TO STRENUOUS EXERCISE (LIKE A BRISK WALK)?: 0 DAYS

## 2021-12-27 ASSESSMENT — PATIENT HEALTH QUESTIONNAIRE - PHQ9
SUM OF ALL RESPONSES TO PHQ9 QUESTIONS 1 AND 2: 0
CLINICAL INTERPRETATION OF PHQ2 SCORE: NO FURTHER SCREENING NEEDED
1. LITTLE INTEREST OR PLEASURE IN DOING THINGS: NOT AT ALL
SUM OF ALL RESPONSES TO PHQ9 QUESTIONS 1 AND 2: 0
2. FEELING DOWN, DEPRESSED OR HOPELESS: NOT AT ALL

## 2021-12-30 ASSESSMENT — PATIENT HEALTH QUESTIONNAIRE - PHQ9
SUM OF ALL RESPONSES TO PHQ9 QUESTIONS 1 AND 2: 0
SUM OF ALL RESPONSES TO PHQ9 QUESTIONS 1 AND 2: 0
CLINICAL INTERPRETATION OF PHQ2 SCORE: NO FURTHER SCREENING NEEDED
1. LITTLE INTEREST OR PLEASURE IN DOING THINGS: NOT AT ALL
2. FEELING DOWN, DEPRESSED OR HOPELESS: NOT AT ALL

## 2022-02-11 ENCOUNTER — EXTERNAL RECORD (OUTPATIENT)
Dept: HEALTH INFORMATION MANAGEMENT | Facility: OTHER | Age: 67
End: 2022-02-11

## 2022-03-11 RX ORDER — PROPRANOLOL HYDROCHLORIDE 10 MG/1
TABLET ORAL
Qty: 30 TABLET | Refills: 5 | Status: SHIPPED | OUTPATIENT
Start: 2022-03-11 | End: 2022-11-28

## 2022-03-22 ENCOUNTER — OFFICE VISIT (OUTPATIENT)
Dept: INTERNAL MEDICINE | Age: 67
End: 2022-03-22

## 2022-03-22 VITALS
WEIGHT: 152.9 LBS | DIASTOLIC BLOOD PRESSURE: 70 MMHG | OXYGEN SATURATION: 99 % | BODY MASS INDEX: 24.57 KG/M2 | HEIGHT: 66 IN | HEART RATE: 67 BPM | SYSTOLIC BLOOD PRESSURE: 120 MMHG | TEMPERATURE: 96.7 F

## 2022-03-22 DIAGNOSIS — M54.50 CHRONIC MIDLINE LOW BACK PAIN WITHOUT SCIATICA: Primary | ICD-10-CM

## 2022-03-22 DIAGNOSIS — G89.29 CHRONIC MIDLINE LOW BACK PAIN WITHOUT SCIATICA: Primary | ICD-10-CM

## 2022-03-22 PROCEDURE — 99213 OFFICE O/P EST LOW 20 MIN: CPT | Performed by: INTERNAL MEDICINE

## 2022-03-22 RX ORDER — CYCLOBENZAPRINE HCL 5 MG
5 TABLET ORAL 3 TIMES DAILY PRN
Qty: 20 TABLET | Refills: 1 | Status: SHIPPED | OUTPATIENT
Start: 2022-03-22 | End: 2022-05-04

## 2022-03-22 ASSESSMENT — PAIN SCALES - GENERAL: PAINLEVEL: 2

## 2022-03-23 ENCOUNTER — NURSE TRIAGE (OUTPATIENT)
Dept: SCHEDULING | Age: 67
End: 2022-03-23

## 2022-03-24 ENCOUNTER — TELEPHONE (OUTPATIENT)
Dept: SCHEDULING | Age: 67
End: 2022-03-24

## 2022-04-07 ENCOUNTER — TELEPHONE (OUTPATIENT)
Dept: SCHEDULING | Age: 67
End: 2022-04-07

## 2022-04-09 ENCOUNTER — APPOINTMENT (OUTPATIENT)
Dept: GENERAL RADIOLOGY | Age: 67
End: 2022-04-09

## 2022-04-09 ENCOUNTER — HOSPITAL ENCOUNTER (OUTPATIENT)
Dept: GENERAL RADIOLOGY | Age: 67
Discharge: HOME | End: 2022-04-09
Attending: EMERGENCY MEDICINE

## 2022-04-09 ENCOUNTER — WALK IN (OUTPATIENT)
Dept: URGENT CARE | Age: 67
End: 2022-04-09
Attending: EMERGENCY MEDICINE

## 2022-04-09 VITALS
TEMPERATURE: 97.9 F | SYSTOLIC BLOOD PRESSURE: 122 MMHG | OXYGEN SATURATION: 98 % | WEIGHT: 145 LBS | DIASTOLIC BLOOD PRESSURE: 74 MMHG | HEART RATE: 72 BPM | BODY MASS INDEX: 23.3 KG/M2 | RESPIRATION RATE: 16 BRPM

## 2022-04-09 DIAGNOSIS — T14.90XA INJURY: ICD-10-CM

## 2022-04-09 DIAGNOSIS — S62.653A CLOSED NONDISPLACED FRACTURE OF MIDDLE PHALANX OF LEFT MIDDLE FINGER, INITIAL ENCOUNTER: Primary | ICD-10-CM

## 2022-04-09 PROCEDURE — 99212 OFFICE O/P EST SF 10 MIN: CPT

## 2022-04-09 PROCEDURE — 73140 X-RAY EXAM OF FINGER(S): CPT

## 2022-04-09 PROCEDURE — 29130 APPL FINGER SPLINT STATIC: CPT

## 2022-04-09 ASSESSMENT — PAIN SCALES - GENERAL
PAINLEVEL: 2
PAINLEVEL_OUTOF10: 2

## 2022-04-12 ENCOUNTER — TELEPHONE (OUTPATIENT)
Dept: NEUROLOGY | Facility: CLINIC | Age: 67
End: 2022-04-12

## 2022-04-12 NOTE — TELEPHONE ENCOUNTER
Pt stated his symptoms have increased. Pt insurance is out of network, [facility] [employer] and advised unable to schedule appt at this time as advised by supervisor. Pt was persistent to speak with Dr. Marilynn John and advised will have RN call him to discuss increased symptoms. Call pt to discuss after 2;00pm today.

## 2022-04-13 NOTE — TELEPHONE ENCOUNTER
Patient calling back, advised he missed RN call yesterday. Patient states he would like to speak to Dr. Mau Reyez directly if possible. Advised RN may need to call first to get current symptoms in order to prepare call for Dr. Mau Reyez. PT understood.

## 2022-04-16 ENCOUNTER — TELEPHONE (OUTPATIENT)
Dept: SCHEDULING | Age: 67
End: 2022-04-16

## 2022-04-18 ENCOUNTER — OFFICE VISIT (OUTPATIENT)
Dept: INTERNAL MEDICINE | Age: 67
End: 2022-04-18

## 2022-04-18 ENCOUNTER — LAB SERVICES (OUTPATIENT)
Dept: LAB | Age: 67
End: 2022-04-18

## 2022-04-18 VITALS
OXYGEN SATURATION: 95 % | HEIGHT: 66 IN | HEART RATE: 86 BPM | BODY MASS INDEX: 24.75 KG/M2 | DIASTOLIC BLOOD PRESSURE: 72 MMHG | SYSTOLIC BLOOD PRESSURE: 100 MMHG | WEIGHT: 154 LBS

## 2022-04-18 DIAGNOSIS — R97.20 ELEVATED PSA: ICD-10-CM

## 2022-04-18 DIAGNOSIS — Z00.00 ENCOUNTER FOR GENERAL ADULT MEDICAL EXAMINATION WITHOUT ABNORMAL FINDINGS: Primary | ICD-10-CM

## 2022-04-18 DIAGNOSIS — R43.1 HYPEROSMIA: ICD-10-CM

## 2022-04-18 DIAGNOSIS — R68.81 EARLY SATIETY: Primary | ICD-10-CM

## 2022-04-18 LAB — PSA SERPL-MCNC: 6.99 NG/ML

## 2022-04-18 PROCEDURE — 36415 COLL VENOUS BLD VENIPUNCTURE: CPT | Performed by: INTERNAL MEDICINE

## 2022-04-18 PROCEDURE — 99214 OFFICE O/P EST MOD 30 MIN: CPT | Performed by: INTERNAL MEDICINE

## 2022-04-18 PROCEDURE — G0103 PSA SCREENING: HCPCS | Performed by: INTERNAL MEDICINE

## 2022-04-18 RX ORDER — TRAMADOL HYDROCHLORIDE 50 MG/1
50 TABLET ORAL 2 TIMES DAILY PRN
COMMUNITY
Start: 2022-04-01 | End: 2023-02-03

## 2022-04-18 ASSESSMENT — COGNITIVE AND FUNCTIONAL STATUS - GENERAL
DO YOU HAVE DIFFICULTY DRESSING OR BATHING: NO
BECAUSE OF A PHYSICAL, MENTAL, OR EMOTIONAL CONDITION, DO YOU HAVE DIFFICULTY DOING ERRANDS ALONE: NO
BECAUSE OF A PHYSICAL, MENTAL, OR EMOTIONAL CONDITION, DO YOU HAVE SERIOUS DIFFICULTY CONCENTRATING, REMEMBERING OR MAKING DECISIONS: NO
DO YOU HAVE SERIOUS DIFFICULTY WALKING OR CLIMBING STAIRS: NO

## 2022-04-18 ASSESSMENT — PATIENT HEALTH QUESTIONNAIRE - PHQ9
2. FEELING DOWN, DEPRESSED OR HOPELESS: NOT AT ALL
CLINICAL INTERPRETATION OF PHQ2 SCORE: NO FURTHER SCREENING NEEDED
SUM OF ALL RESPONSES TO PHQ9 QUESTIONS 1 AND 2: 0
1. LITTLE INTEREST OR PLEASURE IN DOING THINGS: NOT AT ALL
SUM OF ALL RESPONSES TO PHQ9 QUESTIONS 1 AND 2: 0

## 2022-04-19 ENCOUNTER — LAB SERVICES (OUTPATIENT)
Dept: LAB | Age: 67
End: 2022-04-19

## 2022-04-19 ENCOUNTER — TELEPHONE (OUTPATIENT)
Dept: INTERNAL MEDICINE | Age: 67
End: 2022-04-19

## 2022-04-19 ENCOUNTER — TELEPHONE (OUTPATIENT)
Dept: SCHEDULING | Age: 67
End: 2022-04-19

## 2022-04-19 DIAGNOSIS — R73.9 HYPERGLYCEMIA: ICD-10-CM

## 2022-04-19 DIAGNOSIS — I25.10 ATHEROSCLEROSIS OF NATIVE CORONARY ARTERY OF NATIVE HEART WITHOUT ANGINA PECTORIS: ICD-10-CM

## 2022-04-19 DIAGNOSIS — R93.1 ELEVATED CORONARY ARTERY CALCIUM SCORE: ICD-10-CM

## 2022-04-19 LAB
ANION GAP SERPL CALC-SCNC: 8 MMOL/L (ref 10–20)
BUN SERPL-MCNC: 20 MG/DL (ref 6–20)
BUN/CREAT SERPL: 22 (ref 7–25)
CALCIUM SERPL-MCNC: 8.9 MG/DL (ref 8.4–10.2)
CHLORIDE SERPL-SCNC: 106 MMOL/L (ref 98–107)
CO2 SERPL-SCNC: 28 MMOL/L (ref 21–32)
CREAT SERPL-MCNC: 0.89 MG/DL (ref 0.67–1.17)
FASTING DURATION TIME PATIENT: 0 HOURS (ref 0–999)
GFR SERPLBLD BASED ON 1.73 SQ M-ARVRAT: 88 ML/MIN
GLUCOSE SERPL-MCNC: 105 MG/DL (ref 70–99)
POTASSIUM SERPL-SCNC: 4.1 MMOL/L (ref 3.4–5.1)
SODIUM SERPL-SCNC: 138 MMOL/L (ref 135–145)

## 2022-04-19 PROCEDURE — 36415 COLL VENOUS BLD VENIPUNCTURE: CPT | Performed by: INTERNAL MEDICINE

## 2022-04-19 PROCEDURE — 80048 BASIC METABOLIC PNL TOTAL CA: CPT | Performed by: INTERNAL MEDICINE

## 2022-04-20 ENCOUNTER — HOSPITAL ENCOUNTER (OUTPATIENT)
Dept: CT IMAGING | Age: 67
Discharge: HOME | End: 2022-04-20
Attending: INTERNAL MEDICINE

## 2022-04-20 ENCOUNTER — TELEPHONE (OUTPATIENT)
Dept: SCHEDULING | Age: 67
End: 2022-04-20

## 2022-04-20 DIAGNOSIS — R68.81 EARLY SATIETY: ICD-10-CM

## 2022-04-20 PROCEDURE — 74160 CT ABDOMEN W/CONTRAST: CPT

## 2022-04-20 PROCEDURE — 10002805 HB CONTRAST AGENT: Performed by: INTERNAL MEDICINE

## 2022-04-20 PROCEDURE — G1004 CDSM NDSC: HCPCS

## 2022-04-20 RX ADMIN — IOHEXOL 85 ML: 350 INJECTION, SOLUTION INTRAVENOUS at 08:54

## 2022-04-21 ENCOUNTER — TELEPHONE (OUTPATIENT)
Dept: SCHEDULING | Age: 67
End: 2022-04-21

## 2022-04-22 ENCOUNTER — TELEPHONE (OUTPATIENT)
Dept: CARDIOLOGY | Age: 67
End: 2022-04-22

## 2022-05-04 ENCOUNTER — OFFICE VISIT (OUTPATIENT)
Dept: CARDIOLOGY | Age: 67
End: 2022-05-04

## 2022-05-04 VITALS
HEIGHT: 66 IN | RESPIRATION RATE: 16 BRPM | DIASTOLIC BLOOD PRESSURE: 61 MMHG | BODY MASS INDEX: 24.44 KG/M2 | WEIGHT: 152.1 LBS | HEART RATE: 76 BPM | SYSTOLIC BLOOD PRESSURE: 127 MMHG

## 2022-05-04 DIAGNOSIS — R00.2 PALPITATIONS: Primary | ICD-10-CM

## 2022-05-04 DIAGNOSIS — I49.3 PVC'S (PREMATURE VENTRICULAR CONTRACTIONS): ICD-10-CM

## 2022-05-04 DIAGNOSIS — I77.1 CELIAC ARTERY STENOSIS (CMD): ICD-10-CM

## 2022-05-04 PROBLEM — I77.4 CELIAC ARTERY STENOSIS (CMD): Status: ACTIVE | Noted: 2022-05-04

## 2022-05-04 PROCEDURE — 99214 OFFICE O/P EST MOD 30 MIN: CPT | Performed by: INTERNAL MEDICINE

## 2022-05-04 SDOH — HEALTH STABILITY: PHYSICAL HEALTH: ON AVERAGE, HOW MANY DAYS PER WEEK DO YOU ENGAGE IN MODERATE TO STRENUOUS EXERCISE (LIKE A BRISK WALK)?: 0 DAYS

## 2022-05-04 ASSESSMENT — PATIENT HEALTH QUESTIONNAIRE - PHQ9
SUM OF ALL RESPONSES TO PHQ9 QUESTIONS 1 AND 2: 0
1. LITTLE INTEREST OR PLEASURE IN DOING THINGS: NOT AT ALL
2. FEELING DOWN, DEPRESSED OR HOPELESS: NOT AT ALL
SUM OF ALL RESPONSES TO PHQ9 QUESTIONS 1 AND 2: 0
CLINICAL INTERPRETATION OF PHQ2 SCORE: NO FURTHER SCREENING NEEDED

## 2022-05-06 ENCOUNTER — TELEPHONE (OUTPATIENT)
Dept: CARDIOLOGY | Age: 67
End: 2022-05-06

## 2022-05-11 ASSESSMENT — ENCOUNTER SYMPTOMS
HEMOPTYSIS: 0
CHILLS: 0
WEIGHT LOSS: 0
WEIGHT GAIN: 0
BACK PAIN: 1
ALLERGIC/IMMUNOLOGIC COMMENTS: NO NEW FOOD ALLERGIES
COUGH: 0
SUSPICIOUS LESIONS: 0
BRUISES/BLEEDS EASILY: 0
HEMATOCHEZIA: 0
FEVER: 0

## 2022-05-12 ENCOUNTER — EXTERNAL RECORD (OUTPATIENT)
Dept: HEALTH INFORMATION MANAGEMENT | Facility: OTHER | Age: 67
End: 2022-05-12

## 2022-05-13 ENCOUNTER — OFFICE VISIT (OUTPATIENT)
Dept: CARDIOLOGY | Age: 67
End: 2022-05-13

## 2022-05-13 VITALS
HEIGHT: 66 IN | WEIGHT: 153 LBS | DIASTOLIC BLOOD PRESSURE: 70 MMHG | BODY MASS INDEX: 24.59 KG/M2 | HEART RATE: 72 BPM | SYSTOLIC BLOOD PRESSURE: 128 MMHG

## 2022-05-13 DIAGNOSIS — R93.1 ELEVATED CORONARY ARTERY CALCIUM SCORE: Primary | ICD-10-CM

## 2022-05-13 DIAGNOSIS — I25.10 ATHEROSCLEROSIS OF NATIVE CORONARY ARTERY OF NATIVE HEART WITHOUT ANGINA PECTORIS: ICD-10-CM

## 2022-05-13 DIAGNOSIS — I77.1 CELIAC ARTERY STENOSIS (CMD): ICD-10-CM

## 2022-05-13 PROCEDURE — 99214 OFFICE O/P EST MOD 30 MIN: CPT | Performed by: INTERNAL MEDICINE

## 2022-05-13 ASSESSMENT — PATIENT HEALTH QUESTIONNAIRE - PHQ9
SUM OF ALL RESPONSES TO PHQ9 QUESTIONS 1 AND 2: 0
1. LITTLE INTEREST OR PLEASURE IN DOING THINGS: NOT AT ALL
2. FEELING DOWN, DEPRESSED OR HOPELESS: NOT AT ALL
CLINICAL INTERPRETATION OF PHQ2 SCORE: NO FURTHER SCREENING NEEDED
SUM OF ALL RESPONSES TO PHQ9 QUESTIONS 1 AND 2: 0

## 2022-06-06 ENCOUNTER — TELEPHONE (OUTPATIENT)
Dept: CARDIOLOGY | Age: 67
End: 2022-06-06

## 2022-06-07 ENCOUNTER — EXTERNAL RECORD (OUTPATIENT)
Dept: OTHER | Age: 67
End: 2022-06-07

## 2022-06-09 RX ORDER — MELATONIN
Qty: 60 TABLET | Refills: 3 | Status: SHIPPED | OUTPATIENT
Start: 2022-06-09 | End: 2023-01-19

## 2022-06-13 ENCOUNTER — TELEPHONE (OUTPATIENT)
Dept: SCHEDULING | Age: 67
End: 2022-06-13

## 2022-06-30 ENCOUNTER — APPOINTMENT (OUTPATIENT)
Dept: CARDIOLOGY | Age: 67
End: 2022-06-30

## 2022-07-13 ENCOUNTER — TELEPHONE (OUTPATIENT)
Dept: INTERNAL MEDICINE | Age: 67
End: 2022-07-13

## 2022-07-14 ENCOUNTER — HOSPITAL ENCOUNTER (OUTPATIENT)
Dept: LAB | Age: 67
Discharge: HOME | End: 2022-07-14
Attending: UROLOGY

## 2022-07-14 DIAGNOSIS — R97.20 ELEVATED PROSTATE SPECIFIC ANTIGEN (PSA): Primary | ICD-10-CM

## 2022-07-14 PROCEDURE — 84153 ASSAY OF PSA TOTAL: CPT | Performed by: CLINICAL MEDICAL LABORATORY

## 2022-07-14 PROCEDURE — 36415 COLL VENOUS BLD VENIPUNCTURE: CPT | Performed by: CLINICAL MEDICAL LABORATORY

## 2022-07-15 LAB — PSA SERPL-MCNC: 3.49 NG/ML

## 2022-07-18 ENCOUNTER — TELEPHONE (OUTPATIENT)
Dept: SCHEDULING | Age: 67
End: 2022-07-18

## 2022-07-18 ENCOUNTER — EXTERNAL RECORD (OUTPATIENT)
Dept: HEALTH INFORMATION MANAGEMENT | Facility: OTHER | Age: 67
End: 2022-07-18

## 2022-07-18 ENCOUNTER — TELEPHONE (OUTPATIENT)
Dept: NEUROLOGY | Facility: CLINIC | Age: 67
End: 2022-07-18

## 2022-07-18 NOTE — TELEPHONE ENCOUNTER
RN called the patient and referred Dr. Jeremy Bentley with [facility] or any of the Neurologists with [facility]. RN advised to call our office with any further questions.

## 2022-07-18 NOTE — TELEPHONE ENCOUNTER
Pt called [facility] office who will take patient's insurance but will not take any new patients until December. Patient wanted our nurse to know he will be dead by then.

## 2022-07-18 NOTE — TELEPHONE ENCOUNTER
Called patient to inform that he may also call [facility] and schedule with any neurologist there. Patient does not want to do this, states they would schedule him with any new neurologist and he wants specific names. Suggested that patient call his insurance company to determine what otherproviders may be covered. Patient becomes irritated, states, \"I am NOT going to do that. .. Nuvia Mota \"  Patient is looking for earlier appointment and more specific names for referral.   Informed that we would like to help patient, asked how we can help. Patient will call this office with any further requests.

## 2022-07-18 NOTE — TELEPHONE ENCOUNTER
Pt stated he has out of network [employer] and asking for a few neurologists referral from Dr. Zachary Ly.     Call pt to advise

## 2022-07-19 ENCOUNTER — LAB SERVICES (OUTPATIENT)
Dept: LAB | Age: 67
End: 2022-07-19

## 2022-07-19 ENCOUNTER — OFFICE VISIT (OUTPATIENT)
Dept: INTERNAL MEDICINE | Age: 67
End: 2022-07-19

## 2022-07-19 ENCOUNTER — TELEPHONE (OUTPATIENT)
Dept: SCHEDULING | Age: 67
End: 2022-07-19

## 2022-07-19 VITALS
OXYGEN SATURATION: 96 % | SYSTOLIC BLOOD PRESSURE: 131 MMHG | HEART RATE: 77 BPM | WEIGHT: 151.01 LBS | BODY MASS INDEX: 23.7 KG/M2 | TEMPERATURE: 98.4 F | HEIGHT: 67 IN | DIASTOLIC BLOOD PRESSURE: 80 MMHG

## 2022-07-19 DIAGNOSIS — M81.0 OSTEOPOROSIS WITHOUT CURRENT PATHOLOGICAL FRACTURE, UNSPECIFIED OSTEOPOROSIS TYPE: ICD-10-CM

## 2022-07-19 DIAGNOSIS — G89.29 CHRONIC MIDLINE LOW BACK PAIN WITHOUT SCIATICA: ICD-10-CM

## 2022-07-19 DIAGNOSIS — M54.50 CHRONIC MIDLINE LOW BACK PAIN WITHOUT SCIATICA: ICD-10-CM

## 2022-07-19 DIAGNOSIS — M54.50 CHRONIC MIDLINE LOW BACK PAIN WITHOUT SCIATICA: Primary | ICD-10-CM

## 2022-07-19 DIAGNOSIS — G89.29 CHRONIC MIDLINE LOW BACK PAIN WITHOUT SCIATICA: Primary | ICD-10-CM

## 2022-07-19 LAB
25(OH)D3+25(OH)D2 SERPL-MCNC: 45 NG/ML (ref 30–100)
ALBUMIN SERPL-MCNC: 3.6 G/DL (ref 3.6–5.1)
ALBUMIN/GLOB SERPL: 1.1 {RATIO} (ref 1–2.4)
ALP SERPL-CCNC: 65 UNITS/L (ref 45–117)
ALT SERPL-CCNC: 23 UNITS/L
ANION GAP SERPL CALC-SCNC: 9 MMOL/L (ref 7–19)
AST SERPL-CCNC: 13 UNITS/L
BILIRUB SERPL-MCNC: 0.4 MG/DL (ref 0.2–1)
BUN SERPL-MCNC: 22 MG/DL (ref 6–20)
BUN/CREAT SERPL: 31 (ref 7–25)
CALCIUM SERPL-MCNC: 9 MG/DL (ref 8.4–10.2)
CHLORIDE SERPL-SCNC: 107 MMOL/L (ref 97–110)
CO2 SERPL-SCNC: 27 MMOL/L (ref 21–32)
CREAT SERPL-MCNC: 0.7 MG/DL (ref 0.67–1.17)
FASTING DURATION TIME PATIENT: ABNORMAL H
GFR SERPLBLD BASED ON 1.73 SQ M-ARVRAT: >90 ML/MIN
GLOBULIN SER-MCNC: 3.2 G/DL (ref 2–4)
GLUCOSE SERPL-MCNC: 97 MG/DL (ref 70–99)
POTASSIUM SERPL-SCNC: 4.3 MMOL/L (ref 3.4–5.1)
PROT SERPL-MCNC: 6.8 G/DL (ref 6.4–8.2)
SODIUM SERPL-SCNC: 139 MMOL/L (ref 135–145)

## 2022-07-19 PROCEDURE — 80053 COMPREHEN METABOLIC PANEL: CPT | Performed by: INTERNAL MEDICINE

## 2022-07-19 PROCEDURE — 82306 VITAMIN D 25 HYDROXY: CPT | Performed by: INTERNAL MEDICINE

## 2022-07-19 PROCEDURE — 36415 COLL VENOUS BLD VENIPUNCTURE: CPT | Performed by: INTERNAL MEDICINE

## 2022-07-19 PROCEDURE — 99213 OFFICE O/P EST LOW 20 MIN: CPT | Performed by: INTERNAL MEDICINE

## 2022-07-19 ASSESSMENT — PATIENT HEALTH QUESTIONNAIRE - PHQ9
1. LITTLE INTEREST OR PLEASURE IN DOING THINGS: NOT AT ALL
2. FEELING DOWN, DEPRESSED OR HOPELESS: NOT AT ALL
SUM OF ALL RESPONSES TO PHQ9 QUESTIONS 1 AND 2: 0
SUM OF ALL RESPONSES TO PHQ9 QUESTIONS 1 AND 2: 0
CLINICAL INTERPRETATION OF PHQ2 SCORE: NO FURTHER SCREENING NEEDED

## 2022-07-19 ASSESSMENT — PAIN SCALES - GENERAL: PAINLEVEL: 0

## 2022-08-01 ENCOUNTER — TELEPHONE (OUTPATIENT)
Dept: SCHEDULING | Age: 67
End: 2022-08-01

## 2022-08-08 ENCOUNTER — TELEPHONE (OUTPATIENT)
Dept: SCHEDULING | Age: 67
End: 2022-08-08

## 2022-08-09 ENCOUNTER — OFFICE VISIT (OUTPATIENT)
Dept: INTERNAL MEDICINE | Age: 67
End: 2022-08-09

## 2022-08-09 VITALS
SYSTOLIC BLOOD PRESSURE: 134 MMHG | BODY MASS INDEX: 23.7 KG/M2 | TEMPERATURE: 97.5 F | DIASTOLIC BLOOD PRESSURE: 77 MMHG | HEART RATE: 57 BPM | HEIGHT: 67 IN | WEIGHT: 151.01 LBS | OXYGEN SATURATION: 100 %

## 2022-08-09 DIAGNOSIS — K21.00 GASTROESOPHAGEAL REFLUX DISEASE WITH ESOPHAGITIS WITHOUT HEMORRHAGE: Primary | ICD-10-CM

## 2022-08-09 PROCEDURE — 99213 OFFICE O/P EST LOW 20 MIN: CPT | Performed by: INTERNAL MEDICINE

## 2022-08-09 ASSESSMENT — PAIN SCALES - GENERAL: PAINLEVEL: 0

## 2022-09-08 ENCOUNTER — TELEPHONE (OUTPATIENT)
Dept: SCHEDULING | Age: 67
End: 2022-09-08

## 2022-09-12 ENCOUNTER — OFFICE VISIT (OUTPATIENT)
Dept: INTERNAL MEDICINE | Age: 67
End: 2022-09-12

## 2022-09-12 ENCOUNTER — TELEPHONE (OUTPATIENT)
Dept: SCHEDULING | Age: 67
End: 2022-09-12

## 2022-09-12 ENCOUNTER — HOSPITAL ENCOUNTER (OUTPATIENT)
Dept: GENERAL RADIOLOGY | Age: 67
Discharge: HOME | End: 2022-09-12

## 2022-09-12 ENCOUNTER — TELEPHONE (OUTPATIENT)
Dept: INTERNAL MEDICINE | Age: 67
End: 2022-09-12

## 2022-09-12 VITALS
DIASTOLIC BLOOD PRESSURE: 81 MMHG | SYSTOLIC BLOOD PRESSURE: 151 MMHG | WEIGHT: 151 LBS | TEMPERATURE: 97.6 F | OXYGEN SATURATION: 71 % | BODY MASS INDEX: 23.7 KG/M2 | HEART RATE: 65 BPM | HEIGHT: 67 IN

## 2022-09-12 DIAGNOSIS — G89.29 CHRONIC MIDLINE LOW BACK PAIN WITHOUT SCIATICA: ICD-10-CM

## 2022-09-12 DIAGNOSIS — M54.6 ACUTE RIGHT-SIDED THORACIC BACK PAIN: ICD-10-CM

## 2022-09-12 DIAGNOSIS — M54.50 CHRONIC MIDLINE LOW BACK PAIN WITHOUT SCIATICA: ICD-10-CM

## 2022-09-12 DIAGNOSIS — M54.6 ACUTE RIGHT-SIDED THORACIC BACK PAIN: Primary | ICD-10-CM

## 2022-09-12 PROCEDURE — 72100 X-RAY EXAM L-S SPINE 2/3 VWS: CPT

## 2022-09-12 PROCEDURE — 99213 OFFICE O/P EST LOW 20 MIN: CPT | Performed by: INTERNAL MEDICINE

## 2022-09-12 PROCEDURE — 71100 X-RAY EXAM RIBS UNI 2 VIEWS: CPT

## 2022-09-12 ASSESSMENT — PAIN SCALES - GENERAL: PAINLEVEL: 10

## 2022-09-12 ASSESSMENT — PATIENT HEALTH QUESTIONNAIRE - PHQ9
SUM OF ALL RESPONSES TO PHQ9 QUESTIONS 1 AND 2: 0
SUM OF ALL RESPONSES TO PHQ9 QUESTIONS 1 AND 2: 0
CLINICAL INTERPRETATION OF PHQ2 SCORE: NO FURTHER SCREENING NEEDED
2. FEELING DOWN, DEPRESSED OR HOPELESS: NOT AT ALL
1. LITTLE INTEREST OR PLEASURE IN DOING THINGS: NOT AT ALL

## 2022-09-14 ENCOUNTER — HOSPITAL ENCOUNTER (OUTPATIENT)
Dept: MRI IMAGING | Age: 67
Discharge: HOME | End: 2022-09-14
Attending: INTERNAL MEDICINE

## 2022-09-14 ENCOUNTER — TELEPHONE (OUTPATIENT)
Dept: SCHEDULING | Age: 67
End: 2022-09-14

## 2022-09-14 DIAGNOSIS — M54.6 ACUTE RIGHT-SIDED THORACIC BACK PAIN: ICD-10-CM

## 2022-09-14 PROCEDURE — 72157 MRI CHEST SPINE W/O & W/DYE: CPT

## 2022-09-14 PROCEDURE — A9585 GADOBUTROL INJECTION: HCPCS | Performed by: INTERNAL MEDICINE

## 2022-09-14 PROCEDURE — G1004 CDSM NDSC: HCPCS

## 2022-09-14 PROCEDURE — 10002805 HB CONTRAST AGENT: Performed by: INTERNAL MEDICINE

## 2022-09-14 RX ORDER — GADOBUTROL 604.72 MG/ML
7.5 INJECTION INTRAVENOUS ONCE
Status: COMPLETED | OUTPATIENT
Start: 2022-09-14 | End: 2022-09-14

## 2022-09-14 RX ADMIN — GADOBUTROL 7.5 ML: 604.72 INJECTION INTRAVENOUS at 17:06

## 2022-09-19 ENCOUNTER — OFFICE VISIT (OUTPATIENT)
Dept: INTERNAL MEDICINE | Age: 67
End: 2022-09-19

## 2022-09-19 VITALS
RESPIRATION RATE: 16 BRPM | HEIGHT: 67 IN | WEIGHT: 151 LBS | SYSTOLIC BLOOD PRESSURE: 110 MMHG | HEART RATE: 64 BPM | OXYGEN SATURATION: 99 % | TEMPERATURE: 98.1 F | DIASTOLIC BLOOD PRESSURE: 73 MMHG | BODY MASS INDEX: 23.7 KG/M2

## 2022-09-19 DIAGNOSIS — M89.8X1 CHRONIC SCAPULAR PAIN: ICD-10-CM

## 2022-09-19 DIAGNOSIS — Z12.11 SPECIAL SCREENING FOR MALIGNANT NEOPLASMS, COLON: Primary | ICD-10-CM

## 2022-09-19 DIAGNOSIS — R42 DIZZINESS: ICD-10-CM

## 2022-09-19 DIAGNOSIS — G89.29 CHRONIC SCAPULAR PAIN: ICD-10-CM

## 2022-09-19 PROCEDURE — 99213 OFFICE O/P EST LOW 20 MIN: CPT | Performed by: INTERNAL MEDICINE

## 2022-09-22 ENCOUNTER — APPOINTMENT (OUTPATIENT)
Dept: MRI IMAGING | Age: 67
End: 2022-09-22
Attending: INTERNAL MEDICINE

## 2022-09-23 ENCOUNTER — TELEPHONE (OUTPATIENT)
Dept: INTERNAL MEDICINE | Age: 67
End: 2022-09-23

## 2022-09-26 ENCOUNTER — TELEPHONE (OUTPATIENT)
Dept: FAMILY MEDICINE | Age: 67
End: 2022-09-26

## 2022-09-26 ENCOUNTER — TELEPHONE (OUTPATIENT)
Dept: INTERNAL MEDICINE | Age: 67
End: 2022-09-26

## 2022-09-27 ENCOUNTER — TELEPHONE (OUTPATIENT)
Dept: SCHEDULING | Age: 67
End: 2022-09-27

## 2022-09-27 DIAGNOSIS — G89.29 CHRONIC SCAPULAR PAIN: ICD-10-CM

## 2022-09-27 DIAGNOSIS — M51.36 LUMBAR DEGENERATIVE DISC DISEASE: ICD-10-CM

## 2022-09-27 DIAGNOSIS — M54.50 CHRONIC BILATERAL LOW BACK PAIN WITHOUT SCIATICA: Primary | ICD-10-CM

## 2022-09-27 DIAGNOSIS — M47.816 FACET ARTHRITIS OF LUMBAR REGION: ICD-10-CM

## 2022-09-27 DIAGNOSIS — G89.29 CHRONIC BILATERAL LOW BACK PAIN WITHOUT SCIATICA: Primary | ICD-10-CM

## 2022-09-27 DIAGNOSIS — M48.061 SPINAL STENOSIS OF LUMBAR REGION, UNSPECIFIED WHETHER NEUROGENIC CLAUDICATION PRESENT: ICD-10-CM

## 2022-09-27 DIAGNOSIS — M89.8X1 CHRONIC SCAPULAR PAIN: ICD-10-CM

## 2022-09-29 ENCOUNTER — APPOINTMENT (OUTPATIENT)
Dept: MRI IMAGING | Age: 67
End: 2022-09-29
Attending: INTERNAL MEDICINE

## 2022-10-04 ENCOUNTER — TELEPHONE (OUTPATIENT)
Dept: INTERNAL MEDICINE | Age: 67
End: 2022-10-04

## 2022-10-04 DIAGNOSIS — R13.10 DYSPHAGIA, UNSPECIFIED TYPE: Primary | ICD-10-CM

## 2022-10-05 ENCOUNTER — TELEPHONE (OUTPATIENT)
Dept: CARDIOLOGY | Age: 67
End: 2022-10-05

## 2022-10-12 ENCOUNTER — TELEPHONE (OUTPATIENT)
Dept: INTERNAL MEDICINE | Age: 67
End: 2022-10-12

## 2022-10-12 ENCOUNTER — TELEPHONE (OUTPATIENT)
Dept: SCHEDULING | Age: 67
End: 2022-10-12

## 2022-10-12 ENCOUNTER — HOSPITAL ENCOUNTER (OUTPATIENT)
Dept: GENERAL RADIOLOGY | Age: 67
Discharge: HOME | End: 2022-10-12

## 2022-10-12 DIAGNOSIS — M54.50 LOW BACK PAIN: ICD-10-CM

## 2022-10-12 PROCEDURE — 72114 X-RAY EXAM L-S SPINE BENDING: CPT

## 2022-10-13 ENCOUNTER — HOSPITAL ENCOUNTER (OUTPATIENT)
Dept: GENERAL RADIOLOGY | Age: 67
Discharge: HOME | End: 2022-10-13

## 2022-10-13 ENCOUNTER — TELEPHONE (OUTPATIENT)
Dept: SCHEDULING | Age: 67
End: 2022-10-13

## 2022-10-13 DIAGNOSIS — G89.29 CHRONIC SCAPULAR PAIN: ICD-10-CM

## 2022-10-13 DIAGNOSIS — M89.8X1 CHRONIC SCAPULAR PAIN: ICD-10-CM

## 2022-10-13 PROCEDURE — 73010 X-RAY EXAM OF SHOULDER BLADE: CPT

## 2022-10-21 ENCOUNTER — TELEPHONE (OUTPATIENT)
Dept: SCHEDULING | Age: 67
End: 2022-10-21

## 2022-10-24 ENCOUNTER — EXTERNAL RECORD (OUTPATIENT)
Dept: HEALTH INFORMATION MANAGEMENT | Facility: OTHER | Age: 67
End: 2022-10-24

## 2022-10-24 ENCOUNTER — LAB SERVICES (OUTPATIENT)
Dept: LAB | Age: 67
End: 2022-10-24

## 2022-10-24 DIAGNOSIS — G89.29 CHRONIC SCAPULAR PAIN: ICD-10-CM

## 2022-10-24 DIAGNOSIS — M89.8X1 CHRONIC SCAPULAR PAIN: ICD-10-CM

## 2022-10-24 LAB
BASOPHILS # BLD: 0.1 K/MCL (ref 0–0.3)
BASOPHILS NFR BLD: 1 %
DEPRECATED RDW RBC: 41.2 FL (ref 39–50)
EOSINOPHIL # BLD: 0.1 K/MCL (ref 0–0.5)
EOSINOPHIL NFR BLD: 2 %
ERYTHROCYTE [DISTWIDTH] IN BLOOD: 12.4 % (ref 11–15)
ERYTHROCYTE [SEDIMENTATION RATE] IN BLOOD BY WESTERGREN METHOD: 11 MM/HR (ref 0–20)
HCT VFR BLD CALC: 42.1 % (ref 39–51)
HGB BLD-MCNC: 14.3 G/DL (ref 13–17)
IMM GRANULOCYTES # BLD AUTO: 0 K/MCL (ref 0–0.2)
IMM GRANULOCYTES # BLD: 1 %
LYMPHOCYTES # BLD: 1.6 K/MCL (ref 1–4)
LYMPHOCYTES NFR BLD: 24 %
MCH RBC QN AUTO: 31 PG (ref 26–34)
MCHC RBC AUTO-ENTMCNC: 34 G/DL (ref 32–36.5)
MCV RBC AUTO: 91.3 FL (ref 78–100)
MONOCYTES # BLD: 0.6 K/MCL (ref 0.3–0.9)
MONOCYTES NFR BLD: 9 %
NEUTROPHILS # BLD: 4.3 K/MCL (ref 1.8–7.7)
NEUTROPHILS NFR BLD: 63 %
NRBC BLD MANUAL-RTO: 0 /100 WBC
PLATELET # BLD AUTO: 168 K/MCL (ref 140–450)
RBC # BLD: 4.61 MIL/MCL (ref 4.5–5.9)
WBC # BLD: 6.6 K/MCL (ref 4.2–11)

## 2022-10-24 PROCEDURE — 85025 COMPLETE CBC W/AUTO DIFF WBC: CPT | Performed by: INTERNAL MEDICINE

## 2022-10-24 PROCEDURE — 85652 RBC SED RATE AUTOMATED: CPT | Performed by: INTERNAL MEDICINE

## 2022-10-24 PROCEDURE — 36415 COLL VENOUS BLD VENIPUNCTURE: CPT | Performed by: INTERNAL MEDICINE

## 2022-10-26 ENCOUNTER — HOSPITAL ENCOUNTER (OUTPATIENT)
Dept: GENERAL RADIOLOGY | Age: 67
Discharge: HOME | End: 2022-10-26
Attending: INTERNAL MEDICINE

## 2022-10-26 DIAGNOSIS — R13.10 DYSPHAGIA: ICD-10-CM

## 2022-10-26 PROCEDURE — 92611 MOTION FLUOROSCOPY/SWALLOW: CPT

## 2022-10-26 PROCEDURE — 74230 X-RAY XM SWLNG FUNCJ C+: CPT

## 2022-11-06 ENCOUNTER — TELEPHONE (OUTPATIENT)
Dept: SCHEDULING | Age: 67
End: 2022-11-06

## 2022-11-07 ENCOUNTER — ORDER TRANSCRIPTION (OUTPATIENT)
Dept: PHYSICAL THERAPY | Facility: HOSPITAL | Age: 67
End: 2022-11-07

## 2022-11-07 DIAGNOSIS — M54.50 LOW BACK PAIN: Primary | ICD-10-CM

## 2022-11-09 ENCOUNTER — APPOINTMENT (OUTPATIENT)
Dept: CARDIOLOGY | Age: 67
End: 2022-11-09

## 2022-11-15 ENCOUNTER — OFFICE VISIT (OUTPATIENT)
Dept: PHYSICAL THERAPY | Facility: HOSPITAL | Age: 67
End: 2022-11-15
Attending: ANESTHESIOLOGY
Payer: MEDICARE

## 2022-11-15 DIAGNOSIS — M54.50 LOW BACK PAIN: ICD-10-CM

## 2022-11-15 PROCEDURE — 97162 PT EVAL MOD COMPLEX 30 MIN: CPT

## 2022-11-15 PROCEDURE — 97112 NEUROMUSCULAR REEDUCATION: CPT

## 2022-11-16 ENCOUNTER — TELEPHONE (OUTPATIENT)
Dept: CARDIOLOGY | Age: 67
End: 2022-11-16

## 2022-11-17 ENCOUNTER — ANESTHESIA EVENT (OUTPATIENT)
Dept: GASTROENTEROLOGY | Age: 67
End: 2022-11-17

## 2022-11-17 ENCOUNTER — ANESTHESIA (OUTPATIENT)
Dept: GASTROENTEROLOGY | Age: 67
End: 2022-11-17

## 2022-11-17 ENCOUNTER — HOSPITAL ENCOUNTER (OUTPATIENT)
Dept: GASTROENTEROLOGY | Age: 67
Discharge: HOME | End: 2022-11-17
Attending: INTERNAL MEDICINE

## 2022-11-17 VITALS
OXYGEN SATURATION: 99 % | WEIGHT: 147.93 LBS | SYSTOLIC BLOOD PRESSURE: 132 MMHG | DIASTOLIC BLOOD PRESSURE: 69 MMHG | RESPIRATION RATE: 6 BRPM | HEIGHT: 66 IN | TEMPERATURE: 96.8 F | BODY MASS INDEX: 23.77 KG/M2 | HEART RATE: 64 BPM

## 2022-11-17 DIAGNOSIS — R13.19 OTHER DYSPHAGIA: ICD-10-CM

## 2022-11-17 DIAGNOSIS — Z12.11 ENCOUNTER FOR SCREENING COLONOSCOPY: ICD-10-CM

## 2022-11-17 PROCEDURE — 13000008 HB ANESTHESIA MAC OUTSIDE OR

## 2022-11-17 PROCEDURE — 10002807 HB RX 258: Performed by: ANESTHESIOLOGY

## 2022-11-17 PROCEDURE — 10002800 HB RX 250 W HCPCS: Performed by: ANESTHESIOLOGY

## 2022-11-17 PROCEDURE — 10002807 HB RX 258: Performed by: INTERNAL MEDICINE

## 2022-11-17 PROCEDURE — 13000024 HB GI COMPLEX CASE S/U + 1ST 15 MIN

## 2022-11-17 PROCEDURE — 10004451 HB PACU RECOVERY 1ST 30 MINUTES

## 2022-11-17 PROCEDURE — 13000001 HB PHASE II RECOVERY EA 30 MINUTES

## 2022-11-17 RX ORDER — SODIUM CHLORIDE 9 MG/ML
INJECTION, SOLUTION INTRAVENOUS CONTINUOUS
Status: DISCONTINUED | OUTPATIENT
Start: 2022-11-17 | End: 2022-11-19 | Stop reason: HOSPADM

## 2022-11-17 RX ORDER — SODIUM CHLORIDE, SODIUM LACTATE, POTASSIUM CHLORIDE, CALCIUM CHLORIDE 600; 310; 30; 20 MG/100ML; MG/100ML; MG/100ML; MG/100ML
INJECTION, SOLUTION INTRAVENOUS CONTINUOUS PRN
Status: DISCONTINUED | OUTPATIENT
Start: 2022-11-17 | End: 2022-11-17

## 2022-11-17 RX ADMIN — SODIUM CHLORIDE, POTASSIUM CHLORIDE, SODIUM LACTATE AND CALCIUM CHLORIDE: 600; 310; 30; 20 INJECTION, SOLUTION INTRAVENOUS at 08:20

## 2022-11-17 RX ADMIN — PROPOFOL 100 MCG/KG/MIN: 10 INJECTION, EMULSION INTRAVENOUS at 09:13

## 2022-11-17 RX ADMIN — SODIUM CHLORIDE: 9 INJECTION, SOLUTION INTRAVENOUS at 08:41

## 2022-11-17 ASSESSMENT — ACTIVITIES OF DAILY LIVING (ADL)
NEEDS_ASSIST: NO
HISTORY OF FALLING IN THE LAST YEAR (PRIOR TO ADMISSION): NO
ADL_SCORE: 12
ADL_BEFORE_ADMISSION: INDEPENDENT
ADL_SHORT_OF_BREATH: NO

## 2022-11-17 ASSESSMENT — PAIN SCALES - GENERAL
PAINLEVEL_OUTOF10: 0

## 2022-11-22 ENCOUNTER — OFFICE VISIT (OUTPATIENT)
Dept: PHYSICAL THERAPY | Facility: HOSPITAL | Age: 67
End: 2022-11-22
Attending: ANESTHESIOLOGY
Payer: MEDICARE

## 2022-11-22 PROCEDURE — 97110 THERAPEUTIC EXERCISES: CPT

## 2022-11-22 PROCEDURE — 97112 NEUROMUSCULAR REEDUCATION: CPT

## 2022-11-22 NOTE — PROGRESS NOTES
Rocio Partida Xiao    2/22/1955  Referring Physician:  Delmy Salamanca  Diagnosis: Low back pain (M54.50)    Initial Evaluation Date: 11/15/2022  Date of Surgery: None for this diagnosis     Insurance type and authorized visits: Medicaid, TBD  Plan of care expires: For orders: 2/13/2023; For insurance: TBD    Precautions/Hx: OA, B carotid stenosis, compression fx c-spine, GERD, mitral regurgitation, medial meniscus tear, palpitations,     SUBJECTIVE:     Pt reports that he was told not to go back to the pain specialist in [facility] by his insurance. He continues to have pain going across the lower back and noted a strong pain when trying to get off of the toilet. He states that he will wake in the morning without pain, but then starts having pain quickly. Visit count 2/13/2023 1/8 2/8    Date 11/15/2022 11/22/2022     LBP/B SI      Pain Range 3-4 to 8/10 0 to 8/10    Pain Ave 5-6/10 5-6/10       OBJECTIVE:     Treatment performed this date:    Therapeutic Exercise:  Visit #   1/8 2/8   Position Exercise HEP 11/15/2022 11/22/2022    NuStep  Seat 11, Arms 11, Resist 3   X 5 min   Supine Sciatic nerve glide leg pumps H  X          Sidelying Hesch side glide fixation 5 min H  X          Neuro TNE  X beginning level TNE, brain decision on pain, stress effects X see assessment     H = HEP. Pt given copies of this exercise for home program.  X = Exercises done this date - pt verbalized understanding and demonstrated competence. All exercises done B unless otherwise indicated. Pt advised to discontinue exercises that increase pain and to call or return to therapist to discuss. Each intervention above is specifically prescribed to address the patients identified impairments, activity limitations, and participation restrictions. ASSESSMENT:     Pt continues to have limited tolerance for mobility demonstrating very small ROM prior to c/o pain. Pt discussing previous treatments that did not give him relief.   Pt brings in a 1\" stack of medical history papers. Continue to discuss importance of addressing pain and stress levels concurrently w review of therapeutic neuroscience. Pt benefits from redirection to use of breathing and relaxation focus. Began discussion of use of aerobic activity in positions that he can tolerate to aid in pain management. Pt did well with aerobic activity on the NuStep and was able to tolerate 65 spm for 5 min. Pt demonstrates limited trunk side glide w lumbopelvic mobility assessment. He tolerated SL self correction well. Physical Therapy Goals: From 11/15/2022 to 2/13/2023  - Created with patient input during initial evaluation   1. Pt will be independent in beginning level of HEP for stretching, posture and strengthening. 2. Pt will be able to don/doff socks/shoes without increased symptoms. 3. 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 4. 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 5. Pt will be able to turn to back up the car without increased symptoms. PLAN OF CARE:      Continue PT per original plan for therapeutic exercises, posture retraining, therapeutic activities, manual treatment, neuromuscular reeducation, therapeutic pain neuroscience education, patient education, self care home management, home exercise program, and modalities as needed. Charges: Brad Maldonado    Total Timed treatment: 50 min      Total Treatment Time: 50 min     __________________________________________________________________________________________      21st Century Cures Act Notice to Patient: Medical documents like this are made available to patients in the interest of transparency. However, be advised this is a medical document and it is intended as ovsz-de-npif communication between your medical providers.  This medical document may contain abbreviations, assessments, medical data, and results or other terms that are unfamiliar. Medical documents are intended to carry relevant information, facts as evident, and the clinical opinion of the practitioner. As such, this medical document may be written in language that appears blunt or direct. You are encouraged to contact your medical provider and/or [facility] Patient Experience if you have any questions about this medical document. Objective Initial Evaluation Data 11/15/2022:    Functional Scores 11/15/2022   FOTO primary measure 47     Palpation: sensitive to light touch over lumbar spine  Gait:        Deviations: trunk flex, wide based       Devices: none       Assistance: none    Posture 11/15/2022   Alignment R foot ER, L shoulder depressed, forward head position , L trunk shift.  flattened lumbar spine and thoracic spine       Body mechanics 11/15/2022   Reaching for personal belongings poor       Sensation 11/15/2022   Dermatomes Light Touch    Proximal medial thigh R (L2)  wnl   Proximal medial thigh L (L2) wnl   Above knee R (L3) wnl   Above knee L (L3) wnl   Medial arch R(L4) wnl   Medial arch L (L4) wnl   Between 1st - 2nd toes R (L5) wnl   Between 1st - 2nd toes L (L5) wnl   Lateral border of foot R (S1) wnl   Lateral border of foot L (S1) dull   Popliteal fossa R (S2) wnl   Popliteal fossa L(S2) wnl       Abdominals 11/15/2022   Tone  fair       ROM Lumbar 11/15/2022   Flexion Mod-max   Extension max   R SB mod   L SB mod   R Rotation mod   L Rotation Mod-max   * pain limiting    MMT 5/5 11/15/2022   L2- hip flex R 5   Hip flex L 5   L3- knee ext R 5   Knee ext L 5-   L4- ankle DF R 5   Ankle DF L 5   L5- EHL R 5   EHL L 4+   S1- ankle PF R 5   Ankle PF L 5   S2- Hams R 5   Hams L 5   *pain limiting    LE flexibility 11/15/2022   Piriformis L Mod-max   Piriformis R Mod-max   Hams L -40   Hams R -45   *pain limiting    Neural mobility 11/15/2022   SLR R 40 deg myofascial tension   SLR L 35 deg myofascial tension      VENKATA 11/15/2022   R wnl   L wnl Position Hesch Spring Tests 11/22/2022        Supine Hip IR R Min-mod    Hip IR L min    Pelvic Side [place] R mod    Pelvis Side Glide L wnl    Post Rotation of Ilium R Mod-max    Post Rotation of Ilium L wnl

## 2022-11-23 ENCOUNTER — TELEPHONE (OUTPATIENT)
Dept: SCHEDULING | Age: 67
End: 2022-11-23

## 2022-11-28 ENCOUNTER — TELEPHONE (OUTPATIENT)
Dept: FAMILY MEDICINE | Age: 67
End: 2022-11-28

## 2022-11-28 ENCOUNTER — OFFICE VISIT (OUTPATIENT)
Dept: INTERNAL MEDICINE | Age: 67
End: 2022-11-28

## 2022-11-28 ENCOUNTER — OFFICE VISIT (OUTPATIENT)
Dept: PHYSICAL THERAPY | Facility: HOSPITAL | Age: 67
End: 2022-11-28
Attending: ANESTHESIOLOGY
Payer: MEDICARE

## 2022-11-28 VITALS
TEMPERATURE: 97.9 F | WEIGHT: 148.15 LBS | SYSTOLIC BLOOD PRESSURE: 141 MMHG | HEART RATE: 81 BPM | DIASTOLIC BLOOD PRESSURE: 79 MMHG | OXYGEN SATURATION: 99 % | HEIGHT: 67 IN | BODY MASS INDEX: 23.25 KG/M2

## 2022-11-28 DIAGNOSIS — K64.9 HEMORRHOIDS, UNSPECIFIED HEMORRHOID TYPE: Primary | ICD-10-CM

## 2022-11-28 PROCEDURE — 97112 NEUROMUSCULAR REEDUCATION: CPT

## 2022-11-28 PROCEDURE — 99213 OFFICE O/P EST LOW 20 MIN: CPT | Performed by: INTERNAL MEDICINE

## 2022-11-28 PROCEDURE — 97110 THERAPEUTIC EXERCISES: CPT

## 2022-11-28 RX ORDER — PROPRANOLOL HYDROCHLORIDE 10 MG/1
TABLET ORAL
Qty: 90 TABLET | Refills: 2 | Status: SHIPPED | OUTPATIENT
Start: 2022-11-28 | End: 2023-09-21

## 2022-11-28 ASSESSMENT — PATIENT HEALTH QUESTIONNAIRE - PHQ9
SUM OF ALL RESPONSES TO PHQ9 QUESTIONS 1 AND 2: 0
CLINICAL INTERPRETATION OF PHQ2 SCORE: NO FURTHER SCREENING NEEDED
2. FEELING DOWN, DEPRESSED OR HOPELESS: NOT AT ALL
1. LITTLE INTEREST OR PLEASURE IN DOING THINGS: NOT AT ALL
SUM OF ALL RESPONSES TO PHQ9 QUESTIONS 1 AND 2: 0

## 2022-11-28 ASSESSMENT — PAIN SCALES - GENERAL: PAINLEVEL: 0

## 2022-11-28 NOTE — PROGRESS NOTES
Mike Hagen    2/22/1955  Referring Physician:  Alina Martinez  Diagnosis: Low back pain (M54.50)    Initial Evaluation Date: 11/15/2022  Date of Surgery: None for this diagnosis     Insurance type and authorized visits: Medicaid, TBD  Plan of care expires: For orders: 2/13/2023; For insurance: TBD    Precautions/Hx: OA, B carotid stenosis, compression fx c-spine, GERD, mitral regurgitation, medial meniscus tear, palpitations,     SUBJECTIVE:     Pt reports that he is trying to be active at home and still feels like there is a board in his back. Pt talking at length about his pain management dr related to his treatment, what was in his notes and his insurance issues. Visit count 2/13/2023 1/8 2/8 3/8   Date 11/15/2022 11/22/2022  11/28/2022    LBP/B SI      Pain Range 3-4 to 8/10 0 to 8/10 0 to 6-7/10   Pain Ave 5-6/10 5-6/10 5-6/10      OBJECTIVE:     Treatment performed this date:    Therapeutic Exercise:  Visit #   1/8 2/8 3/8   Position Exercise HEP 11/15/2022 11/22/2022  11/28/2022    NuStep  Seat 11, Arms 11, Resist 3   X 5 min X 6 min, 4 interval   Supine Sciatic nerve glide leg pumps H  X X 10x    Hesch R ant ileum self mob 2 min H  X X    Cross leg piriformis H   X 5x 10s    LTR H   X 10x    DKC H   X   Sidelying Hesch side glide fixation 5 min H  X X     Hesch R ant ileum self mob H  X    Neuro TNE  X beginning level TNE, brain decision on pain, stress effects X see assessment   X beginning level review w repetition   H = HEP. Pt given copies of this exercise for home program.  X = Exercises done this date - pt verbalized understanding and demonstrated competence. All exercises done B unless otherwise indicated. Pt advised to discontinue exercises that increase pain and to call or return to therapist to discuss. Each intervention above is specifically prescribed to address the patients identified impairments, activity limitations, and participation restrictions.     ASSESSMENT:     Pt continues to have c/o LBP with stiffness. Pt very concerned about previous treatment required frequent redirection to current environment to progress development of HEP. Continue to discuss beginning level therapeutic neuroscience w effects of stress on pain activation. Pt able to add to HEP as noted above w need for gentle progression and transitions. Physical Therapy Goals: From 11/15/2022 to 2/13/2023  - Created with patient input during initial evaluation   1. Pt will be independent in beginning level of HEP for stretching, posture and strengthening. 2. Pt will be able to don/doff socks/shoes without increased symptoms. 3. 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 4. 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 5. Pt will be able to turn to back up the car without increased symptoms. PLAN OF CARE:      Continue PT per original plan for therapeutic exercises, posture retraining, therapeutic activities, manual treatment, neuromuscular reeducation, therapeutic pain neuroscience education, patient education, self care home management, home exercise program, and modalities as needed. Charges: Estefani Tam    Total Timed treatment: 55 min      Total Treatment Time: 55 min     __________________________________________________________________________________________      21st Century Cures Act Notice to Patient: Medical documents like this are made available to patients in the interest of transparency. However, be advised this is a medical document and it is intended as xgad-ma-nves communication between your medical providers. This medical document may contain abbreviations, assessments, medical data, and results or other terms that are unfamiliar. Medical documents are intended to carry relevant information, facts as evident, and the clinical opinion of the practitioner.  As such, this medical document may be written in language that appears blunt or direct. You are encouraged to contact your medical provider and/or [facility] Patient Experience if you have any questions about this medical document. Objective Initial Evaluation Data 11/15/2022:    Functional Scores 11/15/2022   FOTO primary measure 47     Palpation: sensitive to light touch over lumbar spine  Gait:        Deviations: trunk flex, wide based       Devices: none       Assistance: none    Posture 11/15/2022   Alignment R foot ER, L shoulder depressed, forward head position , L trunk shift.  flattened lumbar spine and thoracic spine       Body mechanics 11/15/2022   Reaching for personal belongings poor       Sensation 11/15/2022   Dermatomes Light Touch    Proximal medial thigh R (L2)  wnl   Proximal medial thigh L (L2) wnl   Above knee R (L3) wnl   Above knee L (L3) wnl   Medial arch R(L4) wnl   Medial arch L (L4) wnl   Between 1st - 2nd toes R (L5) wnl   Between 1st - 2nd toes L (L5) wnl   Lateral border of foot R (S1) wnl   Lateral border of foot L (S1) dull   Popliteal fossa R (S2) wnl   Popliteal fossa L(S2) wnl       Abdominals 11/15/2022   Tone  fair       ROM Lumbar 11/15/2022   Flexion Mod-max   Extension max   R SB mod   L SB mod   R Rotation mod   L Rotation Mod-max   * pain limiting    MMT 5/5 11/15/2022   L2- hip flex R 5   Hip flex L 5   L3- knee ext R 5   Knee ext L 5-   L4- ankle DF R 5   Ankle DF L 5   L5- EHL R 5   EHL L 4+   S1- ankle PF R 5   Ankle PF L 5   S2- Hams R 5   Hams L 5   *pain limiting    LE flexibility 11/15/2022   Piriformis L Mod-max   Piriformis R Mod-max   Hams L -40   Hams R -45   *pain limiting    Neural mobility 11/15/2022   SLR R 40 deg myofascial tension   SLR L 35 deg myofascial tension      VENKATA 11/15/2022   R wnl   L wnl      Position [facility] Spring Tests 11/22/2022        Supine Hip IR R Min-mod    Hip IR L min    Pelvic Side [place] R mod    Pelvis Side Glide L wnl    Post Rotation of Ilium R Mod-max    Post Rotation of Ilium L wnl

## 2022-12-05 ENCOUNTER — OFFICE VISIT (OUTPATIENT)
Dept: PHYSICAL THERAPY | Facility: HOSPITAL | Age: 67
End: 2022-12-05
Attending: ANESTHESIOLOGY
Payer: MEDICARE

## 2022-12-05 PROCEDURE — 97112 NEUROMUSCULAR REEDUCATION: CPT

## 2022-12-05 PROCEDURE — 97110 THERAPEUTIC EXERCISES: CPT

## 2022-12-05 NOTE — PROGRESS NOTES
Kiana Hagen    2/22/1955  Referring Physician:  Saundra Monk  Diagnosis: Low back pain (M54.50)    Initial Evaluation Date: 11/15/2022  Date of Surgery: None for this diagnosis     Insurance type and authorized visits: Medicaid, TBD  Plan of care expires: For orders: 2/13/2023; For insurance: TBD    Precautions/Hx: OA, B carotid stenosis, compression fx c-spine, GERD, mitral regurgitation, medial meniscus tear, palpitations,     SUBJECTIVE:     Pt reports that he has returned to work for one day and was doing taping around the doors and it flared up his pain. 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 He states that he has many jobs waiting for you. He states that the exercises have not made his pain worse. Visit count 2/13/2023 1/8 2/8 3/8 4/8   Date 11/15/2022 11/22/2022  11/28/2022  12/5/2022    LBP/B SI       Pain Range 3-4 to 8/10 0 to 8/10 0 to 6-7/10 0 to 7/10   Pain Ave 5-6/10 5-6/10 5-6/10 5/10      OBJECTIVE:     Treatment performed this date:    Therapeutic Exercise:  Visit #   2/8 3/8 4/8   Position Exercise HEP 11/22/2022 11/28/2022 12/5/2022    NuStep  Seat 11, Arms 11, Resist 3  X 5 min X 6 min, 4 interval    Supine Sciatic nerve glide leg pumps H X X 10x     Hesch R ant ileum self mob 2 min H X X     Cross leg piriformis H  X 5x 10s     LTR H  X 10x     DKC H  X    Sidelying Hesch side glide fixation 5 min H X X      Hesch R ant ileum self mob H X     Neuro TNE  X see assessment   X beginning level review w repetition X see assessment    MT IASTM    X R>L lumbar ps and TLF    MFR    X B lumbar ps and TLF    Joint mob    X PA glides L5-T12   US Ultrasound - units given 1  100% power, 1mHz, 1.2 w/cm2, 7 min to      X lumbar spine   H = HEP. Pt given copies of this exercise for home program.  X = Exercises done this date - pt verbalized understanding and demonstrated competence.  All exercises done B unless otherwise indicated. Pt advised to discontinue exercises that increase pain and to call or return to therapist to discuss. Each intervention above is specifically prescribed to address the patients identified impairments, activity limitations, and participation restrictions. ASSESSMENT:     Pt continues to have flare up of pain w activity. Pt able to tolerate lying prone this date for further lumbar assessment. Pt demonstrates generalized loss of joint mobility in B SI and lumbar spine in all ROM. Pt has significant density of posterior lumbar soft tissue. Pt tolerated instrument assisted soft tissue massage followed by myofascial release after ultrasound as noted above with improved soft tissue mobility. He tolerated gentle joint mobilization without adverse reaction. Discussed importance of continued exercise and progression of ROM to current end ranges frequently throughout the day. Discussed progression of return to sleeping in bed vs in semirecumbent on his couch and decreased use of lumbopelvic support belt as tolerated. Pt encouraged to reach out to physiatry for further medical plan of care. Physical Therapy Goals: From 11/15/2022 to 2/13/2023  - Created with patient input during initial evaluation   1. Pt will be independent in beginning level of HEP for stretching, posture and strengthening. 2. Pt will be able to don/doff socks/shoes without increased symptoms. 3. 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 4. 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 5. Pt will be able to turn to back up the car without increased symptoms. PLAN OF CARE:      Assess response to man tx.   Continue PT per original plan for therapeutic exercises, posture retraining, therapeutic activities, manual treatment, neuromuscular reeducation, therapeutic pain neuroscience education, patient education, self care home management, home exercise program, and modalities as needed. Charges: TE2, Neuro    Total Timed treatment: 38 min      Total Treatment Time: 45 min     __________________________________________________________________________________________      21st Century Cures Act Notice to Patient: Medical documents like this are made available to patients in the interest of transparency. However, be advised this is a medical document and it is intended as [id number] communication between your medical providers. This medical document may contain abbreviations, assessments, medical data, and results or other terms that are unfamiliar. Medical documents are intended to carry relevant information, facts as evident, and the clinical opinion of the practitioner. As such, this medical document may be written in language that appears blunt or direct. You are encouraged to contact your medical provider and/or [facility] Patient Experience if you have any questions about this medical document. Objective Initial Evaluation Data 11/15/2022:    Functional Scores 11/15/2022   FOTO primary measure 47     Palpation: sensitive to light touch over lumbar spine  Gait:        Deviations: trunk flex, wide based       Devices: none       Assistance: none    Posture 11/15/2022   Alignment R foot ER, L shoulder depressed, forward head position , L trunk shift.  flattened lumbar spine and thoracic spine       Body mechanics 11/15/2022   Reaching for personal belongings poor       Sensation 11/15/2022   Dermatomes Light Touch    Proximal medial thigh R (L2)  wnl   Proximal medial thigh L (L2) wnl   Above knee R (L3) wnl   Above knee L (L3) wnl   Medial arch R(L4) wnl   Medial arch L (L4) wnl   Between 1st - 2nd toes R (L5) wnl   Between 1st - 2nd toes L (L5) wnl   Lateral border of foot R (S1) wnl   Lateral border of foot L (S1) dull   Popliteal fossa R (S2) wnl   Popliteal fossa L(S2) wnl       Abdominals 11/15/2022   Tone  fair       ROM Lumbar 11/15/2022 Flexion Mod-max   Extension max   R SB mod   L SB mod   R Rotation mod   L Rotation Mod-max   * pain limiting    MMT 5/5 11/15/2022   L2- hip flex R 5   Hip flex L 5   L3- knee ext R 5   Knee ext L 5-   L4- ankle DF R 5   Ankle DF L 5   L5- EHL R 5   EHL L 4+   S1- ankle PF R 5   Ankle PF L 5   S2- Hams R 5   Hams L 5   *pain limiting    LE flexibility 11/15/2022   Piriformis L Mod-max   Piriformis R Mod-max   Hams L -40   Hams R -45   *pain limiting    Neural mobility 11/15/2022   SLR R 40 deg myofascial tension   SLR L 35 deg myofascial tension      VENKATA 11/15/2022   R wnl   L wnl      Position [facility] Tests 11/22/2022        Supine Hip IR R Min-mod    Hip IR L min    Pelvic Side [place] R mod    Pelvis Side Glide L wnl    Post Rotation of Ilium R Mod-max    Post Rotation of Ilium L wnl

## 2022-12-09 ENCOUNTER — OFFICE VISIT (OUTPATIENT)
Dept: PHYSICAL THERAPY | Facility: HOSPITAL | Age: 67
End: 2022-12-09
Attending: ANESTHESIOLOGY
Payer: MEDICARE

## 2022-12-09 PROCEDURE — 97112 NEUROMUSCULAR REEDUCATION: CPT

## 2022-12-09 PROCEDURE — 97140 MANUAL THERAPY 1/> REGIONS: CPT

## 2022-12-09 NOTE — PROGRESS NOTES
Shahram Nelson Dylanchloe    2/22/1955  Referring Physician:  Rell Nolen  Diagnosis: Low back pain (M54.50)    Initial Evaluation Date: 11/15/2022  Date of Surgery: None for this diagnosis     Insurance type and authorized visits: Medicaid, TBD  Plan of care expires: For orders: 2/13/2023; For insurance: TBD    Precautions/Hx: OA, B carotid stenosis, compression fx c-spine, GERD, mitral regurgitation, medial meniscus tear, palpitations,     SUBJECTIVE:     Pt reports that he had a good decreased of pain after the last tx. He notes that he has been consistent w his HEP. He states that he was sore, but pushed himself to do his exercises even though he had pain. He notes that it flared his pain. Visit count 2/13/2023 1/8 2/8 3/8 4/8 12/9/2022    Date 11/15/2022 11/22/2022  11/28/2022  12/5/2022  12/9/2022    LBP/B SI        Pain Range 3-4 to 8/10 0 to 8/10 0 to 6-7/10 0 to 7/10 0 to 7/10   Pain Ave 5-6/10 5-6/10 5-6/10 5/10 4-5/10      OBJECTIVE:     Treatment performed this date:    Therapeutic Exercise:  Visit #   3/8 4/8 5/8   Position Exercise HEP 11/28/2022 12/5/2022 12/9/2022    NuStep  Seat 11, Arms 11, Resist 3  X 6 min, 4 interval     Supine Sciatic nerve glide leg pumps H X 10x      Hesch R ant ileum self mob 2 min H X      Cross leg piriformis H X 5x 10s      LTR H X 10x      DKC H X     Sidelying Hesch side glide fixation 5 min H X       Hesch R ant ileum self mob H      Neuro TNE  X beginning level review w repetition X see assessment  X see assessment    MT IASTM   X R>L lumbar ps and TLF X B lumbar ps and TLF    MFR   X B lumbar ps and TLF X lumbar myofascia    Joint mob   X PA glides L5-T12 X PA glides L5-T12, sacral glides all planes   US Ultrasound - units given 1  100% power, 1mHz, 1.2 w/cm2, 7 min to     X lumbar spine X lumbar spine   H = HEP. Pt given copies of this exercise for home program.  X = Exercises done this date - pt verbalized understanding and demonstrated competence.  All exercises done B unless otherwise indicated. Pt advised to discontinue exercises that increase pain and to call or return to therapist to discuss. Each intervention above is specifically prescribed to address the patients identified impairments, activity limitations, and participation restrictions. ASSESSMENT:     Pt continues to have flare up of pain. He had trial of exercise when he was flared up and it made him sore for a long time. He has been able to work. Pt educated on therapeutic neuroscience concept of pacing w discussion of avoiding boom and bust pattern w exercise and activity. Pt verbalized understanding. Pt continues to wear an SI-lumbar stability belt whenever he is up. Pt advised that he demonstrates more loss of mobility w myofascial density than hypermobility. Pt advised to have trial of gentle weaning off the belt. Pt had improved tolerance of soft tissue and joint mobility this date. Physical Therapy Goals: From 11/15/2022 to 2/13/2023  - Created with patient input during initial evaluation   1. Pt will be independent in beginning level of HEP for stretching, posture and strengthening. 2. Pt will be able to don/doff socks/shoes without increased symptoms. 3. 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 4. 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 5. Pt will be able to turn to back up the car without increased symptoms. PLAN OF CARE:      Assess response to progression of mobility without belt. Continue PT per original plan for therapeutic exercises, posture retraining, therapeutic activities, manual treatment, neuromuscular reeducation, therapeutic pain neuroscience education, patient education, self care home management, home exercise program, and modalities as needed.     Charges: MT2, Neuro    Total Timed treatment: 38 min      Total Treatment Time: 45 min __________________________________________________________________________________________      21st Century Cures Act Notice to Patient: Medical documents like this are made available to patients in the interest of transparency. However, be advised this is a medical document and it is intended as [id number] communication between your medical providers. This medical document may contain abbreviations, assessments, medical data, and results or other terms that are unfamiliar. Medical documents are intended to carry relevant information, facts as evident, and the clinical opinion of the practitioner. As such, this medical document may be written in language that appears blunt or direct. You are encouraged to contact your medical provider and/or [facility] Patient Experience if you have any questions about this medical document. Objective Initial Evaluation Data 11/15/2022:    Functional Scores 11/15/2022   FOTO primary measure 47     Palpation: sensitive to light touch over lumbar spine  Gait:        Deviations: trunk flex, wide based       Devices: none       Assistance: none    Posture 11/15/2022   Alignment R foot ER, L shoulder depressed, forward head position , L trunk shift.  flattened lumbar spine and thoracic spine       Body mechanics 11/15/2022   Reaching for personal belongings poor       Sensation 11/15/2022   Dermatomes Light Touch    Proximal medial thigh R (L2)  wnl   Proximal medial thigh L (L2) wnl   Above knee R (L3) wnl   Above knee L (L3) wnl   Medial arch R(L4) wnl   Medial arch L (L4) wnl   Between 1st - 2nd toes R (L5) wnl   Between 1st - 2nd toes L (L5) wnl   Lateral border of foot R (S1) wnl   Lateral border of foot L (S1) dull   Popliteal fossa R (S2) wnl   Popliteal fossa L(S2) wnl       Abdominals 11/15/2022   Tone  fair       ROM Lumbar 11/15/2022   Flexion Mod-max   Extension max   R SB mod   L SB mod   R Rotation mod   L Rotation Mod-max   * pain limiting    MMT 5/5 11/15/2022   L2- hip flex R 5   Hip flex L 5   L3- knee ext R 5   Knee ext L 5-   L4- ankle DF R 5   Ankle DF L 5   L5- EHL R 5   EHL L 4+   S1- ankle PF R 5   Ankle PF L 5   S2- Hams R 5   Hams L 5   *pain limiting    LE flexibility 11/15/2022   Piriformis L Mod-max   Piriformis R Mod-max   Hams L -40   Hams R -45   *pain limiting    Neural mobility 11/15/2022   SLR R 40 deg myofascial tension   SLR L 35 deg myofascial tension      VENKATA 11/15/2022   R wnl   L wnl      Position [facility] Tests 11/22/2022        Supine Hip IR R Min-mod    Hip IR L min    Pelvic Side [place] R mod    Pelvis Side Glide L wnl    Post Rotation of Ilium R Mod-max    Post Rotation of Ilium L wnl

## 2022-12-12 ENCOUNTER — OFFICE VISIT (OUTPATIENT)
Dept: PHYSICAL THERAPY | Facility: HOSPITAL | Age: 67
End: 2022-12-12
Attending: ANESTHESIOLOGY
Payer: MEDICARE

## 2022-12-12 PROCEDURE — 97112 NEUROMUSCULAR REEDUCATION: CPT

## 2022-12-12 PROCEDURE — 97110 THERAPEUTIC EXERCISES: CPT

## 2022-12-12 NOTE — PROGRESS NOTES
Progress Summary    Pt has attended 6, cancelled 0, and no shown 0 visits in Physical Therapy. Kristan Hagen    2/22/1955  Referring Physician:  Anthony Moran  Diagnosis: Low back pain (M54.50)    Initial Evaluation Date: 11/15/2022  Date of Surgery: None for this diagnosis     Insurance type and authorized visits: Medicaid, TBD  Plan of care expires: For orders: 2/13/2023; For insurance: TBD    Precautions/Hx: OA, B carotid stenosis, compression fx c-spine, GERD, mitral regurgitation, medial meniscus tear, palpitations,     SUBJECTIVE:     Pt reports that he felt good after the last tx and continues to notice improved mobility. He states that he has had recent worsening of pain in since he started to use heat instead of ice and now today is flared up again. 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 Visit count 2/13/2023 1/8 2/8 3/8 4/8 5/8 6/8   Date 11/15/2022 11/22/2022  11/28/2022  12/5/2022  12/9/2022  12/12/2022    LBP/B SI         Pain Range 3-4 to 8/10 0 to 8/10 0 to 6-7/10 0 to 7/10 0 to 7/10 0-7/10   Pain Ave 5-6/10 5-6/10 5-6/10 5/10 4-5/10 4-5/10     Progress Note Assessment:     Pt has received skilled physical therapy intervention with decreased pain complaints on VAS as noted above. Pt demonstrates improved gait speed w less c/o pain. He has progressed to tolerating mod soft tissue and joint mobilization. He remains mod to max limited in ROM and functional mobility for ADL's due to stiffness and pain. Pt is currently at a beginning level of tolerance for HEP which he reports performing daily and for which he asks good clarifying questions. He has benefited from education on pain therapeutic neuroscience. 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   He is actively a physiatrist for ongoing assessment and medical plan of care.    Pt demonstrates improved function as noted as progression towards goals. See objective assessment data in tables below with initial evaluation data. Recommend that pt continue PT to complete original prescription at 1-2x/week for up to 8 visits. Rehab Potential: good      OBJECTIVE:     Treatment performed this date:    Therapeutic Exercise:  Visit #   4/8 5/8 6/8   Position Exercise HEP 12/5/2022 12/9/2022 12/12/2022    NuStep  Seat 11, Arms 11, Resist 3    X 5 min, 1 interval   Supine Sciatic nerve glide leg pumps H   X 10x2    Hesch R ant ileum self mob 2 min H       Cross leg piriformis H   X 3x 45s    Cross leg ER    X 3x 45s     LTR H   X 10x3    DKC    X unable to cayden    SKC H   X 5x3   Sidelying Hesch side glide fixation 5 min H       Hesch R ant ileum self mob H      4 point` Cat cow        Child pose min       Neuro TNE  X see assessment  X see assessment  X effects of stress on pain perception, gently paced progression   MT IASTM  X R>L lumbar ps and TLF X B lumbar ps and TLF     MFR  X B lumbar ps and TLF X lumbar myofascia     Joint mob  X PA glides L5-T12 X PA glides L5-T12, sacral glides all planes    US Ultrasound - units given 1  100% power, 1mHz, 1.2 w/cm2, 7 min to    X lumbar spine X lumbar spine    Estim Pt received interferential electric stimulation in supine w CP. X lumbar spine during TE   H = HEP. Pt given copies of this exercise for home program.  X = Exercises done this date - pt verbalized understanding and demonstrated competence. All exercises done B unless otherwise indicated. Pt advised to discontinue exercises that increase pain and to call or return to therapist to discuss. Each intervention above is specifically prescribed to address the patients identified impairments, activity limitations, and participation restrictions. ASSESSMENT:     Physical Therapy Goals: From 11/15/2022 to 2/13/2023  - Created with patient input during initial evaluation   1.  Pt will be independent in beginning level of HEP for stretching, posture and strengthening. 2. Pt will be able to don/doff socks/shoes without increased symptoms. 3. 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 4. 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 5. Pt will be able to turn to back up the car without increased symptoms. PLAN OF CARE:      Continue PT per original plan for 8 additional visits for therapeutic exercises, posture retraining, therapeutic activities, manual treatment, neuromuscular reeducation, therapeutic pain neuroscience education, patient education, self care home management, home exercise program, and modalities as needed. Charges: TE2, Neuro    Total Timed treatment: 40 min      Total Treatment Time: 40 min     __________________________________________________________________________________________      21st Century Cures Act Notice to Patient: Medical documents like this are made available to patients in the interest of transparency. However, be advised this is a medical document and it is intended as [id number] communication between your medical providers. This medical document may contain abbreviations, assessments, medical data, and results or other terms that are unfamiliar. Medical documents are intended to carry relevant information, facts as evident, and the clinical opinion of the practitioner. As such, this medical document may be written in language that appears blunt or direct. You are encouraged to contact your medical provider and/or [facility] Patient Experience if you have any questions about this medical document.   Objective Initial Evaluation Data 11/15/2022:    Functional Scores 11/15/2022 12/12/2022    FOTO primary measure 47 47     Palpation: sensitive to light touch over lumbar spine  Gait:        Deviations: trunk flex, wide based       Devices: none       Assistance: none    Posture 11/15/2022   Alignment R foot ER, L shoulder depressed, forward head position , L trunk shift.  flattened lumbar spine and thoracic spine       Body mechanics 11/15/2022   Reaching for personal belongings poor       Sensation 11/15/2022 12/12/2022    Dermatomes Light Touch     Proximal medial thigh R (L2)  wnl wnl   Proximal medial thigh L (L2) wnl wnl   Above knee R (L3) wnl wnl   Above knee L (L3) wnl wnl   Medial arch R(L4) wnl wnl   Medial arch L (L4) wnl wnl   Between 1st - 2nd toes R (L5) wnl wnl   Between 1st - 2nd toes L (L5) wnl wnl   Lateral border of foot R (S1) wnl wnl   Lateral border of foot L (S1) dull wnl   Popliteal fossa R (S2) wnl wnl   Popliteal fossa L(S2) wnl wnl       Abdominals 11/15/2022   Tone  fair       ROM Lumbar 11/15/2022 12/12/2022    Flexion Mod-max Mod-max   Extension max Mod-max   R SB mod Min-mod   L SB mod mod   R Rotation mod mod   L Rotation Mod-max mod   * pain limiting    MMT 5/5 11/15/2022 12/12/2022    L2- hip flex R 5 5   Hip flex L 5 5   L3- knee ext R 5 5   Knee ext L 5- 5   L4- ankle DF R 5 5   Ankle DF L 5 5   L5- EHL R 5 5   EHL L 4+ 4+   S1- ankle PF R 5 5   Ankle PF L 5 5   S2- Hams R 5 5   Hams L 5 5   *pain limiting    LE flexibility 11/15/2022 12/12/2022    Piriformis L Mod-max mod   Piriformis R Mod-max mod   Hams L -40 -35   Hams R -45 -41   *pain limiting    Neural mobility 11/15/2022 12/12/2022    SLR R 40 deg myofascial tension 45 deg myofascial tension   SLR L 35 deg myofascial tension 45 deg myofascial tension      VENKATA 11/15/2022   R wnl   L wnl      Position [facility] Tests 11/22/2022        Supine Hip IR R Min-mod    Hip IR L min    Pelvic Side [place] R mod    Pelvis Side Glide L wnl    Post Rotation of Ilium R Mod-max    Post Rotation of Ilium L wnl

## 2022-12-13 ENCOUNTER — TELEPHONE (OUTPATIENT)
Dept: FAMILY MEDICINE | Age: 67
End: 2022-12-13

## 2022-12-14 ENCOUNTER — TELEPHONE (OUTPATIENT)
Dept: PHYSICAL THERAPY | Facility: HOSPITAL | Age: 67
End: 2022-12-14

## 2022-12-16 ENCOUNTER — OFFICE VISIT (OUTPATIENT)
Dept: PHYSICAL THERAPY | Facility: HOSPITAL | Age: 67
End: 2022-12-16
Attending: ANESTHESIOLOGY
Payer: MEDICARE

## 2022-12-16 PROCEDURE — 97140 MANUAL THERAPY 1/> REGIONS: CPT

## 2022-12-16 PROCEDURE — 97112 NEUROMUSCULAR REEDUCATION: CPT

## 2022-12-16 PROCEDURE — 97110 THERAPEUTIC EXERCISES: CPT

## 2022-12-16 NOTE — PROGRESS NOTES
Samantha Hagen    2/22/1955  Referring Physician:  Ashlee Mendez  Diagnosis: Low back pain (M54.50)    Initial Evaluation Date: 11/15/2022  Date of Surgery: None for this diagnosis     Insurance type and authorized visits: Medicaid, TBD  Plan of care expires: For orders: 2/13/2023; For insurance: TBD    Precautions/Hx: OA, B carotid stenosis, compression fx c-spine, GERD, mitral regurgitation, medial meniscus tear, palpitations,     SUBJECTIVE:     Pt reports that he has been continuing to work on a limited basis. He states that he remains stiff and sore especially in the morning. He notes that he is very cautious even trying to getting on and off the toilet. He continues to use a cold pack to help. He is worried that he will not be able to continue in his current career. He states that he has worked hard to find solutions to his issues as he has had this problem since 2017.       Visit count 2/13/2023 1/8 2/8 3/8 4/8 5/8 6/8 7/8   Date 11/15/2022 11/22/2022  11/28/2022  12/5/2022  12/9/2022  12/12/2022  12/16/2022    LBP/B SI          Pain Range 3-4 to 8/10 0 to 8/10 0 to 6-7/10 0 to 7/10 0 to 7/10 0-7/10 0 to 5-6/10   Pain Ave 5-6/10 5-6/10 5-6/10 5/10 4-5/10 4-5/10 4-5/10        OBJECTIVE:     Treatment performed this date:    Therapeutic Exercise:  Visit #   5/8 6/8 7/8   Position Exercise HEP 12/9/2022 12/12/2022 12/16/2022    NuStep  Seat 11, Arms 11, Resist 3   X 5 min, 1 interval    Supine Sciatic nerve glide leg pumps H  X 10x2 x    Hesch R ant ileum self mob 2 min H   x    Cross leg piriformis H  X 3x 45s x    Cross leg ER   X 3x 45s  x    LTR H  X 10x3 x    DKC   X unable to cayden     SKC H  X 5x3 x   Sidelying Hesch side glide fixation 5 min H       Hesch R ant ileum self mob H      4 point` Cat cow        Child pose min       Neuro TNE  X see assessment  X effects of stress on pain perception, gently paced progression X     Re-ed    X see assessment   MT IASTM  X B lumbar ps and TLF  X B lumbar ps, TLF    MFR  X lumbar myofascia  X lumbar ps    Joint mob  X PA glides L5-T12, sacral glides all planes  X PA glides L5-T12    MET    X L4, L5 ERSR   US Ultrasound - units given 1  100% power, 1mHz, 1.2 w/cm2, 7 min to    X lumbar spine     Estim Pt received interferential electric stimulation in supine w CP. X lumbar spine during TE    H = HEP. Pt given copies of this exercise for home program.  X = Exercises done this date - pt verbalized understanding and demonstrated competence. All exercises done B unless otherwise indicated. Pt advised to discontinue exercises that increase pain and to call or return to therapist to discuss. Each intervention above is specifically prescribed to address the patients identified impairments, activity limitations, and participation restrictions. ASSESSMENT:     Pt had mild decrease in max and average pain levels. Pt continues to do well to try to progress his return to work and activity. Pt displays improved soft tissue mobility from initial evaluation. Pt tolerated instrument assisted soft tissue massage followed by myofascial release after ultrasound as noted above with improved soft tissue mobility. He was then able to tolerate muscle energy technique well with 50% improvement in segmental mobility. Pt educated on the importance of consistent paced exercise and mobility. Reviewed importance of stress management and fear avoidance behavior. Physical Therapy Goals: From 11/15/2022 to 2/13/2023  - Created with patient input during initial evaluation   1. Pt will be independent in beginning level of HEP for stretching, posture and strengthening. 2. Pt will be able to don/doff socks/shoes without increased symptoms. 3. 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 4. 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 5. Pt will be able to turn to back up the car without increased symptoms.      PLAN OF CARE:      Assess response to MET. Continue PT per original plan for 8 additional visits for therapeutic exercises, posture retraining, therapeutic activities, manual treatment, neuromuscular reeducation, therapeutic pain neuroscience education, patient education, self care home management, home exercise program, and modalities as needed. Charges: TE, Neuro, MT2    Total Timed treatment: 60 min      Total Treatment Time: 60 min     __________________________________________________________________________________________      21st Century Cures Act Notice to Patient: Medical documents like this are made available to patients in the interest of transparency. However, be advised this is a medical document and it is intended as [id number] communication between your medical providers. This medical document may contain abbreviations, assessments, medical data, and results or other terms that are unfamiliar. Medical documents are intended to carry relevant information, facts as evident, and the clinical opinion of the practitioner. As such, this medical document may be written in language that appears blunt or direct. You are encouraged to contact your medical provider and/or [facility] Patient Experience if you have any questions about this medical document. Objective Initial Evaluation Data 11/15/2022:    Functional Scores 11/15/2022 12/12/2022    FOTO primary measure 47 47     Palpation: sensitive to light touch over lumbar spine  Gait:        Deviations: trunk flex, wide based       Devices: none       Assistance: none    Posture 11/15/2022   Alignment R foot ER, L shoulder depressed, forward head position , L trunk shift.  flattened lumbar spine and thoracic spine       Body mechanics 11/15/2022   Reaching for personal belongings poor       Sensation 11/15/2022 12/12/2022    Dermatomes Light Touch     Proximal medial thigh R (L2)  wnl wnl   Proximal medial thigh L (L2) wnl wnl   Above knee R (L3) wnl wnl   Above knee L (L3) wnl wnl   Medial arch R(L4) wnl wnl   Medial arch L (L4) wnl wnl   Between 1st - 2nd toes R (L5) wnl wnl   Between 1st - 2nd toes L (L5) wnl wnl   Lateral border of foot R (S1) wnl wnl   Lateral border of foot L (S1) dull wnl   Popliteal fossa R (S2) wnl wnl   Popliteal fossa L(S2) wnl wnl       Abdominals 11/15/2022   Tone  fair       ROM Lumbar 11/15/2022 12/12/2022    Flexion Mod-max Mod-max   Extension max Mod-max   R SB mod Min-mod   L SB mod mod   R Rotation mod mod   L Rotation Mod-max mod   * pain limiting    MMT 5/5 11/15/2022 12/12/2022    L2- hip flex R 5 5   Hip flex L 5 5   L3- knee ext R 5 5   Knee ext L 5- 5   L4- ankle DF R 5 5   Ankle DF L 5 5   L5- EHL R 5 5   EHL L 4+ 4+   S1- ankle PF R 5 5   Ankle PF L 5 5   S2- Hams R 5 5   Hams L 5 5   *pain limiting    LE flexibility 11/15/2022 12/12/2022    Piriformis L Mod-max mod   Piriformis R Mod-max mod   Hams L -40 -35   Hams R -45 -41   *pain limiting    Neural mobility 11/15/2022 12/12/2022    SLR R 40 deg myofascial tension 45 deg myofascial tension   SLR L 35 deg myofascial tension 45 deg myofascial tension      VENKATA 11/15/2022   R wnl   L wnl      Position [facility] Spring Tests 11/22/2022        Supine Hip IR R Min-mod    Hip IR L min    Pelvic Side Neoga R mod    Pelvis Side Glide L wnl    Post Rotation of Ilium R Mod-max    Post Rotation of Ilium L wnl

## 2022-12-19 ENCOUNTER — OFFICE VISIT (OUTPATIENT)
Dept: PHYSICAL THERAPY | Facility: HOSPITAL | Age: 67
End: 2022-12-19
Attending: ANESTHESIOLOGY
Payer: MEDICARE

## 2022-12-19 PROCEDURE — 97112 NEUROMUSCULAR REEDUCATION: CPT

## 2022-12-19 PROCEDURE — 97110 THERAPEUTIC EXERCISES: CPT

## 2022-12-19 NOTE — PROGRESS NOTES
Jensen Hagen    2/22/1955  Referring Physician:  Timothy Allan  Diagnosis: Low back pain (M54.50)    Initial Evaluation Date: 11/15/2022  Date of Surgery: None for this diagnosis     Insurance type and authorized visits: Medicaid, TBD  Plan of care expires: For orders: 2/13/2023; For insurance: TBD    Precautions/Hx: OA, B carotid stenosis, compression fx c-spine, GERD, mitral regurgitation, medial meniscus tear, palpitations,     SUBJECTIVE:     Pt reports that he continues to be stiff and sore and only working part time.      Visit count 2/13/2023 1/8 3/8 5/8 6/8 7/8 8/14   Date 11/15/2022 11/28/2022  12/9/2022  12/12/2022  12/16/2022  12/19/2022    LBP/B SI         Pain Range 3-4 to 8/10 0 to 6-7/10 0 to 7/10 0-7/10 0 to 5-6/10 0 to 5-6/10   Pain Ave 5-6/10 5-6/10 4-5/10 4-5/10 4-5/10         OBJECTIVE:     Treatment performed this date:    Therapeutic Exercise:  Visit #   6/8 7/14 8/14   Position Exercise HEP 12/12/2022 12/16/2022 12/19/2022    NuStep  Seat 11, Arms 11, Resist 3  X 5 min, 1 interval  X 5 min, 2 intervals   Supine Sciatic nerve glide leg pumps H X 10x2 x     Hesch R ant ileum self mob 2 min H  x     Cross leg piriformis H X 3x 45s x     Cross leg ER  X 3x 45s  x     LTR H X 10x3 x     DKC  X unable to cayden      SKC H X 5x3 x    Sidelying Hesch side glide fixation 5 min H       Hesch R ant ileum self mob H      4 point` Cat cow        Child pose min       Prone Lying  H   X 2 min x 2    On elbows H   X 1 min x 4    On elbows w side glide UE's to R    X 1 min    Press ups H   X 10x, 10x   Standing Lat shift L pelvis to wall H   X 10x, 10x    Trunk extn forearms on wall H   X 10x, 10x   Neuro TNE  X effects of stress on pain perception, gently paced progression X      Re-ed   X see assessment X see assessment     Posture    X use of mirror   MT IASTM   X B lumbar ps, TLF     MFR   X lumbar ps     Joint mob   X PA glides L5-T12     MET   X L4, L5 ERSR    US Ultrasound - units given 1  100% power, 1mHz, 1.2 w/cm2, 7 min to         Estim Pt received interferential electric stimulation in supine w CP. X lumbar spine during TE     H = HEP. Pt given copies of this exercise for home program.  X = Exercises done this date - pt verbalized understanding and demonstrated competence. All exercises done B unless otherwise indicated. Pt advised to discontinue exercises that increase pain and to call or return to therapist to discuss. Each intervention above is specifically prescribed to address the patients identified impairments, activity limitations, and participation restrictions. ASSESSMENT:     Pt educated on the concept of centralization of pain and verbalized understanding. Pt educated on the concepts of directional preference and centralization. Pt verbalized understanding. Pt demonstrates extn bias. Pt has postural deficit of loss of lumbar lordosis with significant loss of lower T and upper L extn, and L lat shift. Pt did well to tolerate progression of extn exercises w frequent breaks for ambulation in dept to assess tolerance. Pt also able to cayden lat shift correction exercises. He noted improved mobility post tx. Pt requested CP in supine post tx - noting return of stiffness after that. Discussed trial of no CP next visit. Continue to redirect pt from fear based verbiage and catastrophic thoughts. Physical Therapy Goals: From 11/15/2022 to 2/13/2023  - Created with patient input during initial evaluation   1. Pt will be independent in beginning level of HEP for stretching, posture and strengthening. 2. Pt will be able to don/doff socks/shoes without increased symptoms. 3. 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 4. 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 5. Pt will be able to turn to back up the car without increased symptoms. PLAN OF CARE:      Assess response to extension based exercise.   Continue PT per original plan for 8 additional visits for therapeutic exercises, posture retraining, therapeutic activities, manual treatment, neuromuscular reeducation, therapeutic pain neuroscience education, patient education, self care home management, home exercise program, and modalities as needed. Charges: Shai Ervin    Total Timed treatment: 60 min      Total Treatment Time: 60 min     __________________________________________________________________________________________      21st Century Cures Act Notice to Patient: Medical documents like this are made available to patients in the interest of transparency. However, be advised this is a medical document and it is intended as [id number] communication between your medical providers. This medical document may contain abbreviations, assessments, medical data, and results or other terms that are unfamiliar. Medical documents are intended to carry relevant information, facts as evident, and the clinical opinion of the practitioner. As such, this medical document may be written in language that appears blunt or direct. You are encouraged to contact your medical provider and/or [facility] Patient Experience if you have any questions about this medical document. Objective Initial Evaluation Data 11/15/2022:    Functional Scores 11/15/2022 12/12/2022    FOTO primary measure 47 47     Palpation: sensitive to light touch over lumbar spine  Gait:        Deviations: trunk flex, wide based       Devices: none       Assistance: none    Posture 11/15/2022   Alignment R foot ER, L shoulder depressed, forward head position , L trunk shift.  flattened lumbar spine and thoracic spine       Body mechanics 11/15/2022   Reaching for personal belongings poor       Sensation 11/15/2022 12/12/2022    Dermatomes Light Touch     Proximal medial thigh R (L2)  wnl wnl   Proximal medial thigh L (L2) wnl wnl   Above knee R (L3) wnl wnl   Above knee L (L3) wnl wnl   Medial arch R(L4) wnl wnl   Medial arch L (L4) wnl wnl   Between 1st - 2nd toes R (L5) wnl wnl   Between 1st - 2nd toes L (L5) wnl wnl   Lateral border of foot R (S1) wnl wnl   Lateral border of foot L (S1) dull wnl   Popliteal fossa R (S2) wnl wnl   Popliteal fossa L(S2) wnl wnl       Abdominals 11/15/2022   Tone  fair       ROM Lumbar 11/15/2022 12/12/2022    Flexion Mod-max Mod-max   Extension max Mod-max   R SB mod Min-mod   L SB mod mod   R Rotation mod mod   L Rotation Mod-max mod   * pain limiting    MMT 5/5 11/15/2022 12/12/2022    L2- hip flex R 5 5   Hip flex L 5 5   L3- knee ext R 5 5   Knee ext L 5- 5   L4- ankle DF R 5 5   Ankle DF L 5 5   L5- EHL R 5 5   EHL L 4+ 4+   S1- ankle PF R 5 5   Ankle PF L 5 5   S2- Hams R 5 5   Hams L 5 5   *pain limiting    LE flexibility 11/15/2022 12/12/2022    Piriformis L Mod-max mod   Piriformis R Mod-max mod   Hams L -40 -35   Hams R -45 -41   *pain limiting    Neural mobility 11/15/2022 12/12/2022    SLR R 40 deg myofascial tension 45 deg myofascial tension   SLR L 35 deg myofascial tension 45 deg myofascial tension      VENKATA 11/15/2022   R wnl   L wnl      Position [facility] Spring Tests 11/22/2022        Supine Hip IR R Min-mod    Hip IR L min    Pelvic Side [place]aze R mod    Pelvis Side Glide L wnl    Post Rotation of Ilium R Mod-max    Post Rotation of Ilium L wnl

## 2022-12-22 ENCOUNTER — TELEPHONE (OUTPATIENT)
Dept: PHYSICAL THERAPY | Facility: HOSPITAL | Age: 67
End: 2022-12-22

## 2022-12-22 NOTE — TELEPHONE ENCOUNTER
Returned call to pt who reports that he heard a crumbling sound in his back when he was trying to wipe things off the floor while on his hands and knees. He is concerned that the exercise last visit was too strenuous. He is requesting to cancel his visit 12/23 due to weather and concerns about worsening pain over the holiday weekend.

## 2022-12-23 ENCOUNTER — APPOINTMENT (OUTPATIENT)
Dept: PHYSICAL THERAPY | Facility: HOSPITAL | Age: 67
End: 2022-12-23
Attending: ANESTHESIOLOGY
Payer: MEDICARE

## 2022-12-29 ENCOUNTER — TELEPHONE (OUTPATIENT)
Dept: PHYSICAL THERAPY | Facility: HOSPITAL | Age: 67
End: 2022-12-29

## 2022-12-29 NOTE — TELEPHONE ENCOUNTER
Returned call to pt from his voicemail. Pt notes that he continues to have flare up of pain from trying to exercise. He reports tingling now in B LE's and feels weak in B UE's. He states that he is hoping to get an order for a lumbar MRI prior to his appt w Dr. Clark Quarles. Discussed PT plan of care. Pt wishes to postpone PT tx for now. Pt given number of dept to reschedule.

## 2022-12-30 ENCOUNTER — TELEPHONE (OUTPATIENT)
Dept: PHYSICAL THERAPY | Facility: HOSPITAL | Age: 67
End: 2022-12-30

## 2023-01-03 ENCOUNTER — OFFICE VISIT (OUTPATIENT)
Dept: INTERNAL MEDICINE | Age: 68
End: 2023-01-03

## 2023-01-03 ENCOUNTER — TELEPHONE (OUTPATIENT)
Dept: INTERNAL MEDICINE | Age: 68
End: 2023-01-03

## 2023-01-03 VITALS
SYSTOLIC BLOOD PRESSURE: 132 MMHG | WEIGHT: 152.12 LBS | HEART RATE: 70 BPM | OXYGEN SATURATION: 99 % | DIASTOLIC BLOOD PRESSURE: 81 MMHG | TEMPERATURE: 97.9 F | BODY MASS INDEX: 23.88 KG/M2 | HEIGHT: 67 IN

## 2023-01-03 DIAGNOSIS — G89.29 CHRONIC MIDLINE LOW BACK PAIN WITHOUT SCIATICA: Primary | ICD-10-CM

## 2023-01-03 DIAGNOSIS — M54.50 CHRONIC MIDLINE LOW BACK PAIN WITHOUT SCIATICA: Primary | ICD-10-CM

## 2023-01-03 PROCEDURE — 99213 OFFICE O/P EST LOW 20 MIN: CPT | Performed by: INTERNAL MEDICINE

## 2023-01-03 RX ORDER — POLYETHYLENE GLYCOL-3350 AND ELECTROLYTES 236; 6.74; 5.86; 2.97; 22.74 G/274.31G; G/274.31G; G/274.31G; G/274.31G; G/274.31G
POWDER, FOR SOLUTION ORAL
COMMUNITY
Start: 2022-11-08 | End: 2023-01-05

## 2023-01-03 ASSESSMENT — PATIENT HEALTH QUESTIONNAIRE - PHQ9
2. FEELING DOWN, DEPRESSED OR HOPELESS: NOT AT ALL
SUM OF ALL RESPONSES TO PHQ9 QUESTIONS 1 AND 2: 0
SUM OF ALL RESPONSES TO PHQ9 QUESTIONS 1 AND 2: 0
1. LITTLE INTEREST OR PLEASURE IN DOING THINGS: NOT AT ALL
CLINICAL INTERPRETATION OF PHQ2 SCORE: NO FURTHER SCREENING NEEDED

## 2023-01-03 ASSESSMENT — ENCOUNTER SYMPTOMS
FEVER: 0
HEMOPTYSIS: 0
SUSPICIOUS LESIONS: 0
WEIGHT LOSS: 0
ALLERGIC/IMMUNOLOGIC COMMENTS: NO NEW FOOD ALLERGIES
CHILLS: 0
WEIGHT GAIN: 0
COUGH: 0
HEMATOCHEZIA: 0
BACK PAIN: 1
BRUISES/BLEEDS EASILY: 0

## 2023-01-03 ASSESSMENT — COGNITIVE AND FUNCTIONAL STATUS - GENERAL
BECAUSE OF A PHYSICAL, MENTAL, OR EMOTIONAL CONDITION, DO YOU HAVE SERIOUS DIFFICULTY CONCENTRATING, REMEMBERING OR MAKING DECISIONS: NO
BECAUSE OF A PHYSICAL, MENTAL, OR EMOTIONAL CONDITION, DO YOU HAVE DIFFICULTY DOING ERRANDS ALONE: NO
DO YOU HAVE SERIOUS DIFFICULTY WALKING OR CLIMBING STAIRS: NO
DO YOU HAVE DIFFICULTY DRESSING OR BATHING: NO

## 2023-01-03 ASSESSMENT — PAIN SCALES - GENERAL: PAINLEVEL: 8

## 2023-01-04 ENCOUNTER — HOSPITAL ENCOUNTER (OUTPATIENT)
Dept: MRI IMAGING | Age: 68
Discharge: HOME | End: 2023-01-04
Attending: INTERNAL MEDICINE

## 2023-01-04 DIAGNOSIS — M54.50 CHRONIC MIDLINE LOW BACK PAIN WITHOUT SCIATICA: ICD-10-CM

## 2023-01-04 DIAGNOSIS — G89.29 CHRONIC MIDLINE LOW BACK PAIN WITHOUT SCIATICA: ICD-10-CM

## 2023-01-04 PROCEDURE — G1004 CDSM NDSC: HCPCS

## 2023-01-04 PROCEDURE — A9585 GADOBUTROL INJECTION: HCPCS | Performed by: INTERNAL MEDICINE

## 2023-01-04 PROCEDURE — 10002805 HB CONTRAST AGENT: Performed by: INTERNAL MEDICINE

## 2023-01-04 PROCEDURE — 72158 MRI LUMBAR SPINE W/O & W/DYE: CPT

## 2023-01-04 RX ORDER — GADOBUTROL 604.72 MG/ML
7.5 INJECTION INTRAVENOUS ONCE
Status: COMPLETED | OUTPATIENT
Start: 2023-01-04 | End: 2023-01-04

## 2023-01-04 RX ADMIN — GADOBUTROL 7.5 ML: 604.72 INJECTION INTRAVENOUS at 13:11

## 2023-01-05 ENCOUNTER — OFFICE VISIT (OUTPATIENT)
Dept: CARDIOLOGY | Age: 68
End: 2023-01-05

## 2023-01-05 VITALS
SYSTOLIC BLOOD PRESSURE: 120 MMHG | BODY MASS INDEX: 23.69 KG/M2 | WEIGHT: 150.91 LBS | HEART RATE: 76 BPM | HEIGHT: 67 IN | OXYGEN SATURATION: 97 % | DIASTOLIC BLOOD PRESSURE: 78 MMHG

## 2023-01-05 DIAGNOSIS — I25.10 ATHEROSCLEROSIS OF NATIVE CORONARY ARTERY OF NATIVE HEART WITHOUT ANGINA PECTORIS: ICD-10-CM

## 2023-01-05 DIAGNOSIS — R73.9 HYPERGLYCEMIA: ICD-10-CM

## 2023-01-05 DIAGNOSIS — R00.2 PALPITATIONS: Primary | ICD-10-CM

## 2023-01-05 DIAGNOSIS — I77.1 CELIAC ARTERY STENOSIS (CMD): ICD-10-CM

## 2023-01-05 DIAGNOSIS — R93.1 ELEVATED CORONARY ARTERY CALCIUM SCORE: ICD-10-CM

## 2023-01-05 PROCEDURE — 99214 OFFICE O/P EST MOD 30 MIN: CPT | Performed by: INTERNAL MEDICINE

## 2023-01-05 SDOH — HEALTH STABILITY: PHYSICAL HEALTH: ON AVERAGE, HOW MANY DAYS PER WEEK DO YOU ENGAGE IN MODERATE TO STRENUOUS EXERCISE (LIKE A BRISK WALK)?: 0 DAYS

## 2023-01-05 SDOH — HEALTH STABILITY: PHYSICAL HEALTH: ON AVERAGE, HOW MANY MINUTES DO YOU ENGAGE IN EXERCISE AT THIS LEVEL?: 0 MIN

## 2023-01-05 ASSESSMENT — PATIENT HEALTH QUESTIONNAIRE - PHQ9
SUM OF ALL RESPONSES TO PHQ9 QUESTIONS 1 AND 2: 0
CLINICAL INTERPRETATION OF PHQ2 SCORE: NO FURTHER SCREENING NEEDED
SUM OF ALL RESPONSES TO PHQ9 QUESTIONS 1 AND 2: 0
1. LITTLE INTEREST OR PLEASURE IN DOING THINGS: NOT AT ALL
2. FEELING DOWN, DEPRESSED OR HOPELESS: NOT AT ALL

## 2023-01-06 ENCOUNTER — APPOINTMENT (OUTPATIENT)
Dept: PHYSICAL THERAPY | Facility: HOSPITAL | Age: 68
End: 2023-01-06
Attending: ANESTHESIOLOGY
Payer: MEDICARE

## 2023-01-06 ENCOUNTER — TELEPHONE (OUTPATIENT)
Dept: CARDIOLOGY | Age: 68
End: 2023-01-06

## 2023-01-06 ENCOUNTER — LAB SERVICES (OUTPATIENT)
Dept: LAB | Age: 68
End: 2023-01-06

## 2023-01-06 DIAGNOSIS — I25.10 ATHEROSCLEROSIS OF NATIVE CORONARY ARTERY OF NATIVE HEART WITHOUT ANGINA PECTORIS: ICD-10-CM

## 2023-01-06 DIAGNOSIS — R73.9 HYPERGLYCEMIA: ICD-10-CM

## 2023-01-06 DIAGNOSIS — R93.1 ELEVATED CORONARY ARTERY CALCIUM SCORE: ICD-10-CM

## 2023-01-06 LAB
ALBUMIN SERPL-MCNC: 3.7 G/DL (ref 3.6–5.1)
ALBUMIN/GLOB SERPL: 1.2 {RATIO} (ref 1–2.4)
ALP SERPL-CCNC: 60 UNITS/L (ref 45–117)
ALT SERPL-CCNC: 26 UNITS/L
ANION GAP SERPL CALC-SCNC: 8 MMOL/L (ref 7–19)
AST SERPL-CCNC: 21 UNITS/L
BILIRUB SERPL-MCNC: 0.6 MG/DL (ref 0.2–1)
BUN SERPL-MCNC: 16 MG/DL (ref 6–20)
BUN/CREAT SERPL: 20 (ref 7–25)
CALCIUM SERPL-MCNC: 9.1 MG/DL (ref 8.4–10.2)
CHLORIDE SERPL-SCNC: 106 MMOL/L (ref 97–110)
CHOLEST SERPL-MCNC: 201 MG/DL
CHOLEST/HDLC SERPL: 3.2 {RATIO}
CO2 SERPL-SCNC: 29 MMOL/L (ref 21–32)
CREAT SERPL-MCNC: 0.79 MG/DL (ref 0.67–1.17)
FASTING DURATION TIME PATIENT: 12 HOURS (ref 0–999)
FASTING DURATION TIME PATIENT: NORMAL H
GFR SERPLBLD BASED ON 1.73 SQ M-ARVRAT: >90 ML/MIN
GLOBULIN SER-MCNC: 3.1 G/DL (ref 2–4)
GLUCOSE SERPL-MCNC: 93 MG/DL (ref 70–99)
HDLC SERPL-MCNC: 63 MG/DL
LDLC SERPL CALC-MCNC: 123 MG/DL
NONHDLC SERPL-MCNC: 138 MG/DL
POTASSIUM SERPL-SCNC: 4.1 MMOL/L (ref 3.4–5.1)
PROT SERPL-MCNC: 6.8 G/DL (ref 6.4–8.2)
SODIUM SERPL-SCNC: 139 MMOL/L (ref 135–145)
TRIGL SERPL-MCNC: 76 MG/DL

## 2023-01-06 PROCEDURE — 80061 LIPID PANEL: CPT | Performed by: CLINICAL MEDICAL LABORATORY

## 2023-01-06 PROCEDURE — 36415 COLL VENOUS BLD VENIPUNCTURE: CPT | Performed by: CLINICAL MEDICAL LABORATORY

## 2023-01-06 PROCEDURE — 80053 COMPREHEN METABOLIC PANEL: CPT | Performed by: CLINICAL MEDICAL LABORATORY

## 2023-01-10 ENCOUNTER — OFFICE VISIT (OUTPATIENT)
Dept: PHYSICAL MEDICINE AND REHAB | Facility: CLINIC | Age: 68
End: 2023-01-10
Payer: MEDICARE

## 2023-01-10 ENCOUNTER — APPOINTMENT (OUTPATIENT)
Dept: PHYSICAL THERAPY | Facility: HOSPITAL | Age: 68
End: 2023-01-10
Attending: ANESTHESIOLOGY
Payer: MEDICARE

## 2023-01-10 VITALS — HEART RATE: 75 BPM | OXYGEN SATURATION: 99 % | DIASTOLIC BLOOD PRESSURE: 68 MMHG | SYSTOLIC BLOOD PRESSURE: 124 MMHG

## 2023-01-10 DIAGNOSIS — M54.16 LUMBAR RADICULOPATHY: Primary | ICD-10-CM

## 2023-01-10 DIAGNOSIS — M47.816 FACET SYNDROME, LUMBAR: ICD-10-CM

## 2023-01-10 DIAGNOSIS — M51.36 BULGE OF LUMBAR DISC WITHOUT MYELOPATHY: ICD-10-CM

## 2023-01-10 DIAGNOSIS — N94.89 HIGH-TONE PELVIC FLOOR DYSFUNCTION: ICD-10-CM

## 2023-01-10 DIAGNOSIS — M99.9 BIOMECHANICAL LESION: ICD-10-CM

## 2023-01-10 DIAGNOSIS — M51.37 DDD (DEGENERATIVE DISC DISEASE), LUMBOSACRAL: ICD-10-CM

## 2023-01-10 DIAGNOSIS — M79.10 MYALGIA: ICD-10-CM

## 2023-01-10 DIAGNOSIS — M54.59 MECHANICAL LOW BACK PAIN: ICD-10-CM

## 2023-01-10 DIAGNOSIS — M47.816 LUMBAR SPONDYLOSIS: ICD-10-CM

## 2023-01-10 DIAGNOSIS — M48.061 LUMBAR FORAMINAL STENOSIS: ICD-10-CM

## 2023-01-10 PROCEDURE — 99205 OFFICE O/P NEW HI 60 MIN: CPT | Performed by: PHYSICAL MEDICINE & REHABILITATION

## 2023-01-10 NOTE — PATIENT INSTRUCTIONS
1) Begin pelvic floor physical therapy  2) If pelvic floor therapy is not helpful, then would recommend BILATERAL L5 TFESI  3) Tylenol 500-1000 mg every 6-8 hours as needed for pain. No more than 3000 mg daily. 4) Follow up with me in about 6-8 weeks.    5) Continue Tramadol as needed for breakthrough pain

## 2023-01-12 ENCOUNTER — TELEPHONE (OUTPATIENT)
Dept: PHYSICAL THERAPY | Facility: HOSPITAL | Age: 68
End: 2023-01-12

## 2023-01-12 ENCOUNTER — OFFICE VISIT (OUTPATIENT)
Dept: INTERNAL MEDICINE | Age: 68
End: 2023-01-12

## 2023-01-12 ENCOUNTER — TELEPHONE (OUTPATIENT)
Dept: CARDIOLOGY | Age: 68
End: 2023-01-12

## 2023-01-12 VITALS
TEMPERATURE: 95.4 F | HEART RATE: 74 BPM | DIASTOLIC BLOOD PRESSURE: 88 MMHG | OXYGEN SATURATION: 100 % | BODY MASS INDEX: 23.54 KG/M2 | WEIGHT: 150 LBS | SYSTOLIC BLOOD PRESSURE: 142 MMHG | HEIGHT: 67 IN

## 2023-01-12 DIAGNOSIS — M54.50 CHRONIC MIDLINE LOW BACK PAIN WITHOUT SCIATICA: Primary | ICD-10-CM

## 2023-01-12 DIAGNOSIS — G89.29 CHRONIC MIDLINE LOW BACK PAIN WITHOUT SCIATICA: Primary | ICD-10-CM

## 2023-01-12 PROCEDURE — 99213 OFFICE O/P EST LOW 20 MIN: CPT | Performed by: INTERNAL MEDICINE

## 2023-01-12 ASSESSMENT — PATIENT HEALTH QUESTIONNAIRE - PHQ9
CLINICAL INTERPRETATION OF PHQ2 SCORE: NO FURTHER SCREENING NEEDED
2. FEELING DOWN, DEPRESSED OR HOPELESS: NOT AT ALL
2. FEELING DOWN, DEPRESSED OR HOPELESS: NOT AT ALL
SUM OF ALL RESPONSES TO PHQ9 QUESTIONS 1 AND 2: 0
CLINICAL INTERPRETATION OF PHQ2 SCORE: NO FURTHER SCREENING NEEDED
SUM OF ALL RESPONSES TO PHQ9 QUESTIONS 1 AND 2: 0
SUM OF ALL RESPONSES TO PHQ9 QUESTIONS 1 AND 2: 0
1. LITTLE INTEREST OR PLEASURE IN DOING THINGS: NOT AT ALL
SUM OF ALL RESPONSES TO PHQ9 QUESTIONS 1 AND 2: 0
1. LITTLE INTEREST OR PLEASURE IN DOING THINGS: NOT AT ALL

## 2023-01-13 ENCOUNTER — APPOINTMENT (OUTPATIENT)
Dept: PHYSICAL THERAPY | Facility: HOSPITAL | Age: 68
End: 2023-01-13
Attending: ANESTHESIOLOGY
Payer: MEDICARE

## 2023-01-13 NOTE — TELEPHONE ENCOUNTER
Called pt per his request.  Pt is very concerned about moving forward w his plan of care as he is unable to get into pelvic floor therapy until August.  He is very motivated to return to work due to financial issues. He states that he is very concerned about doing any movements because he is triggering his pain easily. He is afraid to move much and is concerned he might need surgery. Pt advised to keep his upcoming PT appts.

## 2023-01-16 ENCOUNTER — TELEPHONE (OUTPATIENT)
Dept: PHYSICAL MEDICINE AND REHAB | Facility: CLINIC | Age: 68
End: 2023-01-16

## 2023-01-16 DIAGNOSIS — M54.16 LUMBAR RADICULOPATHY: Primary | ICD-10-CM

## 2023-01-16 DIAGNOSIS — M51.37 DDD (DEGENERATIVE DISC DISEASE), LUMBOSACRAL: ICD-10-CM

## 2023-01-16 DIAGNOSIS — M79.10 MYALGIA: ICD-10-CM

## 2023-01-16 DIAGNOSIS — M51.36 BULGE OF LUMBAR DISC WITHOUT MYELOPATHY: ICD-10-CM

## 2023-01-16 DIAGNOSIS — N94.89 HIGH-TONE PELVIC FLOOR DYSFUNCTION: ICD-10-CM

## 2023-01-16 DIAGNOSIS — M48.061 LUMBAR FORAMINAL STENOSIS: ICD-10-CM

## 2023-01-16 DIAGNOSIS — M54.59 MECHANICAL LOW BACK PAIN: ICD-10-CM

## 2023-01-16 DIAGNOSIS — M47.816 LUMBAR SPONDYLOSIS: ICD-10-CM

## 2023-01-16 DIAGNOSIS — M47.816 FACET SYNDROME, LUMBAR: ICD-10-CM

## 2023-01-16 DIAGNOSIS — M99.9 BIOMECHANICAL LESION: ICD-10-CM

## 2023-01-16 NOTE — TELEPHONE ENCOUNTER
Patient dropped off an envelope at the  (drug screen) and asked it to be given to Dr Terrie Alfredo directly.

## 2023-01-17 ENCOUNTER — APPOINTMENT (OUTPATIENT)
Dept: PHYSICAL THERAPY | Facility: HOSPITAL | Age: 68
End: 2023-01-17
Attending: ANESTHESIOLOGY
Payer: MEDICARE

## 2023-01-17 ENCOUNTER — TELEPHONE (OUTPATIENT)
Dept: NEUROLOGY | Facility: CLINIC | Age: 68
End: 2023-01-17

## 2023-01-17 NOTE — TELEPHONE ENCOUNTER
Bilateral L5 transforaminal epidural steroid injection   CPT CODE: 12173-66  Status: Pre-certification is not require. Per Medicare Guidelines: may be subject to review once claim is submitted. All Medicare coverage is based on Medical Necessity.    Notified nusing for scheduling

## 2023-01-17 NOTE — TELEPHONE ENCOUNTER
Paperwork reviewed. Miki Jurado had the following procedures performed:  11/21/2021 L4-L5 interlaminar epidural steroid injection by Dr. Ahmet Crystal  December 6, 2021 right L4 transforaminal epidural steroid injection by Dr. Vivi Patterson  January 7, 2022 right L4 transforaminal epidural steroid injection by Dr. Galina Rhoades  April 8, 2022 right L4 transforaminal epidural steroid injection by Dr. Galina Rhoades  June 16, 2022 Bilateral L4 transforaminal epidural injection by Dr. Jarad Fitzpatrick    July 11, 2022  By Dr. Dash Whitten bilateral knee corticosteroid injection  November 9, 2022 bilateral L4 and L5 transforaminal epidural steroid injection by Dr. Pati Olivo     Mr. Beck Almaraz has had the maximal amount of steroids for the year of 2022. As we are now in 2023, I feel comfortable performing a bilateral L5 transforaminal epidural steroid injection. I will place an order for this. We will confirm with the patient if he would like to have this done under local anesthesia or IV conscious sedation prior to scheduling. He will follow-up with me 2 weeks after the procedure. I continue to recommend formal physical therapy as well as pelvic floor therapy. Please call the patient and let him know. Robyn Pascual.  Felisa Hill MD, [address] [facility]  Physical Medicine and Rehabilitation/Sports Medicine  MEDICAL [facility]

## 2023-01-18 NOTE — TELEPHONE ENCOUNTER
Patient has been scheduled for Bilateral L5 transforaminal epidural steroid injection under local anesthesia   on 2/1/23 at the [facility] with Dr. Poli Eddy.   -Anesthesia type: Local.  -Patient informed to fast 12 hours prior to procedure with IVCS/MAC.   -Scheduling [facility] covid testing required for all procedures whether patient is vaccinated or not. -Patient was advised that if he/she does receive the covid vaccine it needs to be at least 2 weeks before or after the injection. -Medications and allergies reviewed. -Patient reminded to hold NSAIDs (Ibuprofen, ASA 81, Aleve, Naproxen, Mobic, Diclofenac, Etodolac, Celebrex etc.) for 3 days prior to Lumbar MBB/Facet if BMI is greater than 35. For Cervical injections only hold multivitamins, Vitamin E, Fish Oil, Phentermine/Lomaira for 7 days prior to injection and NSAIDS.  mg to be held for 7 days prior to injections.  -If patient is receiving MAC/IVCS Phentermine Edil Leap) will need to be held for 7 days prior to injection.  -If on blood thinner clearance has been received to hold this medication by provider.   -Patient informed he/she will need a  to and from procedure. Kia Motta is located in the [facility] [address] 1st floor,  may park in the yellow/purple parking lot. Patient verbalized understanding and agrees with plan.  -----> Scheduled in Epic:   -----> Scheduled in [employer]:  Yes

## 2023-01-19 ENCOUNTER — OFFICE VISIT (OUTPATIENT)
Dept: SURGERY | Age: 68
End: 2023-01-19

## 2023-01-19 ENCOUNTER — OFFICE VISIT (OUTPATIENT)
Dept: PHYSICAL THERAPY | Facility: HOSPITAL | Age: 68
End: 2023-01-19
Attending: ANESTHESIOLOGY
Payer: MEDICARE

## 2023-01-19 VITALS
DIASTOLIC BLOOD PRESSURE: 83 MMHG | WEIGHT: 149.91 LBS | SYSTOLIC BLOOD PRESSURE: 136 MMHG | HEIGHT: 67 IN | BODY MASS INDEX: 23.53 KG/M2 | OXYGEN SATURATION: 98 % | HEART RATE: 63 BPM

## 2023-01-19 DIAGNOSIS — K64.1 GRADE II HEMORRHOIDS: Primary | ICD-10-CM

## 2023-01-19 PROCEDURE — 97110 THERAPEUTIC EXERCISES: CPT

## 2023-01-19 PROCEDURE — 97112 NEUROMUSCULAR REEDUCATION: CPT

## 2023-01-19 PROCEDURE — 99205 OFFICE O/P NEW HI 60 MIN: CPT

## 2023-01-19 RX ORDER — MELATONIN
Qty: 60 TABLET | Refills: 3 | Status: SHIPPED | OUTPATIENT
Start: 2023-01-19 | End: 2023-04-03

## 2023-01-19 ASSESSMENT — PAIN SCALES - GENERAL: PAINLEVEL: 0

## 2023-01-24 ENCOUNTER — OFFICE VISIT (OUTPATIENT)
Dept: PHYSICAL THERAPY | Facility: HOSPITAL | Age: 68
End: 2023-01-24
Attending: ANESTHESIOLOGY
Payer: MEDICARE

## 2023-01-24 PROCEDURE — 97112 NEUROMUSCULAR REEDUCATION: CPT

## 2023-01-24 PROCEDURE — 97110 THERAPEUTIC EXERCISES: CPT

## 2023-01-24 NOTE — PROGRESS NOTES
Mike Hagen    2/22/1955  Referring Physician:  Alina Martinez  Diagnosis: Low back pain (M54.50)    Initial Evaluation Date: 11/15/2022  Date of Surgery: None for this diagnosis     Insurance type and authorized visits: Medicaid, TBD  Plan of care expires: For orders: 2/13/2023; For insurance: TBD    Precautions/Hx: OA, B carotid stenosis, compression fx c-spine, GERD, mitral regurgitation, medial meniscus tear, palpitations,     SUBJECTIVE:     Pt reports that he continues to have pain with trunk flex or rotation. He notes that he had relief of some stiffness in the lower back since doing prone extension exercises. He states that he took a warm bath for the first time in a long time and felt weak afterwards. Pt recalls that he once fell off of an attic ladder and hurt his back hitting it flat on a wall.      Visit count 2/13/2023 1/8 3/8 5/8 6/8 7/8 8/14 10/14   Date 11/15/2022 11/28/2022  12/9/2022  12/12/2022  12/16/2022  12/19/2022  1/24/2023    LBP/B SI          Pain Range 3-4 to 8/10 0 to 6-7/10 0 to 7/10 0-7/10 0 to 5-6/10 0 to 5-6/10 0 to 8/10   Pain Ave 5-6/10 5-6/10 4-5/10 4-5/10 4-5/10 4-5/10 4-5/10        OBJECTIVE:     Treatment performed this date:    Therapeutic Exercise:  Visit #   8/14 9/14 10/14   Position Exercise HEP 12/19/2022 1/19/2023 1/24/2023    NuStep  Seat 11, Arms 11, Resist 3  X 5 min, 2 intervals     Supine Sciatic nerve glide leg pumps H       Hesch R ant ileum self mob 2 min H       Cross leg piriformis H       Cross leg ER        LTR H       DKC        SKC H      Sidelying Hesch side glide fixation 5 min H       Hesch R ant ileum self mob H       Trunk rot book H  X  X 10x   Sitting Trunk flex H   X     Trunk rot chair H   X 5x     Trunk rot hands on SI and opp thigh H   X 3x    Dynadisc pelvic tilt    X 10x2    Pelvic tilt H   X 10x   4 point` Cat cow        Child pose min       Prone Lying  H X 2 min x 2      On elbows H X 1 min x 4      On elbows w side glide UE's to R  X 1 min      Press ups H X 10x, 10x     Standing Lat shift L pelvis to wall H X 10x, 10x      Trunk extn forearms on wall H X 10x, 10x     Neuro TNE 1-7   X      Re-ed  X see assessment  X see assessment  X see assessment     Posture  X use of mirror     MT IASTM        MFR        Joint mob        MET       US Ultrasound - units given 1  100% power, 1mHz, 1.2 w/cm2, 7 min to         Estim Pt received interferential electric stimulation in supine w CP.       H = HEP. Pt given copies of this exercise for home program.  X = Exercises done this date - pt verbalized understanding and demonstrated competence. All exercises done B unless otherwise indicated. Pt advised to discontinue exercises that increase pain and to call or return to therapist to discuss. Each intervention above is specifically prescribed to address the patients identified impairments, activity limitations, and participation restrictions. ASSESSMENT:     Pt educated on the effect of muscle inhibition from over firing opposite muscles. Pt advised to continue to gain mobility into all ranges as tolerated working to keep function. Pt asking questions regarding prominence of C7 while he was taking a bath. Pt advised that this is normal.  Discussed ongoing thoracolumbar ps tension limiting function. Pt advised to begin walking program.   Pt benefits from consistent cueing from fear avoidance verbalized thoughts to benefits of gentle progressive mobility. Physical Therapy Goals: From 11/15/2022 to 2/13/2023  - Created with patient input during initial evaluation   1. Pt will be independent in beginning level of HEP for stretching, posture and strengthening. 2. Pt will be able to don/doff socks/shoes without increased symptoms. 3. 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 4. 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   5. Pt will be able to turn to back up the car without increased symptoms. PLAN OF CARE:      Assess response to sitting pelvic tilt exercises. Continue PT per original plan for 8 additional visits for therapeutic exercises, posture retraining, therapeutic activities, manual treatment, neuromuscular reeducation, therapeutic pain neuroscience education, patient education, self care home management, home exercise program, and modalities as needed. Charges: TE2, Neuro1    Total Timed treatment: 45 min      Total Treatment Time: 45 min     __________________________________________________________________________________________      21st Century Cures Act Notice to Patient: Medical documents like this are made available to patients in the interest of transparency. However, be advised this is a medical document and it is intended as ayvg-ht-butz communication between your medical providers. This medical document may contain abbreviations, assessments, medical data, and results or other terms that are unfamiliar. Medical documents are intended to carry relevant information, facts as evident, and the clinical opinion of the practitioner. As such, this medical document may be written in language that appears blunt or direct. You are encouraged to contact your medical provider and/or [facility] Patient Experience if you have any questions about this medical document. Objective Initial Evaluation Data 11/15/2022:    Functional Scores 11/15/2022 12/12/2022    FOTO primary measure 47 47     Palpation: sensitive to light touch over lumbar spine  Gait:        Deviations: trunk flex, wide based       Devices: none       Assistance: none    Posture 11/15/2022   Alignment R foot ER, L shoulder depressed, forward head position , L trunk shift.  flattened lumbar spine and thoracic spine       Body mechanics 11/15/2022   Reaching for personal belongings poor       Sensation 11/15/2022 12/12/2022    Dermatomes Light Touch     Proximal medial thigh R (L2)  wnl wnl Proximal medial thigh L (L2) wnl wnl   Above knee R (L3) wnl wnl   Above knee L (L3) wnl wnl   Medial arch R(L4) wnl wnl   Medial arch L (L4) wnl wnl   Between 1st - 2nd toes R (L5) wnl wnl   Between 1st - 2nd toes L (L5) wnl wnl   Lateral border of foot R (S1) wnl wnl   Lateral border of foot L (S1) dull wnl   Popliteal fossa R (S2) wnl wnl   Popliteal fossa L(S2) wnl wnl       Abdominals 11/15/2022   Tone  fair       ROM Lumbar 11/15/2022 12/12/2022    Flexion Mod-max Mod-max   Extension max Mod-max   R SB mod Min-mod   L SB mod mod   R Rotation mod mod   L Rotation Mod-max mod   * pain limiting    MMT 5/5 11/15/2022 12/12/2022    L2- hip flex R 5 5   Hip flex L 5 5   L3- knee ext R 5 5   Knee ext L 5- 5   L4- ankle DF R 5 5   Ankle DF L 5 5   L5- EHL R 5 5   EHL L 4+ 4+   S1- ankle PF R 5 5   Ankle PF L 5 5   S2- Hams R 5 5   Hams L 5 5   *pain limiting    LE flexibility 11/15/2022 12/12/2022    Piriformis L Mod-max mod   Piriformis R Mod-max mod   Hams L -40 -35   Hams R -45 -41   *pain limiting    Neural mobility 11/15/2022 12/12/2022    SLR R 40 deg myofascial tension 45 deg myofascial tension   SLR L 35 deg myofascial tension 45 deg myofascial tension      VENKATA 11/15/2022   R wnl   L wnl      Position [facility] Spring Tests 11/22/2022        Supine Hip IR R Min-mod    Hip IR L min    Pelvic Side [place] R mod    Pelvis Side Glide L wnl    Post Rotation of Ilium R Mod-max    Post Rotation of Ilium L wnl

## 2023-01-26 ENCOUNTER — TELEPHONE (OUTPATIENT)
Dept: NEUROLOGY | Facility: CLINIC | Age: 68
End: 2023-01-26

## 2023-01-26 NOTE — TELEPHONE ENCOUNTER
Patient presented to office with outside records for Dr. Kenya Sánchez to review. Placed in Dr. Gaby Guerrero. Not all of this is medical information. Please advise which of these items that you would like scanned to patient's chart.

## 2023-01-27 ENCOUNTER — OFFICE VISIT (OUTPATIENT)
Dept: PHYSICAL THERAPY | Facility: HOSPITAL | Age: 68
End: 2023-01-27
Attending: ANESTHESIOLOGY
Payer: MEDICARE

## 2023-01-27 PROCEDURE — 97110 THERAPEUTIC EXERCISES: CPT

## 2023-01-27 PROCEDURE — 97112 NEUROMUSCULAR REEDUCATION: CPT

## 2023-01-27 NOTE — PROGRESS NOTES
Pete Rosario Dylanrolycarson    2/22/1955  Referring Physician:  Christianne Soria  Diagnosis: Low back pain (M54.50)    Initial Evaluation Date: 11/15/2022  Date of Surgery: None for this diagnosis     Insurance type and authorized visits: Medicaid, TBD  Plan of care expires: For orders: 2/13/2023; For insurance: TBD    Precautions/Hx: OA, B carotid stenosis, compression fx c-spine, GERD, mitral regurgitation, medial meniscus tear, palpitations,     SUBJECTIVE:     Pt reports that he \"felt his disc start moving\" when he tried to do the sitting pelvic tilt. He is concerned about his memory issues as his father had dementia so he plans to discuss this further with his neurologist.  He is concerned about taking Tramedol due dementia concerns. He states that he lifted a small table ~ 20# without issues. He notes that he is not moving to control his pain, but is unable to change positions. He remains very concerned about returning to work. He states that he will not allow himself to flex forward at all.       Visit count 2/13/2023 1/8 3/8 5/8 6/8 7/8 8/14 10/14 11/14   Date 11/15/2022 11/28/2022  12/9/2022  12/12/2022  12/16/2022  12/19/2022  1/24/2023  1/27/2023    LBP/B SI           Pain Range 3-4 to 8/10 0 to 6-7/10 0 to 7/10 0-7/10 0 to 5-6/10 0 to 5-6/10 0 to 8/10 0-3/10   Pain Ave 5-6/10 5-6/10 4-5/10 4-5/10 4-5/10 4-5/10 4-5/10 2-3/10        OBJECTIVE:     Treatment performed this date:    Therapeutic Exercise:  Visit #   9/14 10/14 11/14   Position Exercise HEP 1/19/2023 1/24/2023 1/27/2023    NuStep  Seat 11, Arms 11, Resist 3    X 5 min, 2 intervals, 60- spm   Supine Sciatic nerve glide leg pumps H       Hesch R ant ileum self mob 2 min H       Cross leg piriformis H       Cross leg ER        LTR H       DKC        SKC H      Sidelying Hesch side glide fixation 5 min H       Hesch R ant ileum self mob H       Trunk rot book H X  X 10x    Sitting Trunk flex H  X      Trunk rot chair H  X 5x      Trunk rot hands on SI and opp thigh H  X 3x     Dynadisc pelvic tilt   X 10x2     Pelvic tilt H  X 10x X 10x   4 point` Cat cow        Child pose min       Prone Lying  H       On elbows H       On elbows w side glide UE's to R        Press ups H      Standing Lat shift L pelvis to wall H       Trunk extn forearms on wall H      Neuro TNE 1-7  X       Re-ed  X see assessment  X see assessment  X see assessment     Posture       MT IASTM        MFR        Joint mob        MET       US Ultrasound - units given 1  100% power, 1mHz, 1.2 w/cm2, 7 min to         Estim Pt received interferential electric stimulation in supine w CP.       H = HEP. Pt given copies of this exercise for home program.  X = Exercises done this date - pt verbalized understanding and demonstrated competence. All exercises done B unless otherwise indicated. Pt advised to discontinue exercises that increase pain and to call or return to therapist to discuss. Each intervention above is specifically prescribed to address the patients identified impairments, activity limitations, and participation restrictions. ASSESSMENT:     Pt had no worsening for pelvic tilt while in the dept, but had worsening with trial at home. Pt remain unable to tolerate mild forward trunk flex for function like brushing teeth and did not tolerate extension exercises. Discussed further therapeutic neuroscience concepts with emphasis on importance of gentle, but steady progression in mobility. Reviewed pacing of activities avoiding boom and bust patterns. Pt remains very motivated to return to his work as an independent . Discussed further ergonomics for his work with future aging considerations. Pt continues to do well to tolerate aerobic activity with intervals. Physical Therapy Goals: From 11/15/2022 to 2/13/2023  - Created with patient input during initial evaluation   1.  Pt will be independent in beginning level of HEP for stretching, posture and strengthening. 2. Pt will be able to don/doff socks/shoes without increased symptoms. 3. 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 4. 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 5. Pt will be able to turn to back up the car without increased symptoms. PLAN OF CARE:      Assess response to lumbar mobility progress post injection. Continue PT per original plan for 8 additional visits for therapeutic exercises, posture retraining, therapeutic activities, manual treatment, neuromuscular reeducation, therapeutic pain neuroscience education, patient education, self care home management, home exercise program, and modalities as needed. Charges: Tra Sanchez    Total Timed treatment: 45 min      Total Treatment Time: 45 min     __________________________________________________________________________________________      21st Century Cures Act Notice to Patient: Medical documents like this are made available to patients in the interest of transparency. However, be advised this is a medical document and it is intended as zity-zi-wnji communication between your medical providers. This medical document may contain abbreviations, assessments, medical data, and results or other terms that are unfamiliar. Medical documents are intended to carry relevant information, facts as evident, and the clinical opinion of the practitioner. As such, this medical document may be written in language that appears blunt or direct. You are encouraged to contact your medical provider and/or [facility] Patient Experience if you have any questions about this medical document.   Objective Initial Evaluation Data 11/15/2022:    Functional Scores 11/15/2022 12/12/2022    FOTO primary measure 47 47     Palpation: sensitive to light touch over lumbar spine  Gait:        Deviations: trunk flex, wide based       Devices: none       Assistance: none    Posture 11/15/2022   Alignment R foot ER, L shoulder depressed, forward head position , L trunk shift.  flattened lumbar spine and thoracic spine       Body mechanics 11/15/2022   Reaching for personal belongings poor       Sensation 11/15/2022 12/12/2022    Dermatomes Light Touch     Proximal medial thigh R (L2)  wnl wnl   Proximal medial thigh L (L2) wnl wnl   Above knee R (L3) wnl wnl   Above knee L (L3) wnl wnl   Medial arch R(L4) wnl wnl   Medial arch L (L4) wnl wnl   Between 1st - 2nd toes R (L5) wnl wnl   Between 1st - 2nd toes L (L5) wnl wnl   Lateral border of foot R (S1) wnl wnl   Lateral border of foot L (S1) dull wnl   Popliteal fossa R (S2) wnl wnl   Popliteal fossa L(S2) wnl wnl       Abdominals 11/15/2022   Tone  fair       ROM Lumbar 11/15/2022 12/12/2022    Flexion Mod-max Mod-max   Extension max Mod-max   R SB mod Min-mod   L SB mod mod   R Rotation mod mod   L Rotation Mod-max mod   * pain limiting    MMT 5/5 11/15/2022 12/12/2022    L2- hip flex R 5 5   Hip flex L 5 5   L3- knee ext R 5 5   Knee ext L 5- 5   L4- ankle DF R 5 5   Ankle DF L 5 5   L5- EHL R 5 5   EHL L 4+ 4+   S1- ankle PF R 5 5   Ankle PF L 5 5   S2- Hams R 5 5   Hams L 5 5   *pain limiting    LE flexibility 11/15/2022 12/12/2022    Piriformis L Mod-max mod   Piriformis R Mod-max mod   Hams L -40 -35   Hams R -45 -41   *pain limiting    Neural mobility 11/15/2022 12/12/2022    SLR R 40 deg myofascial tension 45 deg myofascial tension   SLR L 35 deg myofascial tension 45 deg myofascial tension      VENKATA 11/15/2022   R wnl   L wnl      Position [facility] Spring Tests 11/22/2022        Supine Hip IR R Min-mod    Hip IR L min    Pelvic Side [place] R mod    Pelvis Side Glide L wnl    Post Rotation of Ilium R Mod-max    Post Rotation of Ilium L wnl

## 2023-01-30 ENCOUNTER — APPOINTMENT (OUTPATIENT)
Dept: PHYSICAL THERAPY | Facility: HOSPITAL | Age: 68
End: 2023-01-30
Attending: ANESTHESIOLOGY
Payer: MEDICARE

## 2023-02-01 ENCOUNTER — OFFICE VISIT (OUTPATIENT)
Dept: SURGERY | Facility: CLINIC | Age: 68
End: 2023-02-01
Payer: MEDICARE

## 2023-02-01 DIAGNOSIS — M47.816 FACET SYNDROME, LUMBAR: ICD-10-CM

## 2023-02-01 DIAGNOSIS — M54.16 LUMBAR RADICULOPATHY: ICD-10-CM

## 2023-02-01 DIAGNOSIS — M51.36 BULGE OF LUMBAR DISC WITHOUT MYELOPATHY: ICD-10-CM

## 2023-02-01 DIAGNOSIS — M48.061 LUMBAR FORAMINAL STENOSIS: ICD-10-CM

## 2023-02-01 DIAGNOSIS — M48.062 SPINAL STENOSIS OF LUMBAR REGION WITH NEUROGENIC CLAUDICATION: ICD-10-CM

## 2023-02-01 DIAGNOSIS — M47.816 LUMBAR SPONDYLOSIS: ICD-10-CM

## 2023-02-01 DIAGNOSIS — M51.37 DDD (DEGENERATIVE DISC DISEASE), LUMBOSACRAL: Primary | ICD-10-CM

## 2023-02-01 DIAGNOSIS — M54.59 MECHANICAL LOW BACK PAIN: ICD-10-CM

## 2023-02-01 NOTE — PROCEDURES
[facility] [address]    BILATERAL LUMBAR TRANSFORAMINAL   NAME:  Mihai Hagen    MR #:    [id number] :  1955     PHYSICIAN:  Estevan Núñez MD        Operative Report    DATE OF PROCEDURE: 2023   PREOPERATIVE DIAGNOSES: 1. DDD (degenerative disc disease), lumbosacral    2. Lumbar radiculopathy    3. Facet syndrome, lumbar    4. Lumbar foraminal stenosis    5. Spinal stenosis of lumbar region with neurogenic claudication    6. Mechanical low back pain    7. Bulge of lumbar disc without myelopathy    8. Lumbar spondylosis        POSTOPERATIVE DIAGNOSES:   1. DDD (degenerative disc disease), lumbosacral    2. Lumbar radiculopathy    3. Facet syndrome, lumbar    4. Lumbar foraminal stenosis    5. Spinal stenosis of lumbar region with neurogenic claudication    6. Mechanical low back pain    7. Bulge of lumbar disc without myelopathy    8. Lumbar spondylosis        PROCEDURES: L5 BILATERAL transforaminal epidural steroid injection done under fluoroscopic guidance with contrast enhancement. SURGEON: Estevan Núñez MD   ANESTHESIA: Local   INDICATIONS:      OPERATIVE PROCEDURE:  Written consent was obtained from the patient. The patient was brought into the operating room and placed in the prone position on the fluoroscopy table with pillow underneath his abdomen. The patient's skin was cleaned and draped in a normal sterile fashion. Using AP fluoroscopy, all five lumbar vertebrae were identified. When the fourth and fifth vertebra was identified, fluoroscopy was left anterior obliqued opening up the right L5-S1 intervertebral foramen. At this point in time, the patient's skin was anesthetized with 1% PF lidocaine without epinephrine. Then, a 5 inch, 22-gauge spinal needle was inserted and directed towards the right L5-S1 intervertebral foramen. When it felt to be in good position, AP fluoroscopy was used to advance the needle to the 6 o'clock position on the right L5 pedicle.   At this point in time, Omnipaque-240 contrast was used to obtain a good epidurogram indicating correct needle placement. Then, aspiration was performed. No blood, fluid, or air was aspirated. Then, the patient was injected with a 4 cc solution of 2cc 6mg/cc of Betamethasone and 2cc of 1% PF lidocaine without epinephrine. After this, the needle was removed. Then, the fluoroscopy was right anterior obliqued opening up the left L5-S1 intervertebral foramen. At this point in time, the patient's skin was anesthetized with 1% PF lidocaine without epinephrine. Then, a 5 inch, 22-gauge spinal needle was inserted and directed towards the left L5-S1 intervertebral foramen. When it felt to be in good position, AP fluoroscopy was used to advance the needle to the 6 o'clock position on the left L5 pedicle. At this point in time, Omnipaque-240 contrast was used to obtain a good epidurogram indicating correct needle placement. Then, aspiration was performed. No blood, fluid, or air was aspirated. Then, the patient was injected with a 4 cc solution of 2cc 6mg/cc of Betamethasone and 2cc of 1% PF lidocaine without epinephrine. The patient's skin was cleaned. A Band-Aid was applied. The patient was transferred to the cart and into [facility]. The patient was given discharge instructions and will follow up in the clinic as scheduled. Throughout the whole procedure, the patient's pulse oximetry and vital signs were monitored and they remained completely stable. Also, throughout the whole procedure, prior to injection of any medication, aspiration was performed. No blood, fluid, or air was aspirated at anytime. Roz Ames.  Kaushik Parry MD, [facility]  Physical Medicine and Rehabilitation/Sports Medicine  MEDICAL [facility]

## 2023-02-01 NOTE — PATIENT INSTRUCTIONS
Post Injection Instructions     Please do not do anything strenuous over the next 24 hours (if you had a knee injection do not walk more than 2 city blocks, do not attend any aerobic classes, do not run, no heavy lifting, no prolong standing). You may resume your day to day activities after your injection. You may experience some mild amount of swelling and discomfort after the procedure. Please ice the area that was injected at least 5-6 times a day (15 minutes) for two days after (this will help prevent worsening pain that sometimes occurs after an injection). Only take tylenol if needed for pain for the first few days. Watch for signs of infection which include redness, warmth, worsening pain, fevers or chills. If you develop any of these signs call the office immediately at [phone]    My office will call you in 2 days to check in and see how you are feeling. Follow up with me in the office 2 weeks after your injection. Everyone responds differently to injections, but you can expect your peak effects a few weeks after your last injection. Yanick Bañuelos.  José Antonio Johnson MD  Physical Medicine and Rehabilitation/Sports Medicine  MEDICAL [facility]ENTER [facility]

## 2023-02-03 ENCOUNTER — TELEPHONE (OUTPATIENT)
Dept: CARDIOLOGY | Age: 68
End: 2023-02-03

## 2023-02-03 ENCOUNTER — OFFICE VISIT (OUTPATIENT)
Dept: CARDIOLOGY | Age: 68
End: 2023-02-03

## 2023-02-03 ENCOUNTER — APPOINTMENT (OUTPATIENT)
Dept: PHYSICAL THERAPY | Facility: HOSPITAL | Age: 68
End: 2023-02-03
Attending: ANESTHESIOLOGY
Payer: MEDICARE

## 2023-02-03 VITALS
HEART RATE: 56 BPM | WEIGHT: 149.91 LBS | HEIGHT: 67 IN | DIASTOLIC BLOOD PRESSURE: 86 MMHG | BODY MASS INDEX: 23.53 KG/M2 | SYSTOLIC BLOOD PRESSURE: 124 MMHG

## 2023-02-03 DIAGNOSIS — R93.1 ELEVATED CORONARY ARTERY CALCIUM SCORE: ICD-10-CM

## 2023-02-03 DIAGNOSIS — R00.2 PALPITATIONS: ICD-10-CM

## 2023-02-03 DIAGNOSIS — I77.1 CELIAC ARTERY STENOSIS (CMD): Primary | ICD-10-CM

## 2023-02-03 PROCEDURE — 99214 OFFICE O/P EST MOD 30 MIN: CPT | Performed by: INTERNAL MEDICINE

## 2023-02-06 ENCOUNTER — OFFICE VISIT (OUTPATIENT)
Dept: PHYSICAL THERAPY | Facility: HOSPITAL | Age: 68
End: 2023-02-06
Attending: ANESTHESIOLOGY
Payer: MEDICARE

## 2023-02-06 PROCEDURE — 97112 NEUROMUSCULAR REEDUCATION: CPT

## 2023-02-06 PROCEDURE — 97140 MANUAL THERAPY 1/> REGIONS: CPT

## 2023-02-06 PROCEDURE — 97110 THERAPEUTIC EXERCISES: CPT

## 2023-02-06 NOTE — PROGRESS NOTES
Shahram Hagen    2/22/1955  Referring Physician:  Rell Nolen  Diagnosis: Low back pain (M54.50)    Initial Evaluation Date: 11/15/2022  Date of Surgery: None for this diagnosis     Insurance type and authorized visits: Medicaid, TBD  Plan of care expires: For orders: 2/13/2023; For insurance: TBD    Precautions/Hx: OA, B carotid stenosis, compression fx c-spine, GERD, mitral regurgitation, medial meniscus tear, palpitations,     SUBJECTIVE:     Pt reports that he had initial good response to his recent injection and then has had return of stiffness and pain. He has returned to work and was not able to complete a full day. 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 He noted that he is much better when he wakes in the morning. He states that he notes worsening of his back when he feels the call to have a bowel movement. He states that he is scheduled to get his hemorrhoids banded. He states that the disc has stopped moving around since the injection.       Visit count 2/13/2023 1/8 3/8 5/8 7/8 10/14 11/14 12/14   Date 11/15/2022 11/28/2022  12/9/2022  12/16/2022  1/24/2023  1/27/2023  2/6/2023    LBP/B SI          Pain Range 3-4 to 8/10 0 to 6-7/10 0 to 7/10 0 to 5-6/10 0 to 8/10 0-3/10 0 to 5/10   Pain Ave 5-6/10 5-6/10 4-5/10 4-5/10 4-5/10 2-3/10 3-4/10        OBJECTIVE:     Treatment performed this date:    Therapeutic Exercise:  Visit #   10/14 11/14 12/14   Position Exercise HEP 1/24/2023 1/27/2023 2/6/2023    NuStep  Seat 11, Arms 11, Resist 3   X 5 min, 2 intervals, 60- spm X  20 min, 8 intervals   spm   Supine Sciatic nerve glide leg pumps H       Hesch R ant ileum self mob 2 min H       Cross leg piriformis H       Cross leg ER        LTR H       DKC        SKC H      Sidelying Hesch side glide fixation 5 min H       Hesch R ant ileum self mob H       Trunk rot book H X 10x     Sitting Trunk flex H X   3x    Trunk rot chair H X 5x   3x    Trunk rot hands on SI and opp thigh H X 3x      Dynadisc pelvic tilt  X 10x2      Pelvic tilt H X 10x X 10x    4 point` Cat cow        Child pose min       Prone Lying  H   X 2 min    On elbows H   X 1 min    On elbows w side glide UE's to R        Press ups H   X 2x   Standing Lat shift L pelvis to wall H       Trunk extn forearms on wall H      Neuro TNE 1-7        Re-ed  X see assessment  X see assessment  X see assessment     Posture       MT IASTM    X R>L lumbar ps, R prox/sup glut max    MFR        Joint mob        MET       US Ultrasound - units given 1  100% power, 1mHz, 1.2 w/cm2, 7 min to      X B lumbar ps and TLF   Estim Pt received interferential electric stimulation in supine w CP.       H = HEP. Pt given copies of this exercise for home program.  X = Exercises done this date - pt verbalized understanding and demonstrated competence. All exercises done B unless otherwise indicated. Pt advised to discontinue exercises that increase pain and to call or return to therapist to discuss. Each intervention above is specifically prescribed to address the patients identified impairments, activity limitations, and participation restrictions. ASSESSMENT:     Pt expressing concern about lack of resolution of pain post MARY ELLEN. Pt had limited ability to return to work and is concerned about his finances. He is concerned about returning to ther ex as it might increase his pain. Continue to discuss therapeutic neuroscience concepts of pacing, and importance of mobility. Pt demonstrates ongoing significant stiffness in lumbar spine for both flex and extn. Pt tolerated instrument assisted soft tissue massage followed by myofascial release after ultrasound as noted above with improved soft tissue mobility. Pt remains hesitant to progress with mobility due to previous set backs. Pt did well to perform full interval work on [employer].       Physical Therapy Goals: From 11/15/2022 to 2/13/2023  - Created with patient input during initial evaluation   1. Pt will be independent in beginning level of HEP for stretching, posture and strengthening. 2. Pt will be able to don/doff socks/shoes without increased symptoms. 3. 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 4. 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 5. Pt will be able to turn to back up the car without increased symptoms. PLAN OF CARE:      Assess response to interval work. Continue PT per original plan for 8 additional visits for therapeutic exercises, posture retraining, therapeutic activities, manual treatment, neuromuscular reeducation, therapeutic pain neuroscience education, patient education, self care home management, home exercise program, and modalities as needed. Charges: TE1, Neuro, MT   Total Timed treatment: 45 min      Total Treatment Time: 45 min     __________________________________________________________________________________________      21st Century Cures Act Notice to Patient: Medical documents like this are made available to patients in the interest of transparency. However, be advised this is a medical document and it is intended as ieru-vt-jbbo communication between your medical providers. This medical document may contain abbreviations, assessments, medical data, and results or other terms that are unfamiliar. Medical documents are intended to carry relevant information, facts as evident, and the clinical opinion of the practitioner. As such, this medical document may be written in language that appears blunt or direct. You are encouraged to contact your medical provider and/or [facility] Patient Experience if you have any questions about this medical document.   Objective Initial Evaluation Data 11/15/2022:    Functional Scores 11/15/2022 12/12/2022    FOTO primary measure 47 47     Palpation: sensitive to light touch over lumbar spine  Gait:        Deviations: trunk flex, wide based       Devices: none       Assistance: none    Posture 11/15/2022   Alignment R foot ER, L shoulder depressed, forward head position , L trunk shift.  flattened lumbar spine and thoracic spine       Body mechanics 11/15/2022   Reaching for personal belongings poor       Sensation 11/15/2022 12/12/2022    Dermatomes Light Touch     Proximal medial thigh R (L2)  wnl wnl   Proximal medial thigh L (L2) wnl wnl   Above knee R (L3) wnl wnl   Above knee L (L3) wnl wnl   Medial arch R(L4) wnl wnl   Medial arch L (L4) wnl wnl   Between 1st - 2nd toes R (L5) wnl wnl   Between 1st - 2nd toes L (L5) wnl wnl   Lateral border of foot R (S1) wnl wnl   Lateral border of foot L (S1) dull wnl   Popliteal fossa R (S2) wnl wnl   Popliteal fossa L(S2) wnl wnl       Abdominals 11/15/2022   Tone  fair       ROM Lumbar 11/15/2022 12/12/2022    Flexion Mod-max Mod-max   Extension max Mod-max   R SB mod Min-mod   L SB mod mod   R Rotation mod mod   L Rotation Mod-max mod   * pain limiting    MMT 5/5 11/15/2022 12/12/2022    L2- hip flex R 5 5   Hip flex L 5 5   L3- knee ext R 5 5   Knee ext L 5- 5   L4- ankle DF R 5 5   Ankle DF L 5 5   L5- EHL R 5 5   EHL L 4+ 4+   S1- ankle PF R 5 5   Ankle PF L 5 5   S2- Hams R 5 5   Hams L 5 5   *pain limiting    LE flexibility 11/15/2022 12/12/2022    Piriformis L Mod-max mod   Piriformis R Mod-max mod   Hams L -40 -35   Hams R -45 -41   *pain limiting    Neural mobility 11/15/2022 12/12/2022    SLR R 40 deg myofascial tension 45 deg myofascial tension   SLR L 35 deg myofascial tension 45 deg myofascial tension      VENKATA 11/15/2022   R wnl   L wnl      Position [facility] Tests 11/22/2022        Supine Hip IR R Min-mod    Hip IR L min    Pelvic Side [place] R mod    Pelvis Side Glide L wnl    Post Rotation of Ilium R Mod-max    Post Rotation of Ilium L wnl

## 2023-02-07 ENCOUNTER — OFFICE VISIT (OUTPATIENT)
Dept: NEUROLOGY | Facility: CLINIC | Age: 68
End: 2023-02-07
Payer: MEDICARE

## 2023-02-07 VITALS
WEIGHT: 141 LBS | SYSTOLIC BLOOD PRESSURE: 135 MMHG | DIASTOLIC BLOOD PRESSURE: 87 MMHG | RESPIRATION RATE: 16 BRPM | HEART RATE: 74 BPM | HEIGHT: 66 IN | BODY MASS INDEX: 22.66 KG/M2

## 2023-02-07 DIAGNOSIS — M25.69 BACK STIFFNESS: ICD-10-CM

## 2023-02-07 DIAGNOSIS — R42 LIGHTHEADEDNESS: Primary | ICD-10-CM

## 2023-02-07 DIAGNOSIS — R41.3 MEMORY DIFFICULTY: ICD-10-CM

## 2023-02-07 DIAGNOSIS — I67.1 CEREBROVASCULAR DURAL AV FISTULA: ICD-10-CM

## 2023-02-07 DIAGNOSIS — H90.42 SENSORINEURAL HEARING LOSS (SNHL) OF LEFT EAR WITH UNRESTRICTED HEARING OF RIGHT EAR: ICD-10-CM

## 2023-02-07 DIAGNOSIS — H93.13 TINNITUS AURIUM, BILATERAL: ICD-10-CM

## 2023-02-07 PROCEDURE — 99214 OFFICE O/P EST MOD 30 MIN: CPT | Performed by: OTHER

## 2023-02-08 ENCOUNTER — TELEPHONE (OUTPATIENT)
Dept: PHYSICAL MEDICINE AND REHAB | Facility: CLINIC | Age: 68
End: 2023-02-08

## 2023-02-08 ENCOUNTER — TELEPHONE (OUTPATIENT)
Dept: PHYSICAL THERAPY | Facility: HOSPITAL | Age: 68
End: 2023-02-08

## 2023-02-09 ENCOUNTER — HOSPITAL ENCOUNTER (OUTPATIENT)
Dept: CT IMAGING | Age: 68
Discharge: HOME | End: 2023-02-09
Attending: INTERNAL MEDICINE

## 2023-02-09 ENCOUNTER — LAB ENCOUNTER (OUTPATIENT)
Dept: LAB | Facility: HOSPITAL | Age: 68
End: 2023-02-09
Attending: Other
Payer: MEDICARE

## 2023-02-09 ENCOUNTER — TELEPHONE (OUTPATIENT)
Dept: INTERNAL MEDICINE | Age: 68
End: 2023-02-09

## 2023-02-09 ENCOUNTER — TELEPHONE (OUTPATIENT)
Dept: NEUROLOGY | Facility: CLINIC | Age: 68
End: 2023-02-09

## 2023-02-09 DIAGNOSIS — R00.2 PALPITATIONS: ICD-10-CM

## 2023-02-09 DIAGNOSIS — I67.1 CEREBROVASCULAR DURAL AV FISTULA: ICD-10-CM

## 2023-02-09 DIAGNOSIS — M25.69 BACK STIFFNESS: ICD-10-CM

## 2023-02-09 DIAGNOSIS — I25.10 ATHEROSCLEROSIS OF NATIVE CORONARY ARTERY OF NATIVE HEART WITHOUT ANGINA PECTORIS: ICD-10-CM

## 2023-02-09 DIAGNOSIS — R93.1 ELEVATED CORONARY ARTERY CALCIUM SCORE: ICD-10-CM

## 2023-02-09 PROCEDURE — 75571 CT HRT W/O DYE W/CA TEST: CPT

## 2023-02-09 PROCEDURE — 36415 COLL VENOUS BLD VENIPUNCTURE: CPT

## 2023-02-09 PROCEDURE — 86341 ISLET CELL ANTIBODY: CPT

## 2023-02-09 PROCEDURE — 81374 HLA I TYPING 1 ANTIGEN LR: CPT

## 2023-02-09 NOTE — TELEPHONE ENCOUNTER
Patient informed that the ENT referral is for VNG testing, so he would need a new appointment to do so. However, as long as Dr. Puma Gates office has the capabilities to perform this test, he can go to his established ENT physician. All questions answered.

## 2023-02-09 NOTE — TELEPHONE ENCOUNTER
Patient calling, advised he saw ENT Dr. Mary Conway with [address] and care on 11/11/22. Would like Dr. Fransisco Mckenzie to review what he was seen for, and if he truly needs to follow up with an ENT or if this would be duplicating efforts. Note from Dr. Mary Conway is available via care everywhere. Please advise.

## 2023-02-10 ENCOUNTER — APPOINTMENT (OUTPATIENT)
Dept: PHYSICAL THERAPY | Facility: HOSPITAL | Age: 68
End: 2023-02-10
Attending: ANESTHESIOLOGY
Payer: MEDICARE

## 2023-02-10 ENCOUNTER — TELEPHONE (OUTPATIENT)
Dept: CARDIOLOGY | Age: 68
End: 2023-02-10

## 2023-02-10 NOTE — TELEPHONE ENCOUNTER
Patient stated he did something in therapy and a day later the back was in a lot of pain. Patient states he felt better for a couple of days after the injection, but then \"went hard\" in PT on the bike and has been hurting since. Patient states he will see Dr. Janelle Zelaya at his scheduled appointment on 2/13/2023 and will discuss with him them. Patient stated he  saw his neurologist.  Had a blood test for Stiffman's Syndrome ordered by the neurologist ti neasure his glutamic acid decarboxylase (MART) antibodies. Also ordering other neuro testing. Patient states his memory has been off a little, so has neuro testing coming up and wants to rule out dementia because his father had it. 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 Tried to contact [facility] for the pelvic floor but booked out to August. This RN advised patient that per Dr. Panchito Jennings video visit notes from 1/13/2023 he advised patient to \"Try calling pelvic floor therapy to see if you can get in sooner with [facility] if Slim Vann is to booked out\"  Patient asked if Natividad Marie is good and if the pelvic floor is her specialty. This RN advised that she could not speak for Dr. Janelle Zelaya or the therapist, however if Dr. Janelle Zelaya recommended her by name, that he must have had some good patient experiences with her. Patient also stated when moves his bowels he gets more pain and setting up. Also has hemorrhoids that need to be surgically removed and wonders if they could be playing into his pain. RN suggested that he discuss this w/ Dr. Janelle Zelaya at his office visit. Nothing further required at this time.

## 2023-02-13 ENCOUNTER — TELEPHONE (OUTPATIENT)
Dept: PHYSICAL MEDICINE AND REHAB | Facility: CLINIC | Age: 68
End: 2023-02-13

## 2023-02-13 ENCOUNTER — OFFICE VISIT (OUTPATIENT)
Dept: PHYSICAL MEDICINE AND REHAB | Facility: CLINIC | Age: 68
End: 2023-02-13
Payer: MEDICARE

## 2023-02-13 VITALS — HEIGHT: 66 IN | WEIGHT: 150 LBS | BODY MASS INDEX: 24.11 KG/M2

## 2023-02-13 DIAGNOSIS — M51.36 BULGE OF LUMBAR DISC WITHOUT MYELOPATHY: ICD-10-CM

## 2023-02-13 DIAGNOSIS — M47.816 LUMBAR SPONDYLOSIS: ICD-10-CM

## 2023-02-13 DIAGNOSIS — M47.816 FACET SYNDROME, LUMBAR: ICD-10-CM

## 2023-02-13 DIAGNOSIS — N94.89 HIGH-TONE PELVIC FLOOR DYSFUNCTION: ICD-10-CM

## 2023-02-13 DIAGNOSIS — M54.59 MECHANICAL LOW BACK PAIN: ICD-10-CM

## 2023-02-13 DIAGNOSIS — M51.37 DDD (DEGENERATIVE DISC DISEASE), LUMBOSACRAL: ICD-10-CM

## 2023-02-13 DIAGNOSIS — M79.10 MYALGIA: ICD-10-CM

## 2023-02-13 DIAGNOSIS — M48.061 LUMBAR FORAMINAL STENOSIS: ICD-10-CM

## 2023-02-13 DIAGNOSIS — M54.16 LUMBAR RADICULOPATHY: Primary | ICD-10-CM

## 2023-02-13 DIAGNOSIS — M99.9 BIOMECHANICAL LESION: ICD-10-CM

## 2023-02-13 PROBLEM — I25.10 ATHEROSCLEROSIS OF NATIVE CORONARY ARTERY OF NATIVE HEART WITHOUT ANGINA PECTORIS: Status: ACTIVE | Noted: 2020-09-10

## 2023-02-13 PROBLEM — R93.89 ABNORMAL CHEST X-RAY: Status: ACTIVE | Noted: 2020-09-06

## 2023-02-13 PROBLEM — F41.9 ANXIETY: Status: ACTIVE | Noted: 2021-11-02

## 2023-02-13 PROBLEM — G56.00 CARPAL TUNNEL SYNDROME: Status: ACTIVE | Noted: 2018-07-27

## 2023-02-13 PROBLEM — R93.1 ELEVATED CORONARY ARTERY CALCIUM SCORE: Status: ACTIVE | Noted: 2020-09-10

## 2023-02-13 PROBLEM — R73.9 HYPERGLYCEMIA: Status: ACTIVE | Noted: 2020-09-06

## 2023-02-13 PROBLEM — R42 DIZZINESS: Status: ACTIVE | Noted: 2020-05-06

## 2023-02-13 PROBLEM — I49.3 PVC'S (PREMATURE VENTRICULAR CONTRACTIONS): Status: ACTIVE | Noted: 2019-06-24

## 2023-02-13 PROBLEM — K59.00 CONSTIPATION: Status: ACTIVE | Noted: 2021-11-02

## 2023-02-13 PROBLEM — M54.30 SCIATICA: Status: ACTIVE | Noted: 2021-10-31

## 2023-02-13 RX ORDER — TRAMADOL HYDROCHLORIDE 50 MG/1
50 TABLET ORAL EVERY 6 HOURS PRN
COMMUNITY

## 2023-02-13 NOTE — PATIENT INSTRUCTIONS
1) My office will call you to schedule the BILATERAL L5 TFESI under local anesthesia once the procedure is approved by your insurance carrier. 2) Continue Tramadol for breakthrough pain only  3) Tylenol 500-1000 mg every 6-8 hours as needed for pain. No more than 3000 mg daily. 4) Follow up with me 2 weeks after the procedure.    5) Continue plan for pelvic floor therapy with [facility]

## 2023-02-14 ENCOUNTER — TELEPHONE (OUTPATIENT)
Dept: PHYSICAL MEDICINE AND REHAB | Facility: CLINIC | Age: 68
End: 2023-02-14

## 2023-02-14 NOTE — TELEPHONE ENCOUNTER
Per Medicare Guidelines -no authorization is required for BILATERAL L5 TFESIs CPT [id number], [id number]     Status: Authorization is not required however may be subject to review once claim is submitted-Covered Benefit

## 2023-02-14 NOTE — TELEPHONE ENCOUNTER
Patient has been scheduled for BILATERAL L5 TFESIs  on 03/08/23 at the [facility] with Dr.BEHAR. -Anesthesia type: LOCAL. -If scheduling [facility] covid testing required for all procedures whether patient is vaccinated or not. -Patient informed not to eat or drink anything after midnight the night prior to the procedure, if being sedated. (Patient informed to fast 12 hours prior to procedure with IVCS/MAC. )  -Patient was advised that if he/she does receive the covid vaccine it needs to be at least 2 weeks before or after the injection. -Medications and allergies reviewed. -Patient reminded to hold NSAIDs (Ibuprofen, ASA 81, Aleve, Naproxen, Mobic, Diclofenac, Etodolac, Celebrex etc.) for 3 days prior to [facility]  if BMI is greater than 35. For Cervical injections only hold multivitamins, Vitamin E, Fish Oil, Phentermine (Lomaira) for 7 days prior to injection and NSAIDS.  mg to be held for 7 days prior to injections.  -If patient is receiving MAC/IVCS Phentermine Levell Marus) will need to be held for 7 days prior to injection.  -If on blood thinner clearance has been received to hold this medication by provider.   -Patient informed he/she will need a  to and from procedure. -[facility] is located in the [facility] 1st floor. Patient may park in the yellow or purple parking. Patient verbalized understanding and agrees with plan.  -----> Scheduled in Epic: Yes  -----> Scheduled in [employer]:  Yes

## 2023-02-14 NOTE — TELEPHONE ENCOUNTER
Spoke with patient and he expressed he did not want to wait until 05/2023 to start PT. Patient was advised our office will reach out to DR MELODIE LOPEZ [facility], PT and see if he is able to be seen sooner. Message sent to [facility] who stated she will try to squeeze this patient in sooner.

## 2023-02-15 LAB — GLUTAMIC ACID DECARBOXYLASE AB: <5 IU/ML

## 2023-02-16 LAB — ANKYLOSING SPONDYLITIS (HLAB27): NEGATIVE

## 2023-02-16 NOTE — TELEPHONE ENCOUNTER
Spoke with patient and notified him Dr. Jermaine Robles is out of town and his schedule for 03/01 is full so the next available would be 03/08, which he is already scheduled for. Patient understood.

## 2023-02-21 ENCOUNTER — APPOINTMENT (OUTPATIENT)
Dept: URBAN - METROPOLITAN AREA CLINIC 248 | Age: 68
Setting detail: DERMATOLOGY
End: 2023-02-21

## 2023-02-21 DIAGNOSIS — D18.0 HEMANGIOMA: ICD-10-CM

## 2023-02-21 DIAGNOSIS — L82.1 OTHER SEBORRHEIC KERATOSIS: ICD-10-CM

## 2023-02-21 DIAGNOSIS — L663 OTHER SPECIFIED DISEASES OF HAIR AND HAIR FOLLICLES: ICD-10-CM

## 2023-02-21 DIAGNOSIS — L738 OTHER SPECIFIED DISEASES OF HAIR AND HAIR FOLLICLES: ICD-10-CM

## 2023-02-21 DIAGNOSIS — L11.1 TRANSIENT ACANTHOLYTIC DERMATOSIS [GROVER]: ICD-10-CM

## 2023-02-21 DIAGNOSIS — L81.4 OTHER MELANIN HYPERPIGMENTATION: ICD-10-CM

## 2023-02-21 DIAGNOSIS — L30.8 OTHER SPECIFIED DERMATITIS: ICD-10-CM

## 2023-02-21 PROBLEM — L02.222 FURUNCLE OF BACK [ANY PART, EXCEPT BUTTOCK]: Status: ACTIVE | Noted: 2023-02-21

## 2023-02-21 PROBLEM — D18.01 HEMANGIOMA OF SKIN AND SUBCUTANEOUS TISSUE: Status: ACTIVE | Noted: 2023-02-21

## 2023-02-21 PROCEDURE — OTHER COUNSELING: OTHER

## 2023-02-21 PROCEDURE — OTHER PRESCRIPTION: OTHER

## 2023-02-21 PROCEDURE — OTHER PRESCRIPTION MEDICATION MANAGEMENT: OTHER

## 2023-02-21 PROCEDURE — 99203 OFFICE O/P NEW LOW 30 MIN: CPT

## 2023-02-21 RX ORDER — TRIAMCINOLONE ACETONIDE 1 MG/G
CREAM TOPICAL
Qty: 45 | Refills: 5 | Status: ERX | COMMUNITY
Start: 2023-02-21

## 2023-02-21 ASSESSMENT — LOCATION ZONE DERM
LOCATION ZONE: HAND
LOCATION ZONE: ARM
LOCATION ZONE: TRUNK

## 2023-02-21 ASSESSMENT — LOCATION DETAILED DESCRIPTION DERM
LOCATION DETAILED: RIGHT ANTECUBITAL SKIN
LOCATION DETAILED: LEFT RADIAL DORSAL HAND
LOCATION DETAILED: RIGHT ANTERIOR DISTAL UPPER ARM
LOCATION DETAILED: LEFT ANTERIOR DISTAL UPPER ARM
LOCATION DETAILED: RIGHT RADIAL DORSAL HAND
LOCATION DETAILED: INFERIOR THORACIC SPINE
LOCATION DETAILED: LEFT MEDIAL UPPER BACK
LOCATION DETAILED: RIGHT MEDIAL SUPERIOR CHEST
LOCATION DETAILED: UPPER STERNUM
LOCATION DETAILED: LEFT VENTRAL PROXIMAL FOREARM
LOCATION DETAILED: RIGHT MEDIAL INFERIOR CHEST

## 2023-02-21 ASSESSMENT — LOCATION SIMPLE DESCRIPTION DERM
LOCATION SIMPLE: RIGHT UPPER ARM
LOCATION SIMPLE: LEFT UPPER BACK
LOCATION SIMPLE: LEFT UPPER ARM
LOCATION SIMPLE: UPPER BACK
LOCATION SIMPLE: RIGHT HAND
LOCATION SIMPLE: CHEST
LOCATION SIMPLE: LEFT HAND
LOCATION SIMPLE: LEFT FOREARM

## 2023-02-23 ENCOUNTER — HOSPITAL ENCOUNTER (OUTPATIENT)
Dept: MRI IMAGING | Facility: HOSPITAL | Age: 68
Discharge: HOME | End: 2023-02-23
Attending: Other
Payer: MEDICARE

## 2023-02-23 DIAGNOSIS — H93.13 TINNITUS AURIUM, BILATERAL: ICD-10-CM

## 2023-02-23 DIAGNOSIS — I67.1 CEREBROVASCULAR DURAL AV FISTULA: ICD-10-CM

## 2023-02-23 DIAGNOSIS — R42 LIGHTHEADEDNESS: ICD-10-CM

## 2023-02-23 PROCEDURE — 70546 MR ANGIOGRAPH HEAD W/O&W/DYE: CPT | Performed by: OTHER

## 2023-02-23 PROCEDURE — A9575 INJ GADOTERATE MEGLUMI 0.1ML: HCPCS | Performed by: OTHER

## 2023-02-23 RX ORDER — GADOTERATE MEGLUMINE 376.9 MG/ML
15 INJECTION INTRAVENOUS
Status: COMPLETED | OUTPATIENT
Start: 2023-02-23 | End: 2023-02-23

## 2023-02-23 RX ADMIN — GADOTERATE MEGLUMINE 15 ML: 376.9 INJECTION INTRAVENOUS at 09:15:00

## 2023-02-23 NOTE — PROGRESS NOTES
Lion;    Your MRA demonstrated normal looking blood vessels supplying the Brain      Dr. Radha Gonzalez

## 2023-02-27 ENCOUNTER — OFFICE VISIT (OUTPATIENT)
Dept: PHYSICAL THERAPY | Facility: HOSPITAL | Age: 68
End: 2023-02-27
Attending: PHYSICAL MEDICINE & REHABILITATION
Payer: MEDICARE

## 2023-02-27 PROCEDURE — 97110 THERAPEUTIC EXERCISES: CPT

## 2023-02-27 PROCEDURE — 97112 NEUROMUSCULAR REEDUCATION: CPT

## 2023-02-27 PROCEDURE — 97140 MANUAL THERAPY 1/> REGIONS: CPT

## 2023-02-27 NOTE — PROGRESS NOTES
Romayne Richards Delaurie    2/22/1955  Referring Physician:  Stephen Maradiaga  Diagnosis: Low back pain (M54.50)    Initial Evaluation Date: 11/15/2022  Date of Surgery: None for this diagnosis     Insurance type and authorized visits: Medicaid, TBD  Plan of care expires: For orders: 2/13/2023; For insurance: TBD    Precautions/Hx: OA, B carotid stenosis, compression fx c-spine, GERD, mitral regurgitation, medial meniscus tear, palpitations,     SUBJECTIVE:     Pt reports that he is back to work, but has to stop 2x/day to go home and put on ice. He notes that he gets some relief from going into a hot bath. He states that he tried doing his piriformis stretch in the tub, but feels that it made him worse. He states that he does not believe that many of the exercises are not going to help him. He notes that he figured out that he has to be facing his ladder and not sideways to it. He notes that he cannot reach far or twist anymore. He states that he has had relief of his thoracic spine pain, but now is having neck pain while standing looking down in the kitchen.     Visit count 2/13/2023 1/8 3/8 5/8 7/8 10/14 11/14 12/14 13/14   Date 11/15/2022 11/28/2022  12/9/2022  12/16/2022  1/24/2023  1/27/2023  2/6/2023  2/27/2023    LBP/B SI           Pain Range 3-4 to 8/10 0 to 6-7/10 0 to 7/10 0 to 5-6/10 0 to 8/10 0-3/10 0 to 5/10 0 to 5-6/10   Pain Ave 5-6/10 5-6/10 4-5/10 4-5/10 4-5/10 2-3/10 3-4/10 2-3/10        OBJECTIVE:     Treatment performed this date:    Therapeutic Exercise:  Visit #   11/14 12/14 13/14   Position Exercise HEP 1/27/2023 2/6/2023 2/27/2023    NuStep  Seat 11, Arms 11, Resist 3  X 5 min, 2 intervals, 60- spm X  20 min, 8 intervals   spm    Supine Sciatic nerve glide leg pumps H   10x    Hesch R ant ileum self mob 2 min H       Cross leg piriformis H   2x    Cross leg ER    2x    LTR H   10x    DKC    2x    SKC H   2x   Sidelying Hesch side glide fixation 5 min H       Hesch R ant ileum self mob H       Trunk rot book H   X 10x    Trunk rot book top leg bent to front H   X 10x   Sitting Trunk flex H  3x     Trunk rot chair H  3x 3x    Trunk rot hands on SI and opp thigh H       Dynadisc pelvic tilt        Pelvic tilt H X 10x     4 point` Cat cow    10x    Child pose min    2x   Prone Lying  H  X 2 min     On elbows H  X 1 min     On elbows w side glide UE's to R        Press ups H  X 2x    Standing Lat shift L pelvis to wall H       Trunk extn forearms on wall H      Neuro TNE 1-7    X    Re-ed  X see assessment  X see assessment      Posture       MT IASTM   X R>L lumbar ps, R prox/sup glut max X L>R lumbar     MFR    X lumbar ps and thoracolumbar fascia    Joint mob        MET       US Ultrasound - units given 1  100% power, 1mHz, 1.2 w/cm2, 7 min to     X B lumbar ps and TLF X B lumbar ps and TLF   Estim Pt received interferential electric stimulation in supine w CP.       H = HEP. Pt given copies of this exercise for home program.  X = Exercises done this date - pt verbalized understanding and demonstrated competence. All exercises done B unless otherwise indicated. Pt advised to discontinue exercises that increase pain and to call or return to therapist to discuss. Each intervention above is specifically prescribed to address the patients identified impairments, activity limitations, and participation restrictions. ASSESSMENT:     Pt has returned to work on a limited basis. Continue to reinforce therapeutic neuroscience concepts. Pt tolerated instrument assisted soft tissue massage followed by myofascial release after ultrasound as noted above with improved soft tissue mobility. Pt continues to tolerated limited trunk mobility exercises as noted above and has had flare ups of pain from trying to push further. Continue to discuss importance of mobility and pacing. Physical Therapy Goals: From 11/15/2022 to 2/13/2023  - Created with patient input during initial evaluation   1.  Pt will be independent in beginning level of HEP for stretching, posture and strengthening. 2. Pt will be able to don/doff socks/shoes without increased symptoms. 3. 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 4. 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 5. Pt will be able to turn to back up the car without increased symptoms. PLAN OF CARE:      Assess response to man tx. Continue PT per original plan for 8 additional visits for therapeutic exercises, posture retraining, therapeutic activities, manual treatment, neuromuscular reeducation, therapeutic pain neuroscience education, patient education, self care home management, home exercise program, and modalities as needed. Charges: TE1 - 15 min, Neuro1 - 20 min, MT2 - 25 min   Total Timed treatment: 60 min      Total Treatment Time: 70 min     __________________________________________________________________________________________      21st Century Cures Act Notice to Patient: Medical documents like this are made available to patients in the interest of transparency. However, be advised this is a medical document and it is intended as gmqo-yg-ctny communication between your medical providers. This medical document may contain abbreviations, assessments, medical data, and results or other terms that are unfamiliar. Medical documents are intended to carry relevant information, facts as evident, and the clinical opinion of the practitioner. As such, this medical document may be written in language that appears blunt or direct. You are encouraged to contact your medical provider and/or [facility] Patient Experience if you have any questions about this medical document.   Objective Initial Evaluation Data 11/15/2022:    Functional Scores 11/15/2022 12/12/2022    FOTO primary measure 47 47     Palpation: sensitive to light touch over lumbar spine  Gait:        Deviations: trunk flex, wide based       Devices: none       Assistance: none    Posture 11/15/2022   Alignment R foot ER, L shoulder depressed, forward head position , L trunk shift.  flattened lumbar spine and thoracic spine       Body mechanics 11/15/2022   Reaching for personal belongings poor       Sensation 11/15/2022 12/12/2022    Dermatomes Light Touch     Proximal medial thigh R (L2)  wnl wnl   Proximal medial thigh L (L2) wnl wnl   Above knee R (L3) wnl wnl   Above knee L (L3) wnl wnl   Medial arch R(L4) wnl wnl   Medial arch L (L4) wnl wnl   Between 1st - 2nd toes R (L5) wnl wnl   Between 1st - 2nd toes L (L5) wnl wnl   Lateral border of foot R (S1) wnl wnl   Lateral border of foot L (S1) dull wnl   Popliteal fossa R (S2) wnl wnl   Popliteal fossa L(S2) wnl wnl       Abdominals 11/15/2022   Tone  fair       ROM Lumbar 11/15/2022 12/12/2022    Flexion Mod-max Mod-max   Extension max Mod-max   R SB mod Min-mod   L SB mod mod   R Rotation mod mod   L Rotation Mod-max mod   * pain limiting    MMT 5/5 11/15/2022 12/12/2022    L2- hip flex R 5 5   Hip flex L 5 5   L3- knee ext R 5 5   Knee ext L 5- 5   L4- ankle DF R 5 5   Ankle DF L 5 5   L5- EHL R 5 5   EHL L 4+ 4+   S1- ankle PF R 5 5   Ankle PF L 5 5   S2- Hams R 5 5   Hams L 5 5   *pain limiting    LE flexibility 11/15/2022 12/12/2022    Piriformis L Mod-max mod   Piriformis R Mod-max mod   Hams L -40 -35   Hams R -45 -41   *pain limiting    Neural mobility 11/15/2022 12/12/2022    SLR R 40 deg myofascial tension 45 deg myofascial tension   SLR L 35 deg myofascial tension 45 deg myofascial tension      VENKATA 11/15/2022   R wnl   L wnl      [facility]        Supine Hip IR R Min-mod    Hip IR L min    Pelvic Side [place] R mod    Pelvis Side Glide L wnl    Post Rotation of Ilium R Mod-max    Post Rotation of Ilium L wnl

## 2023-02-28 ENCOUNTER — OFFICE VISIT (OUTPATIENT)
Dept: PHYSICAL THERAPY | Age: 68
End: 2023-02-28
Attending: PHYSICAL MEDICINE & REHABILITATION
Payer: MEDICARE

## 2023-02-28 PROCEDURE — 97163 PT EVAL HIGH COMPLEX 45 MIN: CPT

## 2023-02-28 PROCEDURE — 97112 NEUROMUSCULAR REEDUCATION: CPT

## 2023-02-28 PROCEDURE — 97110 THERAPEUTIC EXERCISES: CPT

## 2023-03-01 ENCOUNTER — TELEPHONE (OUTPATIENT)
Dept: NEUROLOGY | Facility: CLINIC | Age: 68
End: 2023-03-01

## 2023-03-01 NOTE — TELEPHONE ENCOUNTER
Dr. Shirley Cuba (neuropsychologist) calling with request for information from office. Requesting most recent o/v, imaging, and eeg to be sent to her at fax: [phone]. Sent fax, confirmation received.

## 2023-03-02 ENCOUNTER — HOSPITAL ENCOUNTER (OUTPATIENT)
Dept: ULTRASOUND IMAGING | Age: 68
Discharge: HOME | End: 2023-03-02
Attending: UROLOGY

## 2023-03-02 ENCOUNTER — OFFICE VISIT (OUTPATIENT)
Dept: INTERNAL MEDICINE | Age: 68
End: 2023-03-02

## 2023-03-02 ENCOUNTER — TELEPHONE (OUTPATIENT)
Dept: INTERNAL MEDICINE | Age: 68
End: 2023-03-02

## 2023-03-02 VITALS
TEMPERATURE: 98.2 F | SYSTOLIC BLOOD PRESSURE: 125 MMHG | DIASTOLIC BLOOD PRESSURE: 66 MMHG | BODY MASS INDEX: 24.75 KG/M2 | HEART RATE: 84 BPM | OXYGEN SATURATION: 98 % | HEIGHT: 66 IN | WEIGHT: 153.99 LBS

## 2023-03-02 DIAGNOSIS — R09.82 POST-NASAL DRIP: Primary | ICD-10-CM

## 2023-03-02 DIAGNOSIS — R97.20 ELEVATED PSA: ICD-10-CM

## 2023-03-02 PROCEDURE — 76775 US EXAM ABDO BACK WALL LIM: CPT

## 2023-03-02 PROCEDURE — 99213 OFFICE O/P EST LOW 20 MIN: CPT | Performed by: STUDENT IN AN ORGANIZED HEALTH CARE EDUCATION/TRAINING PROGRAM

## 2023-03-02 RX ORDER — TRAMADOL HYDROCHLORIDE 50 MG/1
50 TABLET ORAL
COMMUNITY
End: 2023-04-03 | Stop reason: CLARIF

## 2023-03-02 RX ORDER — TRIAMCINOLONE ACETONIDE 1 MG/G
CREAM TOPICAL
COMMUNITY
Start: 2023-02-21 | End: 2023-04-03 | Stop reason: CLARIF

## 2023-03-02 ASSESSMENT — ENCOUNTER SYMPTOMS
EYES NEGATIVE: 1
ENDOCRINE NEGATIVE: 1
CONSTITUTIONAL NEGATIVE: 1
BACK PAIN: 1
RHINORRHEA: 1
GASTROINTESTINAL NEGATIVE: 1
NEUROLOGICAL NEGATIVE: 1
HEMATOLOGIC/LYMPHATIC NEGATIVE: 1
PSYCHIATRIC NEGATIVE: 1
ALLERGIC/IMMUNOLOGIC NEGATIVE: 1
RESPIRATORY NEGATIVE: 1
SORE THROAT: 1

## 2023-03-03 ENCOUNTER — NURSE TRIAGE (OUTPATIENT)
Dept: TELEHEALTH | Age: 68
End: 2023-03-03

## 2023-03-04 ENCOUNTER — OFFICE VISIT (OUTPATIENT)
Dept: INTERNAL MEDICINE | Age: 68
End: 2023-03-04

## 2023-03-04 VITALS
DIASTOLIC BLOOD PRESSURE: 75 MMHG | OXYGEN SATURATION: 98 % | TEMPERATURE: 99.5 F | HEART RATE: 81 BPM | SYSTOLIC BLOOD PRESSURE: 119 MMHG

## 2023-03-04 DIAGNOSIS — J01.90 ACUTE NON-RECURRENT SINUSITIS, UNSPECIFIED LOCATION: Primary | ICD-10-CM

## 2023-03-04 LAB
FLUAV AG UPPER RESP QL IA.RAPID: NEGATIVE
FLUBV AG UPPER RESP QL IA.RAPID: NEGATIVE
INTERNAL PROCEDURAL CONTROLS ACCEPTABLE: YES
INTERNAL PROCEDURAL CONTROLS ACCEPTABLE: YES
S PYO AG THROAT QL IA.RAPID: NEGATIVE

## 2023-03-04 PROCEDURE — 87804 INFLUENZA ASSAY W/OPTIC: CPT | Performed by: STUDENT IN AN ORGANIZED HEALTH CARE EDUCATION/TRAINING PROGRAM

## 2023-03-04 PROCEDURE — 99213 OFFICE O/P EST LOW 20 MIN: CPT | Performed by: STUDENT IN AN ORGANIZED HEALTH CARE EDUCATION/TRAINING PROGRAM

## 2023-03-04 PROCEDURE — 87880 STREP A ASSAY W/OPTIC: CPT | Performed by: STUDENT IN AN ORGANIZED HEALTH CARE EDUCATION/TRAINING PROGRAM

## 2023-03-04 RX ORDER — AMOXICILLIN AND CLAVULANATE POTASSIUM 875; 125 MG/1; MG/1
1 TABLET, FILM COATED ORAL EVERY 12 HOURS
Qty: 14 TABLET | Refills: 0 | Status: SHIPPED | OUTPATIENT
Start: 2023-03-04 | End: 2023-03-11

## 2023-03-04 ASSESSMENT — ENCOUNTER SYMPTOMS
SINUS PRESSURE: 1
ALLERGIC/IMMUNOLOGIC NEGATIVE: 1
ENDOCRINE NEGATIVE: 1
FEVER: 1
EYES NEGATIVE: 1
PSYCHIATRIC NEGATIVE: 1
HEMATOLOGIC/LYMPHATIC NEGATIVE: 1
SORE THROAT: 1
RESPIRATORY NEGATIVE: 1
GASTROINTESTINAL NEGATIVE: 1
NEUROLOGICAL NEGATIVE: 1
CHILLS: 1

## 2023-03-06 ENCOUNTER — APPOINTMENT (OUTPATIENT)
Dept: PHYSICAL THERAPY | Facility: HOSPITAL | Age: 68
End: 2023-03-06
Attending: PHYSICAL MEDICINE & REHABILITATION
Payer: MEDICARE

## 2023-03-06 ENCOUNTER — TELEPHONE (OUTPATIENT)
Dept: CARDIOLOGY | Age: 68
End: 2023-03-06

## 2023-03-06 ENCOUNTER — TELEPHONE (OUTPATIENT)
Dept: PHYSICAL THERAPY | Facility: HOSPITAL | Age: 68
End: 2023-03-06

## 2023-03-08 ENCOUNTER — TELEPHONE (OUTPATIENT)
Dept: INTERNAL MEDICINE | Age: 68
End: 2023-03-08

## 2023-03-10 ENCOUNTER — APPOINTMENT (OUTPATIENT)
Dept: PHYSICAL THERAPY | Facility: HOSPITAL | Age: 68
End: 2023-03-10
Attending: INTERNAL MEDICINE
Payer: MEDICARE

## 2023-03-10 ENCOUNTER — TELEPHONE (OUTPATIENT)
Dept: INTERNAL MEDICINE | Age: 68
End: 2023-03-10

## 2023-03-10 DIAGNOSIS — R97.20 ELEVATED PSA: Primary | ICD-10-CM

## 2023-03-10 DIAGNOSIS — N28.1 BILATERAL RENAL CYSTS: ICD-10-CM

## 2023-03-10 DIAGNOSIS — R35.0 FREQUENCY OF URINATION: ICD-10-CM

## 2023-03-13 ENCOUNTER — OFFICE VISIT (OUTPATIENT)
Dept: PHYSICAL THERAPY | Facility: HOSPITAL | Age: 68
End: 2023-03-13
Attending: ANESTHESIOLOGY
Payer: MEDICARE

## 2023-03-13 ENCOUNTER — TELEPHONE (OUTPATIENT)
Dept: INTERNAL MEDICINE | Age: 68
End: 2023-03-13

## 2023-03-13 PROCEDURE — 97112 NEUROMUSCULAR REEDUCATION: CPT

## 2023-03-13 PROCEDURE — 97110 THERAPEUTIC EXERCISES: CPT

## 2023-03-13 PROCEDURE — 97140 MANUAL THERAPY 1/> REGIONS: CPT

## 2023-03-13 NOTE — PROGRESS NOTES
Progress Summary    Pt has attended 14, cancelled 1, and no shown 0 visits in Physical Therapy. Shahram Hagen    2/22/1955  Referring Physician:  Rell Nolen  Diagnosis: Low back pain (M54.50)    Initial Evaluation Date: 11/15/2022  Date of Surgery: None for this diagnosis     Insurance type and authorized visits: Medicaid, TBD  Plan of care expires: For orders: 2/13/2023; For insurance: TBD    Precautions/Hx: OA, B carotid stenosis, compression fx c-spine, GERD, mitral regurgitation, medial meniscus tear, palpitations,     SUBJECTIVE:     Pt reports that he has recently had antibiotics for an URI. He states that his back worsened w his coughing. He is scheduled for another MARY ELLEN. He states that he is back to work at 60%. He continues to go home 2x a day to rest and put ice on his back. He continues to have pain w his BM's. Visit count 2/13/2023 1/8 5/8 10/14 11/14 14/18   Date 11/15/2022 12/9/2022  1/24/2023  1/27/2023  3/13/2023    LBP/B SI        Pain Range 3-4 to 8/10 0 to 7/10 0 to 8/10 0-3/10 0-5/10   Pain Ave 5-6/10 4-5/10 4-5/10 2-3/10 2-3/10   ^ prog note 12/12/2022    Progress Note Assessment:     Pt continues to have moderate level LBP and is tolerating 60% of previous work capacity. Progression in PT has been slow and limited by fear avoidance behavior and frequent self limiting vocalization w concern about slipping his disc. Continue to encourage pt to gently push boundaries of mobility and exercise tolerance. Continue to discuss therapeutic neuroscience concepts and affects of stress and fear on pain perception and muscle tension patterns. Pt has received skilled physical therapy intervention with good decrease of pain complaints on VAS as noted above. Pt displays improved: body mechanics; abdominal tone; lumbar ROM w pt refusing to try any trunk flex; L EHL strength;  B LE piriformis flexibility - continues to have significant B hams and post LE myofascial mobility limitation. Pt demonstrates improved function as noted as progression towards goals. See objective assessment data in tables below with initial evaluation data. Recommend that pt continue PT to complete original prescription at 1-2x/week for up to 8 visits. Rehab Potential: good        OBJECTIVE:     Treatment performed this date:    Therapeutic Exercise:  Visit #   12/14 13/14 14/14   Position Exercise HEP 2/6/2023  2/27/2023  3/13/2023    NuStep  Seat 11, Arms 11, Resist 3  X  20 min, 8 intervals   spm     Supine Sciatic nerve glide leg pumps H  10x X 10x    Hams stretch w strap    X pt concerned it will hurt his knees    Hesch R ant ileum self mob 2 min H       Cross leg piriformis H  2x X 5x    Cross leg ER   2x X 5x    LTR H  10x     DKC   2x     SKC H  2x    Sidelying Hesch side glide fixation 5 min H       Hesch R ant ileum self mob H       Trunk rot book H  X 10x     Trunk rot book top leg bent to front H  X 10x    Sitting Trunk flex H 3x      Trunk rot chair H 3x 3x     Trunk rot hands on SI and opp thigh H       Dynadisc pelvic tilt        Pelvic tilt H       Leg pumps nerve glides H   X 10x    Hams stretch heel on floor    X 1x pt concerned it will hurt his back   4 point` Cat cow   10x     Child pose min   2x    Prone Lying  H X 2 min      On elbows H X 1 min      On elbows w side glide UE's to R        Press ups H X 2x     Standing Lat shift L pelvis to wall H       Trunk extn forearms on wall H      Neuro TNE 1-7   X X importance of progressive mobility w decreased fear    Re-ed  X see assessment       Posture       MT IASTM  X R>L lumbar ps, R prox/sup glut max X L>R lumbar  X L>R lumbar px    MFR   X lumbar ps and thoracolumbar fascia     Joint mob        MET       US Ultrasound - units given 1  100% power, 1mHz, 1.2 w/cm2, 7 min to    X B lumbar ps and TLF X B lumbar ps and TLF X lumbar ps   Estim Pt received interferential electric stimulation in supine w CP.       H = HEP.  Pt given copies of this exercise for home program.  X = Exercises done this date - pt verbalized understanding and demonstrated competence. All exercises done B unless otherwise indicated. Pt advised to discontinue exercises that increase pain and to call or return to therapist to discuss. Each intervention above is specifically prescribed to address the patients identified impairments, activity limitations, and participation restrictions. ASSESSMENT:     Physical Therapy Goals: From 11/15/2022 to 2/13/2023, assess response to 3/13/2023   - Created with patient input during initial evaluation   1. Pt will be independent in beginning level of HEP for stretching, posture and strengthening. MET  2. Pt will be able to don/doff socks/shoes without increased symptoms. MET   3. 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 MET  4. 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 Progressing  5. Pt will be able to turn to back up the car without increased symptoms. Progressing     PLAN OF CARE:      Plan: Continue skilled Physical Therapy 1-2 x/week or a total of 8 additional visits over a 90 day period. Treatment will include: Continue PT per original plan for therapeutic exercises, posture retraining, therapeutic activities, manual treatment, neuromuscular reeducation, therapeutic pain neuroscience education, patient education, modalities as needed. Patient/Family/Caregiver was advised of these findings, precautions, and treatment options and has agreed to actively participate in planning and for this course of care. Thank you for your referral. Please co-sign or sign and return this letter via fax as soon as possible to [phone] If you have any questions, please contact me at Dept: Dept: [phone].     Sincerely,  Electronically signed by therapist: Real Jackson PT    [de-identified] certification required: Yes  I certify the need for these services furnished under this plan of treatment and while under my care. X___________________________________________________ Date____________________    Certification From: 1/49/3955  To:6/11/2023       Charges: TE1 - 19 min, Neuro1 - 9 min, MT1 - 20 min   Total Timed treatment: 38 min      Total Treatment Time: 70 min     __________________________________________________________________________________________      21st Century Cures Act Notice to Patient: Medical documents like this are made available to patients in the interest of transparency. However, be advised this is a medical document and it is intended as [id number] communication between your medical providers. This medical document may contain abbreviations, assessments, medical data, and results or other terms that are unfamiliar. Medical documents are intended to carry relevant information, facts as evident, and the clinical opinion of the practitioner. As such, this medical document may be written in language that appears blunt or direct. You are encouraged to contact your medical provider and/or [facility] if you have any questions about this medical document. Objective Initial Evaluation Data 11/15/2022:    Functional Scores 11/15/2022 12/12/2022    FOTO primary measure 47 47     Palpation: sensitive to light touch over lumbar spine  Gait:        Deviations: trunk flex, wide based       Devices: none       Assistance: none    Posture 11/15/2022   Alignment R foot ER, L shoulder depressed, forward head position , L trunk shift.  flattened lumbar spine and thoracic spine       Body mechanics 11/15/2022 3/13/2023    Reaching for personal belongings poor fair       Sensation 11/15/2022 12/12/2022    Dermatomes Light Touch     Proximal medial thigh R (L2)  wnl wnl   Proximal medial thigh L (L2) wnl wnl   Above knee R (L3) wnl wnl   Above knee L (L3) wnl wnl   Medial arch R(L4) wnl wnl   Medial arch L (L4) wnl wnl Between 1st - 2nd toes R (L5) wnl wnl   Between 1st - 2nd toes L (L5) wnl wnl   Lateral border of foot R (S1) wnl wnl   Lateral border of foot L (S1) dull wnl   Popliteal fossa R (S2) wnl wnl   Popliteal fossa L(S2) wnl wnl       Abdominals 11/15/2022 3/13/2023    Tone  fair poor       ROM Lumbar 11/15/2022 12/12/2022  3/13/2023    Flexion Mod-max Mod-max refused   Extension max Mod-max Mod   R SB mod Min-mod min   L SB mod mod Min-mod   R Rotation mod mod Min-mod   L Rotation Mod-max mod Min-mod   * pain limiting    MMT 5/5 11/15/2022 12/12/2022  3/13/2023    L2- hip flex R 5 5 5   Hip flex L 5 5 5   L3- knee ext R 5 5 5   Knee ext L 5- 5 5   L4- ankle DF R 5 5 5   Ankle DF L 5 5 5   L5- EHL R 5 5 5   EHL L 4+ 4+ 5   S1- ankle PF R 5 5 5   Ankle PF L 5 5 5   S2- Hams R 5 5 5   Hams L 5 5 5   *pain limiting    LE flexibility 11/15/2022 12/12/2022  3/13/2023    Piriformis L Mod-max mod Min-mod   Piriformis R Mod-max mod Min-mod   Hams L -40 -35 -45   Hams R -45 -41 -46   *pain limiting    Neural mobility 11/15/2022 12/12/2022    SLR R 40 deg myofascial tension 45 deg myofascial tension   SLR L 35 deg myofascial tension 45 deg myofascial tension      VENKAAT 11/15/2022   R wnl   L wnl      Position [facility] Spring Tests 11/22/2022        Supine Hip IR R Min-mod    Hip IR L min    Pelvic Side [place] R mod    Pelvis Side Glide L wnl    Post Rotation of Ilium R Mod-max    Post Rotation of Ilium L wnl

## 2023-03-14 ENCOUNTER — HOSPITAL ENCOUNTER (OUTPATIENT)
Dept: LAB | Age: 68
Discharge: HOME | End: 2023-03-14
Attending: UROLOGY

## 2023-03-14 DIAGNOSIS — R97.20 ELEVATED PROSTATE SPECIFIC ANTIGEN (PSA): Primary | ICD-10-CM

## 2023-03-14 PROCEDURE — 84153 ASSAY OF PSA TOTAL: CPT | Performed by: CLINICAL MEDICAL LABORATORY

## 2023-03-14 PROCEDURE — 36415 COLL VENOUS BLD VENIPUNCTURE: CPT | Performed by: CLINICAL MEDICAL LABORATORY

## 2023-03-15 ENCOUNTER — OFFICE VISIT (OUTPATIENT)
Dept: SURGERY | Facility: CLINIC | Age: 68
End: 2023-03-15
Payer: MEDICARE

## 2023-03-15 DIAGNOSIS — M54.16 LUMBAR RADICULOPATHY: ICD-10-CM

## 2023-03-15 DIAGNOSIS — M54.59 MECHANICAL LOW BACK PAIN: ICD-10-CM

## 2023-03-15 DIAGNOSIS — M47.816 FACET SYNDROME, LUMBAR: ICD-10-CM

## 2023-03-15 DIAGNOSIS — M48.062 SPINAL STENOSIS OF LUMBAR REGION WITH NEUROGENIC CLAUDICATION: ICD-10-CM

## 2023-03-15 DIAGNOSIS — M47.816 LUMBAR SPONDYLOSIS: ICD-10-CM

## 2023-03-15 DIAGNOSIS — M51.37 DDD (DEGENERATIVE DISC DISEASE), LUMBOSACRAL: Primary | ICD-10-CM

## 2023-03-15 DIAGNOSIS — M48.061 LUMBAR FORAMINAL STENOSIS: ICD-10-CM

## 2023-03-15 DIAGNOSIS — M51.36 BULGE OF LUMBAR DISC WITHOUT MYELOPATHY: ICD-10-CM

## 2023-03-15 LAB — PSA SERPL-MCNC: 4.79 NG/ML

## 2023-03-15 PROCEDURE — 64483 NJX AA&/STRD TFRM EPI L/S 1: CPT | Performed by: PHYSICAL MEDICINE & REHABILITATION

## 2023-03-16 NOTE — PATIENT INSTRUCTIONS
Post Injection Instructions     Please do not do anything strenuous over the next 24 hours (if you had a knee injection do not walk more than 2 city blocks, do not attend any aerobic classes, do not run, no heavy lifting, no prolong standing). You may resume your day to day activities after your injection. You may experience some mild amount of swelling and discomfort after the procedure. Please ice the area that was injected at least 5-6 times a day (15 minutes) for two days after (this will help prevent worsening pain that sometimes occurs after an injection). Only take tylenol if needed for pain for the first few days. Watch for signs of infection which include redness, warmth, worsening pain, fevers or chills. If you develop any of these signs call the office immediately at [phone]    My office will call you in 2 days to check in and see how you are feeling. Follow up with me in the office 2 weeks after your injection. Everyone responds differently to injections, but you can expect your peak effects a few weeks after your last injection. Naseem Holguin.  Luis Montgomery MD  Physical Medicine and Rehabilitation/Sports Medicine  MEDICAL CENTER [facility]

## 2023-03-16 NOTE — PROCEDURES
[facility] [address]    BILATERAL LUMBAR TRANSFORAMINAL   NAME:  Kevin Hagen    MR #:    [id number] :  1955     PHYSICIAN:  Geovani Molina MD        Operative Report    DATE OF PROCEDURE: 3/15/2023   PREOPERATIVE DIAGNOSES: 1. DDD (degenerative disc disease), lumbosacral    2. Spinal stenosis of lumbar region with neurogenic claudication    3. Mechanical low back pain    4. Lumbar radiculopathy    5. Facet syndrome, lumbar    6. Bulge of lumbar disc without myelopathy    7. Lumbar spondylosis    8. Lumbar foraminal stenosis        POSTOPERATIVE DIAGNOSES:   1. DDD (degenerative disc disease), lumbosacral    2. Spinal stenosis of lumbar region with neurogenic claudication    3. Mechanical low back pain    4. Lumbar radiculopathy    5. Facet syndrome, lumbar    6. Bulge of lumbar disc without myelopathy    7. Lumbar spondylosis    8. Lumbar foraminal stenosis        PROCEDURES: L5 BILATERAL transforaminal epidural steroid injection done under fluoroscopic guidance with contrast enhancement. SURGEON: Geovani Molina MD   ANESTHESIA: Local   INDICATIONS:      OPERATIVE PROCEDURE:  Written consent was obtained from the patient. The patient was brought into the operating room and placed in the prone position on the fluoroscopy table with pillow underneath his abdomen. The patient's skin was cleaned and draped in a normal sterile fashion. Using AP fluoroscopy, all five lumbar vertebrae were identified. When the fourth and fifth vertebra was identified, fluoroscopy was left anterior obliqued opening up the right L5-S1 intervertebral foramen. At this point in time, the patient's skin was anesthetized with 1% PF lidocaine without epinephrine. Then, a 5 inch, 22-gauge spinal needle was inserted and directed towards the right L5-S1 intervertebral foramen. When it felt to be in good position, AP fluoroscopy was used to advance the needle to the 6 o'clock position on the right L5 pedicle.   At this point in time, Omnipaque-240 contrast was used to obtain a good epidurogram indicating correct needle placement. Then, aspiration was performed. No blood, fluid, or air was aspirated. Then, the patient was injected with a 4 cc solution of 2cc 6mg/cc of Betamethasone and 2cc of 1% PF lidocaine without epinephrine. After this, the needle was removed. Then, the fluoroscopy was right anterior obliqued opening up the left L5-S1 intervertebral foramen. At this point in time, the patient's skin was anesthetized with 1% PF lidocaine without epinephrine. Then, a 5 inch, 22-gauge spinal needle was inserted and directed towards the left L5-S1 intervertebral foramen. When it felt to be in good position, AP fluoroscopy was used to advance the needle to the 6 o'clock position on the left L5 pedicle. At this point in time, Omnipaque-240 contrast was used to obtain a good epidurogram indicating correct needle placement. Then, aspiration was performed. No blood, fluid, or air was aspirated. Then, the patient was injected with a 4 cc solution of 2cc 6mg/cc of Betamethasone and 2cc of 1% PF lidocaine without epinephrine. The patient's skin was cleaned. A Band-Aid was applied. The patient was transferred to the cart and into [facility]. The patient was given discharge instructions and will follow up in the clinic as scheduled. Throughout the whole procedure, the patient's pulse oximetry and vital signs were monitored and they remained completely stable. Also, throughout the whole procedure, prior to injection of any medication, aspiration was performed. No blood, fluid, or air was aspirated at anytime. Ziggy Donovan MD, [address] [place] [address]  Physical Medicine and Rehabilitation/Sports Medicine  MEDICAL [facility]

## 2023-03-22 ENCOUNTER — TELEPHONE (OUTPATIENT)
Dept: SURGERY | Age: 68
End: 2023-03-22

## 2023-03-30 ENCOUNTER — OFFICE VISIT (OUTPATIENT)
Dept: PHYSICAL THERAPY | Facility: HOSPITAL | Age: 68
End: 2023-03-30
Attending: ANESTHESIOLOGY
Payer: MEDICARE

## 2023-03-30 PROCEDURE — 97112 NEUROMUSCULAR REEDUCATION: CPT

## 2023-03-30 PROCEDURE — 97110 THERAPEUTIC EXERCISES: CPT

## 2023-03-30 NOTE — PROGRESS NOTES
Vitaliy Roberts Dylanrolyrikemal    2/22/1955  Referring Physician:  Kristen Stewart  Diagnosis: Low back pain (M54.50)    Initial Evaluation Date: 11/15/2022  Date of Surgery: None for this diagnosis     Insurance type and authorized visits: Medicaid, TBD  Plan of care expires: For orders: 2/13/2023; For insurance: TBD    Precautions/Hx: OA, B carotid stenosis, compression fx c-spine, GERD, mitral regurgitation, medial meniscus tear, palpitations,     SUBJECTIVE:     Pt reports that he had a second injection which hit his nerve and has not resolved. He states that he has been working more, but not doing any of his exercises. He states that he is getting clicking in his groin with hip abd and in his back with sidelying trunk rotation. He notes that he looked up his shot online and is concerned that it is not going to last.  He is now able to get into the hot bath every night. Pt notes that he is able to sit and gently flex forward. He has finished his sinus infection which was making him cough. Pt is concerned that he is getting too much Vit D and it is affecting his calcium levels. Pt notes that he has only used ice 3x in the last 2 weeks.      Visit count 2/13/2023 1/8 5/8 10/14 11/14 14/18 15/18   Date 11/15/2022 12/9/2022  1/24/2023  1/27/2023  3/13/2023  3/30/2023    LBP/B SI         Pain Range 3-4 to 8/10 0 to 7/10 0 to 8/10 0-3/10 0-5/10 1-4/10   Pain Ave 5-6/10 4-5/10 4-5/10 2-3/10 2-3/10 2/10   ^ prog note 12/12/2022       OBJECTIVE:     Treatment performed this date:    Therapeutic Exercise:  Visit #   13/14 14/14 15/18   Position Exercise HEP 2/27/2023  3/13/2023  3/30/2023    NuStep  Seat 11, Arms 11, Resist 3       Supine Sciatic nerve glide leg pumps H 10x X 10x X 10x    Hams stretch w strap   X pt concerned it will hurt his knees     Hesch R ant ileum self mob 2 min H       Cross leg piriformis H 2x X 5x 3x    Cross leg ER  2x X 5x 3x    LTR H 10x      DKC  2x      SKC H 2x      Pelvic tilt    5x2 Sidelying Hesch side glide fixation 5 min H       Hesch R ant ileum self mob H       Trunk rot book H X 10x      Trunk rot book top leg bent to front H X 10x  10x   Sitting Trunk flex H       Trunk rot chair H 3x  3x    Trunk rot hands on SI and opp thigh H       Dynadisc pelvic tilt        Pelvic tilt H       Leg pumps nerve glides H  X 10x 10x    Hams stretch heel on floor   X 1x pt concerned it will hurt his back    4 point` Cat cow  10x      Child pose min  2x     Prone Lying  H       On elbows H       On elbows w side glide UE's to R        Press ups H      Standing Lat shift L pelvis to wall H       Trunk extn forearms on wall H      Neuro TNE 1-7  X X importance of progressive mobility w decreased fear     Re-ed    X see assessment     Posture       MT IASTM  X L>R lumbar  X L>R lumbar px     MFR  X lumbar ps and thoracolumbar fascia      Joint mob        MET       US Ultrasound - units given 1  100% power, 1mHz, 1.2 w/cm2, 7 min to    X B lumbar ps and TLF X lumbar ps    Estim Pt received interferential electric stimulation in supine w CP.       H = HEP. Pt given copies of this exercise for home program.  X = Exercises done this date - pt verbalized understanding and demonstrated competence. All exercises done B unless otherwise indicated. Pt advised to discontinue exercises that increase pain and to call or return to therapist to discuss. Each intervention above is specifically prescribed to address the patients identified impairments, activity limitations, and participation restrictions. ASSESSMENT:     Pt continues to benefit from ongoing discussions on pain neuroscience. Discussed importance of pacing of gentle progression with pacing for all exercises. Discussed large amount of tension in posterior fascial musculature including lumbar ps and hams. Pt educated on fascial anatomy, function that is fibrous, viscous and contiguous.   Pt advised to continue w nerve glides to aid in recovery from radicular pain. Pt verbalized understanding. Physical Therapy Goals: From 11/15/2022 to 2/13/2023, assess response to 3/13/2023   - Created with patient input during initial evaluation   1. Pt will be independent in beginning level of HEP for stretching, posture and strengthening. MET  2. Pt will be able to don/doff socks/shoes without increased symptoms. MET   3. 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 MET  4. 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 Progressing  5. Pt will be able to turn to back up the car without increased symptoms. Progressing     PLAN OF CARE:      Continue PT per original plan for therapeutic exercises, posture retraining, therapeutic activities, manual treatment, neuromuscular reeducation, therapeutic pain neuroscience education, patient education, self care home management, home exercise program, and modalities as needed. Charges: TE2 - 30 min, Neuro 15 min    Total Timed treatment: 45 min      Total Treatment Time: 45 min     __________________________________________________________________________________________      21st Century Cures Act Notice to Patient: Medical documents like this are made available to patients in the interest of transparency. However, be advised this is a medical document and it is intended as [id number] communication between your medical providers. This medical document may contain abbreviations, assessments, medical data, and results or other terms that are unfamiliar. Medical documents are intended to carry relevant information, facts as evident, and the clinical opinion of the practitioner. As such, this medical document may be written in language that appears blunt or direct. You are encouraged to contact your medical provider and/or [facility] Patient Experience if you have any questions about this medical document.   Objective Initial Evaluation Data 11/15/2022:    Functional Scores 11/15/2022 12/12/2022    FOTO primary measure 47 47     Palpation: sensitive to light touch over lumbar spine  Gait:        Deviations: trunk flex, wide based       Devices: none       Assistance: none    Posture 11/15/2022   Alignment R foot ER, L shoulder depressed, forward head position , L trunk shift.  flattened lumbar spine and thoracic spine       Body mechanics 11/15/2022 3/13/2023    Reaching for personal belongings poor fair       Sensation 11/15/2022 12/12/2022    Dermatomes Light Touch     Proximal medial thigh R (L2)  wnl wnl   Proximal medial thigh L (L2) wnl wnl   Above knee R (L3) wnl wnl   Above knee L (L3) wnl wnl   Medial arch R(L4) wnl wnl   Medial arch L (L4) wnl wnl   Between 1st - 2nd toes R (L5) wnl wnl   Between 1st - 2nd toes L (L5) wnl wnl   Lateral border of foot R (S1) wnl wnl   Lateral border of foot L (S1) dull wnl   Popliteal fossa R (S2) wnl wnl   Popliteal fossa L(S2) wnl wnl       Abdominals 11/15/2022 3/13/2023    Tone  fair poor       ROM Lumbar 11/15/2022 12/12/2022  3/13/2023    Flexion Mod-max Mod-max refused   Extension max Mod-max Mod   R SB mod Min-mod min   L SB mod mod Min-mod   R Rotation mod mod Min-mod   L Rotation Mod-max mod Min-mod   * pain limiting    MMT 5/5 11/15/2022 12/12/2022  3/13/2023    L2- hip flex R 5 5 5   Hip flex L 5 5 5   L3- knee ext R 5 5 5   Knee ext L 5- 5 5   L4- ankle DF R 5 5 5   Ankle DF L 5 5 5   L5- EHL R 5 5 5   EHL L 4+ 4+ 5   S1- ankle PF R 5 5 5   Ankle PF L 5 5 5   S2- Hams R 5 5 5   Hams L 5 5 5   *pain limiting    LE flexibility 11/15/2022 12/12/2022  3/13/2023    Piriformis L Mod-max mod Min-mod   Piriformis R Mod-max mod Min-mod   Hams L -40 -35 -45   Hams R -45 -41 -46   *pain limiting    Neural mobility 11/15/2022 12/12/2022    SLR R 40 deg myofascial tension 45 deg myofascial tension   SLR L 35 deg myofascial tension 45 deg myofascial tension      VENKATA 11/15/2022   R wnl   L wnl      Position [facility] Spring Tests 11/22/2022        Supine Hip IR R Min-mod    Hip IR L min    Pelvic Side [place] R mod    Pelvis Side Glide L wnl    Post Rotation of Ilium R Mod-max    Post Rotation of [place] L wnl

## 2023-04-01 ASSESSMENT — ENCOUNTER SYMPTOMS
ALLERGIC/IMMUNOLOGIC COMMENTS: NO NEW FOOD ALLERGIES
WEIGHT GAIN: 0
HEMOPTYSIS: 0
BACK PAIN: 1
SUSPICIOUS LESIONS: 0
BRUISES/BLEEDS EASILY: 0
COUGH: 0
CHILLS: 0
WEIGHT LOSS: 0
HEMATOCHEZIA: 0
FEVER: 0

## 2023-04-03 ENCOUNTER — OFFICE VISIT (OUTPATIENT)
Dept: CARDIOLOGY | Age: 68
End: 2023-04-03

## 2023-04-03 VITALS
WEIGHT: 148.15 LBS | DIASTOLIC BLOOD PRESSURE: 56 MMHG | SYSTOLIC BLOOD PRESSURE: 110 MMHG | HEART RATE: 80 BPM | BODY MASS INDEX: 23.81 KG/M2

## 2023-04-03 DIAGNOSIS — I25.10 ATHEROSCLEROSIS OF NATIVE CORONARY ARTERY OF NATIVE HEART WITHOUT ANGINA PECTORIS: ICD-10-CM

## 2023-04-03 DIAGNOSIS — E55.9 VITAMIN D DEFICIENCY: ICD-10-CM

## 2023-04-03 DIAGNOSIS — R73.9 HYPERGLYCEMIA: ICD-10-CM

## 2023-04-03 DIAGNOSIS — I49.3 PVC'S (PREMATURE VENTRICULAR CONTRACTIONS): Primary | ICD-10-CM

## 2023-04-03 DIAGNOSIS — R93.1 ELEVATED CORONARY ARTERY CALCIUM SCORE: ICD-10-CM

## 2023-04-03 PROCEDURE — 99214 OFFICE O/P EST MOD 30 MIN: CPT | Performed by: INTERNAL MEDICINE

## 2023-04-03 SDOH — HEALTH STABILITY: PHYSICAL HEALTH: ON AVERAGE, HOW MANY DAYS PER WEEK DO YOU ENGAGE IN MODERATE TO STRENUOUS EXERCISE (LIKE A BRISK WALK)?: 0 DAYS

## 2023-04-03 SDOH — HEALTH STABILITY: PHYSICAL HEALTH: ON AVERAGE, HOW MANY MINUTES DO YOU ENGAGE IN EXERCISE AT THIS LEVEL?: 0 MIN

## 2023-04-03 ASSESSMENT — PATIENT HEALTH QUESTIONNAIRE - PHQ9
SUM OF ALL RESPONSES TO PHQ9 QUESTIONS 1 AND 2: 0
2. FEELING DOWN, DEPRESSED OR HOPELESS: NOT AT ALL
SUM OF ALL RESPONSES TO PHQ9 QUESTIONS 1 AND 2: 0
CLINICAL INTERPRETATION OF PHQ2 SCORE: NO FURTHER SCREENING NEEDED
1. LITTLE INTEREST OR PLEASURE IN DOING THINGS: NOT AT ALL

## 2023-04-05 DIAGNOSIS — R97.20 ELEVATED PSA: Primary | ICD-10-CM

## 2023-04-11 ENCOUNTER — OFFICE VISIT (OUTPATIENT)
Dept: PHYSICAL THERAPY | Facility: HOSPITAL | Age: 68
End: 2023-04-11
Attending: ANESTHESIOLOGY
Payer: MEDICARE

## 2023-04-11 PROCEDURE — 97112 NEUROMUSCULAR REEDUCATION: CPT

## 2023-04-11 PROCEDURE — 97110 THERAPEUTIC EXERCISES: CPT

## 2023-04-11 NOTE — PROGRESS NOTES
Trudi Hagen    2/22/1955  Referring Physician:  Nuzhat Murphy  Diagnosis: Low back pain (M54.50)    Initial Evaluation Date: 11/15/2022  Date of Surgery: None for this diagnosis     Insurance type and authorized visits: Medicaid, TBD  Plan of care expires: For orders: 2/13/2023; For insurance: TBD    Precautions/Hx: OA, B carotid stenosis, compression fx c-spine, GERD, mitral regurgitation, medial meniscus tear, palpitations,     SUBJECTIVE:     He notes that his back pain continues to improve. Pt reports that he has had a very big job for the last two months so he does not want to do his exercises and risk getting sore from doing them. He demonstrates that he is doing ceilings w large B rotation patterns. He feels that his thoracic spine is \"out of whack\". He notes that he has not been needing to go home during the day to use ice. He is instead taking a hot bath every night which he is finding to be more helpful now. Visit count 2/13/2023 1/8 5/8 10/14 11/14 14/18 15/18 16/18   Date 11/15/2022 12/9/2022  1/24/2023  1/27/2023  3/13/2023  3/30/2023  4/11/2023    LBP/B SI          Pain Range 3-4 to 8/10 0 to 7/10 0 to 8/10 0-3/10 0-5/10 1-4/10 1-3/10   Pain Ave 5-6/10 4-5/10 4-5/10 2-3/10 2-3/10 2/10 1-2/10   ^ prog note 12/12/2022       OBJECTIVE:     Treatment performed this date:    Therapeutic Exercise:  Visit #   14/14 15/18 16/18   Position Exercise HEP 3/13/2023  3/30/2023  4/11/2023    NuStep  Seat 11, Arms 11, Resist 3    X 10 min,   Supine Sciatic nerve glide leg pumps H X 10x X 10x X 10x    Hams stretch w strap  X pt concerned it will hurt his knees      Hams stretch foot on elevated box     X ~ 45 deg 2 min B    Hams stretch in doorway    X 10x pumps, 2 min hold.     Hesch R ant ileum self mob 2 min H       Cross leg piriformis H X 5x 3x     Cross leg ER  X 5x 3x     LTR H       DKC        SKC H       Pelvic tilt   5x2    Sidelying Hesch side glide fixation 5 min H       Hesch R ant ileum self mob H       Trunk rot book H       Trunk rot book top leg bent to front H  10x    Sitting Trunk flex H       Trunk rot chair H  3x     Trunk rot hands on SI and opp thigh H       Dynadisc pelvic tilt        Pelvic tilt H       Leg pumps nerve glides H X 10x 10x     Hams stretch heel on floor  X 1x pt concerned it will hurt his back     4 point` Cat cow        Child pose min       Prone Lying  H       On elbows H       On elbows w side glide UE's to R        Press ups H      Standing Lat shift L pelvis to wall H       Trunk extn forearms on wall H      Neuro TNE 1-7  X importance of progressive mobility w decreased fear  X reinforcement of progression w decreased fear of exercises    Re-ed   X see assessment      Posture        Lifting     X    MT IASTM  X L>R lumbar px      MFR        Joint mob        MET       US Ultrasound - units given 1  100% power, 1mHz, 1.2 w/cm2, 7 min to    X lumbar ps     Estim Pt received interferential electric stimulation in supine w CP.       H = HEP. Pt given copies of this exercise for home program.  X = Exercises done this date - pt verbalized understanding and demonstrated competence. All exercises done B unless otherwise indicated. Pt advised to discontinue exercises that increase pain and to call or return to therapist to discuss. Each intervention above is specifically prescribed to address the patients identified impairments, activity limitations, and participation restrictions. ASSESSMENT:     Pt doing well with reduction of pain and good tolerance for return to work. Pt demonstrates incorrect and fearful lifting/reaching. Pt required demonstration and consistent verbal cues to be able to perform w feet flat and using hip mobility. Discussed avoidance of knees forward over toes and benefits of using hip flexion. Pt benefits from prolonged explanations and answering of all questions. Patient instructed in body mechanics for squat lift and golfer's lift.  Pt started by lifting a pillow from 24\" height then from the floor and practiced pivot turns. Instructed in the importance of engaging core w exhale prior to lift. Pt continues to demonstrate post LE/hams fascial restrictions. Pt given multiple choices for self mobilization. Physical Therapy Goals: From 11/15/2022 to 2/13/2023, assess response to 3/13/2023   - Created with patient input during initial evaluation   1. Pt will be independent in beginning level of HEP for stretching, posture and strengthening. MET  2. Pt will be able to don/doff socks/shoes without increased symptoms. MET   3. 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 MET  4. 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 Progressing  5. Pt will be able to turn to back up the car without increased symptoms. Progressing     PLAN OF CARE:      Assess response to lifting education. Continue PT per original plan for therapeutic exercises, posture retraining, therapeutic activities, manual treatment, neuromuscular reeducation, therapeutic pain neuroscience education, patient education, self care home management, home exercise program, and modalities as needed. Charges: TE2 - 30 min, Neuro2 - 25 min    Total Timed treatment: 55 min      Total Treatment Time: 55 min     __________________________________________________________________________________________      21st Century Cures Act Notice to Patient: Medical documents like this are made available to patients in the interest of transparency. However, be advised this is a medical document and it is intended as dajs-al-mcso communication between your medical providers. This medical document may contain abbreviations, assessments, medical data, and results or other terms that are unfamiliar. Medical documents are intended to carry relevant information, facts as evident, and the clinical opinion of the practitioner.  As such, this medical document may be written in language that appears blunt or direct. You are encouraged to contact your medical provider and/or [facility] Patient Experience if you have any questions about this medical document. Objective Initial Evaluation Data 11/15/2022:    Functional Scores 11/15/2022 12/12/2022    FOTO primary measure 47 47     Palpation: sensitive to light touch over lumbar spine  Gait:        Deviations: trunk flex, wide based       Devices: none       Assistance: none    Posture 11/15/2022   Alignment R foot ER, L shoulder depressed, forward head position , L trunk shift.  flattened lumbar spine and thoracic spine       Body mechanics 11/15/2022 3/13/2023    Reaching for personal belongings poor fair       Sensation 11/15/2022 12/12/2022    Dermatomes Light Touch     Proximal medial thigh R (L2)  wnl wnl   Proximal medial thigh L (L2) wnl wnl   Above knee R (L3) wnl wnl   Above knee L (L3) wnl wnl   Medial arch R(L4) wnl wnl   Medial arch L (L4) wnl wnl   Between 1st - 2nd toes R (L5) wnl wnl   Between 1st - 2nd toes L (L5) wnl wnl   Lateral border of foot R (S1) wnl wnl   Lateral border of foot L (S1) dull wnl   Popliteal fossa R (S2) wnl wnl   Popliteal fossa L(S2) wnl wnl       Abdominals 11/15/2022 3/13/2023    Tone  fair poor       ROM Lumbar 11/15/2022 12/12/2022  3/13/2023    Flexion Mod-max Mod-max refused   Extension max Mod-max Mod   R SB mod Min-mod min   L SB mod mod Min-mod   R Rotation mod mod Min-mod   L Rotation Mod-max mod Min-mod   * pain limiting    MMT 5/5 11/15/2022 12/12/2022  3/13/2023    L2- hip flex R 5 5 5   Hip flex L 5 5 5   L3- knee ext R 5 5 5   Knee ext L 5- 5 5   L4- ankle DF R 5 5 5   Ankle DF L 5 5 5   L5- EHL R 5 5 5   EHL L 4+ 4+ 5   S1- ankle PF R 5 5 5   Ankle PF L 5 5 5   S2- Hams R 5 5 5   Hams L 5 5 5   *pain limiting    LE flexibility 11/15/2022 12/12/2022  3/13/2023    Piriformis L Mod-max mod Min-mod   Piriformis R Mod-max mod Min-mod   Hams L -40 -35 -45 Hams R -45 -41 -46   *pain limiting    Neural mobility 11/15/2022 12/12/2022    SLR R 40 deg myofascial tension 45 deg myofascial tension   SLR L 35 deg myofascial tension 45 deg myofascial tension      VENKATA 11/15/2022   R wnl   L wnl      Position [facility] Tests 11/22/2022        Supine Hip IR R Min-mod    Hip IR L min    Pelvic Side [place] R mod    Pelvis Side Glide L wnl    Post Rotation of [place] R Mod-max    Post Rotation of [place] L wnl

## 2023-04-20 ENCOUNTER — OFFICE VISIT (OUTPATIENT)
Dept: PHYSICAL THERAPY | Facility: HOSPITAL | Age: 68
End: 2023-04-20
Attending: ANESTHESIOLOGY
Payer: MEDICARE

## 2023-04-20 PROCEDURE — 97112 NEUROMUSCULAR REEDUCATION: CPT

## 2023-04-20 PROCEDURE — 97110 THERAPEUTIC EXERCISES: CPT

## 2023-04-20 PROCEDURE — 97140 MANUAL THERAPY 1/> REGIONS: CPT

## 2023-04-20 NOTE — PROGRESS NOTES
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 He was turning to the side to hold his phone on the  and had a min-mod flare up of pain. He remains concerned that the exercises will flare up his pain. Visit count 2/13/2023 1/8 5/8 10/14 11/14 14/18 15/18 16/18 17/18   Date 11/15/2022 12/9/2022  1/24/2023  1/27/2023  3/13/2023  3/30/2023  4/11/2023  4/20/2023    LBP/B SI           Pain Range 3-4 to 8/10 0 to 7/10 0 to 8/10 0-3/10 0-5/10 1-4/10 1-3/10 1-4/10   Pain Ave 5-6/10 4-5/10 4-5/10 2-3/10 2-3/10 2/10 1-2/10 2/10   ^ prog note 12/12/2022       OBJECTIVE:     Treatment performed this date:    Therapeutic Exercise:  Visit #   15/18 16/18 17/18   Position Exercise HEP 3/30/2023  4/11/2023  4/20/2023    NuStep  Seat 11, Arms 11, Resist 3   X 10 min,    Supine Sciatic nerve glide leg pumps H X 10x X 10x 10x    Hams stretch w strap        Hams stretch foot on elevated box    X ~ 45 deg 2 min B 2 min    Hams stretch in doorway   X 10x pumps, 2 min hold.      Hesch R ant ileum self mob 2 min H       Cross leg piriformis H 3x  3x    Cross leg ER  3x  3x    LTR H   5x    DKC    5x    SKC H       Pelvic tilt  5x2     Sidelying Hesch side glide fixation 5 min H       Hesch R ant ileum self mob H       Trunk rot book H       Trunk rot book top leg bent to front H 10x     Sitting Trunk flex H   3x    Trunk rot chair H 3x  3x    Trunk rot hands on SI and opp thigh H       Dynadisc pelvic tilt        Pelvic tilt H       Leg pumps nerve glides H 10x      Hams stretch heel on floor       4 point` Cat cow        Child pose min       Prone Lying  H       On elbows H       On elbows w side glide UE's to R        Press ups H      Standing Lat shift L pelvis to wall H       Trunk extn forearms on wall H      Neuro TNE 1-7   X reinforcement of progression w decreased fear of exercises X discussion of fear avoidance behavior for HEP    Re-ed  X see assessment       Posture        Lifting    X     MT IASTM    X     MFR    X lumb ps and thoracodorsal fascia    Joint mob    X lumbar PA glides grade 2-3    MET       US Ultrasound - units given 1  100% power, 1mHz, 1.2 w/cm2, 7 min to      X    Estim Pt received interferential electric stimulation in supine w CP.       H = HEP. Pt given copies of this exercise for home program.  X = Exercises done this date - pt verbalized understanding and demonstrated competence. All exercises done B unless otherwise indicated. Pt advised to discontinue exercises that increase pain and to call or return to therapist to discuss. Each intervention above is specifically prescribed to address the patients identified impairments, activity limitations, and participation restrictions. ASSESSMENT:     Pt continues to do well with return to active work as a . Pt wears a SI belt/corset when active. He demonstrates mod myofascial and joint restriction in lumbar spine. Pt tolerated mod level man tx as noted above. He remains cautious with performance of his HEP     Physical Therapy Goals: From 11/15/2022 to 2/13/2023, assess response to 3/13/2023   - Created with patient input during initial evaluation   1. Pt will be independent in beginning level of HEP for stretching, posture and strengthening. MET  2. Pt will be able to don/doff socks/shoes without increased symptoms. MET   3.  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 MET  4. 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 Progressing  5. Pt will be able to turn to back up the car without increased symptoms. Progressing     PLAN OF CARE:      Assess response to man tx and assess for readiness for discharge. Continue PT per original plan for therapeutic exercises, posture retraining, therapeutic activities, manual treatment, neuromuscular reeducation, therapeutic pain neuroscience education, patient education, self care home management, home exercise program, and modalities as needed. Charged Units Units Minutes   Neuro 1 11   Ther ex 1 18   Gait     Man Tx 2 29   US 1 7        Total Timed  58   Total Tx Time  65         __________________________________________________________________________________________      21st Century Cures Act Notice to Patient: Medical documents like this are made available to patients in the interest of transparency. However, be advised this is a medical document and it is intended as [id number]-bn-otlz communication between your medical providers. This medical document may contain abbreviations, assessments, medical data, and results or other terms that are unfamiliar. Medical documents are intended to carry relevant information, facts as evident, and the clinical opinion of the practitioner. As such, this medical document may be written in language that appears blunt or direct. You are encouraged to contact your medical provider and/or [facility] Patient Experience if you have any questions about this medical document. Objective Initial Evaluation Data 11/15/2022:    Functional Scores 11/15/2022 12/12/2022    FOTO primary measure 47 47     Palpation: sensitive to light touch over lumbar spine  Gait:        Deviations: trunk flex, wide based       Devices: none       Assistance: none    Posture 11/15/2022   Alignment R foot ER, L shoulder depressed, forward head position , L trunk shift. flattened lumbar spine and thoracic spine       Body mechanics 11/15/2022 3/13/2023    Reaching for personal belongings poor fair       Sensation 11/15/2022 12/12/2022    Dermatomes Light Touch     Proximal medial thigh R (L2)  wnl wnl   Proximal medial thigh L (L2) wnl wnl   Above knee R (L3) wnl wnl   Above knee L (L3) wnl wnl   Medial arch R(L4) wnl wnl   Medial arch L (L4) wnl wnl   Between 1st - 2nd toes R (L5) wnl wnl   Between 1st - 2nd toes L (L5) wnl wnl   Lateral border of foot R (S1) wnl wnl   Lateral border of foot L (S1) dull wnl   Popliteal fossa R (S2) wnl wnl   Popliteal fossa L(S2) wnl wnl       Abdominals 11/15/2022 3/13/2023    Tone  fair poor       ROM Lumbar 11/15/2022 12/12/2022  3/13/2023    Flexion Mod-max Mod-max refused   Extension max Mod-max Mod   R SB mod Min-mod min   L SB mod mod Min-mod   R Rotation mod mod Min-mod   L Rotation Mod-max mod Min-mod   * pain limiting    MMT 5/5 11/15/2022 12/12/2022  3/13/2023    L2- hip flex R 5 5 5   Hip flex L 5 5 5   L3- knee ext R 5 5 5   Knee ext L 5- 5 5   L4- ankle DF R 5 5 5   Ankle DF L 5 5 5   L5- EHL R 5 5 5   EHL L 4+ 4+ 5   S1- ankle PF R 5 5 5   Ankle PF L 5 5 5   S2- Hams R 5 5 5   Hams L 5 5 5   *pain limiting    LE flexibility 11/15/2022 12/12/2022  3/13/2023    Piriformis L Mod-max mod Min-mod   Piriformis R Mod-max mod Min-mod   Hams L -40 -35 -45   Hams R -45 -41 -46   *pain limiting    Neural mobility 11/15/2022 12/12/2022    SLR R 40 deg myofascial tension 45 deg myofascial tension   SLR L 35 deg myofascial tension 45 deg myofascial tension      VENKATA 11/15/2022   R wnl   L wnl      Position [facility] Tests 11/22/2022        Supine Hip IR R Min-mod    Hip IR L min    Pelvic Side [place] R mod    Pelvis Side Glide L wnl    Post Rotation of [place]lium R Mod-max    Post Rotation of [place] L wnl

## 2023-04-22 ENCOUNTER — APPOINTMENT (OUTPATIENT)
Dept: MRI IMAGING | Age: 68
End: 2023-04-22
Attending: UROLOGY

## 2023-04-25 ENCOUNTER — APPOINTMENT (OUTPATIENT)
Dept: SURGERY | Age: 68
End: 2023-04-25

## 2023-04-25 ENCOUNTER — OFFICE VISIT (OUTPATIENT)
Dept: PHYSICAL THERAPY | Facility: HOSPITAL | Age: 68
End: 2023-04-25
Attending: ANESTHESIOLOGY
Payer: MEDICARE

## 2023-04-25 PROCEDURE — 97110 THERAPEUTIC EXERCISES: CPT

## 2023-04-25 PROCEDURE — 97112 NEUROMUSCULAR REEDUCATION: CPT

## 2023-04-25 NOTE — PROGRESS NOTES
Discharge Summary    Pt has attended 18, cancelled 1, and no shown 0 visits in Physical Therapy. Roshan Chaparro Xiao    2/22/1955  Referring Physician:  Cristian Pineda  Diagnosis: Low back pain (M54.50)    Initial Evaluation Date: 11/15/2022  Date of Surgery: None for this diagnosis     Insurance type and authorized visits: Medicaid, TBD  Plan of care expires: For orders: 2/13/2023; For insurance: TBD    Precautions/Hx: OA, B carotid stenosis, compression fx c-spine, GERD, mitral regurgitation, medial meniscus tear, palpitations,     SUBJECTIVE:     He states that he continues to be well improved. He remains very busy at work with reaching and lifting. He notes that he has been too busy to do his exercises recently. Visit count 2/13/2023 1/8 18/18   Date 11/15/2022 4/25/2023    LBP/B SI     Pain Range 3-4 to 8/10 1-3/10   Pain Ave 5-6/10 1-2/10   ^ prog note 12/12/2022    Pt has completed skilled physical therapy intervention with good decrease of pain complaints. Pt displays improved: body mechanics; posture; sensation to light touch in L  S1 dermatome; abdominal tone; min improvement in lumbar ROM; strength in L EHL; and B piriformis flexibility; lumbopelvic accessory mobility. Pt has done well to return to full time active work as a . Pt remains limited in lumbar ROM; B hams overuse w loss of flexibility. Pt demonstrates improved function as noted as progression towards goals. See objective discharge data below in tables with initial evaluation data. Pt has met goals, is independent with [facility] and ready to be discharged to [facility].         OBJECTIVE:     Treatment performed this date:    Therapeutic Exercise:  Visit #   16/18 17/18 18/18   Position Exercise HEP 4/11/2023 4/20/2023 4/25/2023    NuStep  Seat 11, Arms 11, Resist 3  X 10 min,     Supine Sciatic nerve glide leg pumps H X 10x 10x X 10x    Hams stretch w strap        Hams stretch foot on elevated box   X ~ 45 deg 2 min B 2 min Hams stretch in doorway  X 10x pumps, 2 min hold. Hesch R ant ileum self mob 2 min H       Cross leg piriformis H  3x X 10x    Cross leg ER   3x X 3x    LTR H  5x X 10x    DKC   5x X 10x    SKC H   5x    Pelvic tilt    5x   Sidelying Hesch side glide fixation 5 min H       Hesch R ant ileum self mob H       Trunk rot book H   3x    Trunk rot book top leg bent to front H      Sitting Trunk flex H  3x     Trunk rot chair H  3x 3x    Trunk rot hands on SI and opp thigh H       Dynadisc pelvic tilt        Pelvic tilt H   3x    Leg pumps nerve glides H       Hams stretch heel on floor       4 point` Cat cow        Child pose min       Prone Lying  H       On elbows H       On elbows w side glide UE's to R        Press ups H      Standing Lat shift L pelvis to wall H       Trunk extn forearms on wall H      Many ROM and strength for assessment    X   Neuro TNE 1-7  X reinforcement of progression w decreased fear of exercises X discussion of fear avoidance behavior for HEP X discussed importance of continued performance of HEP, gentle and steady using deep breathing to aid fear avoidance. Re-ed        Posture        Lifting   X      MT IASTM   X      MFR   X lumb ps and thoracodorsal fascia     Joint mob   X lumbar PA glides grade 2-3     MET       US Ultrasound - units given 1  100% power, 1mHz, 1.2 w/cm2, 7 min to     X     Estim Pt received interferential electric stimulation in supine w CP.       H = HEP. Pt given copies of this exercise for home program.  X = Exercises done this date - pt verbalized understanding and demonstrated competence. All exercises done B unless otherwise indicated. Pt advised to discontinue exercises that increase pain and to call or return to therapist to discuss. Each intervention above is specifically prescribed to address the patients identified impairments, activity limitations, and participation restrictions.     ASSESSMENT:     Physical Therapy Goals: From 11/15/2022 to 2/13/2023, assess response to 3/13/2023   - Created with patient input during initial evaluation   1. Pt will be independent in beginning level of HEP for stretching, posture and strengthening. MET  2. Pt will be able to don/doff socks/shoes without increased symptoms. MET   3. 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 MET  4. 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 MET for 2 gallons in each hand  5. Pt will be able to turn to back up the car without increased symptoms. Progressing     PLAN OF CARE:      Plan:  Discharge to [facility]    Thank you for your referral. If you have any questions, please contact me at Dept: [phone]. Sincerely,  Electronically signed by therapist: Ailyn Aguirre PT       Charged Units Units Minutes   Neuro 1 13   Ther ex 2 32   Gait     Man Tx     US          Total Timed  45   Total Tx Time  45         __________________________________________________________________________________________      21st Century Cures Act Notice to Patient: Medical documents like this are made available to patients in the interest of transparency. However, be advised this is a medical document and it is intended as emfo-yo-vckn communication between your medical providers. This medical document may contain abbreviations, assessments, medical data, and results or other terms that are unfamiliar. Medical documents are intended to carry relevant information, facts as evident, and the clinical opinion of the practitioner. As such, this medical document may be written in language that appears blunt or direct. You are encouraged to contact your medical provider and/or [facility] Patient Experience if you have any questions about this medical document.   Objective Initial Evaluation Data 11/15/2022:    Functional Scores 11/15/2022 12/12/2022    FOTO primary measure 47 47     Palpation: sensitive to light touch over lumbar spine  Gait: Deviations: trunk flex, wide based       Devices: none       Assistance: none    Posture 11/15/2022 4/25/2023    Alignment R foot ER, L shoulder depressed, forward head position , L trunk shift.  flattened lumbar spine and thoracic spine R foot ER, improved stance to neutral w >50% reduction of trunk shift, con't w flattened thoracic and lumbar spine       Body mechanics 11/15/2022 3/13/2023    Reaching for personal belongings poor fair       Sensation 11/15/2022 12/12/2022    Dermatomes Light Touch     Proximal medial thigh R (L2)  wnl wnl   Proximal medial thigh L (L2) wnl wnl   Above knee R (L3) wnl wnl   Above knee L (L3) wnl wnl   Medial arch R(L4) wnl wnl   Medial arch L (L4) wnl wnl   Between 1st - 2nd toes R (L5) wnl wnl   Between 1st - 2nd toes L (L5) wnl wnl   Lateral border of foot R (S1) wnl wnl   Lateral border of foot L (S1) dull wnl   Popliteal fossa R (S2) wnl wnl   Popliteal fossa L(S2) wnl wnl       Abdominals 11/15/2022 3/13/2023  4/25/2023    Tone  fair poor Good (-)       ROM Lumbar 11/15/2022 12/12/2022  3/13/2023  4/25/2023    Flexion Mod-max Mod-max refused Mod-max refused further motion   Extension max Mod-max Mod mod   R SB mod Min-mod min min   L SB mod mod Min-mod Min-mod   R Rotation mod mod Min-mod Min-mod   L Rotation Mod-max mod Min-mod Min-mod   * pain limiting    MMT 5/5 11/15/2022 12/12/2022  3/13/2023    L2- hip flex R 5 5 5   Hip flex L 5 5 5   L3- knee ext R 5 5 5   Knee ext L 5- 5 5   L4- ankle DF R 5 5 5   Ankle DF L 5 5 5   L5- EHL R 5 5 5   EHL L 4+ 4+ 5   S1- ankle PF R 5 5 5   Ankle PF L 5 5 5   S2- Hams R 5 5 5   Hams L 5 5 5   *pain limiting    LE flexibility 11/15/2022 12/12/2022  3/13/2023  4/25/2023    Piriformis L Mod-max mod Min-mod Min-mod   Piriformis R Mod-max mod Min-mod Min-mod   Hams L -40 -35 -45 -45   Hams R -45 -41 -46 -45   *pain limiting    Neural mobility 11/15/2022 12/12/2022  4/25/2023    SLR R 40 deg myofascial tension 45 deg myofascial tension 45 deg myofascial tension   SLR L 35 deg myofascial tension 45 deg myofascial tension 45 deg myofascial tension      VENKATA 11/15/2022   R wnl   L wnl      Position [facility] Spring Tests 11/22/2022 4/25/2023          Supine Hip IR R Min-mod min    Hip IR L min min    Pelvic Side Glide R mod min    Pelvis Side [place] L wnl wnl    Post Rotation of [place] R Mod-max min    Post Rotation of [place] L wnl wnl

## 2023-04-27 ENCOUNTER — HOSPITAL ENCOUNTER (OUTPATIENT)
Dept: GENERAL RADIOLOGY | Age: 68
Discharge: HOME | End: 2023-04-27

## 2023-04-27 ENCOUNTER — OFFICE VISIT (OUTPATIENT)
Dept: INTERNAL MEDICINE | Age: 68
End: 2023-04-27

## 2023-04-27 ENCOUNTER — TELEPHONE (OUTPATIENT)
Dept: NEUROLOGY | Facility: CLINIC | Age: 68
End: 2023-04-27

## 2023-04-27 VITALS
BODY MASS INDEX: 23.03 KG/M2 | SYSTOLIC BLOOD PRESSURE: 122 MMHG | DIASTOLIC BLOOD PRESSURE: 74 MMHG | HEART RATE: 83 BPM | OXYGEN SATURATION: 98 % | TEMPERATURE: 98.2 F | WEIGHT: 143.3 LBS

## 2023-04-27 DIAGNOSIS — M79.644 PAIN IN FINGER OF RIGHT HAND: ICD-10-CM

## 2023-04-27 DIAGNOSIS — A08.4 VIRAL GASTROENTERITIS: Primary | ICD-10-CM

## 2023-04-27 PROCEDURE — 73140 X-RAY EXAM OF FINGER(S): CPT

## 2023-04-27 PROCEDURE — 99213 OFFICE O/P EST LOW 20 MIN: CPT | Performed by: STUDENT IN AN ORGANIZED HEALTH CARE EDUCATION/TRAINING PROGRAM

## 2023-04-27 ASSESSMENT — PATIENT HEALTH QUESTIONNAIRE - PHQ9
1. LITTLE INTEREST OR PLEASURE IN DOING THINGS: NOT AT ALL
SUM OF ALL RESPONSES TO PHQ9 QUESTIONS 1 AND 2: 0
2. FEELING DOWN, DEPRESSED OR HOPELESS: NOT AT ALL
CLINICAL INTERPRETATION OF PHQ2 SCORE: NO FURTHER SCREENING NEEDED
SUM OF ALL RESPONSES TO PHQ9 QUESTIONS 1 AND 2: 0

## 2023-04-27 ASSESSMENT — ENCOUNTER SYMPTOMS
PSYCHIATRIC NEGATIVE: 1
RESPIRATORY NEGATIVE: 1
ENDOCRINE NEGATIVE: 1
ALLERGIC/IMMUNOLOGIC NEGATIVE: 1
LIGHT-HEADEDNESS: 1
ABDOMINAL PAIN: 1
DIARRHEA: 1
BACK PAIN: 1
HEMATOLOGIC/LYMPHATIC NEGATIVE: 1
DIZZINESS: 1
EYES NEGATIVE: 1
CHILLS: 1

## 2023-04-28 ENCOUNTER — NURSE TRIAGE (OUTPATIENT)
Dept: TELEHEALTH | Age: 68
End: 2023-04-28

## 2023-04-28 ENCOUNTER — APPOINTMENT (OUTPATIENT)
Dept: CT IMAGING | Age: 68
End: 2023-04-28
Attending: EMERGENCY MEDICINE

## 2023-04-28 ENCOUNTER — APPOINTMENT (OUTPATIENT)
Dept: GENERAL RADIOLOGY | Age: 68
End: 2023-04-28
Attending: EMERGENCY MEDICINE

## 2023-04-28 ENCOUNTER — HOSPITAL ENCOUNTER (EMERGENCY)
Age: 68
Discharge: HOME | End: 2023-04-28
Attending: EMERGENCY MEDICINE

## 2023-04-28 VITALS
SYSTOLIC BLOOD PRESSURE: 133 MMHG | RESPIRATION RATE: 14 BRPM | WEIGHT: 147.49 LBS | TEMPERATURE: 96.8 F | OXYGEN SATURATION: 99 % | HEART RATE: 59 BPM | DIASTOLIC BLOOD PRESSURE: 76 MMHG | BODY MASS INDEX: 23.7 KG/M2

## 2023-04-28 DIAGNOSIS — K20.90 ESOPHAGITIS: Primary | ICD-10-CM

## 2023-04-28 DIAGNOSIS — K22.81 ESOPHAGEAL POLYP: ICD-10-CM

## 2023-04-28 DIAGNOSIS — E87.6 HYPOKALEMIA: ICD-10-CM

## 2023-04-28 LAB
ALBUMIN SERPL-MCNC: 3.2 G/DL (ref 3.6–5.1)
ALBUMIN/GLOB SERPL: 1 {RATIO} (ref 1–2.4)
ALP SERPL-CCNC: 43 UNITS/L (ref 45–117)
ALT SERPL-CCNC: 18 UNITS/L
ANION GAP SERPL CALC-SCNC: 5 MMOL/L (ref 7–19)
AST SERPL-CCNC: 17 UNITS/L
BASOPHILS # BLD: 0 K/MCL (ref 0–0.3)
BASOPHILS NFR BLD: 0 %
BILIRUB SERPL-MCNC: 0.5 MG/DL (ref 0.2–1)
BUN SERPL-MCNC: 12 MG/DL (ref 6–20)
BUN/CREAT SERPL: 15 (ref 7–25)
CALCIUM SERPL-MCNC: 8.7 MG/DL (ref 8.4–10.2)
CHLORIDE SERPL-SCNC: 110 MMOL/L (ref 97–110)
CO2 SERPL-SCNC: 28 MMOL/L (ref 21–32)
CREAT SERPL-MCNC: 0.8 MG/DL (ref 0.67–1.17)
DEPRECATED RDW RBC: 41.4 FL (ref 39–50)
EOSINOPHIL # BLD: 0.1 K/MCL (ref 0–0.5)
EOSINOPHIL NFR BLD: 2 %
ERYTHROCYTE [DISTWIDTH] IN BLOOD: 12.5 % (ref 11–15)
FASTING DURATION TIME PATIENT: ABNORMAL H
GFR SERPLBLD BASED ON 1.73 SQ M-ARVRAT: >90 ML/MIN
GLOBULIN SER-MCNC: 3.1 G/DL (ref 2–4)
GLUCOSE SERPL-MCNC: 95 MG/DL (ref 70–99)
HCT VFR BLD CALC: 39.1 % (ref 39–51)
HGB BLD-MCNC: 13.4 G/DL (ref 13–17)
IMM GRANULOCYTES # BLD AUTO: 0 K/MCL (ref 0–0.2)
IMM GRANULOCYTES # BLD: 0 %
LIPASE SERPL-CCNC: 139 UNITS/L (ref 73–393)
LYMPHOCYTES # BLD: 1 K/MCL (ref 1–4)
LYMPHOCYTES NFR BLD: 22 %
MCH RBC QN AUTO: 31.2 PG (ref 26–34)
MCHC RBC AUTO-ENTMCNC: 34.3 G/DL (ref 32–36.5)
MCV RBC AUTO: 90.9 FL (ref 78–100)
MONOCYTES # BLD: 0.6 K/MCL (ref 0.3–0.9)
MONOCYTES NFR BLD: 13 %
NEUTROPHILS # BLD: 2.9 K/MCL (ref 1.8–7.7)
NEUTROPHILS NFR BLD: 63 %
NRBC BLD MANUAL-RTO: 0 /100 WBC
PLATELET # BLD AUTO: 137 K/MCL (ref 140–450)
POTASSIUM SERPL-SCNC: 3.3 MMOL/L (ref 3.4–5.1)
PROT SERPL-MCNC: 6.3 G/DL (ref 6.4–8.2)
RBC # BLD: 4.3 MIL/MCL (ref 4.5–5.9)
SODIUM SERPL-SCNC: 140 MMOL/L (ref 135–145)
WBC # BLD: 4.6 K/MCL (ref 4.2–11)

## 2023-04-28 PROCEDURE — 83690 ASSAY OF LIPASE: CPT | Performed by: EMERGENCY MEDICINE

## 2023-04-28 PROCEDURE — 80053 COMPREHEN METABOLIC PANEL: CPT | Performed by: EMERGENCY MEDICINE

## 2023-04-28 PROCEDURE — 36415 COLL VENOUS BLD VENIPUNCTURE: CPT

## 2023-04-28 PROCEDURE — 99284 EMERGENCY DEPT VISIT MOD MDM: CPT

## 2023-04-28 PROCEDURE — 85025 COMPLETE CBC W/AUTO DIFF WBC: CPT | Performed by: EMERGENCY MEDICINE

## 2023-04-28 PROCEDURE — 71250 CT THORAX DX C-: CPT

## 2023-04-28 RX ORDER — SUCRALFATE ORAL 1 G/10ML
1 SUSPENSION ORAL
Qty: 280 ML | Refills: 0 | Status: SHIPPED | OUTPATIENT
Start: 2023-04-28 | End: 2023-05-05

## 2023-04-28 ASSESSMENT — ENCOUNTER SYMPTOMS
ABDOMINAL PAIN: 0
COUGH: 0
ADENOPATHY: 0
FEVER: 0
BACK PAIN: 0
DIZZINESS: 0

## 2023-04-28 ASSESSMENT — PAIN SCALES - GENERAL: PAINLEVEL_OUTOF10: 8

## 2023-04-29 LAB
RAINBOW EXTRA TUBES HOLD SPECIMEN: NORMAL

## 2023-05-01 ENCOUNTER — OFFICE VISIT (OUTPATIENT)
Dept: NEUROLOGY | Facility: CLINIC | Age: 68
End: 2023-05-01
Payer: MEDICARE

## 2023-05-01 VITALS
HEART RATE: 65 BPM | SYSTOLIC BLOOD PRESSURE: 100 MMHG | BODY MASS INDEX: 24.11 KG/M2 | RESPIRATION RATE: 16 BRPM | WEIGHT: 150 LBS | HEIGHT: 66 IN | DIASTOLIC BLOOD PRESSURE: 62 MMHG

## 2023-05-01 DIAGNOSIS — M51.36 BULGE OF LUMBAR DISC WITHOUT MYELOPATHY: Primary | ICD-10-CM

## 2023-05-01 DIAGNOSIS — R41.3 MEMORY CHANGE: ICD-10-CM

## 2023-05-01 DIAGNOSIS — M54.12 CERVICAL RADICULOPATHY: ICD-10-CM

## 2023-05-01 DIAGNOSIS — R42 DIZZINESS: ICD-10-CM

## 2023-05-01 PROCEDURE — 99213 OFFICE O/P EST LOW 20 MIN: CPT | Performed by: OTHER

## 2023-05-02 ENCOUNTER — APPOINTMENT (OUTPATIENT)
Dept: PHYSICAL THERAPY | Age: 68
End: 2023-05-02
Attending: PHYSICAL MEDICINE & REHABILITATION
Payer: MEDICARE

## 2023-05-02 ENCOUNTER — TELEPHONE (OUTPATIENT)
Dept: PHYSICAL MEDICINE AND REHAB | Facility: CLINIC | Age: 68
End: 2023-05-02

## 2023-05-02 ENCOUNTER — OFFICE VISIT (OUTPATIENT)
Dept: PHYSICAL MEDICINE AND REHAB | Facility: CLINIC | Age: 68
End: 2023-05-02
Payer: MEDICARE

## 2023-05-02 ENCOUNTER — OFFICE VISIT (OUTPATIENT)
Dept: SURGERY | Age: 68
End: 2023-05-02

## 2023-05-02 VITALS — DIASTOLIC BLOOD PRESSURE: 70 MMHG | SYSTOLIC BLOOD PRESSURE: 124 MMHG | OXYGEN SATURATION: 96 % | HEART RATE: 84 BPM

## 2023-05-02 VITALS
DIASTOLIC BLOOD PRESSURE: 70 MMHG | SYSTOLIC BLOOD PRESSURE: 128 MMHG | HEIGHT: 66 IN | WEIGHT: 149 LBS | BODY MASS INDEX: 23.95 KG/M2

## 2023-05-02 DIAGNOSIS — M51.36 BULGE OF LUMBAR DISC WITHOUT MYELOPATHY: ICD-10-CM

## 2023-05-02 DIAGNOSIS — M47.816 LUMBAR SPONDYLOSIS: ICD-10-CM

## 2023-05-02 DIAGNOSIS — M47.816 FACET SYNDROME, LUMBAR: ICD-10-CM

## 2023-05-02 DIAGNOSIS — M79.10 MYALGIA: ICD-10-CM

## 2023-05-02 DIAGNOSIS — N94.89 HIGH-TONE PELVIC FLOOR DYSFUNCTION: ICD-10-CM

## 2023-05-02 DIAGNOSIS — M48.061 LUMBAR FORAMINAL STENOSIS: ICD-10-CM

## 2023-05-02 DIAGNOSIS — M54.59 MECHANICAL LOW BACK PAIN: ICD-10-CM

## 2023-05-02 DIAGNOSIS — M54.16 LUMBAR RADICULOPATHY: Primary | ICD-10-CM

## 2023-05-02 DIAGNOSIS — M99.9 BIOMECHANICAL LESION: ICD-10-CM

## 2023-05-02 DIAGNOSIS — M51.37 DDD (DEGENERATIVE DISC DISEASE), LUMBOSACRAL: ICD-10-CM

## 2023-05-02 DIAGNOSIS — K64.1 GRADE II HEMORRHOIDS: Primary | ICD-10-CM

## 2023-05-02 PROCEDURE — 46600 DIAGNOSTIC ANOSCOPY SPX: CPT | Performed by: STUDENT IN AN ORGANIZED HEALTH CARE EDUCATION/TRAINING PROGRAM

## 2023-05-02 PROCEDURE — 99214 OFFICE O/P EST MOD 30 MIN: CPT | Performed by: PHYSICAL MEDICINE & REHABILITATION

## 2023-05-02 PROCEDURE — 99205 OFFICE O/P NEW HI 60 MIN: CPT | Performed by: STUDENT IN AN ORGANIZED HEALTH CARE EDUCATION/TRAINING PROGRAM

## 2023-05-02 ASSESSMENT — PAIN SCALES - GENERAL: PAINLEVEL: 0

## 2023-05-02 ASSESSMENT — ENCOUNTER SYMPTOMS
FATIGUE: 0
ACTIVITY CHANGE: 0
RECTAL PAIN: 1
CHEST TIGHTNESS: 0
NAUSEA: 0
CONFUSION: 0
DIZZINESS: 0
ABDOMINAL DISTENTION: 0
COLOR CHANGE: 0
FEVER: 0
APPETITE CHANGE: 0
VOMITING: 0
LIGHT-HEADEDNESS: 0
RECTAL BLEEDING: 1
ADENOPATHY: 0
SHORTNESS OF BREATH: 0

## 2023-05-02 NOTE — TELEPHONE ENCOUNTER
Patient has been scheduled for Bilateral L5 TFESI on 05/24/23 at the [address] [place] [address] with Dr.Behar.   -Anesthesia type: Local.  -If scheduling [address] [facility] covid testing required for all procedures whether patient is vaccinated or not. -Patient informed not to eat or drink anything after midnight the night prior to the procedure, if being sedated. (Patient informed to fast 12 hours prior to procedure with IVCS/MAC. )  -Patient was advised that if he/she does receive the covid vaccine it needs to be at least 2 weeks before or after the injection. -Medications and allergies reviewed. -Patient reminded to hold NSAIDs (Ibuprofen, ASA 81, Aleve, Naproxen, Mobic, Diclofenac, Etodolac, Celebrex etc.) for 3 days prior to [facility]  if BMI is greater than 35. For Cervical injections only hold multivitamins, Vitamin E, Fish Oil, Phentermine (Lomaira) for 7 days prior to injection and NSAIDS.  mg to be held for 7 days prior to injections.  -If patient is receiving MAC/IVCS Phentermine Ozie Ply) will need to be held for 7 days prior to injection.  -If on blood thinner clearance has been received to hold this medication by provider.   -Patient informed he/she will need a  to and from procedure. -[facility] is located in the [facility] 1st floor. Patient may park in the yellow or purple parking. Patient verbalized understanding and agrees with plan.  -----> Scheduled in Epic: Yes  -----> Scheduled in Caset[employer]:  Yes

## 2023-05-02 NOTE — TELEPHONE ENCOUNTER
Per Medicare Guidelines -no authorization is required for Bilateral L5 TFESIs CPT 19966-93     Status: Authorization is not required however may be subject to review once claim is submitted-Covered Benefit     Per CMS Guidelines-ESIs are limited to a maximum of four (4) sessions per spinal region in a rolling twelve (12) month period.   Lumbar nicki done 2/1/23, 3/15/23-next one will be #3    Per Dr. Vikram Patel w/ half amount of steroids

## 2023-05-08 ENCOUNTER — HOSPITAL ENCOUNTER (OUTPATIENT)
Dept: MRI IMAGING | Age: 68
Discharge: HOME | End: 2023-05-08
Attending: UROLOGY

## 2023-05-08 DIAGNOSIS — R97.20 ELEVATED PSA: ICD-10-CM

## 2023-05-08 PROCEDURE — G1004 CDSM NDSC: HCPCS

## 2023-05-08 PROCEDURE — 72197 MRI PELVIS W/O & W/DYE: CPT

## 2023-05-08 PROCEDURE — 10002805 HB CONTRAST AGENT: Performed by: UROLOGY

## 2023-05-08 PROCEDURE — A9585 GADOBUTROL INJECTION: HCPCS | Performed by: UROLOGY

## 2023-05-08 RX ORDER — GADOBUTROL 604.72 MG/ML
7.5 INJECTION INTRAVENOUS ONCE
Status: COMPLETED | OUTPATIENT
Start: 2023-05-08 | End: 2023-05-08

## 2023-05-08 RX ADMIN — GADOBUTROL 7.5 ML: 604.72 INJECTION INTRAVENOUS at 19:49

## 2023-05-09 ENCOUNTER — APPOINTMENT (OUTPATIENT)
Dept: PHYSICAL THERAPY | Age: 68
End: 2023-05-09
Attending: PHYSICAL MEDICINE & REHABILITATION
Payer: MEDICARE

## 2023-05-15 ENCOUNTER — TELEPHONE (OUTPATIENT)
Dept: PHYSICAL MEDICINE AND REHAB | Facility: CLINIC | Age: 68
End: 2023-05-15

## 2023-05-15 NOTE — TELEPHONE ENCOUNTER
An email was sent to Blanca Crew asking if she could waive the $40.00 balance for his NO SHOW on 3/30th.

## 2023-05-16 ENCOUNTER — OFFICE VISIT (OUTPATIENT)
Dept: SURGERY | Age: 68
End: 2023-05-16

## 2023-05-16 ENCOUNTER — APPOINTMENT (OUTPATIENT)
Dept: PHYSICAL THERAPY | Age: 68
End: 2023-05-16
Attending: PHYSICAL MEDICINE & REHABILITATION
Payer: MEDICARE

## 2023-05-16 VITALS — HEART RATE: 66 BPM | DIASTOLIC BLOOD PRESSURE: 76 MMHG | SYSTOLIC BLOOD PRESSURE: 125 MMHG | OXYGEN SATURATION: 97 %

## 2023-05-16 DIAGNOSIS — K64.1 GRADE II HEMORRHOIDS: Primary | ICD-10-CM

## 2023-05-16 PROCEDURE — 99214 OFFICE O/P EST MOD 30 MIN: CPT | Performed by: STUDENT IN AN ORGANIZED HEALTH CARE EDUCATION/TRAINING PROGRAM

## 2023-05-16 PROCEDURE — 46221 LIGATION OF HEMORRHOID(S): CPT | Performed by: STUDENT IN AN ORGANIZED HEALTH CARE EDUCATION/TRAINING PROGRAM

## 2023-05-16 ASSESSMENT — ENCOUNTER SYMPTOMS
NAUSEA: 0
COLOR CHANGE: 0
DIZZINESS: 0
ADENOPATHY: 0
LIGHT-HEADEDNESS: 0
FEVER: 0
ACTIVITY CHANGE: 0
FATIGUE: 0
VOMITING: 0
CONFUSION: 0
SHORTNESS OF BREATH: 0
APPETITE CHANGE: 0
ABDOMINAL DISTENTION: 0
CHEST TIGHTNESS: 0

## 2023-05-16 ASSESSMENT — PAIN SCALES - GENERAL: PAINLEVEL: 0

## 2023-05-23 ENCOUNTER — TELEPHONE (OUTPATIENT)
Dept: INTERNAL MEDICINE | Age: 68
End: 2023-05-23

## 2023-05-23 ENCOUNTER — APPOINTMENT (OUTPATIENT)
Dept: PHYSICAL THERAPY | Age: 68
End: 2023-05-23
Attending: PHYSICAL MEDICINE & REHABILITATION
Payer: MEDICARE

## 2023-05-23 DIAGNOSIS — M79.641 PAIN OF RIGHT HAND: Primary | ICD-10-CM

## 2023-05-24 ENCOUNTER — OFFICE VISIT (OUTPATIENT)
Dept: SURGERY | Facility: CLINIC | Age: 68
End: 2023-05-24
Payer: MEDICARE

## 2023-05-24 DIAGNOSIS — M47.816 LUMBAR SPONDYLOSIS: ICD-10-CM

## 2023-05-24 DIAGNOSIS — M54.16 LUMBAR RADICULOPATHY: ICD-10-CM

## 2023-05-24 DIAGNOSIS — M48.061 LUMBAR FORAMINAL STENOSIS: ICD-10-CM

## 2023-05-24 DIAGNOSIS — M51.37 DDD (DEGENERATIVE DISC DISEASE), LUMBOSACRAL: Primary | ICD-10-CM

## 2023-05-24 DIAGNOSIS — M54.59 MECHANICAL LOW BACK PAIN: ICD-10-CM

## 2023-05-24 DIAGNOSIS — M51.36 BULGE OF LUMBAR DISC WITHOUT MYELOPATHY: ICD-10-CM

## 2023-05-24 PROCEDURE — 64483 NJX AA&/STRD TFRM EPI L/S 1: CPT | Performed by: PHYSICAL MEDICINE & REHABILITATION

## 2023-05-24 NOTE — PROCEDURES
[facility] [address]    BILATERAL LUMBAR TRANSFORAMINAL   NAME:  Pete Hagen    MR #:    [id number] :  1955     PHYSICIAN:  Clark Quarles MD        Operative Report    DATE OF PROCEDURE: 2023   PREOPERATIVE DIAGNOSES: 1. DDD (degenerative disc disease), lumbosacral    2. Bulge of lumbar disc without myelopathy    3. Mechanical low back pain    4. Lumbar radiculopathy    5. Lumbar foraminal stenosis    6. Lumbar spondylosis        POSTOPERATIVE DIAGNOSES:   1. DDD (degenerative disc disease), lumbosacral    2. Bulge of lumbar disc without myelopathy    3. Mechanical low back pain    4. Lumbar radiculopathy    5. Lumbar foraminal stenosis    6. Lumbar spondylosis        PROCEDURES: L5 BILATERAL transforaminal epidural steroid injection done under fluoroscopic guidance with contrast enhancement. SURGEON: Clark Quarles MD   ANESTHESIA: Local   INDICATIONS:      OPERATIVE PROCEDURE:  Written consent was obtained from the patient. The patient was brought into the operating room and placed in the prone position on the fluoroscopy table with pillow underneath his abdomen. The patient's skin was cleaned and draped in a normal sterile fashion. Using AP fluoroscopy, all five lumbar vertebrae were identified. When the fourth and fifth vertebra was identified, fluoroscopy was left anterior obliqued opening up the right L5-S1 intervertebral foramen. At this point in time, the patient's skin was anesthetized with 1% PF lidocaine without epinephrine. Then, a 3.5 inch, 22-gauge spinal needle was inserted and directed towards the right L5-S1 intervertebral foramen. When it felt to be in good position, AP fluoroscopy was used to advance the needle to the 6 o'clock position on the right L5 pedicle. At this point in time, Omnipaque-240 contrast was used to obtain a good epidurogram indicating correct needle placement. Then, aspiration was performed. No blood, fluid, or air was aspirated.   Then, the patient was injected with a 4 cc solution of 2cc 6mg/cc of Betamethasone and 2cc of 1% PF lidocaine without epinephrine. After this, the needle was removed. Then, the fluoroscopy was right anterior obliqued opening up the left L5-S1 intervertebral foramen. At this point in time, the patient's skin was anesthetized with 1% PF lidocaine without epinephrine. Then, a 3.5 inch, 22-gauge spinal needle was inserted and directed towards the left L5-S1 intervertebral foramen. When it felt to be in good position, AP fluoroscopy was used to advance the needle to the 6 o'clock position on the left L5 pedicle. At this point in time, Omnipaque-240 contrast was used to obtain a good epidurogram indicating correct needle placement. Then, aspiration was performed. No blood, fluid, or air was aspirated. Then, the patient was injected with a 4 cc solution of 2cc 6mg/cc of Betamethasone and 2cc of 1% PF lidocaine without epinephrine. The patient's skin was cleaned. A Band-Aid was applied. The patient was transferred to the cart and into [facility]. The patient was given discharge instructions and will follow up in the clinic as scheduled. Throughout the whole procedure, the patient's pulse oximetry and vital signs were monitored and they remained completely stable. Also, throughout the whole procedure, prior to injection of any medication, aspiration was performed. No blood, fluid, or air was aspirated at anytime. Mary Ruelas.  Maren Babcock MD, [address] [facility]  Physical Medicine and Rehabilitation/Sports Medicine  [facility]

## 2023-05-24 NOTE — PATIENT INSTRUCTIONS
Post Injection Instructions     Please do not do anything strenuous over the next 24 hours (if you had a knee injection do not walk more than 2 city blocks, do not attend any aerobic classes, do not run, no heavy lifting, no prolong standing). You may resume your day to day activities after your injection. You may experience some mild amount of swelling and discomfort after the procedure. Please ice the area that was injected at least 5-6 times a day (15 minutes) for two days after (this will help prevent worsening pain that sometimes occurs after an injection). Only take tylenol if needed for pain for the first few days. Watch for signs of infection which include redness, warmth, worsening pain, fevers or chills. If you develop any of these signs call the office immediately at [phone]    My office will call you in 2 days to check in and see how you are feeling. Follow up with me in the office 2 weeks after your injection. Everyone responds differently to injections, but you can expect your peak effects a few weeks after your last injection. Mary Ruelas.  Maren Babcock MD  Physical Medicine and Rehabilitation/Sports Medicine  [facility]

## 2023-05-26 ENCOUNTER — TELEPHONE (OUTPATIENT)
Dept: PHYSICAL MEDICINE AND REHAB | Facility: CLINIC | Age: 68
End: 2023-05-26

## 2023-05-30 ENCOUNTER — APPOINTMENT (OUTPATIENT)
Dept: PHYSICAL THERAPY | Age: 68
End: 2023-05-30
Attending: PHYSICAL MEDICINE & REHABILITATION
Payer: MEDICARE

## 2023-06-13 ENCOUNTER — TELEPHONE (OUTPATIENT)
Dept: NEUROLOGY | Facility: CLINIC | Age: 68
End: 2023-06-13

## 2023-06-13 DIAGNOSIS — H90.42 SENSORINEURAL HEARING LOSS (SNHL) OF LEFT EAR WITH UNRESTRICTED HEARING OF RIGHT EAR: ICD-10-CM

## 2023-06-13 DIAGNOSIS — R42 LIGHTHEADEDNESS: Primary | ICD-10-CM

## 2023-06-13 NOTE — TELEPHONE ENCOUNTER
Referral order for VNG requested to be changed without provider name added. PSR to fax as requested.

## 2023-06-26 ENCOUNTER — TELEPHONE (OUTPATIENT)
Dept: SURGERY | Age: 68
End: 2023-06-26

## 2023-06-27 ENCOUNTER — APPOINTMENT (OUTPATIENT)
Dept: SURGERY | Age: 68
End: 2023-06-27

## 2023-06-28 ENCOUNTER — APPOINTMENT (OUTPATIENT)
Dept: ORTHOPEDICS | Age: 68
End: 2023-06-28
Attending: STUDENT IN AN ORGANIZED HEALTH CARE EDUCATION/TRAINING PROGRAM

## 2023-06-30 ENCOUNTER — LAB SERVICES (OUTPATIENT)
Dept: LAB | Age: 68
End: 2023-06-30

## 2023-06-30 DIAGNOSIS — E55.9 VITAMIN D DEFICIENCY: ICD-10-CM

## 2023-06-30 DIAGNOSIS — I25.10 ATHEROSCLEROSIS OF NATIVE CORONARY ARTERY OF NATIVE HEART WITHOUT ANGINA PECTORIS: ICD-10-CM

## 2023-06-30 DIAGNOSIS — R73.9 HYPERGLYCEMIA: ICD-10-CM

## 2023-06-30 DIAGNOSIS — I49.3 PVC'S (PREMATURE VENTRICULAR CONTRACTIONS): ICD-10-CM

## 2023-06-30 DIAGNOSIS — R93.1 ELEVATED CORONARY ARTERY CALCIUM SCORE: ICD-10-CM

## 2023-06-30 PROCEDURE — 36415 COLL VENOUS BLD VENIPUNCTURE: CPT | Performed by: CLINICAL MEDICAL LABORATORY

## 2023-06-30 PROCEDURE — 82306 VITAMIN D 25 HYDROXY: CPT | Performed by: CLINICAL MEDICAL LABORATORY

## 2023-07-01 LAB — 25(OH)D3+25(OH)D2 SERPL-MCNC: 34.1 NG/ML (ref 30–100)

## 2023-07-12 ENCOUNTER — TELEPHONE (OUTPATIENT)
Dept: CARDIOLOGY | Age: 68
End: 2023-07-12

## 2023-07-13 ENCOUNTER — APPOINTMENT (OUTPATIENT)
Dept: CARDIOLOGY | Age: 68
End: 2023-07-13

## 2023-07-18 ENCOUNTER — OFFICE VISIT (OUTPATIENT)
Dept: SURGERY | Age: 68
End: 2023-07-18

## 2023-07-18 VITALS — HEART RATE: 68 BPM | DIASTOLIC BLOOD PRESSURE: 78 MMHG | SYSTOLIC BLOOD PRESSURE: 123 MMHG | OXYGEN SATURATION: 97 %

## 2023-07-18 DIAGNOSIS — K64.1 GRADE II HEMORRHOIDS: Primary | ICD-10-CM

## 2023-07-18 PROCEDURE — 99214 OFFICE O/P EST MOD 30 MIN: CPT | Performed by: STUDENT IN AN ORGANIZED HEALTH CARE EDUCATION/TRAINING PROGRAM

## 2023-07-18 ASSESSMENT — ENCOUNTER SYMPTOMS
VOMITING: 0
ACTIVITY CHANGE: 0
APPETITE CHANGE: 0
DIZZINESS: 0
ADENOPATHY: 0
CHEST TIGHTNESS: 0
COLOR CHANGE: 0
FATIGUE: 0
NAUSEA: 0
CONFUSION: 0
SHORTNESS OF BREATH: 0
ABDOMINAL DISTENTION: 0
LIGHT-HEADEDNESS: 0
FEVER: 0

## 2023-07-18 ASSESSMENT — PAIN SCALES - GENERAL: PAINLEVEL: 0

## 2023-07-21 ENCOUNTER — TELEPHONE (OUTPATIENT)
Dept: INTERNAL MEDICINE | Age: 68
End: 2023-07-21

## 2023-07-21 ENCOUNTER — OFFICE VISIT (OUTPATIENT)
Dept: FAMILY MEDICINE | Age: 68
End: 2023-07-21

## 2023-07-21 ENCOUNTER — HOSPITAL ENCOUNTER (OUTPATIENT)
Dept: GENERAL RADIOLOGY | Age: 68
Discharge: HOME | End: 2023-07-21

## 2023-07-21 VITALS
WEIGHT: 148.08 LBS | RESPIRATION RATE: 18 BRPM | HEIGHT: 66 IN | TEMPERATURE: 100.2 F | BODY MASS INDEX: 23.8 KG/M2 | OXYGEN SATURATION: 98 % | HEART RATE: 94 BPM | SYSTOLIC BLOOD PRESSURE: 137 MMHG | DIASTOLIC BLOOD PRESSURE: 81 MMHG

## 2023-07-21 DIAGNOSIS — K22.89 ESOPHAGEAL MASS: ICD-10-CM

## 2023-07-21 DIAGNOSIS — R50.9 FEVER, UNSPECIFIED FEVER CAUSE: ICD-10-CM

## 2023-07-21 DIAGNOSIS — R50.9 FEVER, UNSPECIFIED FEVER CAUSE: Primary | ICD-10-CM

## 2023-07-21 LAB
APPEARANCE, POC: ABNORMAL
BILIRUBIN, POC: NEGATIVE
COLOR, POC: YELLOW
GLUCOSE UR-MCNC: NEGATIVE MG/DL
KETONES, POC: ABNORMAL MG/DL
NITRITE, POC: NEGATIVE
OCCULT BLOOD, POC: NEGATIVE
PH UR: 7.5 [PH] (ref 5–7)
PROT UR-MCNC: NEGATIVE MG/DL
SP GR UR: 1.02 (ref 1–1.03)
UROBILINOGEN UR-MCNC: 1 MG/DL (ref 0–1)
WBC (LEUKOCYTE) ESTERASE, POC: NEGATIVE

## 2023-07-21 PROCEDURE — 99214 OFFICE O/P EST MOD 30 MIN: CPT | Performed by: STUDENT IN AN ORGANIZED HEALTH CARE EDUCATION/TRAINING PROGRAM

## 2023-07-21 PROCEDURE — 71046 X-RAY EXAM CHEST 2 VIEWS: CPT

## 2023-07-21 PROCEDURE — 81002 URINALYSIS NONAUTO W/O SCOPE: CPT | Performed by: STUDENT IN AN ORGANIZED HEALTH CARE EDUCATION/TRAINING PROGRAM

## 2023-07-21 ASSESSMENT — PATIENT HEALTH QUESTIONNAIRE - PHQ9
SUM OF ALL RESPONSES TO PHQ9 QUESTIONS 1 AND 2: 0
SUM OF ALL RESPONSES TO PHQ9 QUESTIONS 1 AND 2: 0
2. FEELING DOWN, DEPRESSED OR HOPELESS: NOT AT ALL
CLINICAL INTERPRETATION OF PHQ2 SCORE: NO FURTHER SCREENING NEEDED
1. LITTLE INTEREST OR PLEASURE IN DOING THINGS: NOT AT ALL

## 2023-07-22 ENCOUNTER — LAB SERVICES (OUTPATIENT)
Dept: LAB | Age: 68
End: 2023-07-22

## 2023-07-22 DIAGNOSIS — R50.9 FEVER, UNSPECIFIED FEVER CAUSE: ICD-10-CM

## 2023-07-22 LAB
BASOPHILS # BLD: 0 K/MCL (ref 0–0.3)
BASOPHILS NFR BLD: 0 %
CRP SERPL-MCNC: 6.8 MG/DL
DEPRECATED RDW RBC: 41 FL (ref 39–50)
EOSINOPHIL # BLD: 0 K/MCL (ref 0–0.5)
EOSINOPHIL NFR BLD: 0 %
ERYTHROCYTE [DISTWIDTH] IN BLOOD: 12.3 % (ref 11–15)
ERYTHROCYTE [SEDIMENTATION RATE] IN BLOOD BY WESTERGREN METHOD: 29 MM/HR (ref 0–20)
HCT VFR BLD CALC: 42.8 % (ref 39–51)
HGB BLD-MCNC: 14.5 G/DL (ref 13–17)
IMM GRANULOCYTES # BLD AUTO: 0 K/MCL (ref 0–0.2)
IMM GRANULOCYTES # BLD: 0 %
LYMPHOCYTES # BLD: 0.6 K/MCL (ref 1–4)
LYMPHOCYTES NFR BLD: 11 %
MCH RBC QN AUTO: 30.7 PG (ref 26–34)
MCHC RBC AUTO-ENTMCNC: 33.9 G/DL (ref 32–36.5)
MCV RBC AUTO: 90.5 FL (ref 78–100)
MONOCYTES # BLD: 0.8 K/MCL (ref 0.3–0.9)
MONOCYTES NFR BLD: 14 %
NEUTROPHILS # BLD: 4.2 K/MCL (ref 1.8–7.7)
NEUTROPHILS NFR BLD: 75 %
NRBC BLD MANUAL-RTO: 0 /100 WBC
PLATELET # BLD AUTO: 119 K/MCL (ref 140–450)
PROCALCITONIN SERPL IA-MCNC: <0.05 NG/ML
RBC # BLD: 4.73 MIL/MCL (ref 4.5–5.9)
WBC # BLD: 5.6 K/MCL (ref 4.2–11)

## 2023-07-22 PROCEDURE — 85025 COMPLETE CBC W/AUTO DIFF WBC: CPT | Performed by: CLINICAL MEDICAL LABORATORY

## 2023-07-22 PROCEDURE — 86140 C-REACTIVE PROTEIN: CPT | Performed by: CLINICAL MEDICAL LABORATORY

## 2023-07-22 PROCEDURE — 85652 RBC SED RATE AUTOMATED: CPT | Performed by: CLINICAL MEDICAL LABORATORY

## 2023-07-22 PROCEDURE — 84145 PROCALCITONIN (PCT): CPT | Performed by: CLINICAL MEDICAL LABORATORY

## 2023-07-22 PROCEDURE — 36415 COLL VENOUS BLD VENIPUNCTURE: CPT | Performed by: CLINICAL MEDICAL LABORATORY

## 2023-07-23 ENCOUNTER — TELEPHONE (OUTPATIENT)
Dept: FAMILY MEDICINE | Age: 68
End: 2023-07-23

## 2023-07-24 ENCOUNTER — TELEPHONE (OUTPATIENT)
Dept: PHYSICAL MEDICINE AND REHAB | Facility: CLINIC | Age: 68
End: 2023-07-24

## 2023-07-24 ENCOUNTER — OFFICE VISIT (OUTPATIENT)
Dept: PHYSICAL MEDICINE AND REHAB | Facility: CLINIC | Age: 68
End: 2023-07-24
Payer: MEDICARE

## 2023-07-24 VITALS — WEIGHT: 150 LBS | HEIGHT: 66 IN | BODY MASS INDEX: 24.11 KG/M2

## 2023-07-24 DIAGNOSIS — M51.37 DDD (DEGENERATIVE DISC DISEASE), LUMBOSACRAL: ICD-10-CM

## 2023-07-24 DIAGNOSIS — M47.816 LUMBAR SPONDYLOSIS: Primary | ICD-10-CM

## 2023-07-24 DIAGNOSIS — M47.816 FACET SYNDROME, LUMBAR: ICD-10-CM

## 2023-07-24 DIAGNOSIS — M79.10 MYALGIA: ICD-10-CM

## 2023-07-24 DIAGNOSIS — M48.061 LUMBAR FORAMINAL STENOSIS: ICD-10-CM

## 2023-07-24 DIAGNOSIS — M54.59 MECHANICAL LOW BACK PAIN: ICD-10-CM

## 2023-07-24 DIAGNOSIS — M51.36 BULGE OF LUMBAR DISC WITHOUT MYELOPATHY: ICD-10-CM

## 2023-07-24 DIAGNOSIS — M99.9 BIOMECHANICAL LESION: ICD-10-CM

## 2023-07-24 PROCEDURE — 99214 OFFICE O/P EST MOD 30 MIN: CPT | Performed by: PHYSICAL MEDICINE & REHABILITATION

## 2023-07-24 NOTE — TELEPHONE ENCOUNTER
Pt is requesting an order for an MRI, Pt stated that he can not lay nor sit without discomfort and would appreciate if the order could be put in today.

## 2023-07-24 NOTE — PATIENT INSTRUCTIONS
1) Start physical therapy as soon as possible  2) My office will call you to schedule the BILATERAL L4-L5 and L5-S1 facet joint injections under local anesthesia once the procedure is approved by your insurance carrier. 3) Follow up with me 2 weeks after the procedure. This can be in the office or virtually.

## 2023-07-24 NOTE — TELEPHONE ENCOUNTER
Per CMS Guidelines -no authorization is required for Bilateral L4-5, L5-S1 Facet joint injection CPT [id number], [id number]     Status: Authorization is not required when being performed at an [facility] however may be subject to review once claim is submitted-Covered Benefit

## 2023-07-25 ENCOUNTER — MED REC SCAN ONLY (OUTPATIENT)
Dept: NEUROLOGY | Facility: CLINIC | Age: 68
End: 2023-07-25

## 2023-07-25 ENCOUNTER — TELEPHONE (OUTPATIENT)
Dept: NEUROLOGY | Facility: CLINIC | Age: 68
End: 2023-07-25

## 2023-07-25 ENCOUNTER — TELEPHONE (OUTPATIENT)
Dept: INTERNAL MEDICINE | Age: 68
End: 2023-07-25

## 2023-07-25 NOTE — TELEPHONE ENCOUNTER
Per [place], [place]. Patient would like to continue with MRI Lumbar spine prior to scheduling injection. Last MRI was completed 01/2023 in [facility].

## 2023-07-25 NOTE — TELEPHONE ENCOUNTER
[facility] VNG test [facility]  DOS:  7/18/23    Placed in nurses bin for review

## 2023-07-25 NOTE — TELEPHONE ENCOUNTER
Contacted patient to schedule inj, patient requesting repeat lumbar MRI prior to injection due to his symptoms    See previous T.E

## 2023-07-26 NOTE — TELEPHONE ENCOUNTER
Patient has been scheduled for Bilateral L4-5 and L5-S1 Facet joint injections on 8/9/23 at the [facility] with Dr. Sawyer Puente.   -Anesthesia type: Local  -Patient informed to fast 12 hours prior to procedure with IVCS/MAC.   -Scheduling [address] covid testing required for all procedures whether patient is vaccinated or not. -Patient was advised that if he/she does receive the covid vaccine it needs to be at least 2 weeks before or after the injection. -Medications and allergies reviewed. -Patient reminded to hold NSAIDs (Ibuprofen, ASA 81, Aleve, Naproxen, Mobic, Diclofenac, Etodolac, Celebrex etc.) for 3 days prior to Lumbar MBB/Facet if BMI is greater than 35. For Cervical injections only hold multivitamins, Vitamin E, Fish Oil, Phentermine/Lomaira for 7 days prior to injection and NSAIDS.  mg to be held for 7 days prior to injections.  -If patient is receiving MAC/IVCS Phentermine Denice Lapine) will need to be held for 7 days prior to injection.  -If on blood thinner clearance has been received to hold this medication by provider.   -Patient informed he/she will need a  to and from procedure. [facility] is located in the [facility] [address] 1st floor,  may park in the yellow/purple parking lot. Patient verbalized understanding and agrees with plan.  -----> Scheduled in Epic: Yes  -----> Scheduled in [employer]: Yes  Placed on wait list.    Patient extremely upset, states he is going to have to come up here and talk to us. Informed that he would be speaking to the same person. Patient states he scheduled an appointment with Ayleen Rodriguez MA 7/25/23 for 8/2/23 advised at this time Dr. Sawyer Puente was full until 8/9/23. Advised to let him speak, which this MA did. Patient then advised it was ok for this MA to talk. Patient continuously interrupted this MA while trying to go over procedure. Patient states I am not over speaking, loud or being rude.  Informed him again of protocol and was rude,  Any cancellation, we will call to schedule sooner, patient scoffed advised this MA was ending call.

## 2023-08-04 ENCOUNTER — LAB SERVICES (OUTPATIENT)
Dept: LAB | Age: 68
End: 2023-08-04

## 2023-08-04 ENCOUNTER — OFFICE VISIT (OUTPATIENT)
Dept: PHYSICAL THERAPY | Facility: HOSPITAL | Age: 68
End: 2023-08-04
Attending: PHYSICAL MEDICINE & REHABILITATION
Payer: MEDICARE

## 2023-08-04 DIAGNOSIS — M99.9 BIOMECHANICAL LESION: ICD-10-CM

## 2023-08-04 DIAGNOSIS — M54.59 MECHANICAL LOW BACK PAIN: ICD-10-CM

## 2023-08-04 DIAGNOSIS — R97.20 ELEVATED PROSTATE SPECIFIC ANTIGEN (PSA): Primary | ICD-10-CM

## 2023-08-04 DIAGNOSIS — M47.816 FACET SYNDROME, LUMBAR: ICD-10-CM

## 2023-08-04 DIAGNOSIS — M51.36 BULGE OF LUMBAR DISC WITHOUT MYELOPATHY: ICD-10-CM

## 2023-08-04 DIAGNOSIS — M47.816 LUMBAR SPONDYLOSIS: Primary | ICD-10-CM

## 2023-08-04 DIAGNOSIS — M79.10 MYALGIA: ICD-10-CM

## 2023-08-04 DIAGNOSIS — M48.061 LUMBAR FORAMINAL STENOSIS: ICD-10-CM

## 2023-08-04 DIAGNOSIS — M51.37 DDD (DEGENERATIVE DISC DISEASE), LUMBOSACRAL: ICD-10-CM

## 2023-08-04 LAB — PSA SERPL-MCNC: 5.6 NG/ML

## 2023-08-04 PROCEDURE — 97162 PT EVAL MOD COMPLEX 30 MIN: CPT

## 2023-08-04 PROCEDURE — 84153 ASSAY OF PSA TOTAL: CPT | Performed by: CLINICAL MEDICAL LABORATORY

## 2023-08-04 PROCEDURE — 97110 THERAPEUTIC EXERCISES: CPT

## 2023-08-04 PROCEDURE — 36415 COLL VENOUS BLD VENIPUNCTURE: CPT | Performed by: CLINICAL MEDICAL LABORATORY

## 2023-08-04 NOTE — PROGRESS NOTES
LUMBAR SPINE EVALUATION:   Honorio Whiteside    2/22/1955  Referring Physician:  Kishor Quintero  Diagnosis: Lumbar spondylosis (M47.816)  Myalgia (M79.10)  DDD (degenerative disc disease), lumbosacral (M51.37)  Lumbar foraminal stenosis (M48.061)  Facet syndrome, lumbar (M47.816)  Biomechanical lesion (M99.9)  Bulge of lumbar disc without myelopathy (M51.36)  Mechanical low back pain (M54.59)  Initial Evaluation Date: 8/4/2023  Date of Surgery: None for this diagnosis   Authorized # of visits 12 by 7/2024    Precautions/Hx: OA, B carotid stenosis, compression fx c-spine, GERD, mitral regurgitation, medial meniscus tear, palpitations,      Past medical history was reviewed with Renae Rodriguez. Past Medical History:   Diagnosis Date    Arthritis     Back pain     Bilateral carotid artery disease ([address] 3/22/2018    Carotid stenosis, bilateral 09/21/2017    < 50% bilat at [facility], recheck 2022    Compression fracture of cervical spine ([address] 6/13/2011    Esophageal reflux     HNP (herniated nucleus pulposus), cervical 6/13/2011    Left axis deviation 11/12/11    stable from 2001, early repol    Mitral regurgitation 11-16-09    MMT (medial meniscus tear) 6/13/2011    Palpitations 12/27/2010    Refused influenza vaccine 11/28/2012    Screening for cardiovascular condition 11-16-09    ECHO with ef 60%, mild pulmonic and tricuspid insuff, mild MR    Vitamin D deficiency 4/28/2014     Past Surgical History:   Procedure Laterality Date    COLONOSCOPY,DIAGNOSTIC  2006     normal, EGD as well     OTHER SURGICAL HISTORY      right foot screw    OTHER SURGICAL HISTORY      multiple lipoma approx 30 yr ago    OTHER SURGICAL HISTORY  2012    left knee meniscus    REPAIR ING HERNIA,5+Y/O,REDUCIBL         PATIENT SUMMARY   Honorio Whiteside is a 76year old y/o male who presents to therapy today with complaints of LBP. He uses ice and heat to control his pain. He continues to sleep on the couch.   He states that he will never be able to sleep on his sides again. He has increased pain w trunk flex and rotation. He continues to wear his SI belt and feels that it helps him. History of current condition: recent worsening of chronic issues. Current functional limitations include, but are not limited to standing, transfers, lifting, working. Prior treatments: PT  Recent accidents or falls: none  [facility] level of function as able to perform all desired ADL's without difficulty prior to previous episode of care. Pt goals included as physical therapy goals below. ASSESSMENT:   Pt presents with subjective complaints and functional limitations as noted above. Pt's function and objective finding are consistent with physician's diagnosis. The results of the objective tests and measures demonstrate the following limitations: postural alterations; gait deviations; decreased lumbar ROM; decreased B hip ROM notable for IR; decreased B sciatic nerve glide; decreased B LE flexibility;  and significant fear avoidance behavior. Pt and PT discussed evaluation findings, pathology, POC and HEP. Pt voiced understanding and performs HEP correctly without reported pain. Skilled Physical Therapy is medically necessary to address the above impairments and reach functional goals. In agreement with functional score and clinical rationale, this evaluation involved Moderate Complexity decision making due to 1-2 personal factors/comorbidities, 3 body structures involved/activity limitations, and evolving symptoms including changing pain levels. SUBJECTIVE:     Visit count 11/2/2023 1/8     Date 8/4/2023     LBP/ B SI      Pain Range 1-2 to 7/10     Pain Ave 4-5/10       Better: SI belt, reclined on couch  Sensation: no  Night: falls asleep - ok ; Position - couch; Bedtime - 12-1am; Hours of sleep - 7; Wakes - no; Sweats - no.   Incontinence: no, seeing urologist for prostate  Saddle Anesthesia: no  GI: GERD  Stress: high  Work:   Living environment: house, alone  Stairs: 2 steps in, laundry basement  Unexplained wt loss: no  Increased pain w coughing, sneezing, straining in the bathroom: no  Blood thinners: no  Diabetes: no  Latex Allergy: no  Pacemaker: no    Are you being hurt, frightened, demeaned, or taken advantage of by anyone at your home or in your life? No   Have you recently had thoughts of hurting yourself?  No   Have you tried to hurt yourself in the past? No     OBJECTIVE:     Objective Initial Evaluation Data 8/4/2023:    Oswestry Disability Index Score  Score: 48 % (8/6/2023  1:58 PM)    Gait:        Deviations: loss of trunk extn and rotation       Devices: none       Assistance: none      Posture 8/4/2023   Alignment Loss of lumbar extn, L trunk shift       Sensation 8/4/2023   Dermatomes Light Touch    Proximal medial thigh R (L2)  wnl   Proximal medial thigh L (L2) wnl   Above knee R (L3) wnl   Above knee L (L3) wnl   Medial arch R(L4) wnl   Medial arch L (L4) wnl   Between 1st - 2nd toes R (L5) wnl   Between 1st - 2nd toes L (L5) wnl   Lateral border of foot R (S1) wnl   Lateral border of foot L (S1) wnl   Popliteal fossa R (S2) wnl   Popliteal fossa L(S2) wnl       Abdominals 8/4/2023   Tone  good       Lumbopelvic 8/4/2023   Control  Poor d/t lack of mobility and fear avoidance        ROM Lumbar 8/4/2023   Flexion Max, refuses   Extension max   R SB mod   L SB mod   R Rotation mod   L Rotation mod   * pain limiting    ROM Hip 8/4/2023   Flex R 125 124   Flex L 125 122   ER R 45 50   ER L 45 45   IR R 40 -3   IR L 40 5   *pain limiting     MMT 5/5 8/4/2023   L2- hip flex R 5   Hip flex L 5   L3- knee ext R 5   Knee ext L 5   L4- ankle DF R 5   Ankle DF L 5   L5- EHL R 5   EHL L 5   S1- ankle PF R 5   Ankle PF L 5   S2- Hams R 5   Hams L 5   *pain limiting    LE flexibility 8/4/2023   Piriformis R mod   Piriformis L mod   Hams R -62   Hams L -50   *pain limiting    Neural mobility 8/4/2023   SLR R (+) 40 deg   SLR L (+) 45 deg      VENKATA 8/4/2023   R (-) wnl ROM   L (-) min ROM loss        Imaging:     PROCEDURE: MRI SPINE LUMBAR (CPT=72148)     COMPARISON: [facility], XR LUMBAR SPINE (MIN 2 VIEWS) (CPT=72100), 5/07/2018, 17:35. [facility], MRI SPINE LUMBAR (NTM=[id number]), 9/18/2017, 15:07. INDICATIONS: intractable low back pain     TECHNIQUE: A variety of imaging planes and parameters were utilized for visualization of suspected pathology. FINDINGS:  NUMERATION: For the purposes of this examination, the lowest fully formed disc space is designated as L5-S1.  ALIGNMENT: There is preservation of the expected lumbar lordosis. BONES: Probable 2.7 cm hemangioma within S1 vertebral body. Right iliac bone island. Schmorl's node extending into the superior T12 endplate. Degenerative-type edema involving the anterosuperior L3 endplate. CORD/CAUDA EQUINA: The conus medullaris terminates at the level of the inferior T12 endplate. The distal cord and nerve roots have normal caliber, contour, and signal intensity. PARASPINAL AREA: No visible mass. OTHER: Probable 1.8 cm right lower pole renal cyst.     LUMBAR DISC LEVELS:  L1-L2: No significant disc/facet abnormality, spinal stenosis, or foraminal stenosis. L2-L3: Small diffuse disc bulge, which is slightly eccentric to the left. Mild ligamentum flavum redundancy. No spinal canal or neural foraminal compromise. L3-L4: Minimal post degeneration and desiccation. No significant neural foraminal or spinal canal compromise. L4-L5: There is a diffuse disc bulge with superimposed central disc protrusion/annular fissure. Mild omentum from redundancy and epidural lipomatosis. Mild spinal canal stenosis. Mild to moderate right and mild left lateral recess stenosis. No  significant neural foraminal compromise. L5-S1: No significant disc/facet abnormality, spinal stenosis, or foraminal stenosis. Impression   CONCLUSION:  1.  Mild multilevel lumbar spine degenerative changes as detailed. Most notable is a central disc protrusion/annular fissure at L4-L5, which results in mild spinal canal stenosis as well as mild to moderate right and mild left lateral recess stenosis at  this level. No additional significant neural compromise throughout the lumbar spine. 2. Probable hemangioma within the S1 vertebral body, unchanged since September, 2017.  3. Right renal cyst, incompletely characterized on this non-dedicated exam.  4. Lesser incidental findings as above. Dictated by (CST): Ray Palomino MD on 5/09/2018 at 9:11      Approved by (CST): Ray Palomino MD on 5/09/2018 at 9:19     PROCEDURE: XR LUMBAR SPINE (MIN 2 VIEWS) (CPT=72100)     COMPARISON: [facility], XR LUMBAR SPINE (MIN 2 VIEWS) (CPT=72100), 12/10/2017, 15:21. INDICATIONS: Atraumatic chronic lower back pain worsening today. TECHNIQUE: Lumbar spine radiographs (4 views)       FINDINGS:     ALIGNMENT: Normal alignment. VERTEBRAL BODIES: There is demineralization of the bony structures. Minimal vertebral body spurring is noted. Otherwise no significant subluxation, fracture or visible bony lesion. DISC SPACES: There is narrowing of  the L4-L5 disc space. Otherwise no significant disc space narrowing. SACROILIAC JOINTS: Normal.    OTHER: Minor vascular calcifications noted. Impression   CONCLUSION:  1. Demineralization. 2. Minimal osteoarthritis. 3. Atherosclerosis. Dictated by (CST): Lobito Mitchell MD on 5/07/2018 at 18:06      Approved by (CST):  Lobito Mitchell MD on 5/07/2018 at 18:08         Treatment performed this date:    Therapeutic Exercise:  Visit #   1/8   Position Exercise HEP 8/4/2023   Supine  Sciatic nerve glide H X 10x    Cross leg piriformis H X 3x20 sec    SKC h X 3x20 sec    DKC  X pt refused as he is concerned his disc will come out    Hesch B hip IR self tx towels 5 min H X   Sitting Leg pumps nerve glide H X 10x    Piriformis stretch H X 3x20 sec         H = HEP. Pt given copies of this exercise for home program.  X = Exercises done this date - pt verbalized understanding and demonstrated competence. All exercises done B unless otherwise indicated. Pt advised to discontinue exercises that increase pain and to call or return to therapist to discuss. Each intervention above is specifically prescribed to address the patients identified impairments, activity limitations, and participation restrictions. ASSESSMENT:     Physical Therapy Goals: From 8/4/2023 to 11/2/2023  - Created with patient input during initial evaluation   1. Pt will be independent in beginning level of HEP for stretching, posture and strengthening. 2. 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 3. 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 4. Pt will be able to transfer on/off toilet without increased symptoms. 5. Pt will be able to stand for 20 min x 2 for work without increased symptoms. PLAN OF CARE:      Frequency / Duration: Patient will be seen for 1-2x/week decreasing to every other week for a total of 18 visits over a 90 day period for therapeutic exercises, posture retraining, therapeutic activities, manual treatment, neuromuscular reeducation, therapeutic pain neuroscience education, patient education, modalities as needed, home exercise program.    Education or treatment limitation: None  Rehab Potential:good    Patient was advised of these findings, precautions, goals of treatment, plan of care, beginning self care and treatment options and has agreed to actively participate in planning and for this course of care. Pt educated on possible soreness after evaluation w use of modalities as needed (ice/heat), postural corrections and the importance of staying active.     Charges: PT Eval 2, TE 2 - 12min    Total Timed treatment: 12 min      Total Treatment Time: 45 min    Thank you for your referral. Please co-sign or sign and return this letter via fax as soon as possible to [phone]. If you have any questions, please contact me at Dept: [phone]    Sincerely,  Electronically signed by therapist: Alicia Rios, PT    [de-identified] certification required: Yes  I certify the need for these services furnished under this plan of treatment and while under my care. X___________________________________________________ Date____________________    Certification From: 9/8/4724  To:11/2/2023        __________________________________________________________________________________________      21st Century Cures Act Notice to Patient: Medical documents like this are made available to patients in the interest of transparency. However, be advised this is a medical document and it is intended as [id number] communication between your medical providers. This medical document may contain abbreviations, assessments, medical data, and results or other terms that are unfamiliar. Medical documents are intended to carry relevant information, facts as evident, and the clinical opinion of the practitioner. As such, this medical document may be written in language that appears blunt or direct. You are encouraged to contact your medical provider and/or [facility] Patient Experience if you have any questions about this medical document.

## 2023-08-09 ENCOUNTER — OFFICE VISIT (OUTPATIENT)
Dept: SURGERY | Facility: CLINIC | Age: 68
End: 2023-08-09
Payer: MEDICARE

## 2023-08-09 DIAGNOSIS — M47.816 FACET SYNDROME, LUMBAR: ICD-10-CM

## 2023-08-09 DIAGNOSIS — M54.59 MECHANICAL LOW BACK PAIN: ICD-10-CM

## 2023-08-09 DIAGNOSIS — M48.062 SPINAL STENOSIS OF LUMBAR REGION WITH NEUROGENIC CLAUDICATION: ICD-10-CM

## 2023-08-09 DIAGNOSIS — M47.816 LUMBAR SPONDYLOSIS: ICD-10-CM

## 2023-08-09 DIAGNOSIS — M48.061 LUMBAR FORAMINAL STENOSIS: ICD-10-CM

## 2023-08-09 DIAGNOSIS — M54.16 LUMBAR RADICULOPATHY: ICD-10-CM

## 2023-08-09 DIAGNOSIS — M51.36 BULGE OF LUMBAR DISC WITHOUT MYELOPATHY: ICD-10-CM

## 2023-08-09 DIAGNOSIS — M51.37 DDD (DEGENERATIVE DISC DISEASE), LUMBOSACRAL: Primary | ICD-10-CM

## 2023-08-09 PROCEDURE — 64494 INJ PARAVERT F JNT L/S 2 LEV: CPT | Performed by: PHYSICAL MEDICINE & REHABILITATION

## 2023-08-09 PROCEDURE — 64493 INJ PARAVERT F JNT L/S 1 LEV: CPT | Performed by: PHYSICAL MEDICINE & REHABILITATION

## 2023-08-09 NOTE — PROCEDURES
[facility] Z-JOINT/FACET INJECTIONS  NAME:  Jensen Hagen    MR #:    [id number] :  1955     PHYSICIAN:  Kirstie Merlos MD        Operative Report    DATE OF PROCEDURE: 2023   PREOPERATIVE DIAGNOSES: 1. DDD (degenerative disc disease), lumbosacral    2. Lumbar spondylosis    3. Mechanical low back pain    4. Bulge of lumbar disc without myelopathy    5. Lumbar foraminal stenosis    6. Lumbar radiculopathy    7. Facet syndrome, lumbar    8. Spinal stenosis of lumbar region with neurogenic claudication        POSTOPERATIVE DIAGNOSES:   1. DDD (degenerative disc disease), lumbosacral    2. Lumbar spondylosis    3. Mechanical low back pain    4. Bulge of lumbar disc without myelopathy    5. Lumbar foraminal stenosis    6. Lumbar radiculopathy    7. Facet syndrome, lumbar    8. Spinal stenosis of lumbar region with neurogenic claudication        PROCEDURES: Bilateral L4-5 and L5-S1 Z-joint/facet injection done under fluoroscopic guidance with contrast enhancement. SURGEON: Kirstie Merlos MD   ANESTHESIA: Local   INDICATIONS:      OPERATIVE PROCEDURE:  Written consent was obtained from the patient. The patient was brought into the operating room and placed in the prone position on the fluoroscopy table with a pillow underneath the abdomen. The patient's skin was cleaned and draped in a normal sterile fashion. Using AP fluoroscopy, all five lumbar vertebrae were identified. Then the Bilateral L4-5 and L5-S1 z-joints were identified on AP view. At this point in time, the patient's skin was anesthetized with 1 to 2 cc of 1% PF lidocaine without epinephrine over each joint site. Then, 3.5 inch, 22-gauge spinal needles were inserted and directed towards the Bilateral L4-5 and L5-S1 z-joints . When it felt to be in good position, Bilateral anterior oblique fluoroscopy was used to advance the needle into the correct z-joint.   At this point in time, Omnipaque-240 contrast was used to obtain a good athrogram indicating correct needle placement. Then, aspiration was performed. No blood, fluid, or air was aspirated and each joint was injected with a 1 cc solution of 1/2 cc of 40 mg/cc of Kenalog and 1/2 cc of 1% PF lidocaine without epinephrine. After this, the needles were removed. The patient's skin was cleaned. Band-Aids were applied. The patient was transferred to the cart and into [facility]. The patient was given discharge instructions and will follow up in 2 weeks at our clinic. Throughout the whole procedure, the patient's pulse oximetry and vital signs were monitored and they remained completely stable. Also, throughout the whole procedure, prior to injection of any medication, aspiration was performed. No blood, fluid, or air was aspirated at anytime. Irlanda Mayen.  Nell Knott MD, [facility]  Physical Medicine and Rehabilitation/Sports Medicine  MEDICAL CENTER [facility]

## 2023-08-09 NOTE — PATIENT INSTRUCTIONS
Post Injection Instructions     Please do not do anything strenuous over the next two days (if you had a knee injection do not walk more than 2 city blocks, do not attend any aerobic classes, do not run, no heavy lifting, no prolong standing). You may resume your day to day activities after your injection. You may experience some mild amount of swelling after the procedure. Please ice your joint that was injected at least 5-6 times a day (15 minutes) for two days after (this will help prevent worsening pain that sometimes occurs after an injection). Only take tylenol if needed for pain for the first few days. Watch for signs of infection which include redness, warmth, worsening pain, fevers or chills. If you develop any of these signs call the office immediately at [phone]    Everyone responds differently to injections, but you can expect your peak effects a few weeks after your last injection. Rickie Edmonds.  Cory Dasilva MD  Physical Medicine and Rehabilitation/Sports Medicine  MEDICAL CENTER [facility]

## 2023-08-14 ENCOUNTER — LAB SERVICES (OUTPATIENT)
Dept: LAB | Age: 68
End: 2023-08-14

## 2023-08-14 DIAGNOSIS — R97.20 ELEVATED PSA: Primary | ICD-10-CM

## 2023-08-14 PROCEDURE — 83970 ASSAY OF PARATHORMONE: CPT | Performed by: CLINICAL MEDICAL LABORATORY

## 2023-08-14 PROCEDURE — 36415 COLL VENOUS BLD VENIPUNCTURE: CPT | Performed by: CLINICAL MEDICAL LABORATORY

## 2023-08-15 LAB — PTH-INTACT SERPL-MCNC: 31 PG/ML (ref 19–88)

## 2023-08-22 ENCOUNTER — OFFICE VISIT (OUTPATIENT)
Dept: PHYSICAL THERAPY | Facility: HOSPITAL | Age: 68
End: 2023-08-22
Attending: PHYSICAL MEDICINE & REHABILITATION
Payer: MEDICARE

## 2023-08-22 PROCEDURE — 97110 THERAPEUTIC EXERCISES: CPT

## 2023-08-22 PROCEDURE — 97140 MANUAL THERAPY 1/> REGIONS: CPT

## 2023-08-22 NOTE — PROGRESS NOTES
Law Hagen    2/22/1955  Referring Physician:  Tamica Patel  Diagnosis: Lumbar spondylosis (M47.816)  Myalgia (M79.10)  DDD (degenerative disc disease), lumbosacral (M51.37)  Lumbar foraminal stenosis (M48.061)  Facet syndrome, lumbar (M47.816)  Biomechanical lesion (M99.9)  Bulge of lumbar disc without myelopathy (M51.36)  Mechanical low back pain (M54.59)  Initial Evaluation Date: 8/4/2023  Date of Surgery: None for this diagnosis   Authorized # of visits 12 by 7/2024    Precautions/Hx: OA, B carotid stenosis, compression fx c-spine, GERD, mitral regurgitation, medial meniscus tear, palpitations,    8/9/2023 B L4-5 and L5-S1 facet injection      SUBJECTIVE:     Pt did not have any adverse reaction to the initial evaluation or treatment. Had injections that have given him relief. 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 He is controlling his pain w rest and ice. Pt states that he is now able to sit up straight in the bathtub. He continues to sleep on the couch. Visit count 11/2/2023 1/8 2/8    Date 8/4/2023 8/22/2023     LBP/ B SI      Pain Range 1-2 to 7/10 1 to 2-3/10    Pain Ave 4-5/10 1-2/10         OBJECTIVE:       Treatment performed this date:    Therapeutic Exercise:  Visit #   1/8 2/8   Position Exercise HEP 8/4/2023 8/22/2023    Supine  Sciatic nerve glide H X 10x     Cross leg piriformis H X 3x20 sec     SKC h X 3x20 sec     DKC  X pt refused as he is concerned his disc will come out     Hesch B hip IR self tx towels 5 min H X     Thoracic decompression   X 40 to towel layers    B shldr flexed hands clasped   X 10x2 w progressive decrease of head positioning    Chin tuck   X  5x2    Thoracic extn over towel 3 min w shldr flex   X    Sitting Leg pumps nerve glide H X 10x     Piriformis stretch H X 3x20 sec           Man Tx MET   X T5, T7 FRSR;   Ribs:  Ant - R 7          H = HEP.  Pt given copies of this exercise for home program.  X = Exercises done this date - pt verbalized understanding and demonstrated competence. All exercises done B unless otherwise indicated. Pt advised to discontinue exercises that increase pain and to call or return to therapist to discuss. Each intervention above is specifically prescribed to address the patients identified impairments, activity limitations, and participation restrictions. ASSESSMENT:     Pt had good pain relief from his recent injections. Pt noting ongoing thoracic pain. Pt demonstrates segmental mobility limitations as noted above and tolerated manual therapy with >50% improvement in mobility after treatment. Pt advised that man tx goal is to allow improved and ongoing mobility and strength. Pt demonstrates significant loss of thoracic mobility especially for extn. Pt instructed in self mobilization and tolerated without adverse reaction. Pt continues to verbalize fear based beliefs about his ability to move with concerns about his disc coming out. Physical Therapy Goals: From 8/4/2023 to 11/2/2023  - Created with patient input during initial evaluation   1. Pt will be independent in beginning level of HEP for stretching, posture and strengthening. 2. 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 3. 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 4. Pt will be able to transfer on/off toilet without increased symptoms. 5. Pt will be able to stand for 20 min x 2 for work without increased symptoms. PLAN OF CARE:      Continue PT per original plan for therapeutic exercises, posture retraining, therapeutic activities, manual treatment, neuromuscular reeducation, therapeutic pain neuroscience education, patient education, self care home management, home exercise program, and modalities as needed.     Charged Units Units Minutes   Ther Ex 2 25   Neuro     Ther Activity     Gait     Man Tx 1 20        Total Timed  45   Total Tx Time  45           __________________________________________________________________________________________      21st Century Cures Act Notice to Patient: Medical documents like this are made available to patients in the interest of transparency. However, be advised this is a medical document and it is intended as cnxa-by-yfvx communication between your medical providers. This medical document may contain abbreviations, assessments, medical data, and results or other terms that are unfamiliar. Medical documents are intended to carry relevant information, facts as evident, and the clinical opinion of the practitioner. As such, this medical document may be written in language that appears blunt or direct. You are encouraged to contact your medical provider and/or [facility] Experience if you have any questions about this medical document.   Objective Initial Evaluation Data 8/4/2023:    Oswestry Disability Index Score  Score: 48 % (8/6/2023  1:58 PM)    Gait:        Deviations: loss of trunk extn and rotation       Devices: none       Assistance: none      Posture 8/4/2023   Alignment Loss of lumbar extn, L trunk shift       Sensation 8/4/2023   Dermatomes Light Touch    Proximal medial thigh R (L2)  wnl   Proximal medial thigh L (L2) wnl   Above knee R (L3) wnl   Above knee L (L3) wnl   Medial arch R(L4) wnl   Medial arch L (L4) wnl   Between 1st - 2nd toes R (L5) wnl   Between 1st - 2nd toes L (L5) wnl   Lateral border of foot R (S1) wnl   Lateral border of foot L (S1) wnl   Popliteal fossa R (S2) wnl   Popliteal fossa L(S2) wnl       Abdominals 8/4/2023   Tone  good       Lumbopelvic 8/4/2023   Control  Poor d/t lack of mobility and fear avoidance        ROM Lumbar 8/4/2023   Flexion Max, refuses   Extension max   R SB mod   L SB mod   R Rotation mod   L Rotation mod   * pain limiting    ROM Hip 8/4/2023   Flex R 125 124   Flex L 125 122   ER R 45 50   ER L 45 45   IR R 40 -3   IR L 40 5 *pain limiting     MMT 5/5 8/4/2023   L2- hip flex R 5   Hip flex L 5   L3- knee ext R 5   Knee ext L 5   L4- ankle DF R 5   Ankle DF L 5   L5- EHL R 5   EHL L 5   S1- ankle PF R 5   Ankle PF L 5   S2- Hams R 5   Hams L 5   *pain limiting    LE flexibility 8/4/2023   Piriformis R mod   Piriformis L mod   Hams R -62   Hams L -50   *pain limiting    Neural mobility 8/4/2023   SLR R (+) 40 deg   SLR L (+) 45 deg      VENKATA 8/4/2023   R (-) wnl ROM   L (-) min ROM loss        Imaging:     PROCEDURE: MRI SPINE LUMBAR (CPT=72148)     COMPARISON: [facility], XR LUMBAR SPINE (MIN 2 VIEWS) (CPT=72100), 5/07/2018, 17:35. [facility], MRI SPINE LUMBAR (QYO=[id number]), 9/18/2017, 15:07. INDICATIONS: intractable low back pain     TECHNIQUE: A variety of imaging planes and parameters were utilized for visualization of suspected pathology. FINDINGS:  NUMERATION: For the purposes of this examination, the lowest fully formed disc space is designated as L5-S1.  ALIGNMENT: There is preservation of the expected lumbar lordosis. BONES: Probable 2.7 cm hemangioma within S1 vertebral body. Right iliac bone island. Schmorl's node extending into the superior T12 endplate. Degenerative-type edema involving the anterosuperior L3 endplate. CORD/CAUDA EQUINA: The conus medullaris terminates at the level of the inferior T12 endplate. The distal cord and nerve roots have normal caliber, contour, and signal intensity. PARASPINAL AREA: No visible mass. OTHER: Probable 1.8 cm right lower pole renal cyst.     LUMBAR DISC LEVELS:  L1-L2: No significant disc/facet abnormality, spinal stenosis, or foraminal stenosis. L2-L3: Small diffuse disc bulge, which is slightly eccentric to the left. Mild ligamentum flavum redundancy. No spinal canal or neural foraminal compromise. L3-L4: Minimal post degeneration and desiccation. No significant neural foraminal or spinal canal compromise.   L4-L5: There is a diffuse disc bulge with superimposed central disc protrusion/annular fissure. Mild omentum from redundancy and epidural lipomatosis. Mild spinal canal stenosis. Mild to moderate right and mild left lateral recess stenosis. No  significant neural foraminal compromise. L5-S1: No significant disc/facet abnormality, spinal stenosis, or foraminal stenosis. Impression   CONCLUSION:  1. Mild multilevel lumbar spine degenerative changes as detailed. Most notable is a central disc protrusion/annular fissure at L4-L5, which results in mild spinal canal stenosis as well as mild to moderate right and mild left lateral recess stenosis at  this level. No additional significant neural compromise throughout the lumbar spine. 2. Probable hemangioma within the S1 vertebral body, unchanged since September, 2017.  3. Right renal cyst, incompletely characterized on this non-dedicated exam.  4. Lesser incidental findings as above. Dictated by (CST): Eulalia Haas MD on 5/09/2018 at 9:11      Approved by (CST): Eulalia Haas MD on 5/09/2018 at 9:19     PROCEDURE: XR LUMBAR SPINE (MIN 2 VIEWS) (CPT=72100)     COMPARISON: [facility], XR LUMBAR SPINE (MIN 2 VIEWS) (CPT=72100), 12/10/2017, 15:21. INDICATIONS: Atraumatic chronic lower back pain worsening today. TECHNIQUE: Lumbar spine radiographs (4 views)       FINDINGS:     ALIGNMENT: Normal alignment. VERTEBRAL BODIES: There is demineralization of the bony structures. Minimal vertebral body spurring is noted. Otherwise no significant subluxation, fracture or visible bony lesion. DISC SPACES: There is narrowing of  the L4-L5 disc space. Otherwise no significant disc space narrowing. SACROILIAC JOINTS: Normal.    OTHER: Minor vascular calcifications noted. Impression   CONCLUSION:  1. Demineralization. 2. Minimal osteoarthritis. 3. Atherosclerosis. Dictated by (CST):  Betsey Collier MD on 5/07/2018 at 18:06      Approved by (CST):  Ruffin Oppenheim, MD on 5/07/2018 at 18:08

## 2023-08-28 ENCOUNTER — TELEPHONE (OUTPATIENT)
Dept: PHYSICAL MEDICINE AND REHAB | Facility: CLINIC | Age: 68
End: 2023-08-28

## 2023-08-28 ENCOUNTER — MED REC SCAN ONLY (OUTPATIENT)
Dept: PHYSICAL MEDICINE AND REHAB | Facility: CLINIC | Age: 68
End: 2023-08-28

## 2023-08-28 ENCOUNTER — OFFICE VISIT (OUTPATIENT)
Dept: PHYSICAL MEDICINE AND REHAB | Facility: CLINIC | Age: 68
End: 2023-08-28
Payer: MEDICARE

## 2023-08-28 VITALS — SYSTOLIC BLOOD PRESSURE: 132 MMHG | DIASTOLIC BLOOD PRESSURE: 66 MMHG

## 2023-08-28 DIAGNOSIS — M47.816 LUMBAR SPONDYLOSIS: ICD-10-CM

## 2023-08-28 DIAGNOSIS — M99.9 BIOMECHANICAL LESION: ICD-10-CM

## 2023-08-28 DIAGNOSIS — M51.36 BULGE OF LUMBAR DISC WITHOUT MYELOPATHY: ICD-10-CM

## 2023-08-28 DIAGNOSIS — M54.59 MECHANICAL LOW BACK PAIN: ICD-10-CM

## 2023-08-28 DIAGNOSIS — M79.10 MYALGIA: ICD-10-CM

## 2023-08-28 DIAGNOSIS — M51.37 DDD (DEGENERATIVE DISC DISEASE), LUMBOSACRAL: ICD-10-CM

## 2023-08-28 DIAGNOSIS — M47.816 FACET SYNDROME, LUMBAR: Primary | ICD-10-CM

## 2023-08-28 DIAGNOSIS — M53.3 SACROILIAC JOINT DYSFUNCTION OF RIGHT SIDE: ICD-10-CM

## 2023-08-28 RX ORDER — LIDOCAINE HYDROCHLORIDE 10 MG/ML
7 INJECTION, SOLUTION INFILTRATION; PERINEURAL ONCE
Status: COMPLETED | OUTPATIENT
Start: 2023-08-28 | End: 2023-08-28

## 2023-08-28 RX ORDER — TRIAMCINOLONE ACETONIDE 40 MG/ML
40 INJECTION, SUSPENSION INTRA-ARTICULAR; INTRAMUSCULAR ONCE
Status: COMPLETED | OUTPATIENT
Start: 2023-08-28 | End: 2023-08-28

## 2023-08-28 NOTE — PATIENT INSTRUCTIONS
1) My office will call you to schedule the RIGHT SI joint and Myofascial trigger point CSI under ultrasound guidance once the procedure is approved by your insurance carrier. 2) Continue working with Deon Akers in physical therapy  3) Follow up with me in about 6 weeks. If low back symptoms worsen, then we could try RFA of the facet joint. Post Injection Instructions     Please do not do anything strenuous over the next two days (if you had a knee injection do not walk more than 2 city blocks, do not attend any aerobic classes, do not run, no heavy lifting, no prolong standing). You may resume your day to day activities after your injection. You may experience some mild amount of swelling after the procedure. Please ice your joint that was injected at least 5-6 times a day (15 minutes) for two days after (this will help prevent worsening pain that sometimes occurs after an injection). Only take tylenol if needed for pain for the first few days. Watch for signs of infection which include redness, warmth, worsening pain, fevers or chills. If you develop any of these signs call the office immediately at [phone]    Everyone responds differently to injections, but you can expect your peak effects a few weeks after your last injection. Aranza Alegria.  Urbano Don MD  Physical Medicine and Rehabilitation/Sports Medicine  MEDICAL [facility]

## 2023-08-28 NOTE — TELEPHONE ENCOUNTER
Per CMS Guidelines -no authorization is required for RIGHT SI joint and myofascial trigger point CSI under ultrasound guidance CPT/HCPCS 08908, , [id number]     Status: Authorization is not required however may be subject to review once claim is submitted-Covered Benefit    Inj done in office

## 2023-08-28 NOTE — PROGRESS NOTES
[address]  Progress Note    CHIEF COMPLAINT:  Patient presents with: Follow - Up: INJ:Bilateral L4-5 and L5-S1 Z-joint/facet injection 8/9/23. Reports pain 1-2 in scale, 8/10 pre-procedure. States he was able to sleep after injection and pain may flare up after work or with some movements but manageable. Denies pain meds. History of Present Illness: The patient is a 76year old RIGHT handed male with significant past medical history of carotid stenosis, mitral regurg, GERD, who presents with midline low back pain since 2017 after he lifted a ladder (works as a ). He is status post bilateral L5 transforaminal epidural steroid injection on 2/1/2023 with 60% improvement. Is also status post bilateral L5 transforaminal epidural steroid injection on March 15, 2023 with 80% improvement that lasted for about 2 weeks. He is now status post bilateral L5 transforaminal epidural steroid injection on 5/24/2023 which provided about 80% improvement that lasted for about 2 months. He then complained of axial low back pain without radicular symptoms or we performed bilateral L4-L5 and L5-S1 facet joint injections on 8/9/2023 which provided about 80 to 90% improvement. He is mostly complaining of right-sided low back pain which she rates about a 1-2 out of 10. He is not take any medications for pain. He has been compliant with a home exercise program and physical therapy. I have reviewed Sayra's notes.        Previous procedures:  11/21/2021 L4-L5 interlaminar epidural steroid injection by Dr. Jamila Prince  December 6, 2021 right L4 transforaminal epidural steroid injection by Dr. Sher Michelle  January 7, 2022 right L4 transforaminal epidural steroid injection by Dr. Silvia Champion  April 8, 2022 right L4 transforaminal epidural steroid injection by Dr. Silvia Champion  June 16, 2022 Bilateral L4 transforaminal epidural injection by Dr. Janene Romano    July 11, 2022 By Dr. Wilbern Hodgkins bilateral knee corticosteroid injection  November 9, 2022 bilateral L4 and L5 transforaminal epidural steroid injection by Dr. Flory Valladares  Bilateral L5 transforaminal epidural steroid injection on 2/1/2023  Bilateral L5 transforaminal epidural steroid injection on 3/15/2023  Bilateral L5 transforaminal epidural steroid injection on 5/24/2023  Bilateral L4-L5 and L5-S1 facet joint injections on 8/9/2023    PAST MEDICAL HISTORY:  Past Medical History:   Diagnosis Date    Arthritis     Back pain     Bilateral carotid artery disease ([address] 3/22/2018    Carotid stenosis, bilateral 09/21/2017    < 50% bilat at [facility], recheck 2022    Compression fracture of cervical spine ([facility] [address] 6/13/2011    Esophageal reflux     HNP (herniated nucleus pulposus), cervical 6/13/2011    Left axis deviation 11/12/11    stable from 2001, early repol    Mitral regurgitation 11-16-09    MMT (medial meniscus tear) 6/13/2011    Palpitations 12/27/2010    Refused influenza vaccine 11/28/2012    Screening for cardiovascular condition 11-16-09    ECHO with ef 60%, mild pulmonic and tricuspid insuff, mild MR    Vitamin D deficiency 4/28/2014       SURGICAL HISTORY:  Past Surgical History:   Procedure Laterality Date    COLONOSCOPY,DIAGNOSTIC  2006     normal, EGD as well     OTHER SURGICAL HISTORY      right foot screw    OTHER SURGICAL HISTORY      multiple lipoma approx 30 yr ago    OTHER SURGICAL HISTORY  2012    left knee meniscus    REPAIR ING HERNIA,5+Y/O,REDUCIBL         SOCIAL HISTORY:   Social History    Occupational History      Occupation: painting    Tobacco Use      Smoking status: Never      Smokeless tobacco: Never    Substance and Sexual Activity      Alcohol use:  Yes        Alcohol/week: 5.8 - 11.7 standard drinks of alcohol        Types: 7 - 14 Standard drinks or equivalent per week        Comment: social       Drug use: No      Sexual activity: Not Currently        Partners: Female      FAMILY HISTORY: Family History   Problem Relation Age of Onset    Heart Disorder Father     Diabetes Father     Psychiatric Father         dementia    Heart Disorder Mother         CAD with stent    Other (Other) Mother        CURRENT MEDICATIONS:   Current Outpatient Medications   Medication Sig Dispense Refill    Propranolol HCl 10 MG Oral Tab Take 1 tablet (10 mg total) by mouth every evening. ALLERGIES:     Naproxen                OTHER (SEE COMMENTS)    Comment:Developed ear problem  Cyclobenzaprine         OTHER (SEE COMMENTS)    Comment:Difficulty urinating and leg spasms  Doxycycline Calcium     NAUSEA ONLY    Comment:Elevated hr and gi upset  Epinephrine             UNKNOWN    Comment:Elevated heart rate    REVIEW OF SYSTEMS:   Review of Systems   Constitutional: Negative. HENT: Negative. Eyes: Negative. Respiratory: Negative. Cardiovascular: Negative. Gastrointestinal: Negative. Genitourinary: Negative. Musculoskeletal: As per HPI  Skin: Negative. Neurological: As per HPI  Endo/Heme/Allergies: Negative. Psychiatric/Behavioral: Negative. All other systems reviewed and are negative. Pertinent positives and negatives noted in the HPI. PHYSICAL EXAM:   /66     There is no height or weight on file to calculate BMI. General: No immediate distress  Head: Normocephalic/ Atraumatic  Eyes: Extra-occular movements intact. Ears: No auricular hematoma or deformities  Mouth: No lesions or ulcerations  Heart: peripheral pulses intact. Normal capillary refill. Lungs: Non-labored respirations  Abdomen: No abdominal guarding  Extremities: No lower extremity edema bilaterally   Skin: No lesions noted. Cognition: alert & oriented x 3, attentive, able to follow 2 step commands, comprehention intact, spontaneous speech intact  Motor:    Musculoskeletal:    LUMBAR SPINE:  Inspection: no erythema, swelling, or obvious deformity.   Their iliac crest and shoulder heights are symmetrical.  Postural exam reveals no scoliosis or kyphosis. Palpation: Tender palpation over the right SI joint with myofascial trigger points  ROM: Full active range of motion of the lumbar spine      Gait:  Normal    Data  No visits with results within 6 Month(s) from this visit. Latest known visit with results is:   Critical access hospital Lab Encounter on 02/09/2023   Component Date Value Ref Range Status    Glutamic Acid Decarboxylase Ab 02/09/2023 <5.0  0.0 - 5.0 IU/mL Final    Ankylosing Spondylitis (HLAB27) Sp* 02/09/2023 Whole Blood   Final    Ankylosing Spondylitis (HLAB27) 02/09/2023 Negative   Final   ]      Radiology Imaging:  I reviewed with the patient his MRI of the lumbar spine   PROCEDURE: MRI SPINE LUMBAR (CPT=72148)     COMPARISON: [facility], XR LUMBAR SPINE (MIN 2 VIEWS) (CPT=72100), 5/07/2018, 17:35. [facility], MRI SPINE LUMBAR (ILQ=[id number]), 9/18/2017, 15:07. INDICATIONS: intractable low back pain     TECHNIQUE: A variety of imaging planes and parameters were utilized for visualization of suspected pathology. FINDINGS:  NUMERATION: For the purposes of this examination, the lowest fully formed disc space is designated as L5-S1.  ALIGNMENT: There is preservation of the expected lumbar lordosis. BONES: Probable 2.7 cm hemangioma within S1 vertebral body. Right iliac bone island. Schmorl's node extending into the superior T12 endplate. Degenerative-type edema involving the anterosuperior L3 endplate. CORD/CAUDA EQUINA: The conus medullaris terminates at the level of the inferior T12 endplate. The distal cord and nerve roots have normal caliber, contour, and signal intensity. PARASPINAL AREA: No visible mass. OTHER: Probable 1.8 cm right lower pole renal cyst.     LUMBAR DISC LEVELS:  L1-L2: No significant disc/facet abnormality, spinal stenosis, or foraminal stenosis. L2-L3: Small diffuse disc bulge, which is slightly eccentric to the left.  Mild ligamentum flavum redundancy. No spinal canal or neural foraminal compromise. L3-L4: Minimal post degeneration and desiccation. No significant neural foraminal or spinal canal compromise. L4-L5: There is a diffuse disc bulge with superimposed central disc protrusion/annular fissure. Mild omentum from redundancy and epidural lipomatosis. Mild spinal canal stenosis. Mild to moderate right and mild left lateral recess stenosis. No  significant neural foraminal compromise. L5-S1: No significant disc/facet abnormality, spinal stenosis, or foraminal stenosis. Impression   CONCLUSION:  1. Mild multilevel lumbar spine degenerative changes as detailed. Most notable is a central disc protrusion/annular fissure at L4-L5, which results in mild spinal canal stenosis as well as mild to moderate right and mild left lateral recess stenosis at  this level. No additional significant neural compromise throughout the lumbar spine. 2. Probable hemangioma within the S1 vertebral body, unchanged since September, 2017.  3. Right renal cyst, incompletely characterized on this non-dedicated exam.  4. Lesser incidental findings as above. Dictated by (CST): Marce Tavera MD on 5/09/2018 at 9:11      Approved by (CST): Marce Tavera MD on 5/09/2018 at 9:19      I reviewed with the patient his MRI of the lumbar spine from 1/4/2023  Formatting of this note might be different from the original.   EXAM: MRI LUMBAR [address]: Chronic midline low back pain without sciatica. COMPARISON:  Selected images from MRI lumbar spine without contrast   examination dated January 15, 2020. TECHNIQUE: MRI lumbar spine was performed without and with contrast.   Approximately 7.5 mL of IV Gadavist administered. FINDINGS:     No acute malalignment of the lumbar spine. Rudimentary disc space at S1-S2. Vertebral body heights are overall maintained without evidence of   acute/recent compression fracture.  There are a few stable marrow lesions   within the lumbar spine and sacrum, some which enhance, which are felt to   be benign given long-term stability. Scattered Schmorl's nodes and Modic   endplate changes. The distal spinal cord demonstrates a signal intensity   within normal limits. The conus medullaris terminates at approximately L1. Multilevel degenerative disc disease. No suspicious areas of postcontrast   enhancement within the lumbar spinal canal. A couple of nonenhancing   sclerotic foci within the bilateral ilium which are nonspecific. A 2 cm T2   hyperintensity within the right kidney, likely a cyst. Bilateral   perinephric thickening which is nonspecific. Left renal sinus cyst.     L1-L2: No significant neural foraminal or spinal canal stenosis. L2-L3: Disc bulge. Facet joint arthropathy. Mild neural foraminal stenosis. No significant spinal canal stenosis. L3-L4: Disc bulge. Facet joint arthropathy with ligamentum flavum   infolding. Mild left neural foraminal stenosis. Mild spinal canal stenosis. L4-L5: Disc bulge with annular fissure. There is a small superimposed right   paracentral disc extrusion. Facet joint arthropathy with ligament flavum   infolding. Mild-to-moderate neural foraminal stenosis, left greater than   right. Moderate spinal canal stenosis. There is encroachment of the lateral   recesses. L5-S1: Disc bulge. Facet joint arthropathy. No significant neural foraminal   or spinal canal stenosis. IMPRESSION:     1. Overall, no significant interval progression of multilevel chronic   degenerative changes of the lumbar spine contributing to varying degrees of   neural foraminal and spinal canal stenosis as described in detail in the   body of the report. 2. Additional findings as described above    ASSESSMENT AND PLAN:  Betina Marie is a pleasant 69-year-old male who presents for follow-up of his low back pain.   He did fairly well following the bilateral L4-L5 and L5-S1 facet joint injections. I am recommending a right SI joint and myofascial trigger point corticosteroid injection under ultrasound guidance which we performed today. Please see separate procedure note. He will continue with physical therapy and home exercise program.  Follow-up with me in about 6 to 8 weeks. RTC in 6 to 8 weeks  Discharge Instructions were provided as documented in AVS summary. The patient was in agreement with the assessment and plan. All questions were answered. There were no barriers to learning. Facet syndrome, lumbar  (primary encounter diagnosis)  Biomechanical lesion  Bulge of lumbar disc without myelopathy  Mechanical low back pain  Myalgia  Lumbar spondylosis  DDD (degenerative disc disease), lumbosacral  Sacroiliac joint dysfunction of right side    Alex B. Tyronne Spatz MD  Physical Medicine and Rehabilitation/Sports Medicine  MEDICAL CENTER [facility]

## 2023-08-28 NOTE — PROCEDURES
[address]   Sacroiliac Joint and Myofascial Trigger Point Injection Procedure Note    CHIEF COMPLAINT:  Patient presents with: Follow - Up: INJ:Bilateral L4-5 and L5-S1 Z-joint/facet injection 8/9/23. Reports pain 1-2 in scale, 8/10 pre-procedure. States he was able to sleep after injection and pain may flare up after work or with some movements but manageable. Denies pain meds. PROCEDURE PERFORMED:  Right Sacroiliac joint corticosteroid injection and myofascial trigger points under ultrasound guidance    INDICATIONS:  Right sacroiliitis and myalgia    PRIMARY PROCEDURALIST:  Geovani Moilna MD    INFORMED CONSENT & TIME OUT:   As documented in the Time Out and Pre-Procedure Check Lists. Verbal consent was obtained    Vitals: [unfilled]  Labs (document last wbc, plts, hgb, and PT/INR):    No visits with results within 6 Month(s) from this visit. Latest known visit with results is:   [facility] Lab Encounter on 02/09/2023   Component Date Value Ref Range Status    Glutamic Acid Decarboxylase Ab 02/09/2023 <5.0  0.0 - 5.0 IU/mL Final    Ankylosing Spondylitis (HLAB27) Sp* 02/09/2023 Whole Blood   Final    Ankylosing Spondylitis (HLAB27) 02/09/2023 Negative   Final   ]    PROCEDURE:    The procedure risks, benefits, and alternatives were discussed with the patient. All questions were answered. The patient acknowledged understanding and agreed to proceed with ultrasound-guided intra- articular injection of the Right sacroiliac joints with corticosteroid. The patient's Right low back was prepped and draped in the usual sterile fashion using 3 betadine swabs. The Right SI joint, lumbar paraspinal, and thoracolumbar fascia was identified using ultrasound guidance with a curvilinear probe. Approximately 3 cc of 1% lidocaine were used to anesthetize the skin and deep soft tissues under ultrasound guidance using a  22-gauge 2.5 inch needle.   This was introduced into the Right sacroiliac joint, lumbar paraspinal, and thoracolumbar fascia. Aspiration was attempted. There was no fluid able to be aspirated. A mixture of 4 cc of 1% lidocaine and 1 cc of Kenalog containing 40 mg of corticosteroid was injected into the intra-articular space and surrounding myofascial tissue. The fluid was seen flowing into the intra-articular region. The needle was then removed and hemostasis obtained. Patient verbalized understanding of assessment and plan. Patient is in agreement with the plan. All questions were answered. No barriers to learning identified. Permanent pictures were saved. INSTRUCTIONS GIVEN TO PATIENT:    \"You will see an effect in the next 2-3 days. Please contact me if you have fevers, worsening swelling, worsening pain, decreased range of motion, increased redness, chills, or anything that makes you concerned about how the joint we injected feels/looks. If you do not reach me in a reasonable time, please report directly to the emergency room for further evaluation\"        Dolores Wu.  Deepali Salvador MD, [address] [facility]  Physical Medicine and Rehabilitation/Sports Medicine  MEDICAL CENTER [facility]

## 2023-09-01 ENCOUNTER — OFFICE VISIT (OUTPATIENT)
Dept: PHYSICAL THERAPY | Facility: HOSPITAL | Age: 68
End: 2023-09-01
Attending: PHYSICAL MEDICINE & REHABILITATION
Payer: MEDICARE

## 2023-09-01 PROCEDURE — 97530 THERAPEUTIC ACTIVITIES: CPT

## 2023-09-01 PROCEDURE — 97110 THERAPEUTIC EXERCISES: CPT

## 2023-09-01 PROCEDURE — 97112 NEUROMUSCULAR REEDUCATION: CPT

## 2023-09-05 ENCOUNTER — OFFICE VISIT (OUTPATIENT)
Dept: INTERNAL MEDICINE | Age: 68
End: 2023-09-05

## 2023-09-05 ENCOUNTER — LAB SERVICES (OUTPATIENT)
Dept: LAB | Age: 68
End: 2023-09-05

## 2023-09-05 VITALS
HEART RATE: 71 BPM | DIASTOLIC BLOOD PRESSURE: 78 MMHG | HEIGHT: 66 IN | BODY MASS INDEX: 22.75 KG/M2 | SYSTOLIC BLOOD PRESSURE: 153 MMHG | OXYGEN SATURATION: 99 % | TEMPERATURE: 97.2 F | WEIGHT: 141.54 LBS

## 2023-09-05 DIAGNOSIS — R63.4 WEIGHT LOSS: Primary | ICD-10-CM

## 2023-09-05 DIAGNOSIS — M54.50 CHRONIC MIDLINE LOW BACK PAIN WITHOUT SCIATICA: ICD-10-CM

## 2023-09-05 DIAGNOSIS — F41.9 ANXIETY: ICD-10-CM

## 2023-09-05 DIAGNOSIS — G89.29 CHRONIC MIDLINE LOW BACK PAIN WITHOUT SCIATICA: ICD-10-CM

## 2023-09-05 LAB — TSH SERPL-ACNC: 1.34 MCUNITS/ML (ref 0.35–5)

## 2023-09-05 PROCEDURE — 99213 OFFICE O/P EST LOW 20 MIN: CPT | Performed by: INTERNAL MEDICINE

## 2023-09-05 PROCEDURE — 36415 COLL VENOUS BLD VENIPUNCTURE: CPT | Performed by: CLINICAL MEDICAL LABORATORY

## 2023-09-05 PROCEDURE — 84443 ASSAY THYROID STIM HORMONE: CPT | Performed by: CLINICAL MEDICAL LABORATORY

## 2023-09-07 ENCOUNTER — TELEPHONE (OUTPATIENT)
Dept: PHYSICAL THERAPY | Facility: HOSPITAL | Age: 68
End: 2023-09-07

## 2023-09-07 ENCOUNTER — APPOINTMENT (OUTPATIENT)
Dept: PHYSICAL THERAPY | Facility: HOSPITAL | Age: 68
End: 2023-09-07
Attending: PHYSICAL MEDICINE & REHABILITATION
Payer: MEDICARE

## 2023-09-15 ENCOUNTER — APPOINTMENT (OUTPATIENT)
Dept: PHYSICAL THERAPY | Facility: HOSPITAL | Age: 68
End: 2023-09-15
Attending: PHYSICAL MEDICINE & REHABILITATION
Payer: MEDICARE

## 2023-09-15 ENCOUNTER — TELEPHONE (OUTPATIENT)
Dept: PHYSICAL THERAPY | Facility: HOSPITAL | Age: 68
End: 2023-09-15

## 2023-09-21 RX ORDER — PROPRANOLOL HYDROCHLORIDE 10 MG/1
TABLET ORAL
Qty: 90 TABLET | Refills: 1 | Status: SHIPPED | OUTPATIENT
Start: 2023-09-21

## 2023-09-22 ENCOUNTER — APPOINTMENT (OUTPATIENT)
Dept: PHYSICAL THERAPY | Facility: HOSPITAL | Age: 68
End: 2023-09-22
Attending: PHYSICAL MEDICINE & REHABILITATION
Payer: MEDICARE

## 2023-09-25 ENCOUNTER — TELEPHONE (OUTPATIENT)
Dept: PHYSICAL THERAPY | Facility: HOSPITAL | Age: 68
End: 2023-09-25

## 2023-09-29 ENCOUNTER — APPOINTMENT (OUTPATIENT)
Dept: PHYSICAL THERAPY | Facility: HOSPITAL | Age: 68
End: 2023-09-29
Attending: PHYSICAL MEDICINE & REHABILITATION
Payer: MEDICARE

## 2023-09-29 ENCOUNTER — NURSE TRIAGE (OUTPATIENT)
Dept: TELEHEALTH | Age: 68
End: 2023-09-29

## 2023-10-02 ENCOUNTER — OFFICE VISIT (OUTPATIENT)
Dept: INTERNAL MEDICINE | Age: 68
End: 2023-10-02

## 2023-10-02 ENCOUNTER — LAB SERVICES (OUTPATIENT)
Dept: LAB | Age: 68
End: 2023-10-02

## 2023-10-02 VITALS
TEMPERATURE: 96.3 F | OXYGEN SATURATION: 100 % | HEART RATE: 64 BPM | RESPIRATION RATE: 14 BRPM | WEIGHT: 140.32 LBS | DIASTOLIC BLOOD PRESSURE: 83 MMHG | SYSTOLIC BLOOD PRESSURE: 127 MMHG | BODY MASS INDEX: 22.65 KG/M2

## 2023-10-02 DIAGNOSIS — R93.1 ELEVATED CORONARY ARTERY CALCIUM SCORE: ICD-10-CM

## 2023-10-02 DIAGNOSIS — R73.9 HYPERGLYCEMIA: ICD-10-CM

## 2023-10-02 DIAGNOSIS — I49.3 PVC'S (PREMATURE VENTRICULAR CONTRACTIONS): ICD-10-CM

## 2023-10-02 DIAGNOSIS — I25.10 ATHEROSCLEROSIS OF NATIVE CORONARY ARTERY OF NATIVE HEART WITHOUT ANGINA PECTORIS: ICD-10-CM

## 2023-10-02 DIAGNOSIS — E55.9 VITAMIN D DEFICIENCY: ICD-10-CM

## 2023-10-02 DIAGNOSIS — S09.92XA INJURY OF NOSE, INITIAL ENCOUNTER: Primary | ICD-10-CM

## 2023-10-02 LAB
ALBUMIN SERPL-MCNC: 3.6 G/DL (ref 3.6–5.1)
ALBUMIN/GLOB SERPL: 1.1 {RATIO} (ref 1–2.4)
ALP SERPL-CCNC: 49 UNITS/L (ref 45–117)
ALT SERPL-CCNC: 25 UNITS/L
ANION GAP SERPL CALC-SCNC: 7 MMOL/L (ref 7–19)
AST SERPL-CCNC: 18 UNITS/L
BILIRUB SERPL-MCNC: 0.5 MG/DL (ref 0.2–1)
BUN SERPL-MCNC: 18 MG/DL (ref 6–20)
BUN/CREAT SERPL: 22 (ref 7–25)
CALCIUM SERPL-MCNC: 8.8 MG/DL (ref 8.4–10.2)
CHLORIDE SERPL-SCNC: 108 MMOL/L (ref 97–110)
CHOLEST SERPL-MCNC: 186 MG/DL
CHOLEST/HDLC SERPL: 2.4 {RATIO}
CO2 SERPL-SCNC: 26 MMOL/L (ref 21–32)
CREAT SERPL-MCNC: 0.81 MG/DL (ref 0.67–1.17)
EGFRCR SERPLBLD CKD-EPI 2021: >90 ML/MIN/{1.73_M2}
FASTING DURATION TIME PATIENT: 10 HOURS (ref 0–999)
GLOBULIN SER-MCNC: 3.4 G/DL (ref 2–4)
GLUCOSE SERPL-MCNC: 94 MG/DL (ref 70–99)
HBA1C MFR BLD: 5 % (ref 4.5–5.6)
HDLC SERPL-MCNC: 77 MG/DL
LDLC SERPL CALC-MCNC: 93 MG/DL
NONHDLC SERPL-MCNC: 109 MG/DL
POTASSIUM SERPL-SCNC: 4 MMOL/L (ref 3.4–5.1)
PROT SERPL-MCNC: 7 G/DL (ref 6.4–8.2)
SODIUM SERPL-SCNC: 137 MMOL/L (ref 135–145)
TRIGL SERPL-MCNC: 78 MG/DL

## 2023-10-02 PROCEDURE — 83036 HEMOGLOBIN GLYCOSYLATED A1C: CPT | Performed by: CLINICAL MEDICAL LABORATORY

## 2023-10-02 PROCEDURE — 80053 COMPREHEN METABOLIC PANEL: CPT | Performed by: CLINICAL MEDICAL LABORATORY

## 2023-10-02 PROCEDURE — 80061 LIPID PANEL: CPT | Performed by: CLINICAL MEDICAL LABORATORY

## 2023-10-02 PROCEDURE — 36415 COLL VENOUS BLD VENIPUNCTURE: CPT | Performed by: CLINICAL MEDICAL LABORATORY

## 2023-10-02 PROCEDURE — 99213 OFFICE O/P EST LOW 20 MIN: CPT | Performed by: STUDENT IN AN ORGANIZED HEALTH CARE EDUCATION/TRAINING PROGRAM

## 2023-10-02 ASSESSMENT — ENCOUNTER SYMPTOMS
LIGHT-HEADEDNESS: 1
BACK PAIN: 1
FEVER: 0
ALLERGIC/IMMUNOLOGIC COMMENTS: NO NEW FOOD ALLERGIES
WEIGHT LOSS: 0
CONSTITUTIONAL NEGATIVE: 1
RESPIRATORY NEGATIVE: 1
BRUISES/BLEEDS EASILY: 0
HEMOPTYSIS: 0
GASTROINTESTINAL NEGATIVE: 1
COUGH: 0
WEIGHT GAIN: 0
CHILLS: 0
SUSPICIOUS LESIONS: 0
HEMATOCHEZIA: 0

## 2023-10-02 ASSESSMENT — PAIN SCALES - GENERAL
PAINLEVEL: 0
PAINLEVEL: 0

## 2023-10-02 ASSESSMENT — PATIENT HEALTH QUESTIONNAIRE - PHQ9
2. FEELING DOWN, DEPRESSED OR HOPELESS: NOT AT ALL
SUM OF ALL RESPONSES TO PHQ9 QUESTIONS 1 AND 2: 0
CLINICAL INTERPRETATION OF PHQ2 SCORE: NO FURTHER SCREENING NEEDED
SUM OF ALL RESPONSES TO PHQ9 QUESTIONS 1 AND 2: 0
1. LITTLE INTEREST OR PLEASURE IN DOING THINGS: NOT AT ALL

## 2023-10-03 ENCOUNTER — TELEPHONE (OUTPATIENT)
Dept: CARDIOLOGY | Age: 68
End: 2023-10-03

## 2023-10-03 ENCOUNTER — CLINICAL DOCUMENTATION (OUTPATIENT)
Dept: CARDIOLOGY | Age: 68
End: 2023-10-03

## 2023-10-03 ENCOUNTER — TELEPHONE (OUTPATIENT)
Dept: PHYSICAL MEDICINE AND REHAB | Facility: CLINIC | Age: 68
End: 2023-10-03

## 2023-10-03 NOTE — TELEPHONE ENCOUNTER
Patient came to  stating he is having neck issues (feels like something is trapped and having eye issues when he turns neck).   Per patient, wondering if he can see Dr. Behar for this issues or if he can order any type of tests like an MRI to see what is going on. Please call patient back and advise.

## 2023-10-04 NOTE — TELEPHONE ENCOUNTER
Patient needs an appointment for this issue.  Please add him to first available and add to wait list.

## 2023-10-05 ENCOUNTER — CLINICAL ABSTRACT (OUTPATIENT)
Dept: HEALTH INFORMATION MANAGEMENT | Facility: OTHER | Age: 68
End: 2023-10-05

## 2023-10-05 ENCOUNTER — OFFICE VISIT (OUTPATIENT)
Dept: CARDIOLOGY | Age: 68
End: 2023-10-05

## 2023-10-05 VITALS
HEART RATE: 65 BPM | SYSTOLIC BLOOD PRESSURE: 136 MMHG | OXYGEN SATURATION: 100 % | WEIGHT: 139.77 LBS | HEIGHT: 66 IN | DIASTOLIC BLOOD PRESSURE: 80 MMHG | BODY MASS INDEX: 22.46 KG/M2

## 2023-10-05 DIAGNOSIS — I65.29 OCCLUSION OF CAROTID ARTERY, UNSPECIFIED LATERALITY: ICD-10-CM

## 2023-10-05 DIAGNOSIS — R00.2 PALPITATIONS: ICD-10-CM

## 2023-10-05 DIAGNOSIS — I25.10 ATHEROSCLEROSIS OF NATIVE CORONARY ARTERY OF NATIVE HEART WITHOUT ANGINA PECTORIS: Primary | ICD-10-CM

## 2023-10-05 DIAGNOSIS — R73.9 HYPERGLYCEMIA: ICD-10-CM

## 2023-10-05 DIAGNOSIS — E55.9 VITAMIN D DEFICIENCY: ICD-10-CM

## 2023-10-05 DIAGNOSIS — I34.0 NONRHEUMATIC MITRAL VALVE REGURGITATION: ICD-10-CM

## 2023-10-05 DIAGNOSIS — E78.2 MIXED HYPERLIPIDEMIA: ICD-10-CM

## 2023-10-05 DIAGNOSIS — R93.1 ELEVATED CORONARY ARTERY CALCIUM SCORE: ICD-10-CM

## 2023-10-05 PROCEDURE — 99214 OFFICE O/P EST MOD 30 MIN: CPT | Performed by: INTERNAL MEDICINE

## 2023-10-05 SDOH — HEALTH STABILITY: PHYSICAL HEALTH: ON AVERAGE, HOW MANY DAYS PER WEEK DO YOU ENGAGE IN MODERATE TO STRENUOUS EXERCISE (LIKE A BRISK WALK)?: 0 DAYS

## 2023-10-05 SDOH — HEALTH STABILITY: PHYSICAL HEALTH: ON AVERAGE, HOW MANY MINUTES DO YOU ENGAGE IN EXERCISE AT THIS LEVEL?: 0 MIN

## 2023-10-06 ENCOUNTER — APPOINTMENT (OUTPATIENT)
Dept: PHYSICAL THERAPY | Facility: HOSPITAL | Age: 68
End: 2023-10-06
Attending: PHYSICAL MEDICINE & REHABILITATION
Payer: MEDICARE

## 2023-10-17 ENCOUNTER — OFFICE VISIT (OUTPATIENT)
Dept: INTERNAL MEDICINE | Age: 68
End: 2023-10-17

## 2023-10-17 ENCOUNTER — LAB SERVICES (OUTPATIENT)
Dept: LAB | Age: 68
End: 2023-10-17

## 2023-10-17 VITALS
OXYGEN SATURATION: 100 % | BODY MASS INDEX: 22.39 KG/M2 | HEIGHT: 66 IN | DIASTOLIC BLOOD PRESSURE: 84 MMHG | TEMPERATURE: 97.2 F | HEART RATE: 63 BPM | WEIGHT: 139.33 LBS | SYSTOLIC BLOOD PRESSURE: 137 MMHG

## 2023-10-17 DIAGNOSIS — R42 DIZZINESS: Primary | ICD-10-CM

## 2023-10-17 DIAGNOSIS — R42 DIZZINESS: ICD-10-CM

## 2023-10-17 LAB
ALBUMIN SERPL-MCNC: 3.6 G/DL (ref 3.6–5.1)
ALBUMIN/GLOB SERPL: 1.2 {RATIO} (ref 1–2.4)
ALP SERPL-CCNC: 53 UNITS/L (ref 45–117)
ALT SERPL-CCNC: 25 UNITS/L
ANION GAP SERPL CALC-SCNC: 13 MMOL/L (ref 7–19)
APPEARANCE UR: CLEAR
AST SERPL-CCNC: 20 UNITS/L
BASOPHILS # BLD: 0 K/MCL (ref 0–0.3)
BASOPHILS NFR BLD: 1 %
BILIRUB SERPL-MCNC: 0.5 MG/DL (ref 0.2–1)
BILIRUB UR QL STRIP: NEGATIVE
BUN SERPL-MCNC: 18 MG/DL (ref 6–20)
BUN/CREAT SERPL: 25 (ref 7–25)
CALCIUM SERPL-MCNC: 8.6 MG/DL (ref 8.4–10.2)
CHLORIDE SERPL-SCNC: 106 MMOL/L (ref 97–110)
CO2 SERPL-SCNC: 24 MMOL/L (ref 21–32)
COLOR UR: NORMAL
CREAT SERPL-MCNC: 0.71 MG/DL (ref 0.67–1.17)
DEPRECATED RDW RBC: 44 FL (ref 39–50)
EGFRCR SERPLBLD CKD-EPI 2021: >90 ML/MIN/{1.73_M2}
EOSINOPHIL # BLD: 0.1 K/MCL (ref 0–0.5)
EOSINOPHIL NFR BLD: 2 %
ERYTHROCYTE [DISTWIDTH] IN BLOOD: 12.9 % (ref 11–15)
FASTING DURATION TIME PATIENT: NORMAL H
GLOBULIN SER-MCNC: 3.1 G/DL (ref 2–4)
GLUCOSE SERPL-MCNC: 90 MG/DL (ref 70–99)
GLUCOSE UR STRIP-MCNC: NEGATIVE MG/DL
HCT VFR BLD CALC: 43 % (ref 39–51)
HGB BLD-MCNC: 14.4 G/DL (ref 13–17)
HGB UR QL STRIP: NEGATIVE
IMM GRANULOCYTES # BLD AUTO: 0 K/MCL (ref 0–0.2)
IMM GRANULOCYTES # BLD: 0 %
KETONES UR STRIP-MCNC: NEGATIVE MG/DL
LEUKOCYTE ESTERASE UR QL STRIP: NEGATIVE
LYMPHOCYTES # BLD: 1.1 K/MCL (ref 1–4)
LYMPHOCYTES NFR BLD: 21 %
MCH RBC QN AUTO: 31.4 PG (ref 26–34)
MCHC RBC AUTO-ENTMCNC: 33.5 G/DL (ref 32–36.5)
MCV RBC AUTO: 93.7 FL (ref 78–100)
MONOCYTES # BLD: 0.4 K/MCL (ref 0.3–0.9)
MONOCYTES NFR BLD: 9 %
NEUTROPHILS # BLD: 3.4 K/MCL (ref 1.8–7.7)
NEUTROPHILS NFR BLD: 67 %
NITRITE UR QL STRIP: NEGATIVE
NRBC BLD MANUAL-RTO: 0 /100 WBC
PH UR STRIP: 6.5 [PH] (ref 5–7)
PLATELET # BLD AUTO: 147 K/MCL (ref 140–450)
POTASSIUM SERPL-SCNC: 4.2 MMOL/L (ref 3.4–5.1)
PROT SERPL-MCNC: 6.7 G/DL (ref 6.4–8.2)
PROT UR STRIP-MCNC: NEGATIVE MG/DL
RBC # BLD: 4.59 MIL/MCL (ref 4.5–5.9)
SODIUM SERPL-SCNC: 139 MMOL/L (ref 135–145)
SP GR UR STRIP: 1.02 (ref 1–1.03)
UROBILINOGEN UR STRIP-MCNC: 0.2 MG/DL
WBC # BLD: 5 K/MCL (ref 4.2–11)

## 2023-10-17 PROCEDURE — 85025 COMPLETE CBC W/AUTO DIFF WBC: CPT | Performed by: CLINICAL MEDICAL LABORATORY

## 2023-10-17 PROCEDURE — 36415 COLL VENOUS BLD VENIPUNCTURE: CPT | Performed by: CLINICAL MEDICAL LABORATORY

## 2023-10-17 PROCEDURE — 80053 COMPREHEN METABOLIC PANEL: CPT | Performed by: CLINICAL MEDICAL LABORATORY

## 2023-10-17 PROCEDURE — 99213 OFFICE O/P EST LOW 20 MIN: CPT | Performed by: INTERNAL MEDICINE

## 2023-10-17 PROCEDURE — 81003 URINALYSIS AUTO W/O SCOPE: CPT | Performed by: CLINICAL MEDICAL LABORATORY

## 2023-10-17 ASSESSMENT — PATIENT HEALTH QUESTIONNAIRE - PHQ9
CLINICAL INTERPRETATION OF PHQ2 SCORE: NO FURTHER SCREENING NEEDED
SUM OF ALL RESPONSES TO PHQ9 QUESTIONS 1 AND 2: 0
SUM OF ALL RESPONSES TO PHQ9 QUESTIONS 1 AND 2: 0
2. FEELING DOWN, DEPRESSED OR HOPELESS: NOT AT ALL
1. LITTLE INTEREST OR PLEASURE IN DOING THINGS: NOT AT ALL

## 2023-10-18 ENCOUNTER — TELEPHONE (OUTPATIENT)
Dept: INTERNAL MEDICINE | Age: 68
End: 2023-10-18

## 2023-10-19 ENCOUNTER — OFFICE VISIT (OUTPATIENT)
Dept: PHYSICAL MEDICINE AND REHAB | Facility: CLINIC | Age: 68
End: 2023-10-19
Payer: MEDICARE

## 2023-10-19 ENCOUNTER — TELEPHONE (OUTPATIENT)
Dept: PHYSICAL MEDICINE AND REHAB | Facility: CLINIC | Age: 68
End: 2023-10-19

## 2023-10-19 VITALS
HEART RATE: 60 BPM | RESPIRATION RATE: 16 BRPM | HEIGHT: 66 IN | OXYGEN SATURATION: 99 % | BODY MASS INDEX: 24.11 KG/M2 | WEIGHT: 150 LBS

## 2023-10-19 DIAGNOSIS — N94.89 HIGH-TONE PELVIC FLOOR DYSFUNCTION: ICD-10-CM

## 2023-10-19 DIAGNOSIS — M48.061 LUMBAR FORAMINAL STENOSIS: ICD-10-CM

## 2023-10-19 DIAGNOSIS — M54.59 MECHANICAL LOW BACK PAIN: ICD-10-CM

## 2023-10-19 DIAGNOSIS — M47.816 FACET SYNDROME, LUMBAR: Primary | ICD-10-CM

## 2023-10-19 DIAGNOSIS — M79.10 MYALGIA: ICD-10-CM

## 2023-10-19 DIAGNOSIS — M51.37 DDD (DEGENERATIVE DISC DISEASE), LUMBOSACRAL: ICD-10-CM

## 2023-10-19 DIAGNOSIS — M47.816 LUMBAR SPONDYLOSIS: ICD-10-CM

## 2023-10-19 DIAGNOSIS — M99.9 BIOMECHANICAL LESION: ICD-10-CM

## 2023-10-19 DIAGNOSIS — M53.3 SACROILIAC JOINT DYSFUNCTION OF RIGHT SIDE: ICD-10-CM

## 2023-10-19 DIAGNOSIS — M51.36 BULGE OF LUMBAR DISC WITHOUT MYELOPATHY: ICD-10-CM

## 2023-10-19 DIAGNOSIS — M54.16 LUMBAR RADICULOPATHY: ICD-10-CM

## 2023-10-19 PROCEDURE — 99214 OFFICE O/P EST MOD 30 MIN: CPT | Performed by: PHYSICAL MEDICINE & REHABILITATION

## 2023-10-19 NOTE — PATIENT INSTRUCTIONS
1) My office will call you to schedule the RIGHT SI joint and myofascial trigger point CSI under ultrasound guidance  after 11/28/23 once the procedure is approved by your insurance carrier.     2) Continue home exercise program  3) Follow up with me if you want the injection

## 2023-10-19 NOTE — TELEPHONE ENCOUNTER
Per CMS Guidelines -no authorization is required for RIGHT SI joint and myofascial trigger point CSI under ultrasound guidance CPT 52485, 52147 () 14359    Status: Authorization is not required based on medical necessity however may be subject to review once claim is submitted-Covered Benefit    Per Dr. Poli Eddy -scheduled after 11/28/23, patient already scheduled 12/4/23

## 2023-10-24 ENCOUNTER — ANCILLARY PROCEDURE (OUTPATIENT)
Dept: CARDIOLOGY | Age: 68
End: 2023-10-24
Attending: INTERNAL MEDICINE

## 2023-10-24 ENCOUNTER — APPOINTMENT (OUTPATIENT)
Dept: PHYSICAL THERAPY | Facility: HOSPITAL | Age: 68
End: 2023-10-24
Attending: PHYSICAL MEDICINE & REHABILITATION
Payer: MEDICARE

## 2023-10-24 DIAGNOSIS — R93.1 ELEVATED CORONARY ARTERY CALCIUM SCORE: ICD-10-CM

## 2023-10-24 DIAGNOSIS — E78.2 MIXED HYPERLIPIDEMIA: ICD-10-CM

## 2023-10-24 DIAGNOSIS — E55.9 VITAMIN D DEFICIENCY: ICD-10-CM

## 2023-10-24 DIAGNOSIS — R73.9 HYPERGLYCEMIA: ICD-10-CM

## 2023-10-24 DIAGNOSIS — I25.10 ATHEROSCLEROSIS OF NATIVE CORONARY ARTERY OF NATIVE HEART WITHOUT ANGINA PECTORIS: ICD-10-CM

## 2023-10-24 DIAGNOSIS — R00.2 PALPITATIONS: ICD-10-CM

## 2023-10-24 LAB
AORTIC VALVE AREA (AVA): 0.86
ASCENDING AORTA (AAD): 3
AV PEAK GRADIENT (AVPG): 7
AV PEAK VELOCITY (AVPV): 1.3
AV STENOSIS SEVERITY TEXT: NORMAL
AVI LVOT PEAK GRADIENT (LVOTMG): 0.9
E WAVE DECELARATION TIME (MDT): 10.15
INTERVENTRICULAR SEPTUM IN END DIASTOLE (IVSD): 2.57
LEFT INTERNAL DIMENSION IN SYSTOLE (LVSD): 1
LEFT VENTRICULAR INTERNAL DIMENSION IN DIASTOLE (LVDD): 3.2
LEFT VENTRICULAR POSTERIOR WALL IN END DIASTOLE (LVPW): 4.6
LV EF: NORMAL %
LVOT VTI (LVOTVTI): 0.99
MV E TISSUE VEL MED (MESV): 10.3
MV E WAVE VEL/E TISSUE VEL MED(MSR): 8.5
MV PEAK A VELOCITY (MVPAV): 278
MV PEAK E VELOCITY (MVPEV): 0.71
RV END SYSTOLIC LONGITUDINAL STRAIN FREE WALL (RVGS): 1.9
TRICUSPID VALVE PEAK REGURGITATION VELOCITY (TRPV): 3.7
TV ESTIMATED RIGHT ARTERIAL PRESSURE (RAP): 12.5

## 2023-10-24 PROCEDURE — 93306 TTE W/DOPPLER COMPLETE: CPT | Performed by: INTERNAL MEDICINE

## 2023-10-24 PROCEDURE — 76376 3D RENDER W/INTRP POSTPROCES: CPT | Performed by: INTERNAL MEDICINE

## 2023-10-24 PROCEDURE — 93356 MYOCRD STRAIN IMG SPCKL TRCK: CPT | Performed by: INTERNAL MEDICINE

## 2023-10-30 ENCOUNTER — OFFICE VISIT (OUTPATIENT)
Dept: PHYSICAL THERAPY | Facility: HOSPITAL | Age: 68
End: 2023-10-30
Attending: PHYSICAL MEDICINE & REHABILITATION
Payer: MEDICARE

## 2023-10-30 PROCEDURE — 97112 NEUROMUSCULAR REEDUCATION: CPT

## 2023-10-30 PROCEDURE — 97110 THERAPEUTIC EXERCISES: CPT

## 2023-10-30 NOTE — PROGRESS NOTES
[facility]    2/22/1955  Referring Physician:  Dr. Kirstie Merlos  Diagnosis: Lumbar spondylosis (M47.816)  Myalgia (M79.10)  DDD (degenerative disc disease), lumbosacral (M51.37)  Lumbar foraminal stenosis (M48.061)  Facet syndrome, lumbar (M47.816)  Biomechanical lesion (M99.9)  Bulge of lumbar disc without myelopathy (M51.36)  Mechanical low back pain (M54.59)  Initial Evaluation Date: 8/4/2023  Date of Surgery: None for this diagnosis   Authorized # of visits 12 by 7/2024    Precautions/Hx: OA, B carotid stenosis, compression fx c-spine, GERD, mitral regurgitation, medial meniscus tear, palpitations,    8/9/2023 B L4-5 and L5-S1 facet injection    SUBJECTIVE:     Pt states that he has been very busy at work and had a scare with possible retinal detachment. Pt notes that he has been able to tolerate a little bit of twisting without triggering his pain. He states that he has some dizziness with positional changes. Visit count 11/2/2023 1/8 2/8 3/8 4/8   Date 8/4/2023 8/22/2023  9/1/2023  10/30/2023    LBP/ B SI       Pain Range 1-2 to 7/10 1 to 2-3/10 0 to 1-2/10 0-2/10   Pain Ave 4-5/10 1-2/10 1-2/10 1-2/10     Progress Note Assessment:     Pt has received skilled physical therapy intervention with good decrease of pain complaints on VAS as noted above. Pt displays improved: lumbar ROM; B hip ROM; B LE flexibility and sciatic neural mobility. Pt doing very well with low to no LBP and return to his work as a . Pt remains fearful of lumbar mobility especially any flexion past neutral.  Discussed that he is likely doing some components of flexion with his active job. Pt assessed and demonstrates decreased hip IR B. Pt instructed in self mobilization. Pt demonstrates improved function as noted as progression towards goals and improved functional score. See objective assessment data in tables below with initial evaluation data.      Post Oswestry Disability Index Score  Post Score: 30 % (11/5/2023 10:55 PM)    18 % improvement    Recommend that pt continue PT to complete original prescription at 1-2x/week for up to 8 visits. Rehab Potential: good    OBJECTIVE:       Treatment performed this date:    Therapeutic Exercise:  Visit #   2/8 3/8 4/8   Position Exercise HEP 8/22/2023  9/1/2023  10/30/2023    Supine  Sciatic nerve glide H  10x 10x    Cross leg piriformis H  3x 3x    SKC H  X 3x 20 sec 3x    DKC    5x only min past neutral    Hesch B hip IR self tx towels 5 min H   x    Thoracic decompression  X 40 to 20towel layers      B shldr flexed hands clasped  X 10x2 w progressive decrease of head positioning      Chin tuck  X  5x2      Thoracic extn over towel 3 min w shldr flex  X       B hip IR self mob H   X 3x30s   Sidelying  Trunk rotation book H  X 10x    Sitting Leg pumps nerve glide H  10x 10x    Piriformis stretch H  3x 3x   Many ROM and strength for assessment    X    Ther Act Function   X lifting    Neuro Re-ed   X see assessment  Progression of flex/extn mobility   Man Tx MET  X T5, T7 FRSR;   Ribs:  Ant - R 7             H = HEP. Pt given copies of this exercise for home program.  X = Exercises done this date - pt verbalized understanding and demonstrated competence. All exercises done B unless otherwise indicated. Pt advised to discontinue exercises that increase pain and to call or return to therapist to discuss. Each intervention above is specifically prescribed to address the patients identified impairments, activity limitations, and participation restrictions. ASSESSMENT:     Physical Therapy Goals: From 8/4/2023 to 2/3/2024  - Created with patient input during initial evaluation   1. Pt will be independent in beginning level of HEP for stretching, posture and strengthening. 2. 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 3. 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   4. Pt will be able to transfer on/off toilet without increased symptoms. 5. Pt will be able to stand for 20 min x 2 for work without increased symptoms. PLAN OF CARE:      Plan: Continue skilled Physical Therapy 1-2 x/week or a total of 8 additional visits over a 90 day period. Treatment will include: Continue PT per original plan for therapeutic exercises, posture retraining, therapeutic activities, manual treatment, neuromuscular reeducation, therapeutic pain neuroscience education, patient education, modalities as needed. Patient/Family/Caregiver was advised of these findings, precautions, and treatment options and has agreed to actively participate in planning and for this course of care. Thank you for your referral. Please co-sign or sign and return this letter via fax as soon as possible to [phone] If you have any questions, please contact me at Dept: Dept: [phone]. Sincerely,  Electronically signed by therapist: Kostas Hurley PT    [de-identified] certification required: Yes  I certify the need for these services furnished under this plan of treatment and while under my care. X___________________________________________________ Date____________________    Certification From: 50/0/0265  To:2/3/2024        Charged Units Units Minutes    Ther Ex 2 25   Neuro 1 15   Ther Activity     Gait     Man Tx          Total Timed  40   Total Tx Time  40           __________________________________________________________________________________________      21st Century Cures Act Notice to Patient: Medical documents like this are made available to patients in the interest of transparency. However, be advised this is a medical document and it is intended as [id number] communication between your medical providers. This medical document may contain abbreviations, assessments, medical data, and results or other terms that are unfamiliar.  Medical documents are intended to carry relevant information, facts as evident, and the clinical opinion of the practitioner. As such, this medical document may be written in language that appears blunt or direct. You are encouraged to contact your medical provider and/or [facility] if you have any questions about this medical document.   Objective Initial Evaluation Data 8/4/2023:    Oswestry Disability Index Score  Score: 48 % (8/6/2023  1:58 PM)    Gait:        Deviations: loss of trunk extn and rotation       Devices: none       Assistance: none      Posture 8/4/2023   Alignment Loss of lumbar extn, L trunk shift       Sensation 8/4/2023   Dermatomes Light Touch    Proximal medial thigh R (L2)  wnl   Proximal medial thigh L (L2) wnl   Above knee R (L3) wnl   Above knee L (L3) wnl   Medial arch R(L4) wnl   Medial arch L (L4) wnl   Between 1st - 2nd toes R (L5) wnl   Between 1st - 2nd toes L (L5) wnl   Lateral border of foot R (S1) wnl   Lateral border of foot L (S1) wnl   Popliteal fossa R (S2) wnl   Popliteal fossa L(S2) wnl       Abdominals 8/4/2023   Tone  good       Lumbopelvic 8/4/2023 10/30/2023    Control  Poor d/t lack of mobility and fear avoidance Poor (+) d/t lack of mobility and fear avoidance        ROM Lumbar 8/4/2023 10/30/2023    Flexion Max, refuses Mod-max, refuses to go further   Extension max max   R SB mod min   L SB mod min   R Rotation mod Min-mod   L Rotation mod Min-mod   * pain limiting    ROM Hip 8/4/2023 10/30/2023    Flex R 125 124 125   Flex L 125 122 124   ER R 45 50 50   ER L 45 45 50   IR R 40 -3 10   IR L 40 5 20   *pain limiting     MMT 5/5 8/4/2023   L2- hip flex R 5   Hip flex L 5   L3- knee ext R 5   Knee ext L 5   L4- ankle DF R 5   Ankle DF L 5   L5- EHL R 5   EHL L 5   S1- ankle PF R 5   Ankle PF L 5   S2- Hams R 5   Hams L 5   *pain limiting    LE flexibility 8/4/2023   Piriformis R mod   Piriformis L mod   Hams R -62   Hams L -50   *pain limiting    Neural mobility 8/4/2023 10/30/2023    SLR R (+) 40 deg (+) 60 deg   SLR L (+) 45 deg (+) 59 deg      VENKATA 8/4/2023   R (-) wnl ROM   L (-) min ROM loss        Imaging:     PROCEDURE: MRI SPINE LUMBAR (CPT=72148)     COMPARISON: [facility], XR LUMBAR SPINE (MIN 2 VIEWS) (CPT=72100), 5/07/2018, 17:35. [facility], MRI SPINE LUMBAR (XSD=47683), 9/18/2017, 15:07. INDICATIONS: intractable low back pain     TECHNIQUE: A variety of imaging planes and parameters were utilized for visualization of suspected pathology. FINDINGS:  NUMERATION: For the purposes of this examination, the lowest fully formed disc space is designated as L5-S1.  ALIGNMENT: There is preservation of the expected lumbar lordosis. BONES: Probable 2.7 cm hemangioma within S1 vertebral body. Right iliac bone island. Schmorl's node extending into the superior T12 endplate. Degenerative-type edema involving the anterosuperior L3 endplate. CORD/CAUDA EQUINA: The conus medullaris terminates at the level of the inferior T12 endplate. The distal cord and nerve roots have normal caliber, contour, and signal intensity. PARASPINAL AREA: No visible mass. OTHER: Probable 1.8 cm right lower pole renal cyst.     LUMBAR DISC LEVELS:  L1-L2: No significant disc/facet abnormality, spinal stenosis, or foraminal stenosis. L2-L3: Small diffuse disc bulge, which is slightly eccentric to the left. Mild ligamentum flavum redundancy. No spinal canal or neural foraminal compromise. L3-L4: Minimal post degeneration and desiccation. No significant neural foraminal or spinal canal compromise. L4-L5: There is a diffuse disc bulge with superimposed central disc protrusion/annular fissure. Mild omentum from redundancy and epidural lipomatosis. Mild spinal canal stenosis. Mild to moderate right and mild left lateral recess stenosis. No  significant neural foraminal compromise. L5-S1: No significant disc/facet abnormality, spinal stenosis, or foraminal stenosis. Impression   CONCLUSION:  1.  Mild multilevel lumbar spine degenerative changes as detailed. Most notable is a central disc protrusion/annular fissure at L4-L5, which results in mild spinal canal stenosis as well as mild to moderate right and mild left lateral recess stenosis at  this level. No additional significant neural compromise throughout the lumbar spine. 2. Probable hemangioma within the S1 vertebral body, unchanged since September, 2017.  3. Right renal cyst, incompletely characterized on this non-dedicated exam.  4. Lesser incidental findings as above. Dictated by (CST): Indira Allen MD on 5/09/2018 at 9:11      Approved by (CST): Indira Allen MD on 5/09/2018 at 9:19     PROCEDURE: XR LUMBAR SPINE (MIN 2 VIEWS) (CPT=72100)     COMPARISON: [facility], XR LUMBAR SPINE (MIN 2 VIEWS) (CPT=72100), 12/10/2017, 15:21. INDICATIONS: Atraumatic chronic lower back pain worsening today. TECHNIQUE: Lumbar spine radiographs (4 views)       FINDINGS:     ALIGNMENT: Normal alignment. VERTEBRAL BODIES: There is demineralization of the bony structures. Minimal vertebral body spurring is noted. Otherwise no significant subluxation, fracture or visible bony lesion. DISC SPACES: There is narrowing of  the L4-L5 disc space. Otherwise no significant disc space narrowing. SACROILIAC JOINTS: Normal.    OTHER: Minor vascular calcifications noted. Impression   CONCLUSION:  1. Demineralization. 2. Minimal osteoarthritis. 3. Atherosclerosis. Dictated by (CST): Kelly Ngo MD on 5/07/2018 at 18:06      Approved by (CST):  Kelly Ngo MD on 5/07/2018 at 18:08

## 2023-11-01 ENCOUNTER — TELEPHONE (OUTPATIENT)
Dept: NEUROLOGY | Facility: CLINIC | Age: 68
End: 2023-11-01

## 2023-11-01 ENCOUNTER — TELEPHONE (OUTPATIENT)
Dept: PHYSICAL THERAPY | Facility: HOSPITAL | Age: 68
End: 2023-11-01

## 2023-11-01 NOTE — TELEPHONE ENCOUNTER
Pt stated he has vertigo, dizzy and other issues. Do not call pt between 12 and 1 today. Call pt to discuss and advise.

## 2023-11-06 ENCOUNTER — OFFICE VISIT (OUTPATIENT)
Dept: PHYSICAL THERAPY | Facility: HOSPITAL | Age: 68
End: 2023-11-06
Attending: PHYSICAL MEDICINE & REHABILITATION
Payer: MEDICARE

## 2023-11-06 PROCEDURE — 97112 NEUROMUSCULAR REEDUCATION: CPT

## 2023-11-06 PROCEDURE — 97110 THERAPEUTIC EXERCISES: CPT

## 2023-11-07 NOTE — TELEPHONE ENCOUNTER
Pt is experiencing light headedness when he looks different ways. Patient is not sure it is a neurology issue or maybe his neck. Patient wanted the name and phone number for his Neuro Psychiatrist.  Dr. Marce Wilson -[phone]. Patient advised he is going to call us if he needs to schedule an appt with Dr. Albaro Avila.

## 2023-11-20 ENCOUNTER — TELEPHONE (OUTPATIENT)
Dept: NEUROLOGY | Facility: CLINIC | Age: 68
End: 2023-11-20

## 2023-11-20 NOTE — TELEPHONE ENCOUNTER
Patient requesting a call back from the he is hoping to reschedule his injection sooner that December.

## 2023-11-27 ENCOUNTER — APPOINTMENT (OUTPATIENT)
Dept: GASTROENTEROLOGY | Age: 68
End: 2023-11-27
Attending: INTERNAL MEDICINE

## 2023-12-04 ENCOUNTER — OFFICE VISIT (OUTPATIENT)
Dept: PHYSICAL MEDICINE AND REHAB | Facility: CLINIC | Age: 68
End: 2023-12-04
Payer: MEDICARE

## 2023-12-04 DIAGNOSIS — M99.9 BIOMECHANICAL LESION: ICD-10-CM

## 2023-12-04 DIAGNOSIS — M47.816 FACET SYNDROME, LUMBAR: Primary | ICD-10-CM

## 2023-12-04 DIAGNOSIS — M53.3 SACROILIAC JOINT DYSFUNCTION OF RIGHT SIDE: ICD-10-CM

## 2023-12-04 DIAGNOSIS — M51.36 BULGE OF LUMBAR DISC WITHOUT MYELOPATHY: ICD-10-CM

## 2023-12-04 DIAGNOSIS — M79.10 MYALGIA: ICD-10-CM

## 2023-12-04 DIAGNOSIS — M54.59 MECHANICAL LOW BACK PAIN: ICD-10-CM

## 2023-12-04 RX ORDER — LIDOCAINE HYDROCHLORIDE 10 MG/ML
9 INJECTION, SOLUTION INFILTRATION; PERINEURAL ONCE
Status: COMPLETED | OUTPATIENT
Start: 2023-12-04 | End: 2023-12-04

## 2023-12-04 RX ORDER — TRIAMCINOLONE ACETONIDE 40 MG/ML
40 INJECTION, SUSPENSION INTRA-ARTICULAR; INTRAMUSCULAR ONCE
Status: COMPLETED | OUTPATIENT
Start: 2023-12-04 | End: 2023-12-04

## 2023-12-04 NOTE — PROCEDURES
[address]   Sacroiliac Joint and Myofascial Trigger Point Injection Procedure Note    CHIEF COMPLAINT:    Chief Complaint   Patient presents with    Injection       PROCEDURE PERFORMED:  Right Sacroiliac joint corticosteroid injection and myofascial trigger points under ultrasound guidance    INDICATIONS:  Right sacroiliitis and myalgia    PRIMARY PROCEDURALIST:  Nolvia Norman MD    [address] [place] [address]:   As documented in the Time Out and Pre-Procedure Check Lists. Verbal consent was obtained    Vitals: [unfilled]  Labs (document last wbc, plts, hgb, and PT/INR):    No visits with results within 6 Month(s) from this visit. Latest known visit with results is:   Counts include 234 beds at the [facility] Lab Encounter on 02/09/2023   Component Date Value Ref Range Status    Glutamic Acid Decarboxylase Ab 02/09/2023 <5.0  0.0 - 5.0 IU/mL Final    Ankylosing Spondylitis (HLAB27) Sp* 02/09/2023 Whole Blood   Final    Ankylosing Spondylitis (HLAB27) 02/09/2023 Negative   Final   ]    PROCEDURE:    The procedure risks, benefits, and alternatives were discussed with the patient. All questions were answered. The patient acknowledged understanding and agreed to proceed with ultrasound-guided intra- articular injection of the Right sacroiliac joints with corticosteroid. The patient's Right low back was prepped and draped in the usual sterile fashion using 3 betadine swabs. The Right SI joint, lumbar paraspinal, and thoracolumbar fascia was identified using ultrasound guidance with a curvilinear probe. Approximately 4 cc of 1% lidocaine were used to anesthetize the skin and deep soft tissues under ultrasound guidance using a  22-gauge 2.5 inch needle. This was introduced into the Right sacroiliac joint, lumbar paraspinal, and thoracolumbar fascia. Aspiration was attempted. There was no fluid able to be aspirated.   A mixture of 4 cc of 1% lidocaine and 1 cc of Kenalog containing 40 mg of corticosteroid was injected into the intra-articular space and surrounding myofascial tissue. The fluid was seen flowing into the intra-articular region. The needle was then removed and hemostasis obtained. Patient verbalized understanding of assessment and plan. Patient is in agreement with the plan. All questions were answered. No barriers to learning identified. Permanent pictures were saved. INSTRUCTIONS GIVEN TO PATIENT:    \"You will see an effect in the next 2-3 days. Please contact me if you have fevers, worsening swelling, worsening pain, decreased range of motion, increased redness, chills, or anything that makes you concerned about how the joint we injected feels/looks. If you do not reach me in a reasonable time, please report directly to the emergency room for further evaluation\"    Symptoms persist, then I would recommend bilateral L4-L5 and L5-S1 medial branch blocks if symptoms are nonradicular. Erick Sharma.  Poli Eddy MD, [facility]  Physical Medicine and Rehabilitation/Sports Medicine  MEDICAL CENTER [facility]

## 2023-12-04 NOTE — PATIENT INSTRUCTIONS
Post Injection Instructions     Please do not do anything strenuous over the next two days (if you had a knee injection do not walk more than 2 city blocks, do not attend any aerobic classes, do not run, no heavy lifting, no prolong standing). You may resume your day to day activities after your injection. You may experience some mild amount of swelling after the procedure. Please ice your joint that was injected at least 5-6 times a day (15 minutes) for two days after (this will help prevent worsening pain that sometimes occurs after an injection). Only take tylenol if needed for pain for the first few days. Watch for signs of infection which include redness, warmth, worsening pain, fevers or chills. If you develop any of these signs call the office immediately at [phone]    Everyone responds differently to injections, but you can expect your peak effects a few weeks after your last injection. Christina Jimenez. Rosario Woods MD  Physical Medicine and Rehabilitation/Sports Medicine  [facility] [place]      We can consider BILATERAL L4-L5 and L5-S1 MBB for facet joint pain.

## 2023-12-12 ENCOUNTER — TELEPHONE (OUTPATIENT)
Dept: NEUROLOGY | Facility: CLINIC | Age: 68
End: 2023-12-12

## 2023-12-12 NOTE — TELEPHONE ENCOUNTER
Patient calling to speak to a nurse to provide condition update after his LOV new symptoms have arise and looking for some advice, he can be contacted after :30 PM today.

## 2023-12-12 NOTE — TELEPHONE ENCOUNTER
Location of Pain: \"Above the disc\"; pt stating he feels the pain in the middle of the low back, denied this RN's additional questions to elaborate further. Old or new pain: Old - \"when I had issues with my back\" \"before the last shot\"; \"I had it a long time ago. \"  Date Pain Began: \"I don't know like 4 days ago. \"; denies injury/accident/trauma. Numeric Rating Scale:  Pain at Present:  3                                                                                                            (No Pain) 0  to  10 (Worst Pain)                 Minimum Pain:   4  Maximum Pain  4    Distribution of Pain:    bilateral  Quality of Pain:   aching; dull - intermittent  - Denies new weakness/N/T. Denies any b/b incontinence. Aggravating Factors:    Standing - for too long; \"when I take my 10 footer ladder and put it on the truck it exacerbates the pain. \"  Current pain treatment:  \"I don't take anything for pain. \"    LOV: 12/04/2023 with Zina Rocha M.D.  - Pt had Right Sacroiliac joint corticosteroid injection and myofascial trigger points on 12/04/2023; pt reports no issues with his SIJ injection, pt reports no pain or soreness to the area of injection. NOV: 03/04/2024 with Zina Rocha M.D. Summary of patient request: \"Just give him the message, he'll know what to do. \"; \"I just want him to tell me what's going on. \"  \"I think, tell him, that it's from the dampness and the coldness, especially when the barometric pressure. \"

## 2023-12-19 ENCOUNTER — APPOINTMENT (OUTPATIENT)
Dept: INTERNAL MEDICINE | Age: 68
End: 2023-12-19

## 2024-01-04 ENCOUNTER — OFFICE VISIT (OUTPATIENT)
Dept: PHYSICAL MEDICINE AND REHAB | Facility: CLINIC | Age: 69
End: 2024-01-04
Payer: MEDICARE

## 2024-01-04 ENCOUNTER — OFFICE VISIT (OUTPATIENT)
Dept: INTERNAL MEDICINE | Age: 69
End: 2024-01-04

## 2024-01-04 VITALS — HEART RATE: 77 BPM | WEIGHT: 135 LBS | OXYGEN SATURATION: 98 % | BODY MASS INDEX: 22 KG/M2

## 2024-01-04 VITALS
WEIGHT: 135 LBS | SYSTOLIC BLOOD PRESSURE: 121 MMHG | OXYGEN SATURATION: 97 % | BODY MASS INDEX: 21.69 KG/M2 | HEIGHT: 66 IN | DIASTOLIC BLOOD PRESSURE: 73 MMHG | HEART RATE: 90 BPM | TEMPERATURE: 97.9 F

## 2024-01-04 DIAGNOSIS — M53.3 SI (SACROILIAC) JOINT DYSFUNCTION: ICD-10-CM

## 2024-01-04 DIAGNOSIS — M47.816 LUMBAR SPONDYLOSIS: ICD-10-CM

## 2024-01-04 DIAGNOSIS — N94.89 HIGH-TONE PELVIC FLOOR DYSFUNCTION: ICD-10-CM

## 2024-01-04 DIAGNOSIS — R10.33 PERIUMBILICAL ABDOMINAL PAIN: Primary | ICD-10-CM

## 2024-01-04 DIAGNOSIS — M79.10 MYALGIA: ICD-10-CM

## 2024-01-04 DIAGNOSIS — M54.16 LUMBAR RADICULOPATHY: ICD-10-CM

## 2024-01-04 DIAGNOSIS — M51.36 BULGE OF LUMBAR DISC WITHOUT MYELOPATHY: ICD-10-CM

## 2024-01-04 DIAGNOSIS — M99.9 BIOMECHANICAL LESION: ICD-10-CM

## 2024-01-04 DIAGNOSIS — M53.3 SACROILIAC JOINT DYSFUNCTION OF RIGHT SIDE: ICD-10-CM

## 2024-01-04 DIAGNOSIS — M54.59 MECHANICAL LOW BACK PAIN: ICD-10-CM

## 2024-01-04 DIAGNOSIS — M48.061 LUMBAR FORAMINAL STENOSIS: ICD-10-CM

## 2024-01-04 DIAGNOSIS — M47.816 FACET SYNDROME, LUMBAR: Primary | ICD-10-CM

## 2024-01-04 DIAGNOSIS — M51.37 DDD (DEGENERATIVE DISC DISEASE), LUMBOSACRAL: ICD-10-CM

## 2024-01-04 PROCEDURE — 99213 OFFICE O/P EST LOW 20 MIN: CPT | Performed by: INTERNAL MEDICINE

## 2024-01-04 ASSESSMENT — COGNITIVE AND FUNCTIONAL STATUS - GENERAL
BECAUSE OF A PHYSICAL, MENTAL, OR EMOTIONAL CONDITION, DO YOU HAVE DIFFICULTY DOING ERRANDS ALONE: NO
DO YOU HAVE SERIOUS DIFFICULTY WALKING OR CLIMBING STAIRS: NO
BECAUSE OF A PHYSICAL, MENTAL, OR EMOTIONAL CONDITION, DO YOU HAVE SERIOUS DIFFICULTY CONCENTRATING, REMEMBERING OR MAKING DECISIONS: NO
DO YOU HAVE SERIOUS DIFFICULTY WALKING OR CLIMBING STAIRS: NO
BECAUSE OF A PHYSICAL, MENTAL, OR EMOTIONAL CONDITION, DO YOU HAVE SERIOUS DIFFICULTY CONCENTRATING, REMEMBERING OR MAKING DECISIONS: NO
BECAUSE OF A PHYSICAL, MENTAL, OR EMOTIONAL CONDITION, DO YOU HAVE DIFFICULTY DOING ERRANDS ALONE: NO
DO YOU HAVE DIFFICULTY DRESSING OR BATHING: NO
DO YOU HAVE DIFFICULTY DRESSING OR BATHING: NO

## 2024-01-04 ASSESSMENT — PATIENT HEALTH QUESTIONNAIRE - PHQ9
SUM OF ALL RESPONSES TO PHQ9 QUESTIONS 1 AND 2: 0
2. FEELING DOWN, DEPRESSED OR HOPELESS: NOT AT ALL
1. LITTLE INTEREST OR PLEASURE IN DOING THINGS: NOT AT ALL
SUM OF ALL RESPONSES TO PHQ9 QUESTIONS 1 AND 2: 0
CLINICAL INTERPRETATION OF PHQ2 SCORE: NO FURTHER SCREENING NEEDED

## 2024-01-04 NOTE — PATIENT INSTRUCTIONS
1) Start physical therapy as soon as possible. Focus on flexibility, biomechanics, and SI joint  2) Discuss with your neuropsychologist to discuss anxiety/ADHD

## 2024-01-05 NOTE — PROGRESS NOTES
[facility]  Progress Note    CHIEF COMPLAINT:    Chief Complaint   Patient presents with    Low Back Pain     12/04/23 R SIJ TPIs. 10/19/23 LOV. Patient is here to f/u on low back pain. Pain 4/10. Denies t/n. No pain meds.        History of Present Illness:  The patient is a 68 year old  RIGHT handed male with significant past medical history of carotid stenosis, mitral regurg, GERD, who presents with midline low back pain since 2017 after he lifted a ladder (works as a ).  He is status post bilateral L5 transforaminal epidural steroid injection on 2/1/2023 with 60% improvement.  Is also status post bilateral L5 transforaminal epidural steroid injection on March 15, 2023 with 80% improvement that lasted for about 2 weeks.  He is now status post bilateral L5 transforaminal epidural steroid injection on 5/24/2023 which provided about 80% improvement that lasted for about 2 months.  He then complained of axial low back pain without radicular symptoms or we performed bilateral L4-L5 and L5-S1 facet joint injections on 8/9/2023 which provided about 80 to 90% improvement.  He was last seen by me on 8/28/2023 where I performed a right SI joint and myofascial trigger point corticosteroid injection with 95% improvement.  A repeat of the right SI joint and myofascial trigger point injection was performed on 12/4/2023.  He felt this was helpful only for a couple of weeks but not as helpful as the one from August 2023.  He rates his discomfort about a 4 out of 10 and is not taking any medications for pain.  He denies any radicular symptoms.  He feels that part of his symptom onset was worsened by laying and watching movies for a couple of days during the New Year's holiday.    PAST MEDICAL HISTORY:  Past Medical History:   Diagnosis Date    Arthritis     Back pain     Bilateral carotid artery disease (HCC) 3/22/2018    Carotid stenosis, bilateral 09/21/2017    < 50% bilat  at [facility], recheck 2022    Compression fracture of cervical spine (HCC) 6/13/2011    Esophageal reflux     HNP (herniated nucleus pulposus), cervical 6/13/2011    Left axis deviation 11/12/11    stable from 2001, early repol    Mitral regurgitation 11-16-09    MMT (medial meniscus tear) 6/13/2011    Palpitations 12/27/2010    Refused influenza vaccine 11/28/2012    Screening for cardiovascular condition 11-16-09    ECHO with ef 60%, mild pulmonic and tricuspid insuff, mild MR    Vitamin D deficiency 4/28/2014       SURGICAL HISTORY:  Past Surgical History:   Procedure Laterality Date    COLONOSCOPY,DIAGNOSTIC  2006     normal, EGD as well     OTHER SURGICAL HISTORY      right foot screw    OTHER SURGICAL HISTORY      multiple lipoma approx 30 yr ago    OTHER SURGICAL HISTORY  2012    left knee meniscus    REPAIR ING HERNIA,5+Y/O,REDUCIBL         SOCIAL HISTORY:   Social History     Occupational History    Occupation: painting   Tobacco Use    Smoking status: Never    Smokeless tobacco: Never   Substance and Sexual Activity    Alcohol use: Yes     Alcohol/week: 5.8 - 11.7 standard drinks of alcohol     Types: 7 - 14 Standard drinks or equivalent per week     Comment: social     Drug use: No    Sexual activity: Not Currently     Partners: Female       FAMILY HISTORY:   Family History   Problem Relation Age of Onset    Heart Disorder Father     Diabetes Father     Psychiatric Father         dementia    Heart Disorder Mother         CAD with stent    Other (Other) Mother        CURRENT MEDICATIONS:   Current Outpatient Medications   Medication Sig Dispense Refill    Propranolol HCl 10 MG Oral Tab Take 1 tablet (10 mg total) by mouth every evening.         ALLERGIES:   Allergies   Allergen Reactions    Naproxen OTHER (SEE COMMENTS)     Developed ear problem      Cyclobenzaprine OTHER (SEE COMMENTS)     Difficulty urinating and leg spasms    Doxycycline Calcium NAUSEA ONLY     Elevated hr and gi upset    Epinephrine  UNKNOWN     Elevated heart rate          REVIEW OF SYSTEMS:   Review of Systems   Constitutional: Negative.    HENT: Negative.    Eyes: Negative.    Respiratory: Negative.    Cardiovascular: Negative.    Gastrointestinal: Negative.    Genitourinary: Negative.    Musculoskeletal: As per HPI  Skin: Negative.    Neurological: As per HPI  Endo/Heme/Allergies: Negative.    Psychiatric/Behavioral: Negative.      All other systems reviewed and are negative. Pertinent positives and negatives noted in the HPI.        PHYSICAL EXAM:   Pulse 77   Wt 135 lb (61.2 kg)   SpO2 98%   BMI 21.79 kg/m²     Body mass index is 21.79 kg/m².      General: No immediate distress  Head: Normocephalic/ Atraumatic  Eyes: Extra-occular movements intact.   Ears: No auricular hematoma or deformities  Mouth: No lesions or ulcerations  Heart: peripheral pulses intact. Normal capillary refill.   Lungs: Non-labored respirations  Abdomen: No abdominal guarding  Extremities: No lower extremity edema bilaterally   Skin: No lesions noted.   Cognition: alert & oriented x 3, attentive, able to follow 2 step commands, comprehention intact, spontaneous speech intact  Motor:    Musculoskeletal:  Tender to palpation over the right SI joint and myofascial trigger point at the glutes and piriformis as well as lumbosacral fascia2    Data  No visits with results within 6 Month(s) from this visit.   Latest known visit with results is:   [facility] Lab Encounter on 02/09/2023   Component Date Value Ref Range Status    Glutamic Acid Decarboxylase Ab 02/09/2023 <5.0  0.0 - 5.0 IU/mL Final    Ankylosing Spondylitis (HLAB27) Sp* 02/09/2023 Whole Blood   Final    Ankylosing Spondylitis (HLAB27) 02/09/2023 Negative   Final   ]      Radiology Imaging:  I reviewed with the patient his MRI of the lumbar spine   PROCEDURE: MRI SPINE LUMBAR (CPT=72148)     COMPARISON: [facility], XR LUMBAR SPINE (MIN 2 VIEWS) (CPT=72100), 5/07/2018, 17:35.  [facility]  [facility], MRI SPINE LUMBAR (CPT=72148), 9/18/2017, 15:07.     INDICATIONS: intractable low back pain     TECHNIQUE: A variety of imaging planes and parameters were utilized for visualization of suspected pathology.     FINDINGS:  NUMERATION: For the purposes of this examination, the lowest fully formed disc space is designated as L5-S1.  ALIGNMENT: There is preservation of the expected lumbar lordosis.  BONES: Probable 2.7 cm hemangioma within S1 vertebral body. Right iliac bone island. Schmorl's node extending into the superior T12 endplate. Degenerative-type edema involving the anterosuperior L3 endplate.  CORD/CAUDA EQUINA: The conus medullaris terminates at the level of the inferior T12 endplate. The distal cord and nerve roots have normal caliber, contour, and signal intensity.  PARASPINAL AREA: No visible mass.    OTHER: Probable 1.8 cm right lower pole renal cyst.     LUMBAR DISC LEVELS:  L1-L2: No significant disc/facet abnormality, spinal stenosis, or foraminal stenosis.    L2-L3: Small diffuse disc bulge, which is slightly eccentric to the left. Mild ligamentum flavum redundancy. No spinal canal or neural foraminal compromise.  L3-L4: Minimal post degeneration and desiccation. No significant neural foraminal or spinal canal compromise.  L4-L5: There is a diffuse disc bulge with superimposed central disc protrusion/annular fissure. Mild omentum from redundancy and epidural lipomatosis. Mild spinal canal stenosis. Mild to moderate right and mild left lateral recess stenosis. No  significant neural foraminal compromise.  L5-S1: No significant disc/facet abnormality, spinal stenosis, or foraminal stenosis.        Impression   CONCLUSION:  1. Mild multilevel lumbar spine degenerative changes as detailed. Most notable is a central disc protrusion/annular fissure at L4-L5, which results in mild spinal canal stenosis as well as mild to moderate right and mild left lateral recess stenosis at  this level. No  additional significant neural compromise throughout the lumbar spine.  2. Probable hemangioma within the S1 vertebral body, unchanged since September, 2017.  3. Right renal cyst, incompletely characterized on this non-dedicated exam.  4. Lesser incidental findings as above.              Dictated by (CST): Mickey Mcgregor MD on 5/09/2018 at 9:11      Approved by (CST): Mickey Mcgregor MD on 5/09/2018 at 9:19      I reviewed with the patient his MRI of the lumbar spine from 1/4/2023  Formatting of this note might be different from the original.   EXAM: MRI LUMBAR SPINE W WO CONTRAST     CLINICAL INDICATION: Chronic midline low back pain without sciatica.     COMPARISON:  Selected images from MRI lumbar spine without contrast   examination dated January 15, 2020.     TECHNIQUE: MRI lumbar spine was performed without and with contrast.   Approximately 7.5 mL of IV Gadavist administered.     FINDINGS:     No acute malalignment of the lumbar spine. Rudimentary disc space at S1-S2.   Vertebral body heights are overall maintained without evidence of   acute/recent compression fracture. There are a few stable marrow lesions   within the lumbar spine and sacrum, some which enhance, which are felt to   be benign given long-term stability. Scattered Schmorl's nodes and Modic   endplate changes. The distal spinal cord demonstrates a signal intensity   within normal limits. The conus medullaris terminates at approximately L1.   Multilevel degenerative disc disease. No suspicious areas of postcontrast   enhancement within the lumbar spinal canal. A couple of nonenhancing   sclerotic foci within the bilateral ilium which are nonspecific. A 2 cm T2   hyperintensity within the right kidney, likely a cyst. Bilateral   perinephric thickening which is nonspecific. Left renal sinus cyst.     L1-L2: No significant neural foraminal or spinal canal stenosis.     L2-L3: Disc bulge. Facet joint arthropathy. Mild neural foraminal stenosis.    No significant spinal canal stenosis.     L3-L4: Disc bulge. Facet joint arthropathy with ligamentum flavum   infolding. Mild left neural foraminal stenosis. Mild spinal canal stenosis.     L4-L5: Disc bulge with annular fissure. There is a small superimposed right   paracentral disc extrusion. Facet joint arthropathy with ligament flavum   infolding. Mild-to-moderate neural foraminal stenosis, left greater than   right. Moderate spinal canal stenosis. There is encroachment of the lateral   recesses.     L5-S1: Disc bulge. Facet joint arthropathy. No significant neural foraminal   or spinal canal stenosis.     IMPRESSION:     1. Overall, no significant interval progression of multilevel chronic   degenerative changes of the lumbar spine contributing to varying degrees of   neural foraminal and spinal canal stenosis as described in detail in the   body of the report.   2. Additional findings as described above         ASSESSMENT AND PLAN:  Max is a pleasant 68-year-old male who presents for follow-up of his right-sided low back pain without radicular symptoms.  He was doing well following the SI joint injection although he ended up sitting for long periods of time during the New Year's holiday.  At this time, I am recommending he restart formal physical therapy which she should focus on flexibility, biomechanics, and SI joint mobility.  I have also had a long discussion with him regarding his diagnosis of ADHD and its close relationship to anxiety.  He did have neuropsychological testing where he was diagnosed with ADHD but is scheduled to follow-up with the neuropsychologist in the near future.  I have recommended he discuss with the neuropsychologist the possibility of underlying anxiety leading to an increased perception of his pain.  I have also discussed with him regarding seeking psychological nonpharmacological treatment which would be his preference.  He will discuss this with neuropsychologist as well.   He will follow-up with me in about 3 months.       RTC in 3 months  Discharge Instructions were provided as documented in AVS summary.  The patient was in agreement with the assessment and plan.  All questions were answered.  There were no barriers to learning.    I have spent >40 minutes discussing the key components of this visit including the history, physical exam, diagnostic imaging, and labs, and medical decision making with greater than 50% of the total visit counseling/coordinating care.       1. Facet syndrome, lumbar    2. Bulge of lumbar disc without myelopathy    3. Mechanical low back pain    4. Sacroiliac joint dysfunction of right side    5. Biomechanical lesion    6. Myalgia    7. Lumbar spondylosis    8. DDD (degenerative disc disease), lumbosacral    9. Lumbar foraminal stenosis    10. Lumbar radiculopathy    11. High-tone pelvic floor dysfunction    12. SI (sacroiliac) joint dysfunction        Alex B. Behar MD  Physical Medicine and Rehabilitation/Sports Medicine  [facility]

## 2024-01-11 ENCOUNTER — APPOINTMENT (OUTPATIENT)
Dept: INTERNAL MEDICINE | Age: 69
End: 2024-01-11

## 2024-01-11 ENCOUNTER — TELEPHONE (OUTPATIENT)
Dept: PHYSICAL MEDICINE AND REHAB | Facility: CLINIC | Age: 69
End: 2024-01-11

## 2024-01-11 NOTE — TELEPHONE ENCOUNTER
Patient calling stating that he is scheduled to see Sayra PT on 01/15/2024, however since it's his \"third time around with her (Sayra).\" Alfietent inquiring if he can have massage therapy prescribed.     This RN attempted to educate patient regarding the benefits of PT and starting at the least manipulation and gradually advancing the manipulation based on patient's conditions. Pt insisted that he would like this RN to inquire with Dr. Behar if massage therapy can be ordered.     Once Dr. Behar's response is received, will reach back out to patient. Pt verbalized understanding and was thankful.

## 2024-01-15 ENCOUNTER — OFFICE VISIT (OUTPATIENT)
Dept: PHYSICAL THERAPY | Facility: HOSPITAL | Age: 69
End: 2024-01-15
Attending: PHYSICAL MEDICINE & REHABILITATION
Payer: MEDICARE

## 2024-01-15 DIAGNOSIS — M48.061 LUMBAR FORAMINAL STENOSIS: ICD-10-CM

## 2024-01-15 DIAGNOSIS — M47.816 LUMBAR SPONDYLOSIS: ICD-10-CM

## 2024-01-15 DIAGNOSIS — M51.36 BULGE OF LUMBAR DISC WITHOUT MYELOPATHY: ICD-10-CM

## 2024-01-15 DIAGNOSIS — M53.3 SACROILIAC JOINT DYSFUNCTION OF RIGHT SIDE: ICD-10-CM

## 2024-01-15 DIAGNOSIS — M79.10 MYALGIA: ICD-10-CM

## 2024-01-15 DIAGNOSIS — M53.3 SI (SACROILIAC) JOINT DYSFUNCTION: ICD-10-CM

## 2024-01-15 DIAGNOSIS — M47.816 FACET SYNDROME, LUMBAR: Primary | ICD-10-CM

## 2024-01-15 DIAGNOSIS — M51.37 DDD (DEGENERATIVE DISC DISEASE), LUMBOSACRAL: ICD-10-CM

## 2024-01-15 DIAGNOSIS — M54.16 LUMBAR RADICULOPATHY: ICD-10-CM

## 2024-01-15 DIAGNOSIS — M99.9 BIOMECHANICAL LESION: ICD-10-CM

## 2024-01-15 DIAGNOSIS — N94.89 HIGH-TONE PELVIC FLOOR DYSFUNCTION: ICD-10-CM

## 2024-01-15 DIAGNOSIS — M54.59 MECHANICAL LOW BACK PAIN: ICD-10-CM

## 2024-01-15 PROCEDURE — 97110 THERAPEUTIC EXERCISES: CPT

## 2024-01-15 PROCEDURE — 97162 PT EVAL MOD COMPLEX 30 MIN: CPT

## 2024-01-15 NOTE — PROGRESS NOTES
LUMBAR SPINE EVALUATION:   Max Hagen    2/22/1955  Referring Physician:  Alex Behar  Diagnosis: Facet syndrome, lumbar (M47.816)  Bulge of lumbar disc without myelopathy (M51.36)  Mechanical low back pain (M54.59)  Sacroiliac joint dysfunction of right side (M53.3)  Biomechanical lesion (M99.9)  Myalgia (M79.10)  Lumbar spondylosis (M47.816)  DDD (degenerative disc disease), lumbosacral (M51.37)  Lumbar foraminal stenosis (M48.061)  Lumbar radiculopathy (M54.16)  High-tone pelvic floor dysfunction (N94.89)  SI (sacroiliac) joint dysfunction (M53.3)  Initial Evaluation Date: 1/15/2024  Date of Surgery: None for this diagnosis   Authorized # of visits NA by 1/3/2025    Precautions/Hx: OA, B carotid stenosis, compression fx c-spine, GERD, mitral regurgitation, medial meniscus tear, palpitations,     Past medical history was reviewed with Max.   Past Medical History:   Diagnosis Date    Arthritis     Back pain     Bilateral carotid artery disease (HCC) 3/22/2018    Carotid stenosis, bilateral 09/21/2017    < 50% bilat at [place], recheck 2022    Compression fracture of cervical spine (HCC) 6/13/2011    Esophageal reflux     HNP (herniated nucleus pulposus), cervical 6/13/2011    Left axis deviation 11/12/11    stable from 2001, early repol    Mitral regurgitation 11-16-09    MMT (medial meniscus tear) 6/13/2011    Palpitations 12/27/2010    Refused influenza vaccine 11/28/2012    Screening for cardiovascular condition 11-16-09    ECHO with ef 60%, mild pulmonic and tricuspid insuff, mild MR    Vitamin D deficiency 4/28/2014     Past Surgical History:   Procedure Laterality Date    COLONOSCOPY,DIAGNOSTIC  2006     normal, EGD as well     OTHER SURGICAL HISTORY      right foot screw    OTHER SURGICAL HISTORY      multiple lipoma approx 30 yr ago    OTHER SURGICAL HISTORY  2012    left knee meniscus    REPAIR ING HERNIA,5+Y/O,REDUCIBL         PATIENT SUMMARY   Max Hagen is a 68 year old y/o male who  presents to therapy today with complaints of LBP radiating lateral across the lower back.  He continues to have increased pain with bending forward. He continues to sleep in reclined sitting on the couch with his feet on a towel on the coffee table.  He notes that he cannot allow himself to move L or R.  He get light tingling sensation in the R foot.  He states that he did not get relief with his recent SI injections.  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  Pt states he will never take any further prescription medication as he is worried about getting dementia.  He is concerned that muscle relaxants will cause urinary tract issues for him.  He states that he has been very cautious with shoveling snow and only doing very small amounts.  He was doing well prior to this recent flare up and was able to paint an entire home without issue.  History of current condition: he had worsening of pain as the colder weather occurred.  He states that he was standing and a cold breeze came in the drafty window and then his back started to tighten.   Current functional limitations include, but are not limited to walking, standing, sitting, carrying.   Prior treatments: PT, injections  Recent accidents or falls: waqas  Max describes prior level of function as able to perform all desired ADL's without difficulty until prior to 2018. Pt goals included as physical therapy goals below.     ASSESSMENT:   Pt presents with subjective complaints and functional limitations as noted above.  Pt's function and objective finding are consistent with physician's diagnosis.  The results of the objective tests and measures demonstrate the following limitations: very dense myofascia in lower T to lower lumbar ps muscle - pt fearful to move into any flexion; postural alteration w flattened thoracic and lumbar spine w forward head position; mod + loss of lumbar ROM; B  significant loss of hip IR ROM;  mod to max loss of B LE flexibility; and R (+) sciatic neural limit.    Pt and PT discussed evaluation findings, pathology, POC and HEP.  Pt voiced understanding and performs HEP correctly without reported pain. Skilled Physical Therapy is medically necessary to address the above impairments and reach functional goals.    In agreement with functional score and clinical rationale, this evaluation involved Moderate Complexity decision making due to 1-2 personal factors/comorbidities, 3 body structures involved/activity limitations, and unstable symptoms including changing pain levels.     SUBJECTIVE:     Visit count 4/14/2024 1/8     Date 1/15/2024     LBP      Pain Range 1 to 5-6/10     Pain Ave 3-4/10       Better: SI belt, reclined on couch  Sensation: mild tingling in R foot  Night: falls asleep - ok ; Position - couch; Bedtime - 12-1am can be later if he is not going to work; Hours of sleep - 7; Wakes - no; Sweats - no.  Incontinence: no, seeing urologist for prostate  Saddle Anesthesia: no  GI: GERD, occasionally  Stress: high  Work:    Living environment: house, alone  Stairs: 2 steps in, laundry basement  Unexplained wt loss: no  Increased pain w coughing, sneezing, straining in the bathroom: no  Blood thinners: no  Diabetes: no  Latex Allergy: no  Pacemaker: no     Are you being hurt, frightened, demeaned, or taken advantage of by anyone at your home or in your life? No   Have you recently had thoughts of hurting yourself? No   Have you tried to hurt yourself in the past? No     OBJECTIVE:     Objective Initial Evaluation Data 1/15/2024:    Oswestry Disability Index Score  Score: 36 % (1/15/2024  1:48 PM)      Palpation: dense post ps myofascial density  Gait:        Deviations: wide based, B hip ER, hip/trunk mild flex       Devices: none       Assistance: none      Posture 1/15/2024   Alignment Flattened thoracic and lumbar spine w mild forward head position         Sensation 1/15/2024   Dermatomes Light Touch    Proximal medial thigh R (L2)  wnl   Proximal medial thigh L (L2) wnl   Above knee R (L3) wnl   Above knee L (L3) wnl   Medial arch R(L4) wnl   Medial arch L (L4) wnl   Between 1st - 2nd toes R (L5) wnl   Between 1st - 2nd toes L (L5) wnl   Lateral border of foot R (S1) wnl   Lateral border of foot L (S1) wnl   Popliteal fossa R (S2) wnl   Popliteal fossa L(S2) wnl       Abdominals 1/15/2024   Tone  good       ROM Lumbar 1/15/2024   Flexion Max w protective tension pattern in T-L ps   Extension Mod-max   R SB min   L SB Min-mod   R Rotation mod   L Rotation mod   * pain limiting    ROM Hip 1/15/2024   Flex R 125 116   Flex L 125 118   ER R 45 45   ER L 45 40   IR R 40 0   IR L 40 3   *pain limiting     MMT 5/5 1/15/2024   L2- hip flex R 5   Hip flex L 5   L3- knee ext R 5   Knee ext L 5   L4- ankle DF R 5   Ankle DF L 5   L5- EHL R 5   EHL L 5   S1- ankle PF R 5   Ankle PF L 5   S2- Hams R 5   Hams L 5   *pain limiting    LE flexibility 1/15/2024   Piriformis R mod   Piriformis L mod   Hams R -48   Hams L -50   *pain limiting    Neural mobility 1/15/2024   SLR R (+) 35 deg   SLR L (-) 35 deg myofascial      VENKATA 1/15/2024   R wnl   L wnl        Imaging:     PROCEDURE: MRI SPINE LUMBAR (CPT=72148)     COMPARISON: [facility], XR LUMBAR SPINE (MIN 2 VIEWS) (CPT=72100), 5/07/2018, 17:35.  [facility], MRI SPINE LUMBAR (CPT=72148), 9/18/2017, 15:07.     INDICATIONS: intractable low back pain     TECHNIQUE: A variety of imaging planes and parameters were utilized for visualization of suspected pathology.     FINDINGS:  NUMERATION: For the purposes of this examination, the lowest fully formed disc space is designated as L5-S1.  ALIGNMENT: There is preservation of the expected lumbar lordosis.  BONES: Probable 2.7 cm hemangioma within S1 vertebral body. Right iliac bone island. Schmorl's node extending into the superior T12 endplate.  Degenerative-type edema involving the anterosuperior L3 endplate.  CORD/CAUDA EQUINA: The conus medullaris terminates at the level of the inferior T12 endplate. The distal cord and nerve roots have normal caliber, contour, and signal intensity.  PARASPINAL AREA: No visible mass.    OTHER: Probable 1.8 cm right lower pole renal cyst.     LUMBAR DISC LEVELS:  L1-L2: No significant disc/facet abnormality, spinal stenosis, or foraminal stenosis.    L2-L3: Small diffuse disc bulge, which is slightly eccentric to the left. Mild ligamentum flavum redundancy. No spinal canal or neural foraminal compromise.  L3-L4: Minimal post degeneration and desiccation. No significant neural foraminal or spinal canal compromise.  L4-L5: There is a diffuse disc bulge with superimposed central disc protrusion/annular fissure. Mild omentum from redundancy and epidural lipomatosis. Mild spinal canal stenosis. Mild to moderate right and mild left lateral recess stenosis. No  significant neural foraminal compromise.  L5-S1: No significant disc/facet abnormality, spinal stenosis, or foraminal stenosis.        Impression   CONCLUSION:  1. Mild multilevel lumbar spine degenerative changes as detailed. Most notable is a central disc protrusion/annular fissure at L4-L5, which results in mild spinal canal stenosis as well as mild to moderate right and mild left lateral recess stenosis at  this level. No additional significant neural compromise throughout the lumbar spine.  2. Probable hemangioma within the S1 vertebral body, unchanged since September, 2017.  3. Right renal cyst, incompletely characterized on this non-dedicated exam.  4. Lesser incidental findings as above.              Dictated by (CST): Mickey Mcgregor MD on 5/09/2018 at 9:11      Approved by (CST): Mickey Mcgregor MD on 5/09/2018 at 9:19             Treatment performed this date:    Therapeutic Exercise:  Visit #   1/8   Position Exercise HEP 1/15/2024   NuStep Seat 9, Arms 9,  Resist 5  X 5 min, 2 intervals.   Supine Sciatic nerve glide H X    Hams stretch up on bolsters 5 min H X    SKC H X    Cross leg piriformis H X   Sitting Piriformis stretch H X    Leg pumps nerve glide H X   Neuro Re-ed  X importance of beliefs about movements. Interval work.   H = HEP. Pt given copies of this exercise for home program.  X = Exercises done this date - pt verbalized understanding and demonstrated competence. All exercises done B unless otherwise indicated.   Pt advised to discontinue exercises that increase pain and to call or return to therapist to discuss.  Each intervention above is specifically prescribed to address the patient's identified impairments, activity limitations, and participation restrictions.    ASSESSMENT:     Physical Therapy Goals: From 1/15/2024 to 4/14/2024  - Created with patient input during initial evaluation   1. Pt will be independent in beginning level of HEP for stretching, posture and strengthening.   2. Pt will be able to lift 2 gallons of pain without increased symptoms.  3. Pt will be able to stand for 20 min x 2 for work without increased symptoms.  4. Pt will be able to walk for 15 min x 2 without increased symptoms.  5. Pt will be able to sit w support for 30 min to eat in a restaurant without increased symptoms.     PLAN OF CARE:      Frequency / Duration: Patient will be seen for 1-2x/week decreasing to every other week for a total of 18 visits over a 90 day period for therapeutic exercises, posture retraining, therapeutic activities, manual treatment, neuromuscular reeducation, therapeutic pain neuroscience education, patient education, modalities as needed, home exercise program.    Education or treatment limitation: None  Rehab Potential:good    Patient was advised of these findings, precautions, goals of treatment, plan of care, beginning self care and treatment options and has agreed to actively participate in planning and for this course of care. Pt  educated on possible soreness after evaluation w use of modalities as needed (ice/heat), postural corrections and the importance of staying active.    Charges: PT Eval 2, TE 1    Total Timed treatment: 15 min      Total Treatment Time: 60 min    Thank you for your referral. Please co-sign or sign and return this letter via fax as soon as possible to [phone]. If you have any questions, please contact me at Dept: [phone]    Sincerely,  Electronically signed by therapist: Sayra Tucker PT    Physician's certification required: Yes  I certify the need for these services furnished under this plan of treatment and while under my care.    X___________________________________________________ Date____________________    Certification From: 1/15/2024  To:4/14/2024        __________________________________________________________________________________________      21st Century Cures Act Notice to Patient: Medical documents like this are made available to patients in the interest of transparency. However, be advised this is a medical document and it is intended as xgyi-tr-[id number] communication between your medical providers. This medical document may contain abbreviations, assessments, medical data, and results or other terms that are unfamiliar. Medical documents are intended to carry relevant information, facts as evident, and the clinical opinion of the practitioner. As such, this medical document may be written in language that appears blunt or direct. You are encouraged to contact your medical provider and/or [facility] Patient Experience if you have any questions about this medical document.

## 2024-01-22 ENCOUNTER — NURSE TRIAGE (OUTPATIENT)
Dept: TELEHEALTH | Age: 69
End: 2024-01-22

## 2024-01-23 ENCOUNTER — APPOINTMENT (OUTPATIENT)
Dept: GENERAL RADIOLOGY | Age: 69
End: 2024-01-23
Attending: EMERGENCY MEDICINE

## 2024-01-23 ENCOUNTER — HOSPITAL ENCOUNTER (EMERGENCY)
Age: 69
Discharge: HOME | End: 2024-01-23
Attending: EMERGENCY MEDICINE

## 2024-01-23 VITALS
TEMPERATURE: 98.1 F | RESPIRATION RATE: 20 BRPM | SYSTOLIC BLOOD PRESSURE: 126 MMHG | OXYGEN SATURATION: 98 % | HEART RATE: 68 BPM | DIASTOLIC BLOOD PRESSURE: 84 MMHG

## 2024-01-23 DIAGNOSIS — R00.2 PALPITATIONS: Primary | ICD-10-CM

## 2024-01-23 LAB
ALBUMIN SERPL-MCNC: 3.4 G/DL (ref 3.6–5.1)
ALBUMIN/GLOB SERPL: 1.1 {RATIO} (ref 1–2.4)
ALP SERPL-CCNC: 58 UNITS/L (ref 45–117)
ALT SERPL-CCNC: 27 UNITS/L
ANION GAP SERPL CALC-SCNC: 7 MMOL/L (ref 7–19)
AST SERPL-CCNC: 23 UNITS/L
ATRIAL RATE (BPM): 63
ATRIAL RATE (BPM): 65
BASOPHILS # BLD: 0 K/MCL (ref 0–0.3)
BASOPHILS NFR BLD: 1 %
BILIRUB SERPL-MCNC: 0.3 MG/DL (ref 0.2–1)
BUN SERPL-MCNC: 19 MG/DL (ref 6–20)
BUN/CREAT SERPL: 23 (ref 7–25)
CALCIUM SERPL-MCNC: 8.6 MG/DL (ref 8.4–10.2)
CHLORIDE SERPL-SCNC: 110 MMOL/L (ref 97–110)
CO2 SERPL-SCNC: 26 MMOL/L (ref 21–32)
CREAT SERPL-MCNC: 0.84 MG/DL (ref 0.67–1.17)
DEPRECATED RDW RBC: 41.1 FL (ref 39–50)
EGFRCR SERPLBLD CKD-EPI 2021: >90 ML/MIN/{1.73_M2}
EOSINOPHIL # BLD: 0.1 K/MCL (ref 0–0.5)
EOSINOPHIL NFR BLD: 2 %
ERYTHROCYTE [DISTWIDTH] IN BLOOD: 12.3 % (ref 11–15)
FASTING DURATION TIME PATIENT: ABNORMAL H
GLOBULIN SER-MCNC: 3.1 G/DL (ref 2–4)
GLUCOSE SERPL-MCNC: 113 MG/DL (ref 70–99)
HCT VFR BLD CALC: 39.3 % (ref 39–51)
HGB BLD-MCNC: 13.5 G/DL (ref 13–17)
IMM GRANULOCYTES # BLD AUTO: 0 K/MCL (ref 0–0.2)
IMM GRANULOCYTES # BLD: 0 %
LYMPHOCYTES # BLD: 1.5 K/MCL (ref 1–4)
LYMPHOCYTES NFR BLD: 24 %
MAGNESIUM SERPL-MCNC: 2.4 MG/DL (ref 1.7–2.4)
MCH RBC QN AUTO: 31.5 PG (ref 26–34)
MCHC RBC AUTO-ENTMCNC: 34.4 G/DL (ref 32–36.5)
MCV RBC AUTO: 91.8 FL (ref 78–100)
MONOCYTES # BLD: 0.6 K/MCL (ref 0.3–0.9)
MONOCYTES NFR BLD: 9 %
NEUTROPHILS # BLD: 4.1 K/MCL (ref 1.8–7.7)
NEUTROPHILS NFR BLD: 64 %
NRBC BLD MANUAL-RTO: 0 /100 WBC
P AXIS (DEGREES): 75
P AXIS (DEGREES): 79
PLATELET # BLD AUTO: 152 K/MCL (ref 140–450)
POTASSIUM SERPL-SCNC: 4.1 MMOL/L (ref 3.4–5.1)
PR-INTERVAL (MSEC): 146
PR-INTERVAL (MSEC): 148
PROT SERPL-MCNC: 6.5 G/DL (ref 6.4–8.2)
QRS-INTERVAL (MSEC): 90
QRS-INTERVAL (MSEC): 96
QT-INTERVAL (MSEC): 384
QT-INTERVAL (MSEC): 392
QTC: 393
QTC: 408
R AXIS (DEGREES): -43
R AXIS (DEGREES): -49
RAINBOW EXTRA TUBES HOLD SPECIMEN: NORMAL
RAINBOW EXTRA TUBES HOLD SPECIMEN: NORMAL
RBC # BLD: 4.28 MIL/MCL (ref 4.5–5.9)
REPORT TEXT: NORMAL
REPORT TEXT: NORMAL
SODIUM SERPL-SCNC: 139 MMOL/L (ref 135–145)
T AXIS (DEGREES): 35
T AXIS (DEGREES): 39
TROPONIN I SERPL DL<=0.01 NG/ML-MCNC: 5 NG/L
TSH SERPL-ACNC: 2.35 MCUNITS/ML (ref 0.35–5)
VENTRICULAR RATE EKG/MIN (BPM): 63
VENTRICULAR RATE EKG/MIN (BPM): 65
WBC # BLD: 6.4 K/MCL (ref 4.2–11)

## 2024-01-23 PROCEDURE — 85025 COMPLETE CBC W/AUTO DIFF WBC: CPT | Performed by: EMERGENCY MEDICINE

## 2024-01-23 PROCEDURE — 84484 ASSAY OF TROPONIN QUANT: CPT | Performed by: EMERGENCY MEDICINE

## 2024-01-23 PROCEDURE — 80053 COMPREHEN METABOLIC PANEL: CPT | Performed by: EMERGENCY MEDICINE

## 2024-01-23 PROCEDURE — 84443 ASSAY THYROID STIM HORMONE: CPT | Performed by: EMERGENCY MEDICINE

## 2024-01-23 PROCEDURE — 71046 X-RAY EXAM CHEST 2 VIEWS: CPT

## 2024-01-23 PROCEDURE — 83735 ASSAY OF MAGNESIUM: CPT | Performed by: EMERGENCY MEDICINE

## 2024-01-23 PROCEDURE — 93005 ELECTROCARDIOGRAM TRACING: CPT | Performed by: EMERGENCY MEDICINE

## 2024-01-25 ENCOUNTER — NURSE TRIAGE (OUTPATIENT)
Dept: TELEHEALTH | Age: 69
End: 2024-01-25

## 2024-01-25 ENCOUNTER — OFFICE VISIT (OUTPATIENT)
Dept: CARDIOLOGY | Age: 69
End: 2024-01-25

## 2024-01-25 ENCOUNTER — ANCILLARY PROCEDURE (OUTPATIENT)
Dept: CARDIOLOGY | Age: 69
End: 2024-01-25
Attending: INTERNAL MEDICINE

## 2024-01-25 ENCOUNTER — TELEPHONE (OUTPATIENT)
Dept: CARDIOLOGY | Age: 69
End: 2024-01-25

## 2024-01-25 VITALS
HEART RATE: 77 BPM | WEIGHT: 138.89 LBS | BODY MASS INDEX: 22.32 KG/M2 | HEIGHT: 66 IN | DIASTOLIC BLOOD PRESSURE: 71 MMHG | RESPIRATION RATE: 16 BRPM | SYSTOLIC BLOOD PRESSURE: 136 MMHG

## 2024-01-25 DIAGNOSIS — R00.2 PALPITATIONS: ICD-10-CM

## 2024-01-25 DIAGNOSIS — R73.9 HYPERGLYCEMIA: ICD-10-CM

## 2024-01-25 DIAGNOSIS — I49.3 PVC'S (PREMATURE VENTRICULAR CONTRACTIONS): ICD-10-CM

## 2024-01-25 DIAGNOSIS — I77.1 CELIAC ARTERY STENOSIS (CMD): ICD-10-CM

## 2024-01-25 DIAGNOSIS — E78.2 MIXED HYPERLIPIDEMIA: ICD-10-CM

## 2024-01-25 DIAGNOSIS — I25.10 ATHEROSCLEROSIS OF NATIVE CORONARY ARTERY OF NATIVE HEART WITHOUT ANGINA PECTORIS: Primary | ICD-10-CM

## 2024-01-25 DIAGNOSIS — E55.9 VITAMIN D DEFICIENCY: ICD-10-CM

## 2024-01-25 DIAGNOSIS — I34.0 NONRHEUMATIC MITRAL VALVE REGURGITATION: ICD-10-CM

## 2024-01-25 SDOH — HEALTH STABILITY: PHYSICAL HEALTH: ON AVERAGE, HOW MANY DAYS PER WEEK DO YOU ENGAGE IN MODERATE TO STRENUOUS EXERCISE (LIKE A BRISK WALK)?: 0 DAYS

## 2024-01-25 SDOH — HEALTH STABILITY: PHYSICAL HEALTH: ON AVERAGE, HOW MANY MINUTES DO YOU ENGAGE IN EXERCISE AT THIS LEVEL?: 0 MIN

## 2024-01-25 ASSESSMENT — ENCOUNTER SYMPTOMS
HEMATOCHEZIA: 0
BRUISES/BLEEDS EASILY: 0
SUSPICIOUS LESIONS: 0
FEVER: 0
HEMOPTYSIS: 0
ALLERGIC/IMMUNOLOGIC COMMENTS: NO NEW FOOD ALLERGIES
WEIGHT GAIN: 0
WEIGHT LOSS: 0
COUGH: 0
BACK PAIN: 1
CHILLS: 0

## 2024-01-29 ENCOUNTER — TELEPHONE (OUTPATIENT)
Dept: INTERNAL MEDICINE | Age: 69
End: 2024-01-29

## 2024-01-30 ENCOUNTER — OFFICE VISIT (OUTPATIENT)
Dept: PHYSICAL THERAPY | Facility: HOSPITAL | Age: 69
End: 2024-01-30
Attending: PHYSICAL MEDICINE & REHABILITATION
Payer: MEDICARE

## 2024-01-30 ENCOUNTER — APPOINTMENT (OUTPATIENT)
Dept: INTERNAL MEDICINE | Age: 69
End: 2024-01-30

## 2024-01-30 VITALS
SYSTOLIC BLOOD PRESSURE: 123 MMHG | HEIGHT: 66 IN | HEART RATE: 67 BPM | WEIGHT: 135 LBS | BODY MASS INDEX: 21.69 KG/M2 | OXYGEN SATURATION: 97 % | TEMPERATURE: 97.2 F | DIASTOLIC BLOOD PRESSURE: 72 MMHG

## 2024-01-30 DIAGNOSIS — Z00.00 MEDICARE ANNUAL WELLNESS VISIT, SUBSEQUENT: ICD-10-CM

## 2024-01-30 DIAGNOSIS — Z00.00 PHYSICAL EXAM, ROUTINE: Primary | ICD-10-CM

## 2024-01-30 PROCEDURE — G0439 PPPS, SUBSEQ VISIT: HCPCS | Performed by: INTERNAL MEDICINE

## 2024-01-30 PROCEDURE — 97140 MANUAL THERAPY 1/> REGIONS: CPT

## 2024-01-30 PROCEDURE — 97112 NEUROMUSCULAR REEDUCATION: CPT

## 2024-01-30 PROCEDURE — 97110 THERAPEUTIC EXERCISES: CPT

## 2024-01-30 ASSESSMENT — PATIENT HEALTH QUESTIONNAIRE - PHQ9
2. FEELING DOWN, DEPRESSED OR HOPELESS: NOT AT ALL
SUM OF ALL RESPONSES TO PHQ9 QUESTIONS 1 AND 2: 0
SUM OF ALL RESPONSES TO PHQ9 QUESTIONS 1 AND 2: 0
CLINICAL INTERPRETATION OF PHQ2 SCORE: NO FURTHER SCREENING NEEDED
1. LITTLE INTEREST OR PLEASURE IN DOING THINGS: NOT AT ALL

## 2024-01-30 ASSESSMENT — ACTIVITIES OF DAILY LIVING (ADL)
PREPARING MEALS: INDEPENDENT
ADL_BEFORE_ADMISSION: INDEPENDENT
ADL_SHORT_OF_BREATH: NO
USING TRANSPORTATION: INDEPENDENT
ADL_SHORT_OF_BREATH: NO
NEEDS_ASSIST: NO
DOING HOUSEWORK: INDEPENDENT
MANAGING FINANCES: INDEPENDENT
RECENT_DECLINE_ADL: NO
PILL BOX USED: NO
NEEDS ASSISTANCE WITH FOOD: INDEPENDENT
RECENT_DECLINE_ADL: NO
TAKING MEDICATION: INDEPENDENT
ADL_SCORE: 12
ADL_BEFORE_ADMISSION: INDEPENDENT
STIL DRIVING: YES
EATING: INDEPENDENT
GROCERY SHOPPING: INDEPENDENT
ADL_SCORE: 12
SENSORY_SUPPORT_DEVICES: EYEGLASSES
USING TELEPHONE: INDEPENDENT

## 2024-01-30 ASSESSMENT — VISUAL ACUITY
OD_CC: 20/30
OS_CC: 20/30

## 2024-01-30 ASSESSMENT — COGNITIVE AND FUNCTIONAL STATUS - GENERAL
ARE YOU BLIND OR DO YOU HAVE SERIOUS DIFFICULTY SEEING, EVEN WHEN WEARING GLASSES: NO
ARE YOU DEAF OR DO YOU HAVE SERIOUS DIFFICULTY  HEARING: NO

## 2024-01-30 ASSESSMENT — MINI COG
PATIENT WAS GIVEN REPEAT BACK WORDS FROM VERSION: 1 - BANANA SUNRISE CHAIR
PATIENT ABLE TO FILL IN THE CLOCK FACE WITH 10 MINUTES PAST 11 O'CLOCK?: YES, CLOCK IS CORRECT
PATIENT ABLE TO REPEAT THE 3 WORDS GIVEN PREVIOUSLY?: WAS ABLE TO REPEAT BACK 3 WORDS CORRECTLY
TOTAL SCORE: 5

## 2024-01-30 NOTE — PROGRESS NOTES
Max Hagen    2/22/1955  Referring Physician:  Alex Behar  Diagnosis: Facet syndrome, lumbar (M47.816)  Bulge of lumbar disc without myelopathy (M51.36)  Mechanical low back pain (M54.59)  Sacroiliac joint dysfunction of right side (M53.3)  Biomechanical lesion (M99.9)  Myalgia (M79.10)  Lumbar spondylosis (M47.816)  DDD (degenerative disc disease), lumbosacral (M51.37)  Lumbar foraminal stenosis (M48.061)  Lumbar radiculopathy (M54.16)  High-tone pelvic floor dysfunction (N94.89)  SI (sacroiliac) joint dysfunction (M53.3)  Initial Evaluation Date: 1/15/2024  Date of Surgery: None for this diagnosis   Authorized # of visits NA by 1/3/2025    Precautions/Hx: OA, B carotid stenosis, compression fx c-spine, GERD, mitral regurgitation, medial meniscus tear, palpitations,     SUBJECTIVE:     Pt reports that he went to the ER for skipped heartbeats and now has a monitor.  Pt also reports that he bumped into the armrest on his car and his R rib is sore and bruised.  He continues to be very cautious so his disc does not go out.      Visit count 4/14/2024 1/8 2/8    Date 1/15/2024 1/30/2024     LBP      Pain Range 1 to 5-6/10 1 to 5-6/10    Pain Ave 3-4/10 3-4/10         OBJECTIVE:     Treatment performed this date:    Therapeutic Exercise:  Visit #   1/8 2/8   Position Exercise HEP 1/15/2024 1/30/2024    NuStep Seat 9, Arms 9, Resist 5  X 5 min, 2 intervals.    Supine Sciatic nerve glide H X X 10x    Hams stretch up on bolsters 5 min H X     SKC H X     Cross leg piriformis H X X 10x10s    Glut set   X 10x          Sitting Piriformis stretch H X     Leg pumps nerve glide H X x   Neuro Re-ed  X importance of beliefs about movements. Interval work. X see assessment    Man Tx STM   X B lumbar ps and sup gluteals   H = HEP. Pt given copies of this exercise for home program.  X = Exercises done this date - pt verbalized understanding and demonstrated competence. All exercises done B unless otherwise indicated.   Pt  advised to discontinue exercises that increase pain and to call or return to therapist to discuss.  Each intervention above is specifically prescribed to address the patient's identified impairments, activity limitations, and participation restrictions.    ASSESSMENT:     Pt doing well to control pain - avoiding a flare-up.  Pt educated on listening to his body for adaptations of reps and set and pressure.  Pt continues to benefit from cues to gain corrected breathing patterns during effort of exercise.  Pt continues to demonstrate overfiring of B hams and lumbar ps w decreased glut max activation.  Pt educated on importance of end range hip extension control for forward weight shift in locomotion to avoid use of thoracolumbar ps and/or suboccipital substitution patterns.  Pt required V/T cues to gain good activation.  Pt able to perform bridging on bolster without adverse reaction.      Physical Therapy Goals: From 1/15/2024 to 4/14/2024  - Created with patient input during initial evaluation   1. Pt will be independent in beginning level of HEP for stretching, posture and strengthening.   2. Pt will be able to lift 2 gallons of pain without increased symptoms.  3. Pt will be able to stand for 20 min x 2 for work without increased symptoms.  4. Pt will be able to walk for 15 min x 2 without increased symptoms.  5. Pt will be able to sit w support for 30 min to eat in a restaurant without increased symptoms.     PLAN OF CARE:      Continue PT per original plan for therapeutic exercises, posture retraining, therapeutic activities, manual treatment, neuromuscular reeducation, therapeutic pain neuroscience education, patient education, self care home management, home exercise program, and modalities as needed.    Charged Units Units Minutes   Ther Ex 1 20   Neuro 1 10   Ther Activity     Gait     Man Tx 1 15        Total Timed  45   Total Tx Time  45          __________________________________________________________________________________________      21st Century Cures Act Notice to Patient: Medical documents like this are made available to patients in the interest of transparency. However, be advised this is a medical document and it is intended as [id number] communication between your medical providers. This medical document may contain abbreviations, assessments, medical data, and results or other terms that are unfamiliar. Medical documents are intended to carry relevant information, facts as evident, and the clinical opinion of the practitioner. As such, this medical document may be written in language that appears blunt or direct. You are encouraged to contact your medical provider and/or [facility] Patient Experience if you have any questions about this medical document.    Objective Initial Evaluation Data 1/15/2024:    Oswestry Disability Index Score  Score: 36 % (1/15/2024  1:48 PM)      Palpation: dense post ps myofascial density  Gait:        Deviations: wide based, B hip ER, hip/trunk mild flex       Devices: none       Assistance: none      Posture 1/15/2024   Alignment Flattened thoracic and lumbar spine w mild forward head position        Sensation 1/15/2024   Dermatomes Light Touch    Proximal medial thigh R (L2)  wnl   Proximal medial thigh L (L2) wnl   Above knee R (L3) wnl   Above knee L (L3) wnl   Medial arch R(L4) wnl   Medial arch L (L4) wnl   Between 1st - 2nd toes R (L5) wnl   Between 1st - 2nd toes L (L5) wnl   Lateral border of foot R (S1) wnl   Lateral border of foot L (S1) wnl   Popliteal fossa R (S2) wnl   Popliteal fossa L(S2) wnl       Abdominals 1/15/2024   Tone  good       ROM Lumbar 1/15/2024   Flexion Max w protective tension pattern in T-L ps   Extension Mod-max   R SB min   L SB Min-mod   R Rotation mod   L Rotation mod   * pain limiting    ROM Hip 1/15/2024   Flex R 125 116   Flex L 125 118   ER R 45 45   ER L  45 40   IR R 40 0   IR L 40 3   *pain limiting     MMT 5/5 1/15/2024   L2- hip flex R 5   Hip flex L 5   L3- knee ext R 5   Knee ext L 5   L4- ankle DF R 5   Ankle DF L 5   L5- EHL R 5   EHL L 5   S1- ankle PF R 5   Ankle PF L 5   S2- Hams R 5   Hams L 5   *pain limiting    LE flexibility 1/15/2024   Piriformis R mod   Piriformis L mod   Hams R -48   Hams L -50   *pain limiting    Neural mobility 1/15/2024   SLR R (+) 35 deg   SLR L (-) 35 deg myofascial      VENKATA 1/15/2024   R wnl   L wnl        Imaging:     PROCEDURE: MRI SPINE LUMBAR (CPT=72148)     COMPARISON: [facility], XR LUMBAR SPINE (MIN 2 VIEWS) (CPT=72100), 5/07/2018, 17:35.  [facility], MRI SPINE LUMBAR (CPT=72148), 9/18/2017, 15:07.     INDICATIONS: intractable low back pain     TECHNIQUE: A variety of imaging planes and parameters were utilized for visualization of suspected pathology.     FINDINGS:  NUMERATION: For the purposes of this examination, the lowest fully formed disc space is designated as L5-S1.  ALIGNMENT: There is preservation of the expected lumbar lordosis.  BONES: Probable 2.7 cm hemangioma within S1 vertebral body. Right iliac bone island. Schmorl's node extending into the superior T12 endplate. Degenerative-type edema involving the anterosuperior L3 endplate.  CORD/CAUDA EQUINA: The conus medullaris terminates at the level of the inferior T12 endplate. The distal cord and nerve roots have normal caliber, contour, and signal intensity.  PARASPINAL AREA: No visible mass.    OTHER: Probable 1.8 cm right lower pole renal cyst.     LUMBAR DISC LEVELS:  L1-L2: No significant disc/facet abnormality, spinal stenosis, or foraminal stenosis.    L2-L3: Small diffuse disc bulge, which is slightly eccentric to the left. Mild ligamentum flavum redundancy. No spinal canal or neural foraminal compromise.  L3-L4: Minimal post degeneration and desiccation. No significant neural foraminal or spinal canal  compromise.  L4-L5: There is a diffuse disc bulge with superimposed central disc protrusion/annular fissure. Mild omentum from redundancy and epidural lipomatosis. Mild spinal canal stenosis. Mild to moderate right and mild left lateral recess stenosis. No  significant neural foraminal compromise.  L5-S1: No significant disc/facet abnormality, spinal stenosis, or foraminal stenosis.        Impression   CONCLUSION:  1. Mild multilevel lumbar spine degenerative changes as detailed. Most notable is a central disc protrusion/annular fissure at L4-L5, which results in mild spinal canal stenosis as well as mild to moderate right and mild left lateral recess stenosis at  this level. No additional significant neural compromise throughout the lumbar spine.  2. Probable hemangioma within the S1 vertebral body, unchanged since September, 2017.  3. Right renal cyst, incompletely characterized on this non-dedicated exam.  4. Lesser incidental findings as above.              Dictated by (CST): Mickey Mcgregor MD on 5/09/2018 at 9:11      Approved by (CST): Mickey Mcgregor MD on 5/09/2018 at 9:19

## 2024-01-31 ENCOUNTER — LAB SERVICES (OUTPATIENT)
Dept: LAB | Age: 69
End: 2024-01-31

## 2024-01-31 ENCOUNTER — TELEPHONE (OUTPATIENT)
Dept: CARDIOLOGY | Age: 69
End: 2024-01-31

## 2024-01-31 DIAGNOSIS — Z00.00 PHYSICAL EXAM, ROUTINE: ICD-10-CM

## 2024-01-31 LAB
APPEARANCE UR: CLEAR
BILIRUB UR QL STRIP: NEGATIVE
CHOLEST SERPL-MCNC: 177 MG/DL
CHOLEST/HDLC SERPL: 2.4 {RATIO}
COLOR UR: NORMAL
GLUCOSE UR STRIP-MCNC: NEGATIVE MG/DL
HDLC SERPL-MCNC: 73 MG/DL
HGB UR QL STRIP: NEGATIVE
KETONES UR STRIP-MCNC: NEGATIVE MG/DL
LDLC SERPL CALC-MCNC: 91 MG/DL
LEUKOCYTE ESTERASE UR QL STRIP: NEGATIVE
NITRITE UR QL STRIP: NEGATIVE
NONHDLC SERPL-MCNC: 104 MG/DL
PH UR STRIP: 5.5 [PH] (ref 5–7)
PROT UR STRIP-MCNC: NEGATIVE MG/DL
SP GR UR STRIP: 1.02 (ref 1–1.03)
TRIGL SERPL-MCNC: 64 MG/DL
TSH SERPL-ACNC: 1.2 MCUNITS/ML (ref 0.35–5)
UROBILINOGEN UR STRIP-MCNC: 0.2 MG/DL

## 2024-01-31 PROCEDURE — 80061 LIPID PANEL: CPT | Performed by: CLINICAL MEDICAL LABORATORY

## 2024-01-31 PROCEDURE — 36415 COLL VENOUS BLD VENIPUNCTURE: CPT | Performed by: CLINICAL MEDICAL LABORATORY

## 2024-01-31 PROCEDURE — 84443 ASSAY THYROID STIM HORMONE: CPT | Performed by: CLINICAL MEDICAL LABORATORY

## 2024-01-31 PROCEDURE — G0103 PSA SCREENING: HCPCS | Performed by: CLINICAL MEDICAL LABORATORY

## 2024-02-01 LAB — PSA SERPL-MCNC: 7.18 NG/ML

## 2024-02-08 ENCOUNTER — TELEPHONE (OUTPATIENT)
Dept: CARDIOLOGY | Age: 69
End: 2024-02-08

## 2024-02-08 ENCOUNTER — OFFICE VISIT (OUTPATIENT)
Dept: PHYSICAL THERAPY | Facility: HOSPITAL | Age: 69
End: 2024-02-08
Attending: PHYSICAL MEDICINE & REHABILITATION
Payer: MEDICARE

## 2024-02-08 PROCEDURE — 97112 NEUROMUSCULAR REEDUCATION: CPT

## 2024-02-08 PROCEDURE — 97110 THERAPEUTIC EXERCISES: CPT

## 2024-02-08 PROCEDURE — 97140 MANUAL THERAPY 1/> REGIONS: CPT

## 2024-02-08 NOTE — PROGRESS NOTES
Max Hagen    2/22/1955  Referring Physician:  Alex Behar  Diagnosis: Facet syndrome, lumbar (M47.816)  Bulge of lumbar disc without myelopathy (M51.36)  Mechanical low back pain (M54.59)  Sacroiliac joint dysfunction of right side (M53.3)  Biomechanical lesion (M99.9)  Myalgia (M79.10)  Lumbar spondylosis (M47.816)  DDD (degenerative disc disease), lumbosacral (M51.37)  Lumbar foraminal stenosis (M48.061)  Lumbar radiculopathy (M54.16)  High-tone pelvic floor dysfunction (N94.89)  SI (sacroiliac) joint dysfunction (M53.3)  Initial Evaluation Date: 1/15/2024  Date of Surgery: None for this diagnosis   Authorized # of visits NA by 1/3/2025    Precautions/Hx: OA, B carotid stenosis, compression fx c-spine, GERD, mitral regurgitation, medial meniscus tear, palpitations,     SUBJECTIVE:     Pt reports that he fell the day after therapy.  He was on his way to his [facility] appt.  He tripped on a tarp and fell hitting his knees, L elbow and chin.  He did not have any increased back pain.  He states that he is happy that he was able to get new insurance.      Visit count 4/14/2024 1/8 2/8 3/8   Date 1/15/2024 1/30/2024  2/8/2024    LBP      Pain Range 1 to 5-6/10 1 to 5-6/10 1 to 4-5/10   Pain Ave 3-4/10 3-4/10 3/10   Pain Current --- --- 3/10        OBJECTIVE:     Treatment performed this date:    Therapeutic Exercise:  Visit #   1/8 2/8 3/8   Position Exercise HEP 1/15/2024 1/30/2024  2/8/2024    NuStep Seat 9, Arms 9, Resist 5  X 5 min, 2 intervals.  X 10 min, 3 intervals   Supine Sciatic nerve glide H X X 10x X 10x2    Hams stretch up on bolsters 5 min H X      SKC H X  X 3x    Cross leg piriformis H X X 10x10s X 10x 10s    Glut set   X 10x X 10x    Bridging on bolster    X 10x, 5x   Sitting Piriformis stretch H X      Leg pumps nerve glide H X x    4 point Cat cow H   X 10x heavy cues   Fwd lean Cat cow H   X 5x   Neuro Re-ed  X importance of beliefs about movements. Interval work. X see assessment  X see  assessment    Man Tx STM   X B lumbar ps and sup gluteals X B lumbar ps    Joint mob    Gentle PA babar   H = HEP. Pt given copies of this exercise for home program.  X = Exercises done this date - pt verbalized understanding and demonstrated competence. All exercises done B unless otherwise indicated.   Pt advised to discontinue exercises that increase pain and to call or return to therapist to discuss.  Each intervention above is specifically prescribed to address the patient's identified impairments, activity limitations, and participation restrictions.    ASSESSMENT:     Pt doing well with decreased c/o pain despite recent fall.  Pt willing to try 4 point spinal mobility this date.  Pt demonstrates significant loss of flexion and extension mobility.  Pt benefited from V/T cues to maximize end range movement.  Continue to encourage pt to decreased his fear about spinal mobility and continue to gently move into his current end range restrictions.  Pt educated on body pressurization changes associated with barometric pressure changes.  Analogy with photos of chip bags at elevated altitude to reinforce understanding.     Physical Therapy Goals: From 1/15/2024 to 4/14/2024  - Created with patient input during initial evaluation   1. Pt will be independent in beginning level of HEP for stretching, posture and strengthening.   2. Pt will be able to lift 2 gallons of pain without increased symptoms.  3. Pt will be able to stand for 20 min x 2 for work without increased symptoms.  4. Pt will be able to walk for 15 min x 2 without increased symptoms.  5. Pt will be able to sit w support for 30 min to eat in a restaurant without increased symptoms.     PLAN OF CARE:      Continue PT per original plan for therapeutic exercises, posture retraining, therapeutic activities, manual treatment, neuromuscular reeducation, therapeutic pain neuroscience education, patient education, self care home management, home exercise program,  and modalities as needed.    Charged Units Units Minutes    Ther Ex 1 23   Neuro 1 10   Ther Activity     Gait     Man Tx 1 12        Total Timed  45   Total Tx Time  45         __________________________________________________________________________________________      21st Century Cures Act Notice to Patient: Medical documents like this are made available to patients in the interest of transparency. However, be advised this is a medical document and it is intended as [id number] communication between your medical providers. This medical document may contain abbreviations, assessments, medical data, and results or other terms that are unfamiliar. Medical documents are intended to carry relevant information, facts as evident, and the clinical opinion of the practitioner. As such, this medical document may be written in language that appears blunt or direct. You are encouraged to contact your medical provider and/or [facility] Patient Experience if you have any questions about this medical document.    Objective Initial Evaluation Data 1/15/2024:    Oswestry Disability Index Score  Score: 36 % (1/15/2024  1:48 PM)      Palpation: dense post ps myofascial density  Gait:        Deviations: wide based, B hip ER, hip/trunk mild flex       Devices: none       Assistance: none      Posture 1/15/2024   Alignment Flattened thoracic and lumbar spine w mild forward head position        Sensation 1/15/2024   Dermatomes Light Touch    Proximal medial thigh R (L2)  wnl   Proximal medial thigh L (L2) wnl   Above knee R (L3) wnl   Above knee L (L3) wnl   Medial arch R(L4) wnl   Medial arch L (L4) wnl   Between 1st - 2nd toes R (L5) wnl   Between 1st - 2nd toes L (L5) wnl   Lateral border of foot R (S1) wnl   Lateral border of foot L (S1) wnl   Popliteal fossa R (S2) wnl   Popliteal fossa L(S2) wnl       Abdominals 1/15/2024   Tone  good       ROM Lumbar 1/15/2024   Flexion Max w protective tension pattern in T-L ps    Extension Mod-max   R SB min   L SB Min-mod   R Rotation mod   L Rotation mod   * pain limiting    ROM Hip 1/15/2024   Flex R 125 116   Flex L 125 118   ER R 45 45   ER L 45 40   IR R 40 0   IR L 40 3   *pain limiting     MMT 5/5 1/15/2024   L2- hip flex R 5   Hip flex L 5   L3- knee ext R 5   Knee ext L 5   L4- ankle DF R 5   Ankle DF L 5   L5- EHL R 5   EHL L 5   S1- ankle PF R 5   Ankle PF L 5   S2- Hams R 5   Hams L 5   *pain limiting    LE flexibility 1/15/2024   Piriformis R mod   Piriformis L mod   Hams R -48   Hams L -50   *pain limiting    Neural mobility 1/15/2024   SLR R (+) 35 deg   SLR L (-) 35 deg myofascial      VENKATA 1/15/2024   R wnl   L wnl        Imaging:     PROCEDURE: MRI SPINE LUMBAR (CPT=72148)     COMPARISON: [facility], XR LUMBAR SPINE (MIN 2 VIEWS) (CPT=72100), 5/07/2018, 17:35.  [facility], MRI SPINE LUMBAR (CPT=72148), 9/18/2017, 15:07.     INDICATIONS: intractable low back pain     TECHNIQUE: A variety of imaging planes and parameters were utilized for visualization of suspected pathology.     FINDINGS:  NUMERATION: For the purposes of this examination, the lowest fully formed disc space is designated as L5-S1.  ALIGNMENT: There is preservation of the expected lumbar lordosis.  BONES: Probable 2.7 cm hemangioma within S1 vertebral body. Right iliac bone island. Schmorl's node extending into the superior T12 endplate. Degenerative-type edema involving the anterosuperior L3 endplate.  CORD/CAUDA EQUINA: The conus medullaris terminates at the level of the inferior T12 endplate. The distal cord and nerve roots have normal caliber, contour, and signal intensity.  PARASPINAL AREA: No visible mass.    OTHER: Probable 1.8 cm right lower pole renal cyst.     LUMBAR DISC LEVELS:  L1-L2: No significant disc/facet abnormality, spinal stenosis, or foraminal stenosis.    L2-L3: Small diffuse disc bulge, which is slightly eccentric to the left. Mild ligamentum flavum  redundancy. No spinal canal or neural foraminal compromise.  L3-L4: Minimal post degeneration and desiccation. No significant neural foraminal or spinal canal compromise.  L4-L5: There is a diffuse disc bulge with superimposed central disc protrusion/annular fissure. Mild omentum from redundancy and epidural lipomatosis. Mild spinal canal stenosis. Mild to moderate right and mild left lateral recess stenosis. No  significant neural foraminal compromise.  L5-S1: No significant disc/facet abnormality, spinal stenosis, or foraminal stenosis.        Impression   CONCLUSION:  1. Mild multilevel lumbar spine degenerative changes as detailed. Most notable is a central disc protrusion/annular fissure at L4-L5, which results in mild spinal canal stenosis as well as mild to moderate right and mild left lateral recess stenosis at  this level. No additional significant neural compromise throughout the lumbar spine.  2. Probable hemangioma within the S1 vertebral body, unchanged since September, 2017.  3. Right renal cyst, incompletely characterized on this non-dedicated exam.  4. Lesser incidental findings as above.              Dictated by (CST): Mickey Mcgregor MD on 5/09/2018 at 9:11      Approved by (CST): Mickey Mcgregor MD on 5/09/2018 at 9:19

## 2024-02-19 ENCOUNTER — OFFICE VISIT (OUTPATIENT)
Dept: PHYSICAL THERAPY | Facility: HOSPITAL | Age: 69
End: 2024-02-19
Attending: PHYSICAL MEDICINE & REHABILITATION
Payer: MEDICARE

## 2024-02-19 PROCEDURE — 97112 NEUROMUSCULAR REEDUCATION: CPT

## 2024-02-19 PROCEDURE — 97110 THERAPEUTIC EXERCISES: CPT

## 2024-02-19 NOTE — PROGRESS NOTES
Max Hagen    2/22/1955  Referring Physician:  Alex Behar  Diagnosis: Facet syndrome, lumbar (M47.816)  Bulge of lumbar disc without myelopathy (M51.36)  Mechanical low back pain (M54.59)  Sacroiliac joint dysfunction of right side (M53.3)  Biomechanical lesion (M99.9)  Myalgia (M79.10)  Lumbar spondylosis (M47.816)  DDD (degenerative disc disease), lumbosacral (M51.37)  Lumbar foraminal stenosis (M48.061)  Lumbar radiculopathy (M54.16)  High-tone pelvic floor dysfunction (N94.89)  SI (sacroiliac) joint dysfunction (M53.3)  Initial Evaluation Date: 1/15/2024  Date of Surgery: None for this diagnosis   Authorized # of visits NA by 1/3/2025    Precautions/Hx: OA, B carotid stenosis, compression fx c-spine, GERD, mitral regurgitation, medial meniscus tear, palpitations,     SUBJECTIVE:     Pt reports that he is doing ok, but can be triggered by lifting.  He has been doing more desk work recently.   He states that he is doing his HEP two times/day.  He notes that he is able to use his exercises to control his pain now.  He continues to sleep on the couch.      Visit count 4/14/2024 1/8 2/8 3/8 4/8   Date 1/15/2024 1/30/2024  2/8/2024  2/19/2024    LBP       Pain Range 1 to 5-6/10 1 to 5-6/10 1 to 4-5/10 1 to 2-3/10   Pain Ave 3-4/10 3-4/10 3/10 2/10   Pain Current --- --- 3/10 2/10        OBJECTIVE:     Treatment performed this date:    Therapeutic Exercise:  Visit #   2/8 3/8 4/8   Position Exercise HEP 1/30/2024 2/8/2024 2/19/2024    NuStep Seat 9, Arms 9, Resist 5   X 10 min, 3 intervals    Supine Sciatic nerve glide H X 10x X 10x2 X 10x    Hams stretch up on bolsters 5 min H       SKC H  X 3x X 3x    Cross leg piriformis H X 10x10s X 10x 10s     Glut set  X 10x X 10x 10x    Bridging on bolster   X 10x, 5x X 10x2 w self palp g max    Bridging    X 5x    Hip add ball H   X 5x cues for breath coordination    Bridging w hip add ball    X 5x2    Unilat bridging cross leg    X 7x    Bent knee fallouts    X     Sitting Piriformis stretch H   X 3x    Leg pumps nerve glide H x      Hip add ball H   X 10x   4 point Cat cow H  X 10x heavy cues    Fwd lean Cat cow H  X 5x    Neuro Re-ed  X see assessment  X see assessment  X see assessment    Man Tx STM  X B lumbar ps and sup gluteals X B lumbar ps     Joint mob   Gentle PA glides    H = HEP. Pt given copies of this exercise for home program.  X = Exercises done this date - pt verbalized understanding and demonstrated competence. All exercises done B unless otherwise indicated.   Pt advised to discontinue exercises that increase pain and to call or return to therapist to discuss.  Each intervention above is specifically prescribed to address the patient's identified impairments, activity limitations, and participation restrictions.    ASSESSMENT:     Pt continues to do well with decreased pain and increased tolerance for his HEP.  Pt benefited from further cueing to gain corrected breath work.  Pt continues to have overfiring hams and decreased hip extn activation.  Discussed the importance of correct hip firing patterns for gait.  Pt doing well to tolerate more exercise.  He remains very cautious of any movement patterns especially trunk flexion out of 0 deg full neutral.  Pt will continue to benefit from PT intervention to achieve goals.      Physical Therapy Goals: From 1/15/2024 to 4/14/2024  - Created with patient input during initial evaluation   1. Pt will be independent in beginning level of HEP for stretching, posture and strengthening.   2. Pt will be able to lift 2 gallons of pain without increased symptoms.  3. Pt will be able to stand for 20 min x 2 for work without increased symptoms.  4. Pt will be able to walk for 15 min x 2 without increased symptoms.  5. Pt will be able to sit w support for 30 min to eat in a restaurant without increased symptoms.     PLAN OF CARE:      Assess response to progression of hip extn and IR/add strengthening.  Continue PT per  original plan for therapeutic exercises, posture retraining, therapeutic activities, manual treatment, neuromuscular reeducation, therapeutic pain neuroscience education, patient education, self care home management, home exercise program, and modalities as needed.    Charged Units Units Minutes    Ther Ex 3 40   Neuro 1 15   Ther Activity     Gait     Man Tx          Total Timed  55   Total Tx Time  55         __________________________________________________________________________________________      21st Century Cures Act Notice to Patient: Medical documents like this are made available to patients in the interest of transparency. However, be advised this is a medical document and it is intended as [id number] communication between your medical providers. This medical document may contain abbreviations, assessments, medical data, and results or other terms that are unfamiliar. Medical documents are intended to carry relevant information, facts as evident, and the clinical opinion of the practitioner. As such, this medical document may be written in language that appears blunt or direct. You are encouraged to contact your medical provider and/or [facility] Patient Experience if you have any questions about this medical document.    Objective Initial Evaluation Data 1/15/2024:    Oswestry Disability Index Score  Score: 36 % (1/15/2024  1:48 PM)      Palpation: dense post ps myofascial density  Gait:        Deviations: wide based, B hip ER, hip/trunk mild flex       Devices: none       Assistance: none      Posture 1/15/2024   Alignment Flattened thoracic and lumbar spine w mild forward head position        Sensation 1/15/2024   Dermatomes Light Touch    Proximal medial thigh R (L2)  wnl   Proximal medial thigh L (L2) wnl   Above knee R (L3) wnl   Above knee L (L3) wnl   Medial arch R(L4) wnl   Medial arch L (L4) wnl   Between 1st - 2nd toes R (L5) wnl   Between 1st - 2nd toes L (L5) wnl   Lateral  border of foot R (S1) wnl   Lateral border of foot L (S1) wnl   Popliteal fossa R (S2) wnl   Popliteal fossa L(S2) wnl       Abdominals 1/15/2024   Tone  good       ROM Lumbar 1/15/2024   Flexion Max w protective tension pattern in T-L ps   Extension Mod-max   R SB min   L SB Min-mod   R Rotation mod   L Rotation mod   * pain limiting    ROM Hip 1/15/2024   Flex R 125 116   Flex L 125 118   ER R 45 45   ER L 45 40   IR R 40 0   IR L 40 3   *pain limiting     MMT 5/5 1/15/2024   L2- hip flex R 5   Hip flex L 5   L3- knee ext R 5   Knee ext L 5   L4- ankle DF R 5   Ankle DF L 5   L5- EHL R 5   EHL L 5   S1- ankle PF R 5   Ankle PF L 5   S2- Hams R 5   Hams L 5   *pain limiting    LE flexibility 1/15/2024   Piriformis R mod   Piriformis L mod   Hams R -48   Hams L -50   *pain limiting    Neural mobility 1/15/2024   SLR R (+) 35 deg   SLR L (-) 35 deg myofascial      VENKATA 1/15/2024   R wnl   L wnl        Imaging:     PROCEDURE: MRI SPINE LUMBAR (CPT=72148)     COMPARISON: [facility], XR LUMBAR SPINE (MIN 2 VIEWS) (CPT=72100), 5/07/2018, 17:35.  [facility], MRI SPINE LUMBAR (CPT=72148), 9/18/2017, 15:07.     INDICATIONS: intractable low back pain     TECHNIQUE: A variety of imaging planes and parameters were utilized for visualization of suspected pathology.     FINDINGS:  NUMERATION: For the purposes of this examination, the lowest fully formed disc space is designated as L5-S1.  ALIGNMENT: There is preservation of the expected lumbar lordosis.  BONES: Probable 2.7 cm hemangioma within S1 vertebral body. Right iliac bone island. Schmorl's node extending into the superior T12 endplate. Degenerative-type edema involving the anterosuperior L3 endplate.  CORD/CAUDA EQUINA: The conus medullaris terminates at the level of the inferior T12 endplate. The distal cord and nerve roots have normal caliber, contour, and signal intensity.  PARASPINAL AREA: No visible mass.    OTHER: Probable 1.8 cm  right lower pole renal cyst.     LUMBAR DISC LEVELS:  L1-L2: No significant disc/facet abnormality, spinal stenosis, or foraminal stenosis.    L2-L3: Small diffuse disc bulge, which is slightly eccentric to the left. Mild ligamentum flavum redundancy. No spinal canal or neural foraminal compromise.  L3-L4: Minimal post degeneration and desiccation. No significant neural foraminal or spinal canal compromise.  L4-L5: There is a diffuse disc bulge with superimposed central disc protrusion/annular fissure. Mild omentum from redundancy and epidural lipomatosis. Mild spinal canal stenosis. Mild to moderate right and mild left lateral recess stenosis. No  significant neural foraminal compromise.  L5-S1: No significant disc/facet abnormality, spinal stenosis, or foraminal stenosis.        Impression   CONCLUSION:  1. Mild multilevel lumbar spine degenerative changes as detailed. Most notable is a central disc protrusion/annular fissure at L4-L5, which results in mild spinal canal stenosis as well as mild to moderate right and mild left lateral recess stenosis at  this level. No additional significant neural compromise throughout the lumbar spine.  2. Probable hemangioma within the S1 vertebral body, unchanged since September, 2017.  3. Right renal cyst, incompletely characterized on this non-dedicated exam.  4. Lesser incidental findings as above.              Dictated by (CST): Mickey Mcgregor MD on 5/09/2018 at 9:11      Approved by (CST): Mickey Mcgregor MD on 5/09/2018 at 9:19

## 2024-02-26 ENCOUNTER — EXTERNAL RECORD (OUTPATIENT)
Dept: HEALTH INFORMATION MANAGEMENT | Facility: OTHER | Age: 69
End: 2024-02-26

## 2024-03-05 ENCOUNTER — OFFICE VISIT (OUTPATIENT)
Dept: PHYSICAL MEDICINE AND REHAB | Facility: CLINIC | Age: 69
End: 2024-03-05
Payer: MEDICARE

## 2024-03-05 VITALS — HEART RATE: 49 BPM | HEIGHT: 66 IN | WEIGHT: 135 LBS | BODY MASS INDEX: 21.69 KG/M2 | OXYGEN SATURATION: 97 %

## 2024-03-05 DIAGNOSIS — M47.816 FACET SYNDROME, LUMBAR: Primary | ICD-10-CM

## 2024-03-05 DIAGNOSIS — M54.59 MECHANICAL LOW BACK PAIN: ICD-10-CM

## 2024-03-05 DIAGNOSIS — M51.37 DDD (DEGENERATIVE DISC DISEASE), LUMBOSACRAL: ICD-10-CM

## 2024-03-05 DIAGNOSIS — M51.36 BULGE OF LUMBAR DISC WITHOUT MYELOPATHY: ICD-10-CM

## 2024-03-05 DIAGNOSIS — M79.10 MYALGIA: ICD-10-CM

## 2024-03-05 DIAGNOSIS — M99.9 BIOMECHANICAL LESION: ICD-10-CM

## 2024-03-05 DIAGNOSIS — M53.3 SACROILIAC JOINT DYSFUNCTION OF RIGHT SIDE: ICD-10-CM

## 2024-03-05 PROCEDURE — 99214 OFFICE O/P EST MOD 30 MIN: CPT | Performed by: PHYSICAL MEDICINE & REHABILITATION

## 2024-03-05 NOTE — PROGRESS NOTES
[facility]  Progress Note    CHIEF COMPLAINT:    Chief Complaint   Patient presents with    Follow - Up     LOV 1/4/24 pt is here for a for a 2month follow up on Facet syndrome, lumbar. No N/T . Currently not taking any pain meds. Still has difficulty sleeping in his bed due to not be able to turn on his sides. Pain 1/10       History of Present Illness:  The patient is a 69 year old RIGHT handed male with significant past medical history of carotid stenosis, mitral regurg, GERD, who presents with midline low back pain since 2017 after he lifted a ladder (works as a ).  He is status post bilateral L5 transforaminal epidural steroid injection on 2/1/2023 with 60% improvement.  Is also status post bilateral L5 transforaminal epidural steroid injection on March 15, 2023 with 80% improvement that lasted for about 2 weeks.  He is now status post bilateral L5 transforaminal epidural steroid injection on 5/24/2023 which provided about 80% improvement that lasted for about 2 months.  He then complained of axial low back pain without radicular symptoms or we performed bilateral L4-L5 and L5-S1 facet joint injections on 8/9/2023 which provided about 80 to 90% improvement.  He was last seen by me on 8/28/2023 where I performed a right SI joint and myofascial trigger point corticosteroid injection with 95% improvement.  A repeat of the right SI joint and myofascial trigger point injection was performed on 12/4/2023.  This is overall, he has been doing well.  He has minimal discomfort and rates a 1 out of 10.  He still sleeping on the couch and cannot sleep in a bed.  PAST MEDICAL HISTORY:  Past Medical History:   Diagnosis Date    Arthritis     Back pain     Bilateral carotid artery disease (HCC) 3/22/2018    Carotid stenosis, bilateral 09/21/2017    < 50% bilat at [place], recheck 2022    Compression fracture of cervical spine (HCC) 6/13/2011    Esophageal reflux     HNP  (herniated nucleus pulposus), cervical 6/13/2011    Left axis deviation 11/12/11    stable from 2001, early repol    Mitral regurgitation 11-16-09    MMT (medial meniscus tear) 6/13/2011    Palpitations 12/27/2010    Refused influenza vaccine 11/28/2012    Screening for cardiovascular condition 11-16-09    ECHO with ef 60%, mild pulmonic and tricuspid insuff, mild MR    Vitamin D deficiency 4/28/2014       SURGICAL HISTORY:  Past Surgical History:   Procedure Laterality Date    COLONOSCOPY,DIAGNOSTIC  2006     normal, EGD as well     OTHER SURGICAL HISTORY      right foot screw    OTHER SURGICAL HISTORY      multiple lipoma approx 30 yr ago    OTHER SURGICAL HISTORY  2012    left knee meniscus    REPAIR ING HERNIA,5+Y/O,REDUCIBL         SOCIAL HISTORY:   Social History     Occupational History    Occupation: painting   Tobacco Use    Smoking status: Never    Smokeless tobacco: Never   Substance and Sexual Activity    Alcohol use: Yes     Alcohol/week: 5.8 - 11.7 standard drinks of alcohol     Types: 7 - 14 Standard drinks or equivalent per week     Comment: social     Drug use: No    Sexual activity: Not Currently     Partners: Female       FAMILY HISTORY:   Family History   Problem Relation Age of Onset    Heart Disorder Father     Diabetes Father     Psychiatric Father         dementia    Heart Disorder Mother         CAD with stent    Other (Other) Mother        CURRENT MEDICATIONS:   Current Outpatient Medications   Medication Sig Dispense Refill    Propranolol HCl 10 MG Oral Tab Take 1 tablet (10 mg total) by mouth every evening.         ALLERGIES:   Allergies   Allergen Reactions    Naproxen OTHER (SEE COMMENTS)     Developed ear problem      Cyclobenzaprine OTHER (SEE COMMENTS)     Difficulty urinating and leg spasms    Doxycycline Calcium NAUSEA ONLY     Elevated hr and gi upset    Epinephrine UNKNOWN     Elevated heart rate          REVIEW OF SYSTEMS:   Review of Systems   Constitutional: Negative.    HENT:  Negative.    Eyes: Negative.    Respiratory: Negative.    Cardiovascular: Negative.    Gastrointestinal: Negative.    Genitourinary: Negative.    Musculoskeletal: As per HPI  Skin: Negative.    Neurological: As per HPI  Endo/Heme/Allergies: Negative.    Psychiatric/Behavioral: Negative.      All other systems reviewed and are negative. Pertinent positives and negatives noted in the HPI.        PHYSICAL EXAM:   Pulse (!) 49   Ht 66\"   Wt 135 lb (61.2 kg)   SpO2 97%   BMI 21.79 kg/m²     Body mass index is 21.79 kg/m².      General: No immediate distress  Head: Normocephalic/ Atraumatic  Eyes: Extra-occular movements intact.   Ears: No auricular hematoma or deformities  Mouth: No lesions or ulcerations  Heart: peripheral pulses intact. Normal capillary refill.   Lungs: Non-labored respirations  Abdomen: No abdominal guarding  Extremities: No lower extremity edema bilaterally   Skin: No lesions noted.   Cognition: alert & oriented x 3, attentive, able to follow 2 step commands, comprehention intact, spontaneous speech intact  Motor:    Musculoskeletal:      Data  No visits with results within 6 Month(s) from this visit.   Latest known visit with results is:   [facility] Lab Encounter on 02/09/2023   Component Date Value Ref Range Status    Glutamic Acid Decarboxylase Ab 02/09/2023 <5.0  0.0 - 5.0 IU/mL Final    Ankylosing Spondylitis (HLAB27) Sp* 02/09/2023 Whole Blood   Final    Ankylosing Spondylitis (HLAB27) 02/09/2023 Negative   Final   ]      Radiology Imaging:  I reviewed with the patient his MRI of the brain from 2/23/2023  MRA, HEAD (W+WO) (CPT=70546)  Narrative: PROCEDURE: MRA, HEAD (W+WO) (CPT=70546)     COMPARISON: [facility], MRA CAROTIDS (W+WO) (CPT=70549), 2/16/2021, 9:49 AM.  [facility], MRI BRAIN (W+WO) (CPT=70553), 2/16/2021, 9:49 AM.     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  15 cc of IV Dotarem was utilized     FINDINGS:   INTERNAL CAROTIDS: Flow identified.    ANTERIOR CEREBRALS: Flow identified.  Right A2 bifurcates just beyond origin.  MIDDLE CEREBRALS: Flow identified.    POSTERIOR CEREBRALS: Flow identified.    ANTERIOR COMMUNICATOR: Flow identified.    POSTERIOR COMMUNICATORS: Flow identified.  .  BASILAR: Flow identified.    VERTEBRALS: Flow identified.    SUPERIOR CEREBELLAR: Flow identified .  Small accessory right superior cerebellar artery.  ANTERIOR INFERIOR CEREBALLAR: Flow identified at origins.  PICA: Flow identified on source images.  ANEURYSMS: None.  AVM/AV FISTULA:   None.  SIGNIFICANT STENOSIS: None.  VASCULAR MALFORMATIONS: None.  OTHER:   Flow present in major dural venous sinuses.                Impression: CONCLUSION:   1. No aneurysm or AVM/AV fistula.  Negative MRA.           Dictated by (CST): Renard Cabrera MD on 2/23/2023 at 1:34 PM       Finalized by (CST): Renard Cabrera MD on 2/23/2023 at 3:04 PM            PROCEDURE: MRI SPINE LUMBAR (CPT=72148)     COMPARISON: [facility], XR LUMBAR SPINE (MIN 2 VIEWS) (CPT=72100), 5/07/2018, 17:35.  [facility], MRI SPINE LUMBAR (CPT=72148), 9/18/2017, 15:07.     INDICATIONS: intractable low back pain     TECHNIQUE: A variety of imaging planes and parameters were utilized for visualization of suspected pathology.     FINDINGS:  NUMERATION: For the purposes of this examination, the lowest fully formed disc space is designated as L5-S1.  ALIGNMENT: There is preservation of the expected lumbar lordosis.  BONES: Probable 2.7 cm hemangioma within S1 vertebral body. Right iliac bone island. Schmorl's node extending into the superior T12 endplate. Degenerative-type edema involving the  anterosuperior L3 endplate.  CORD/CAUDA EQUINA: The conus medullaris terminates at the level of the inferior T12 endplate. The distal cord and nerve roots have normal caliber, contour, and signal intensity.  PARASPINAL AREA: No visible mass.    OTHER: Probable 1.8 cm right lower pole renal cyst.     LUMBAR DISC LEVELS:  L1-L2: No significant disc/facet abnormality, spinal stenosis, or foraminal stenosis.    L2-L3: Small diffuse disc bulge, which is slightly eccentric to the left. Mild ligamentum flavum redundancy. No spinal canal or neural foraminal compromise.  L3-L4: Minimal post degeneration and desiccation. No significant neural foraminal or spinal canal compromise.  L4-L5: There is a diffuse disc bulge with superimposed central disc protrusion/annular fissure. Mild omentum from redundancy and epidural lipomatosis. Mild spinal canal stenosis. Mild to moderate right and mild left lateral recess stenosis. No  significant neural foraminal compromise.  L5-S1: No significant disc/facet abnormality, spinal stenosis, or foraminal stenosis.        Impression   CONCLUSION:  1. Mild multilevel lumbar spine degenerative changes as detailed. Most notable is a central disc protrusion/annular fissure at L4-L5, which results in mild spinal canal stenosis as well as mild to moderate right and mild left lateral recess stenosis at  this level. No additional significant neural compromise throughout the lumbar spine.  2. Probable hemangioma within the S1 vertebral body, unchanged since September, 2017.  3. Right renal cyst, incompletely characterized on this non-dedicated exam.  4. Lesser incidental findings as above.              Dictated by (CST): Mickey Mcgregor MD on 5/09/2018 at 9:11      Approved by (CST): Mickey Mcgregor MD on 5/09/2018 at 9:19      I reviewed with the patient his MRI of the lumbar spine from 1/4/2023  Formatting of this note might be different from the original.   EXAM: MRI LUMBAR SPINE W WO CONTRAST      CLINICAL INDICATION: Chronic midline low back pain without sciatica.     COMPARISON:  Selected images from MRI lumbar spine without contrast   examination dated January 15, 2020.     TECHNIQUE: MRI lumbar spine was performed without and with contrast.   Approximately 7.5 mL of IV Gadavist administered.     FINDINGS:     No acute malalignment of the lumbar spine. Rudimentary disc space at S1-S2.   Vertebral body heights are overall maintained without evidence of   acute/recent compression fracture. There are a few stable marrow lesions   within the lumbar spine and sacrum, some which enhance, which are felt to   be benign given long-term stability. Scattered Schmorl's nodes and Modic   endplate changes. The distal spinal cord demonstrates a signal intensity   within normal limits. The conus medullaris terminates at approximately L1.   Multilevel degenerative disc disease. No suspicious areas of postcontrast   enhancement within the lumbar spinal canal. A couple of nonenhancing   sclerotic foci within the bilateral ilium which are nonspecific. A 2 cm T2   hyperintensity within the right kidney, likely a cyst. Bilateral   perinephric thickening which is nonspecific. Left renal sinus cyst.     L1-L2: No significant neural foraminal or spinal canal stenosis.     L2-L3: Disc bulge. Facet joint arthropathy. Mild neural foraminal stenosis.   No significant spinal canal stenosis.     L3-L4: Disc bulge. Facet joint arthropathy with ligamentum flavum   infolding. Mild left neural foraminal stenosis. Mild spinal canal stenosis.     L4-L5: Disc bulge with annular fissure. There is a small superimposed right   paracentral disc extrusion. Facet joint arthropathy with ligament flavum   infolding. Mild-to-moderate neural foraminal stenosis, left greater than   right. Moderate spinal canal stenosis. There is encroachment of the lateral   recesses.     L5-S1: Disc bulge. Facet joint arthropathy. No significant neural foraminal    or spinal canal stenosis.     IMPRESSION:     1. Overall, no significant interval progression of multilevel chronic   degenerative changes of the lumbar spine contributing to varying degrees of   neural foraminal and spinal canal stenosis as described in detail in the   body of the report.   2. Additional findings as described above     ASSESSMENT AND PLAN:  Max is a 69-year-old male who presents for follow-up of his bilateral low back pain (right greater than left).  He is doing pretty well and rates his pain a 1 out of 10.  At this time, recommend he continue with a home exercise program.  I have reviewed his cardiology notes.  I have also reviewed his physical therapy notes.  He has been using a Holter monitor.  I am also recommending he continue to use Tylenol for pain.  He will follow-up with me in about 2 to 3 months.       RTC in 2 to 3 months  Discharge Instructions were provided as documented in AVS summary.  The patient was in agreement with the assessment and plan.  All questions were answered.  There were no barriers to learning.         1. Facet syndrome, lumbar    2. Bulge of lumbar disc without myelopathy    3. Mechanical low back pain    4. Sacroiliac joint dysfunction of right side    5. Biomechanical lesion    6. Myalgia    7. DDD (degenerative disc disease), lumbosacral        Alex B. Behar MD  Physical Medicine and Rehabilitation/Sports Medicine  [facility]

## 2024-03-05 NOTE — PATIENT INSTRUCTIONS
1) Follow up with me if symptoms worsen  2)  Ice 20 minutes at a time 3-4 times per day  3) Tylenol 500-1000 mg every 6-8 hours as needed for pain.  No more than 3000 mg daily.  4) Continue home exercise program

## 2024-03-07 ENCOUNTER — TELEPHONE (OUTPATIENT)
Dept: PHYSICAL THERAPY | Facility: HOSPITAL | Age: 69
End: 2024-03-07

## 2024-03-12 ENCOUNTER — OFFICE VISIT (OUTPATIENT)
Dept: INTERNAL MEDICINE | Age: 69
End: 2024-03-12

## 2024-03-12 ENCOUNTER — NURSE TRIAGE (OUTPATIENT)
Dept: TELEHEALTH | Age: 69
End: 2024-03-12

## 2024-03-12 VITALS
RESPIRATION RATE: 18 BRPM | WEIGHT: 138.89 LBS | HEART RATE: 68 BPM | OXYGEN SATURATION: 96 % | DIASTOLIC BLOOD PRESSURE: 74 MMHG | HEIGHT: 66 IN | SYSTOLIC BLOOD PRESSURE: 122 MMHG | BODY MASS INDEX: 22.32 KG/M2

## 2024-03-12 DIAGNOSIS — R42 DIZZINESS: Primary | ICD-10-CM

## 2024-03-12 PROCEDURE — 99213 OFFICE O/P EST LOW 20 MIN: CPT | Performed by: INTERNAL MEDICINE

## 2024-03-14 ENCOUNTER — TELEPHONE (OUTPATIENT)
Dept: INTERNAL MEDICINE | Age: 69
End: 2024-03-14

## 2024-03-22 ENCOUNTER — HOSPITAL ENCOUNTER (OUTPATIENT)
Dept: MRI IMAGING | Age: 69
End: 2024-03-22
Attending: INTERNAL MEDICINE

## 2024-03-22 DIAGNOSIS — R42 DIZZINESS: ICD-10-CM

## 2024-03-22 PROCEDURE — A9585 GADOBUTROL INJECTION: HCPCS | Performed by: INTERNAL MEDICINE

## 2024-03-22 PROCEDURE — 10002805 HB CONTRAST AGENT: Performed by: INTERNAL MEDICINE

## 2024-03-22 PROCEDURE — 70553 MRI BRAIN STEM W/O & W/DYE: CPT

## 2024-03-22 PROCEDURE — 70548 MR ANGIOGRAPHY NECK W/DYE: CPT

## 2024-03-22 RX ORDER — GADOBUTROL 604.72 MG/ML
10 INJECTION INTRAVENOUS ONCE
Status: COMPLETED | OUTPATIENT
Start: 2024-03-22 | End: 2024-03-22

## 2024-03-22 RX ADMIN — GADOBUTROL 10 ML: 604.72 INJECTION INTRAVENOUS at 18:47

## 2024-03-25 ENCOUNTER — EXTERNAL RECORD (OUTPATIENT)
Dept: HEALTH INFORMATION MANAGEMENT | Facility: OTHER | Age: 69
End: 2024-03-25

## 2024-03-26 ENCOUNTER — TELEPHONE (OUTPATIENT)
Dept: INTERNAL MEDICINE | Age: 69
End: 2024-03-26

## 2024-03-29 ENCOUNTER — CLINICAL ABSTRACT (OUTPATIENT)
Dept: HEALTH INFORMATION MANAGEMENT | Facility: OTHER | Age: 69
End: 2024-03-29

## 2024-04-01 ASSESSMENT — ENCOUNTER SYMPTOMS
BACK PAIN: 1
HEMOPTYSIS: 0
COUGH: 0
ALLERGIC/IMMUNOLOGIC COMMENTS: NO NEW FOOD ALLERGIES
FEVER: 0
WEIGHT LOSS: 0
HEMATOCHEZIA: 0
SUSPICIOUS LESIONS: 0
CHILLS: 0
WEIGHT GAIN: 0
BRUISES/BLEEDS EASILY: 0

## 2024-04-03 ENCOUNTER — HOSPITAL ENCOUNTER (OUTPATIENT)
Dept: GENERAL RADIOLOGY | Age: 69
Discharge: HOME | End: 2024-04-03
Attending: INTERNAL MEDICINE

## 2024-04-03 ENCOUNTER — OFFICE VISIT (OUTPATIENT)
Dept: CARDIOLOGY | Age: 69
End: 2024-04-03

## 2024-04-03 VITALS
WEIGHT: 138.56 LBS | DIASTOLIC BLOOD PRESSURE: 73 MMHG | HEART RATE: 58 BPM | SYSTOLIC BLOOD PRESSURE: 124 MMHG | BODY MASS INDEX: 22.36 KG/M2

## 2024-04-03 DIAGNOSIS — M54.50 CHRONIC BILATERAL LOW BACK PAIN WITHOUT SCIATICA: ICD-10-CM

## 2024-04-03 DIAGNOSIS — M25.532 ACUTE PAIN OF LEFT WRIST: ICD-10-CM

## 2024-04-03 DIAGNOSIS — G89.29 CHRONIC BILATERAL LOW BACK PAIN WITHOUT SCIATICA: ICD-10-CM

## 2024-04-03 DIAGNOSIS — I34.0 NONRHEUMATIC MITRAL VALVE REGURGITATION: ICD-10-CM

## 2024-04-03 DIAGNOSIS — I49.3 PVC'S (PREMATURE VENTRICULAR CONTRACTIONS): ICD-10-CM

## 2024-04-03 DIAGNOSIS — E78.2 MIXED HYPERLIPIDEMIA: Primary | ICD-10-CM

## 2024-04-03 DIAGNOSIS — E55.9 VITAMIN D DEFICIENCY: ICD-10-CM

## 2024-04-03 DIAGNOSIS — R73.9 HYPERGLYCEMIA: ICD-10-CM

## 2024-04-03 PROCEDURE — 73100 X-RAY EXAM OF WRIST: CPT

## 2024-04-03 SDOH — HEALTH STABILITY: PHYSICAL HEALTH: ON AVERAGE, HOW MANY DAYS PER WEEK DO YOU ENGAGE IN MODERATE TO STRENUOUS EXERCISE (LIKE A BRISK WALK)?: 0 DAYS

## 2024-04-03 SDOH — HEALTH STABILITY: PHYSICAL HEALTH: ON AVERAGE, HOW MANY MINUTES DO YOU ENGAGE IN EXERCISE AT THIS LEVEL?: 0 MIN

## 2024-04-04 ENCOUNTER — TELEPHONE (OUTPATIENT)
Dept: CARDIOLOGY | Age: 69
End: 2024-04-04

## 2024-04-04 ENCOUNTER — OFFICE VISIT (OUTPATIENT)
Dept: INTERNAL MEDICINE | Age: 69
End: 2024-04-04

## 2024-04-04 VITALS
TEMPERATURE: 97.2 F | SYSTOLIC BLOOD PRESSURE: 133 MMHG | OXYGEN SATURATION: 99 % | HEART RATE: 65 BPM | WEIGHT: 138.01 LBS | HEIGHT: 66 IN | BODY MASS INDEX: 22.18 KG/M2 | DIASTOLIC BLOOD PRESSURE: 80 MMHG

## 2024-04-04 DIAGNOSIS — R97.20 ELEVATED PSA: Primary | ICD-10-CM

## 2024-04-04 DIAGNOSIS — R42 DIZZINESS: ICD-10-CM

## 2024-04-04 ASSESSMENT — PATIENT HEALTH QUESTIONNAIRE - PHQ9
1. LITTLE INTEREST OR PLEASURE IN DOING THINGS: NOT AT ALL
SUM OF ALL RESPONSES TO PHQ9 QUESTIONS 1 AND 2: 0
CLINICAL INTERPRETATION OF PHQ2 SCORE: NO FURTHER SCREENING NEEDED
SUM OF ALL RESPONSES TO PHQ9 QUESTIONS 1 AND 2: 0
2. FEELING DOWN, DEPRESSED OR HOPELESS: NOT AT ALL

## 2024-04-05 ENCOUNTER — TELEPHONE (OUTPATIENT)
Dept: CARDIOLOGY | Age: 69
End: 2024-04-05

## 2024-04-05 RX ORDER — PROPRANOLOL HYDROCHLORIDE 10 MG/1
10 TABLET ORAL 2 TIMES DAILY
Qty: 60 TABLET | Refills: 5 | Status: SHIPPED | OUTPATIENT
Start: 2024-04-05

## 2024-04-08 ENCOUNTER — HOSPITAL ENCOUNTER (OUTPATIENT)
Dept: NEUROLOGY | Age: 69
Discharge: HOME | End: 2024-04-08
Attending: INTERNAL MEDICINE

## 2024-04-08 ENCOUNTER — TELEPHONE (OUTPATIENT)
Dept: FAMILY MEDICINE | Age: 69
End: 2024-04-08

## 2024-04-08 DIAGNOSIS — R42 DIZZINESS: ICD-10-CM

## 2024-04-08 PROCEDURE — 95813 EEG EXTND MNTR 61-119 MIN: CPT

## 2024-04-16 ENCOUNTER — APPOINTMENT (OUTPATIENT)
Dept: PHYSICAL THERAPY | Facility: HOSPITAL | Age: 69
End: 2024-04-16
Attending: PHYSICAL MEDICINE & REHABILITATION
Payer: MEDICARE

## 2024-04-17 ENCOUNTER — TELEPHONE (OUTPATIENT)
Dept: PHYSICAL MEDICINE AND REHAB | Facility: CLINIC | Age: 69
End: 2024-04-17

## 2024-04-18 NOTE — TELEPHONE ENCOUNTER
S/W patient to advise needed to cancel his appointment on 4/19/2024, as this RN scheduled him during OR time in error.  Patient was very understanding.  This RN advised that need to discuss with Dr. Behar if just cervical x-ray or both cervical and thoracic x-ray advised.  Patient noted that he has recently had MRA of head and neck but this RN advised she was not sure it will show the spine, as it is to look at the vascular system, not the tissues and bones. Patient stated he had those at [facility].      This RN sees MRA of head in Epic from 2/23/2023, but nothing more recently, and RN advised patient Dr. Behar would only be able to see the reports, not the images from [facility].

## 2024-04-19 NOTE — TELEPHONE ENCOUNTER
Dr Behar was in office and this RN asked Dr Behar in regards to the below notes. Dr Behar wants to see patient in office.     Patient notified and scheduled for a follow up on 4/22/2024 at 11:00 am.     No further actions needed at this time. Closing this encounter.

## 2024-04-19 NOTE — TELEPHONE ENCOUNTER
Patient called office and stated that if there is an update from JAYDEN Livingston. Currently waiting on Dr Behar's recommendations.     Patient stated he will call back Monday.

## 2024-04-22 ENCOUNTER — OFFICE VISIT (OUTPATIENT)
Dept: PHYSICAL MEDICINE AND REHAB | Facility: CLINIC | Age: 69
End: 2024-04-22
Payer: MEDICARE

## 2024-04-22 ENCOUNTER — HOSPITAL ENCOUNTER (OUTPATIENT)
Dept: GENERAL RADIOLOGY | Facility: HOSPITAL | Age: 69
Discharge: HOME | End: 2024-04-22
Attending: PHYSICAL MEDICINE & REHABILITATION
Payer: MEDICARE

## 2024-04-22 VITALS — HEIGHT: 66 IN | WEIGHT: 137 LBS | BODY MASS INDEX: 22.02 KG/M2

## 2024-04-22 DIAGNOSIS — M47.812 CERVICAL FACET SYNDROME: ICD-10-CM

## 2024-04-22 DIAGNOSIS — M47.816 FACET SYNDROME, LUMBAR: Primary | ICD-10-CM

## 2024-04-22 DIAGNOSIS — M54.2 TRIGGER POINT OF NECK: ICD-10-CM

## 2024-04-22 DIAGNOSIS — M50.30 DDD (DEGENERATIVE DISC DISEASE), CERVICAL: ICD-10-CM

## 2024-04-22 DIAGNOSIS — M47.816 FACET SYNDROME, LUMBAR: ICD-10-CM

## 2024-04-22 PROCEDURE — 72050 X-RAY EXAM NECK SPINE 4/5VWS: CPT | Performed by: PHYSICAL MEDICINE & REHABILITATION

## 2024-04-22 NOTE — PROGRESS NOTES
[facility]  Progress Note    CHIEF COMPLAINT:    Chief Complaint   Patient presents with    Neck Pain     LOV: 3/5/2024 pt comes in with posterior neck/upper back burning pain. Denies T/N. Denies weakness. Rates pain 2-5/10 depending on motions. MRA of neck done outside of .        History of Present Illness:  The patient is a 69 year old RIGHT handed male with significant past medical history of carotid stenosis, mitral regurg, GERD, who presents for complaints of right-sided neck and parascapular pain which she rates about a 2-5 out of 10.  His symptoms have been ongoing for the last couple of weeks without an inciting event.  He did have an MRA of the head and neck performed.  I reviewed those images and notes.  He denies any radicular symptoms including paresthesias or weakness.    PAST MEDICAL HISTORY:  Past Medical History:    Arthritis    Back pain    Bilateral carotid artery disease (HCC)    Carotid stenosis, bilateral    < 50% bilat at [place], recheck 2022    Compression fracture of cervical spine (HCC)    Esophageal reflux    HNP (herniated nucleus pulposus), cervical    Left axis deviation    stable from 2001, early repol    Mitral regurgitation    MMT (medial meniscus tear)    Palpitations    Refused influenza vaccine    Screening for cardiovascular condition    ECHO with ef 60%, mild pulmonic and tricuspid insuff, mild MR    Vitamin D deficiency       SURGICAL HISTORY:  Past Surgical History:   Procedure Laterality Date    Colonoscopy,diagnostic  2006     normal, EGD as well     Other surgical history      right foot screw    Other surgical history      multiple lipoma approx 30 yr ago    Other surgical history  2012    left knee meniscus    Repair ing hernia,5+y/o,reducibl         SOCIAL HISTORY:   Social History     Occupational History    Occupation: painting   Tobacco Use    Smoking status: Never    Smokeless tobacco: Never   Substance and Sexual  Activity    Alcohol use: Yes     Alcohol/week: 5.8 - 11.7 standard drinks of alcohol     Types: 7 - 14 Standard drinks or equivalent per week     Comment: social     Drug use: No    Sexual activity: Not Currently     Partners: Female       FAMILY HISTORY:   Family History   Problem Relation Age of Onset    Heart Disorder Father     Diabetes Father     Psychiatric Father         dementia    Heart Disorder Mother         CAD with stent    Other (Other) Mother        CURRENT MEDICATIONS:   Current Outpatient Medications   Medication Sig Dispense Refill    Propranolol HCl 10 MG Oral Tab Take 1 tablet (10 mg total) by mouth every evening.         ALLERGIES:   Allergies   Allergen Reactions    Naproxen OTHER (SEE COMMENTS)     Developed ear problem      Cyclobenzaprine OTHER (SEE COMMENTS)     Difficulty urinating and leg spasms    Doxycycline Calcium NAUSEA ONLY     Elevated hr and gi upset    Epinephrine UNKNOWN     Elevated heart rate          REVIEW OF SYSTEMS:   Review of Systems   Constitutional: Negative.    HENT: Negative.    Eyes: Negative.    Respiratory: Negative.    Cardiovascular: Negative.    Gastrointestinal: Negative.    Genitourinary: Negative.    Musculoskeletal: As per HPI  Skin: Negative.    Neurological: As per HPI  Endo/Heme/Allergies: Negative.    Psychiatric/Behavioral: Negative.      All other systems reviewed and are negative. Pertinent positives and negatives noted in the HPI.        PHYSICAL EXAM:   Ht 66\"   Wt 137 lb (62.1 kg)   BMI 22.11 kg/m²     Body mass index is 22.11 kg/m².      General: No immediate distress  Head: Normocephalic/ Atraumatic  Eyes: Extra-occular movements intact.   Ears: No auricular hematoma or deformities  Mouth: No lesions or ulcerations  Heart: peripheral pulses intact. Normal capillary refill.   Lungs: Non-labored respirations  Abdomen: No abdominal guarding  Extremities: No lower extremity edema bilaterally   Skin: No lesions noted.   Cognition: alert & oriented  x 3, attentive, able to follow 2 step commands, comprehention intact, spontaneous speech intact  Motor:    Musculoskeletal:    CERVICAL SPINE:  Inspection: no erythema, swelling, or obvious deformity  Palpation: Tender myofascial trigger points over the right trapezius and levator scapula  ROM: intact to all planes of motion of cervical spine including side-bend bilaterally, rotation bilaterally, flexion, and extension   Strength: 5/5 in all myotomes of the BILATERAL upper extremities   Sensation: Intact to light touch in all dermatomes of the BILATERAL upper extremities   Reflexes: 2/4 at C5, C6, C7 with a negative Martin's sign  Spurling Test: negative for radicular symptoms down either extremity bilaterally        Data  No visits with results within 6 Month(s) from this visit.   Latest known visit with results is:   [facility] Lab Encounter on 02/09/2023   Component Date Value Ref Range Status    Glutamic Acid Decarboxylase Ab 02/09/2023 <5.0  0.0 - 5.0 IU/mL Final    Ankylosing Spondylitis (HLAB27) Sp* 02/09/2023 Whole Blood   Final    Ankylosing Spondylitis (HLAB27) 02/09/2023 Negative   Final   ]      Radiology Imaging:  I reviewed with the patient his X-ray of the cervical spine from 4/22/2024  XR CERVICAL SPINE (4VIEWS) (CPT=72050)  Narrative: PROCEDURE: XR CERVICAL SPINE (4 OR 5 VIEWS) (CPT=72050)     COMPARISON: None.     INDICATIONS: Chronic right neck pain and limited range of motion.     TECHNIQUE: Cervical spine radiographs (5 views)     FINDINGS:      ALIGNMENT: There is stable straightening of the normal cervical lordosis.  There is unchanged mild degenerative retrolisthesis of C3 on C4.  The cervical spine is otherwise well aligned.  ATLANTOAXIAL: Normal odontoid and atlantoaxial joints.    VERTEBRAL BODIES:   There is no fracture or dislocation.  DISC SPACES: There is stable moderate disc space narrowing at C3-C4, C4-C5, C5-C6 and C7-T1.  There is stable mild disc space narrowing at C6-C7 with a  prominent Schmorl's node along the superior C7 endplate.  PREVERTEBRAL SOFT TISSUES: Negative.    OBLIQUE VIEWS: There is left neural foraminal stenosis at C3-C4, C4-C5 and C5-C6.  There is right neural foraminal stenosis at C3-C4 and C5-C6.  These findings are unchanged from the 04/25/2020 MRI.  OTHER: Negative.                Impression: CONCLUSION:   Moderate multilevel spondylosis, grossly unchanged from the 04/25/2020 outside MRI.           Dictated by (CST): Marc Rabago MD on 4/22/2024 at 2:12 PM       Finalized by (CST): Marc Rabago MD on 4/22/2024 at 2:15 PM              ASSESSMENT AND PLAN:  Max is a pleasant 69-year-old male presents for follow-up of his right-sided neck and parascapular pain which I believe is due to myofascial pain and myalgias along with trigger points.  I am recommending he start formal physical therapy and use Tylenol for pain.  He can also use ice or heat whichever feels better for him.  He will follow-up with me in about 6 to 8 weeks.  If his symptoms persist, we can consider trigger point injections.       RTC in 6 to 8 weeks  Discharge Instructions were provided as documented in AVS summary.  The patient was in agreement with the assessment and plan.  All questions were answered.  There were no barriers to learning.         1. Facet syndrome, lumbar    2. Trigger point of neck    3. Cervical facet syndrome    4. DDD (degenerative disc disease), cervical        Alex B. Behar MD  Physical Medicine and Rehabilitation/Sports Medicine  [facility]

## 2024-04-22 NOTE — PATIENT INSTRUCTIONS
1) Please begin physical therapy as soon as possible.   2) Please get X-rays of the cervical today on your way out.   3) Tylenol 500-1000 mg every 6-8 hours as needed for pain.  No more than 3000 mg daily.  4) Continue ice or heat for 20 minutes at a time. Whichever works

## 2024-04-25 ENCOUNTER — APPOINTMENT (OUTPATIENT)
Dept: PHYSICAL THERAPY | Facility: HOSPITAL | Age: 69
End: 2024-04-25
Attending: PHYSICAL MEDICINE & REHABILITATION
Payer: MEDICARE

## 2024-05-02 ENCOUNTER — APPOINTMENT (OUTPATIENT)
Dept: PHYSICAL THERAPY | Facility: HOSPITAL | Age: 69
End: 2024-05-02
Attending: PHYSICAL MEDICINE & REHABILITATION

## 2024-05-03 ENCOUNTER — OFFICE VISIT (OUTPATIENT)
Dept: NEUROLOGY | Facility: CLINIC | Age: 69
End: 2024-05-03
Payer: MEDICARE

## 2024-05-03 ENCOUNTER — TELEPHONE (OUTPATIENT)
Dept: NEUROLOGY | Facility: CLINIC | Age: 69
End: 2024-05-03

## 2024-05-03 VITALS
WEIGHT: 137 LBS | BODY MASS INDEX: 22.02 KG/M2 | SYSTOLIC BLOOD PRESSURE: 98 MMHG | HEART RATE: 82 BPM | HEIGHT: 66 IN | RESPIRATION RATE: 16 BRPM | DIASTOLIC BLOOD PRESSURE: 56 MMHG

## 2024-05-03 DIAGNOSIS — R42 DIZZINESS: Primary | ICD-10-CM

## 2024-05-03 DIAGNOSIS — R42 LIGHTHEADEDNESS: ICD-10-CM

## 2024-05-03 PROCEDURE — 99214 OFFICE O/P EST MOD 30 MIN: CPT | Performed by: OTHER

## 2024-05-03 NOTE — TELEPHONE ENCOUNTER
Uploaded one disk from [facility]  MRI Brain, MRA Neck  DOS:  03/22/24    Uploaded one disk and returned disk to pt    Pt has ov visit today to be reviewed by Dr. Roach

## 2024-05-03 NOTE — PATIENT INSTRUCTIONS
Refill policies:    Allow 2-3 business days for refills; controlled substances may take longer.  Contact your pharmacy at least 5 days prior to running out of medication and have them send an electronic request or submit request through the “request refill” option in your [employer] account.  Refills are not addressed on weekends; covering physicians do not authorize routine medications on weekends.  No narcotics or controlled substances are refilled after noon on Fridays or by on call physicians.  By law, narcotics must be electronically prescribed.  A 30 day supply with no refills is the maximum allowed.  If your prescription is due for a refill, you may be due for a follow up appointment.  To best provide you care, patients receiving routine medications need to be seen at least once a year.  Patients receiving narcotic/controlled substance medications need to be seen at least once every 3 months.  In the event that your preferred pharmacy does not have the requested medication in stock (e.g. Backordered), it is your responsibility to find another pharmacy that has the requested medication available.  We will gladly send a new prescription to that pharmacy at your request.    Scheduling Tests:    If your physician has ordered radiology tests such as MRI or CT scans, please contact Central Scheduling at [phone] right away to schedule the test.  Once scheduled, the [facility] Centralized Referral Team will work with your insurance carrier to obtain pre-certification or prior authorization.  Depending on your insurance carrier, approval may take 3-10 days.  It is highly recommended patients assure they have received an authorization before having a test performed.  If test is done without insurance authorization, patient may be responsible for the entire amount billed.      Precertification and Prior Authorizations:  If your physician has recommended that you have a procedure or additional testing performed the [facility]  Centralized Referral Team will contact your insurance carrier to obtain pre-certification or prior authorization.    You are strongly encouraged to contact your insurance carrier to verify that your procedure/test has been approved and is a COVERED benefit.  Although the [facility] Centralized Referral Team does its due diligence, the insurance carrier gives the disclaimer that \"Although the procedure is authorized, this does not guarantee payment.\"    Ultimately the patient is responsible for payment.   Thank you for your understanding in this matter.  Paperwork Completion:  If you require FMLA or disability paperwork for your recovery, please make sure to either drop it off or have it faxed to our office at [phone]. Be sure the form has your name and date of birth on it.  The form will be faxed to our Forms Department and they will complete it for you.  There is a 25$ fee for all forms that need to be filled out.  Please be aware there is a 10-14 day turnaround time.  You will need to sign a release of information (JERRY) form if your paperwork does not come with one.  You may call the Forms Department with any questions at [phone].  Their fax number is [phone].

## 2024-05-03 NOTE — PROGRESS NOTES
NEUROLOGY  [facility]       Max LEVY Dylanrolycarson  2/22/1955  Primary Care Provider:  ERICKA REBOLLAR MD    5/3/2024  69 year old yo,  was last seen on:: May 2023    Seen for/plans last visit:  Dizziness    Previous visit and existing record notes reviewed in preparation for the face to face visit.  Relevant labs and studies reviewed and will be noted in relevant areas of this record.  Accompanied visit:     (x) No.      Present condition:  Stress and eye movements would trigger a feeling of lightheadedness    Eye exam normal    As a consequence of above, MRI brain normal  Previous Neuropsych         Past History update/new problem(s): as above    Review of Systems:  Review of Systems:  Denies systemic symptoms     No CP or SOB.  No GI or  symptoms. Relevant Neuro as noted above.      Medications:      Current Outpatient Medications:     Propranolol HCl 10 MG Oral Tab, Take 1 tablet (10 mg total) by mouth every evening., Disp: , Rfl:   PRN:     Allergies:  Allergies   Allergen Reactions    Naproxen OTHER (SEE COMMENTS)     Developed ear problem      Cyclobenzaprine OTHER (SEE COMMENTS)     Difficulty urinating and leg spasms    Doxycycline Calcium NAUSEA ONLY     Elevated hr and gi upset    Epinephrine UNKNOWN     Elevated heart rate             EXAM:  BP 98/56 (BP Location: Left arm, Patient Position: Sitting, Cuff Size: adult)   Pulse 82   Resp 16   Ht 66\"   Wt 137 lb (62.1 kg)   BMI 22.11 kg/m²   Looks stated age  General Exam:  HENT:  pink conjunctiva anicteric sclerae  Neck no adenopathy, thyroid normal  Heart and Lungs:  normal  Extremities: no cyanosis, skin changes    NEURO  NEURO  Able to relate events with fluent speech and intact comprehension  CN 2-12: pupils reactive, VF full face symmetric sensation and movement tongue midline  No motor focal findings  Sensory: no lateralizing findings  Reflexes are symmetric  UMN signs: none  Gait: narrow based          INTERPRETATION of  RELEVANT LABS and other DATA:    MRI and MRA  Impression    1.  MRI of the brain demonstrates no acute infarct, enhancing parenchymal  brain mass, or hydrocephalus.  2.  Mild T2/FLAIR signal hyperintensity for which main differential  consideration is chronic small vessel ischemia.  3.  Mild age-appropriate involutional change.  4.  MRA of the neck demonstrates no aneurysm, dissection, or flow-limiting  stenosis.    EEG:  Interpretation: This EEG obtained during wakefullness and sleep   is within normal limits.      Problem/s Identified this visit:   1. Dizziness    2. Lightheadedness          Discussion plus Diagnostics & Treatment Orders:  No structural issues to explain   Likely linked to vestibular problem and triggered by stress      (x) Discussed potential side effects of any treatment relevant to above.  Includes explanation of tests as necessary.    No follow-ups on file.      Patient understands that if needed, based on condition and or test results, follow up will be readjusted      Ramon Roach MD  Vascular & General Neurology  Director, Multiple Sclerosis Program  [facility]  5/3/2024, Time completed 8:57 AM    Decision making:  ( x ) labs reviewed/ordered - 1  (  ) new diagnosis: - 1  ( x) Images & studies independently reviewed -non F2F  (  ) Case/studies discussed with other caregivers - -non F2F  (  ) Telephone time with patiern or authorized Fam member--non F2F  ( x ) other records reviewed --non F2F including consultations  (  ) [facility] meetings - patient not present --non F2F  (  ) Independent Historian obtained    Non Face to Face CPT code 43627/80375 applies as documented above    PROCEDURE DONE     (   ) see notes        After visit, patient was escorted out and handed-off discharge process and instructions to the check out desk.  No additional issues relevant to visit were raised to staff at this time interval.        This document is to be interpreted as my current opinion  regarding the case as of the stated date of service based on the information available to me at this time and may supersedes any prior opinion expressed either orally or in writing.  Services rendered are only within the scope of direct medical care  Sometimes, reports may have been prepared partially using a speech recognition software technology.  If a word or phrase is confusing or out of context, please do not hesitate to call for clarification.

## 2024-05-07 ENCOUNTER — TELEPHONE (OUTPATIENT)
Dept: INTERNAL MEDICINE | Age: 69
End: 2024-05-07

## 2024-05-08 ENCOUNTER — TELEPHONE (OUTPATIENT)
Dept: INTERNAL MEDICINE | Age: 69
End: 2024-05-08

## 2024-05-08 ENCOUNTER — HOSPITAL ENCOUNTER (OUTPATIENT)
Dept: GENERAL RADIOLOGY | Age: 69
Discharge: HOME | End: 2024-05-08

## 2024-05-08 ENCOUNTER — OFFICE VISIT (OUTPATIENT)
Dept: INTERNAL MEDICINE | Age: 69
End: 2024-05-08

## 2024-05-08 VITALS
HEART RATE: 65 BPM | DIASTOLIC BLOOD PRESSURE: 75 MMHG | HEIGHT: 66 IN | SYSTOLIC BLOOD PRESSURE: 123 MMHG | WEIGHT: 138.89 LBS | OXYGEN SATURATION: 99 % | TEMPERATURE: 96.8 F | BODY MASS INDEX: 22.32 KG/M2

## 2024-05-08 DIAGNOSIS — M25.562 ACUTE PAIN OF LEFT KNEE: Primary | ICD-10-CM

## 2024-05-08 DIAGNOSIS — M25.562 ACUTE PAIN OF LEFT KNEE: ICD-10-CM

## 2024-05-08 PROCEDURE — 73562 X-RAY EXAM OF KNEE 3: CPT

## 2024-05-08 ASSESSMENT — PATIENT HEALTH QUESTIONNAIRE - PHQ9
SUM OF ALL RESPONSES TO PHQ9 QUESTIONS 1 AND 2: 0
SUM OF ALL RESPONSES TO PHQ9 QUESTIONS 1 AND 2: 0
1. LITTLE INTEREST OR PLEASURE IN DOING THINGS: NOT AT ALL
2. FEELING DOWN, DEPRESSED OR HOPELESS: NOT AT ALL
CLINICAL INTERPRETATION OF PHQ2 SCORE: NO FURTHER SCREENING NEEDED

## 2024-05-08 ASSESSMENT — ENCOUNTER SYMPTOMS
NEUROLOGICAL NEGATIVE: 1
CONSTITUTIONAL NEGATIVE: 1

## 2024-05-23 ENCOUNTER — WALK IN (OUTPATIENT)
Dept: URGENT CARE | Age: 69
End: 2024-05-23
Attending: STUDENT IN AN ORGANIZED HEALTH CARE EDUCATION/TRAINING PROGRAM

## 2024-05-23 VITALS
RESPIRATION RATE: 16 BRPM | HEART RATE: 82 BPM | BODY MASS INDEX: 21.63 KG/M2 | OXYGEN SATURATION: 98 % | WEIGHT: 134 LBS | DIASTOLIC BLOOD PRESSURE: 66 MMHG | SYSTOLIC BLOOD PRESSURE: 135 MMHG | TEMPERATURE: 98.6 F

## 2024-05-23 DIAGNOSIS — R09.81 CONGESTION OF NASAL SINUS: ICD-10-CM

## 2024-05-23 DIAGNOSIS — R05.1 ACUTE COUGH: Primary | ICD-10-CM

## 2024-05-23 RX ORDER — AZITHROMYCIN 250 MG/1
TABLET, FILM COATED ORAL DAILY
Qty: 6 TABLET | Refills: 0 | Status: SHIPPED | OUTPATIENT
Start: 2024-05-23 | End: 2024-05-27

## 2024-06-24 ENCOUNTER — ANESTHESIA EVENT (OUTPATIENT)
Dept: GASTROENTEROLOGY | Age: 69
End: 2024-06-24

## 2024-06-24 ENCOUNTER — ANESTHESIA (OUTPATIENT)
Dept: GASTROENTEROLOGY | Age: 69
End: 2024-06-24

## 2024-06-24 ENCOUNTER — HOSPITAL ENCOUNTER (OUTPATIENT)
Dept: GASTROENTEROLOGY | Age: 69
Discharge: HOME | End: 2024-06-24
Attending: INTERNAL MEDICINE

## 2024-06-24 VITALS
HEART RATE: 56 BPM | OXYGEN SATURATION: 99 % | DIASTOLIC BLOOD PRESSURE: 70 MMHG | SYSTOLIC BLOOD PRESSURE: 133 MMHG | BODY MASS INDEX: 22.14 KG/M2 | TEMPERATURE: 97.5 F | WEIGHT: 137.79 LBS | HEIGHT: 66 IN | RESPIRATION RATE: 12 BRPM

## 2024-06-24 DIAGNOSIS — K21.9 GASTROESOPHAGEAL REFLUX DISEASE WITHOUT ESOPHAGITIS: ICD-10-CM

## 2024-06-24 PROCEDURE — 88305 TISSUE EXAM BY PATHOLOGIST: CPT | Performed by: INTERNAL MEDICINE

## 2024-06-24 PROCEDURE — C1751 CATH, INF, PER/CENT/MIDLINE: HCPCS

## 2024-06-24 PROCEDURE — 10006023 HB SUPPLY 272

## 2024-06-24 PROCEDURE — 13000008 HB ANESTHESIA MAC OUTSIDE OR

## 2024-06-24 PROCEDURE — 10002800 HB RX 250 W HCPCS

## 2024-06-24 PROCEDURE — 10004451 HB PACU RECOVERY 1ST 30 MINUTES

## 2024-06-24 PROCEDURE — 13000024 HB GI COMPLEX CASE S/U + 1ST 15 MIN

## 2024-06-24 PROCEDURE — 10002807 HB RX 258: Performed by: INTERNAL MEDICINE

## 2024-06-24 PROCEDURE — 13000001 HB PHASE II RECOVERY EA 30 MINUTES

## 2024-06-24 RX ORDER — PROPOFOL 10 MG/ML
INJECTION, EMULSION INTRAVENOUS PRN
Status: DISCONTINUED | OUTPATIENT
Start: 2024-06-24 | End: 2024-06-24

## 2024-06-24 RX ORDER — SODIUM CHLORIDE 9 MG/ML
INJECTION, SOLUTION INTRAVENOUS CONTINUOUS
Status: DISCONTINUED | OUTPATIENT
Start: 2024-06-24 | End: 2024-06-26 | Stop reason: HOSPADM

## 2024-06-24 RX ADMIN — PROPOFOL INJECTABLE EMULSION 50 MCG/KG/MIN: 10 INJECTION, EMULSION INTRAVENOUS at 12:01

## 2024-06-24 RX ADMIN — PROPOFOL 50 MG: 10 INJECTION, EMULSION INTRAVENOUS at 12:02

## 2024-06-24 RX ADMIN — SODIUM CHLORIDE: 9 INJECTION, SOLUTION INTRAVENOUS at 11:53

## 2024-06-24 SDOH — SOCIAL STABILITY: SOCIAL INSECURITY: HOW OFTEN DOES ANYONE, INCLUDING FAMILY AND FRIENDS, THREATEN YOU WITH HARM?: PATIENT DECLINED

## 2024-06-24 SDOH — SOCIAL STABILITY: SOCIAL INSECURITY: HOW OFTEN DOES ANYONE, INCLUDING FAMILY AND FRIENDS, INSULT OR TALK DOWN TO YOU?: PATIENT DECLINED

## 2024-06-24 SDOH — SOCIAL STABILITY: SOCIAL INSECURITY: HOW OFTEN DOES ANYONE, INCLUDING FAMILY AND FRIENDS, PHYSICALLY HURT YOU?: PATIENT DECLINED

## 2024-06-24 SDOH — SOCIAL STABILITY: SOCIAL INSECURITY: HOW OFTEN DOES ANYONE, INCLUDING FAMILY AND FRIENDS, SCREAM OR CURSE AT YOU?: PATIENT DECLINED

## 2024-06-24 ASSESSMENT — ACTIVITIES OF DAILY LIVING (ADL)
RECENT_DECLINE_ADL: NO
SENSORY_SUPPORT_DEVICES: EYEGLASSES
ADL_SCORE: 12
NEEDS_ASSIST: NO
ADL_BEFORE_ADMISSION: INDEPENDENT
HISTORY OF FALLING IN THE LAST YEAR (PRIOR TO ADMISSION): NO
CHRONIC_PAIN_PRESENT: NO
ADL_SHORT_OF_BREATH: NO

## 2024-06-24 ASSESSMENT — PAIN SCALES - GENERAL
PAINLEVEL_OUTOF10: 0

## 2024-06-24 ASSESSMENT — PAIN SCALES - WONG BAKER: WONGBAKER_NUMERICALRESPONSE: 0

## 2024-06-26 ENCOUNTER — HOSPITAL ENCOUNTER (EMERGENCY)
Age: 69
Discharge: LEFT BEFORE DISCHARGE | End: 2024-06-26

## 2024-06-26 VITALS
RESPIRATION RATE: 16 BRPM | HEART RATE: 65 BPM | TEMPERATURE: 98.1 F | DIASTOLIC BLOOD PRESSURE: 81 MMHG | SYSTOLIC BLOOD PRESSURE: 151 MMHG | OXYGEN SATURATION: 96 %

## 2024-06-26 LAB
ASR DISCLAIMER: NORMAL
CASE RPRT: NORMAL
CLINICAL INFO: NORMAL
PATH REPORT.FINAL DX SPEC: NORMAL
PATH REPORT.GROSS SPEC: NORMAL

## 2024-06-26 ASSESSMENT — PAIN SCALES - GENERAL: PAINLEVEL_OUTOF10: 0

## 2024-07-05 ENCOUNTER — NURSE TRIAGE (OUTPATIENT)
Dept: TELEHEALTH | Age: 69
End: 2024-07-05

## 2024-07-19 ENCOUNTER — TELEPHONE (OUTPATIENT)
Dept: INTERNAL MEDICINE | Age: 69
End: 2024-07-19

## 2024-07-19 ENCOUNTER — NURSE TRIAGE (OUTPATIENT)
Dept: TELEHEALTH | Age: 69
End: 2024-07-19

## 2024-07-29 ENCOUNTER — TELEPHONE (OUTPATIENT)
Dept: INTERNAL MEDICINE | Age: 69
End: 2024-07-29

## 2024-07-29 ENCOUNTER — EXTERNAL RECORD (OUTPATIENT)
Dept: HEALTH INFORMATION MANAGEMENT | Facility: OTHER | Age: 69
End: 2024-07-29

## 2024-07-29 ENCOUNTER — TELEPHONE (OUTPATIENT)
Dept: PHYSICAL MEDICINE AND REHAB | Facility: CLINIC | Age: 69
End: 2024-07-29

## 2024-08-05 ENCOUNTER — TELEPHONE (OUTPATIENT)
Dept: INTERNAL MEDICINE | Age: 69
End: 2024-08-05

## 2024-08-06 NOTE — TELEPHONE ENCOUNTER
Patient called office asking for a work letter  to specify restrictions and work limitations as he is a . Patient wanted Dr behar to write what he can or cannot do and requested the amount of time to be indefinitely.       Explained to the patient that it might be difficult having the amount of time for restriction indefinitely unless patient is on permanent disability, however will ask Dr Behar on his recommendations.    Patient also did not have a follow up since 4/22/24. Patient stated that he didn't follow up as he didn't need to as there was no worsening on his symptoms.     Scheduled patient for a follow up as recommended on 8/12/24.

## 2024-08-07 ENCOUNTER — TELEPHONE (OUTPATIENT)
Dept: INTERNAL MEDICINE | Age: 69
End: 2024-08-07

## 2024-08-07 NOTE — TELEPHONE ENCOUNTER
Spoke with Dr Behar who stated that he will wait until patient's follow up before sending a work letter.     Patient was notified. Closing the encounter at this time.

## 2024-08-08 ENCOUNTER — OFFICE VISIT (OUTPATIENT)
Dept: INTERNAL MEDICINE | Age: 69
End: 2024-08-08

## 2024-08-08 VITALS
HEIGHT: 66 IN | HEART RATE: 65 BPM | SYSTOLIC BLOOD PRESSURE: 123 MMHG | TEMPERATURE: 96.8 F | DIASTOLIC BLOOD PRESSURE: 77 MMHG | BODY MASS INDEX: 21.97 KG/M2 | WEIGHT: 136.69 LBS | OXYGEN SATURATION: 99 %

## 2024-08-08 DIAGNOSIS — R42 DIZZINESS: ICD-10-CM

## 2024-08-08 DIAGNOSIS — H93.233 HYPERACUSIS OF BOTH EARS: Primary | ICD-10-CM

## 2024-08-08 DIAGNOSIS — R97.20 ELEVATED PSA: ICD-10-CM

## 2024-08-08 ASSESSMENT — PATIENT HEALTH QUESTIONNAIRE - PHQ9
1. LITTLE INTEREST OR PLEASURE IN DOING THINGS: NOT AT ALL
SUM OF ALL RESPONSES TO PHQ9 QUESTIONS 1 AND 2: 0
2. FEELING DOWN, DEPRESSED OR HOPELESS: NOT AT ALL
SUM OF ALL RESPONSES TO PHQ9 QUESTIONS 1 AND 2: 0
CLINICAL INTERPRETATION OF PHQ2 SCORE: NO FURTHER SCREENING NEEDED

## 2024-08-12 ENCOUNTER — OFFICE VISIT (OUTPATIENT)
Dept: PHYSICAL MEDICINE AND REHAB | Facility: CLINIC | Age: 69
End: 2024-08-12
Payer: MEDICARE

## 2024-08-12 VITALS — HEIGHT: 66 IN | WEIGHT: 137 LBS | BODY MASS INDEX: 22.02 KG/M2

## 2024-08-12 DIAGNOSIS — M53.3 SACROILIAC JOINT DYSFUNCTION OF RIGHT SIDE: ICD-10-CM

## 2024-08-12 DIAGNOSIS — M51.37 DDD (DEGENERATIVE DISC DISEASE), LUMBOSACRAL: ICD-10-CM

## 2024-08-12 DIAGNOSIS — M99.9 BIOMECHANICAL LESION: ICD-10-CM

## 2024-08-12 DIAGNOSIS — M79.10 MYALGIA: ICD-10-CM

## 2024-08-12 DIAGNOSIS — M51.36 BULGE OF LUMBAR DISC WITHOUT MYELOPATHY: ICD-10-CM

## 2024-08-12 DIAGNOSIS — M47.816 LUMBAR SPONDYLOSIS: ICD-10-CM

## 2024-08-12 DIAGNOSIS — M53.3 SI (SACROILIAC) JOINT DYSFUNCTION: ICD-10-CM

## 2024-08-12 DIAGNOSIS — M62.89 HIGH-TONE PELVIC FLOOR DYSFUNCTION: ICD-10-CM

## 2024-08-12 DIAGNOSIS — M50.30 DDD (DEGENERATIVE DISC DISEASE), CERVICAL: ICD-10-CM

## 2024-08-12 DIAGNOSIS — M54.59 MECHANICAL LOW BACK PAIN: ICD-10-CM

## 2024-08-12 DIAGNOSIS — M47.816 FACET SYNDROME, LUMBAR: Primary | ICD-10-CM

## 2024-08-12 DIAGNOSIS — M54.2 TRIGGER POINT OF NECK: ICD-10-CM

## 2024-08-12 PROCEDURE — 99214 OFFICE O/P EST MOD 30 MIN: CPT | Performed by: PHYSICAL MEDICINE & REHABILITATION

## 2024-08-12 NOTE — PROGRESS NOTES
[facility]  Progress Note    CHIEF COMPLAINT:    Chief Complaint   Patient presents with    Follow - Up     LOV: 4/22/2024 pt comes in with bilateral low back aching pain. Denies T/N. Denies weakness. Rates pain 1/10. Denies medication.        History of Present Illness:  The patient is a 69 year old  RIGHT handed male with significant past medical history of carotid stenosis, mitral regurg, GERD, who presents for follow-up of his right-sided low back pain without radicular symptoms.  Today he rates his pain a 1 out of 10 but can be as high as a 5-6 out of 10.  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  Previously before his injury and pain, he was able to do 15-20 jobs per month.  He has done well with SI joint injections and the last time he had a right SI joint injection was in December 2023.  He is not taking any medications for pain.  He denies any paresthesias or weakness.    PAST MEDICAL HISTORY:  Past Medical History:    Arthritis    Back pain    Bilateral carotid artery disease (HCC)    Carotid stenosis, bilateral    < 50% bilat at [place], recheck 2022    Compression fracture of cervical spine (HCC)    Esophageal reflux    HNP (herniated nucleus pulposus), cervical    Left axis deviation    stable from 2001, early repol    Mitral regurgitation    MMT (medial meniscus tear)    Palpitations    Refused influenza vaccine    Screening for cardiovascular condition    ECHO with ef 60%, mild pulmonic and tricuspid insuff, mild MR    Vitamin D deficiency       SURGICAL HISTORY:  Past Surgical History:   Procedure Laterality Date    Colonoscopy,diagnostic  2006     normal, EGD as well     Other surgical history      right foot screw    Other surgical history      multiple lipoma approx 30 yr ago     Other surgical history  2012    left knee meniscus    Repair ing hernia,5+y/o,reducibl         SOCIAL HISTORY:   Social History     Occupational History    Occupation: painting   Tobacco Use    Smoking status: Never    Smokeless tobacco: Never   Substance and Sexual Activity    Alcohol use: Yes     Alcohol/week: 5.8 - 11.7 standard drinks of alcohol     Types: 7 - 14 Standard drinks or equivalent per week     Comment: social     Drug use: No    Sexual activity: Not Currently     Partners: Female       FAMILY HISTORY:   Family History   Problem Relation Age of Onset    Heart Disorder Father     Diabetes Father     Psychiatric Father         dementia    Heart Disorder Mother         CAD with stent    Other (Other) Mother        CURRENT MEDICATIONS:   Current Outpatient Medications   Medication Sig Dispense Refill    Propranolol HCl 10 MG Oral Tab Take 1 tablet (10 mg total) by mouth every evening.         ALLERGIES:   Allergies   Allergen Reactions    Naproxen OTHER (SEE COMMENTS)     Developed ear problem      Cyclobenzaprine OTHER (SEE COMMENTS)     Difficulty urinating and leg spasms    Doxycycline Calcium NAUSEA ONLY     Elevated hr and gi upset    Epinephrine UNKNOWN     Elevated heart rate          REVIEW OF SYSTEMS:   Review of Systems   Constitutional: Negative.    HENT: Negative.    Eyes: Negative.    Respiratory: Negative.    Cardiovascular: Negative.    Gastrointestinal: Negative.    Genitourinary: Negative.    Musculoskeletal: As per HPI  Skin: Negative.    Neurological: As per HPI  Endo/Heme/Allergies: Negative.    Psychiatric/Behavioral: Negative.      All other systems reviewed and are negative. Pertinent positives and negatives noted in the HPI.        PHYSICAL EXAM:   Ht 66\"   Wt 137 lb (62.1 kg)   BMI 22.11 kg/m²     Body mass index is 22.11 kg/m².      General: No immediate distress  Head: Normocephalic/ Atraumatic  Eyes: Extra-occular movements intact.   Ears: No auricular hematoma or  deformities  Mouth: No lesions or ulcerations  Heart: peripheral pulses intact. Normal capillary refill.   Lungs: Non-labored respirations  Abdomen: No abdominal guarding  Extremities: No lower extremity edema bilaterally   Skin: No lesions noted.   Cognition: alert & oriented x 3, attentive, able to follow 2 step commands, comprehention intact, spontaneous speech intact  Motor:    Musculoskeletal:    To palpation over the right SI joint as well as right lower lumbar facets    Data  No visits with results within 6 Month(s) from this visit.   Latest known visit with results is:   Critical access hospital Lab Encounter on 02/09/2023   Component Date Value Ref Range Status    Glutamic Acid Decarboxylase Ab 02/09/2023 <5.0  0.0 - 5.0 IU/mL Final    Ankylosing Spondylitis (HLAB27) Sp* 02/09/2023 Whole Blood   Final    Ankylosing Spondylitis (HLAB27) 02/09/2023 Negative   Final   ]      Radiology Imaging:  I reviewed with the patient his MRI of the brain from 2/23/2023  MRA, HEAD (W+WO) (CPT=70546)  Narrative: PROCEDURE:MRA, HEAD (W+WO) (CPT=70546)     COMPARISON:          [facility], MRA CAROTIDS (W+WO) (CPT=70549), 2/16/2021, 9:49 AM.  [facility], MRI BRAIN (W+WO) (CPT=70553), 2/16/2021, 9:49 AM.     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  15 cc of IV Dotarem was utilized     FINDINGS:        INTERNAL CAROTIDS:         Flow identified.    ANTERIOR CEREBRALS:     Flow identified.  Right A2 bifurcates just beyond origin.  MIDDLE CEREBRALS:          Flow identified.    POSTERIOR CEREBRALS:   Flow identified.    ANTERIOR COMMUNICATOR:         Flow identified.    POSTERIOR COMMUNICATORS:    Flow identified.  .  BASILAR:         Flow identified.    VERTEBRALS:           Flow identified.    SUPERIOR CEREBELLAR:   Flow identified .  Small accessory right superior cerebellar artery.  ANTERIOR INFERIOR CEREBALLAR:Flow identified at origins.  PICA:Flow identified on source images.  ANEURYSMS:None.  AVM/AV FISTULA:     None.  SIGNIFICANT STENOSIS:None.  VASCULAR MALFORMATIONS:None.  OTHER:          Flow present in major dural venous sinuses.                Impression: CONCLUSION:     1. No aneurysm or AVM/AV fistula.  Negative MRA.           Dictated by (CST): Renard Cabrera MD on 2/23/2023 at 1:34 PM       Finalized by (CST): Renard Cabrera MD on 2/23/2023 at 3:04 PM             PROCEDURE: MRI SPINE LUMBAR (CPT=72148)     COMPARISON: [facility], XR LUMBAR SPINE (MIN 2 VIEWS) (CPT=72100), 5/07/2018, 17:35.  [facility], MRI SPINE LUMBAR (CPT=72148), 9/18/2017, 15:07.     INDICATIONS: intractable low back pain     TECHNIQUE: A variety of imaging planes and parameters were utilized for visualization of suspected pathology.     FINDINGS:  NUMERATION: For the purposes of this examination, the lowest fully formed disc space is designated as L5-S1.  ALIGNMENT: There is preservation of the expected lumbar lordosis.  BONES: Probable 2.7 cm hemangioma within S1 vertebral body. Right iliac bone island. Schmorl's node extending into the superior T12 endplate. Degenerative-type edema involving the anterosuperior L3 endplate.  CORD/CAUDA EQUINA: The conus medullaris terminates at the level of the inferior T12 endplate. The distal cord and nerve roots have normal caliber, contour, and signal intensity.  PARASPINAL AREA: No visible mass.    OTHER: Probable 1.8 cm right lower pole renal cyst.     LUMBAR DISC LEVELS:  L1-L2: No significant disc/facet abnormality, spinal stenosis, or foraminal stenosis.    L2-L3: Small diffuse disc bulge, which is slightly eccentric to the left. Mild ligamentum flavum redundancy. No spinal canal  or neural foraminal compromise.  L3-L4: Minimal post degeneration and desiccation. No significant neural foraminal or spinal canal compromise.  L4-L5: There is a diffuse disc bulge with superimposed central disc protrusion/annular fissure. Mild omentum from redundancy and epidural lipomatosis. Mild spinal canal stenosis. Mild to moderate right and mild left lateral recess stenosis. No  significant neural foraminal compromise.  L5-S1: No significant disc/facet abnormality, spinal stenosis, or foraminal stenosis.        Impression   CONCLUSION:  1. Mild multilevel lumbar spine degenerative changes as detailed. Most notable is a central disc protrusion/annular fissure at L4-L5, which results in mild spinal canal stenosis as well as mild to moderate right and mild left lateral recess stenosis at  this level. No additional significant neural compromise throughout the lumbar spine.  2. Probable hemangioma within the S1 vertebral body, unchanged since September, 2017.  3. Right renal cyst, incompletely characterized on this non-dedicated exam.  4. Lesser incidental findings as above.              Dictated by (CST): Mickey Mcgregor MD on 5/09/2018 at 9:11      Approved by (CST): Mickey Mcgregor MD on 5/09/2018 at 9:19      I reviewed with the patient his MRI of the lumbar spine from 1/4/2023  Formatting of this note might be different from the original.   EXAM: MRI LUMBAR SPINE W WO CONTRAST     CLINICAL INDICATION: Chronic midline low back pain without sciatica.     COMPARISON:  Selected images from MRI lumbar spine without contrast   examination dated January 15, 2020.     TECHNIQUE: MRI lumbar spine was performed without and with contrast.   Approximately 7.5 mL of IV Gadavist administered.     FINDINGS:     No acute malalignment of the lumbar spine. Rudimentary disc space at S1-S2.   Vertebral body heights are overall maintained without evidence of   acute/recent compression fracture. There are a few stable marrow  lesions   within the lumbar spine and sacrum, some which enhance, which are felt to   be benign given long-term stability. Scattered Schmorl's nodes and Modic   endplate changes. The distal spinal cord demonstrates a signal intensity   within normal limits. The conus medullaris terminates at approximately L1.   Multilevel degenerative disc disease. No suspicious areas of postcontrast   enhancement within the lumbar spinal canal. A couple of nonenhancing   sclerotic foci within the bilateral ilium which are nonspecific. A 2 cm T2   hyperintensity within the right kidney, likely a cyst. Bilateral   perinephric thickening which is nonspecific. Left renal sinus cyst.     L1-L2: No significant neural foraminal or spinal canal stenosis.     L2-L3: Disc bulge. Facet joint arthropathy. Mild neural foraminal stenosis.   No significant spinal canal stenosis.     L3-L4: Disc bulge. Facet joint arthropathy with ligamentum flavum   infolding. Mild left neural foraminal stenosis. Mild spinal canal stenosis.     L4-L5: Disc bulge with annular fissure. There is a small superimposed right   paracentral disc extrusion. Facet joint arthropathy with ligament flavum   infolding. Mild-to-moderate neural foraminal stenosis, left greater than   right. Moderate spinal canal stenosis. There is encroachment of the lateral   recesses.     L5-S1: Disc bulge. Facet joint arthropathy. No significant neural foraminal   or spinal canal stenosis.     IMPRESSION:     1. Overall, no significant interval progression of multilevel chronic   degenerative changes of the lumbar spine contributing to varying degrees of   neural foraminal and spinal canal stenosis as described in detail in the   body of the report.   2. Additional findings as described above      ASSESSMENT AND PLAN:  Max is a 69-year-old male who presents for follow-up of his right-sided low back pain with intermittent discomfort.  Today he is pain is about a 1 out of 10 but can be as  high as a 5-6 out of 10 without radicular symptoms.  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  He has had longstanding low back pain for several years now which limits his functional ability.  Prior to his low back pain, he was able to perform 15-20 jobs per month.  At this time, recommending he continue with a home exercise program and use Tylenol for pain.  He can use ice as well.  He will follow-up with me in about 2 to 3 months.  If his pain returns, then we can consider repeating the right SI joint and myofascial trigger point corticosteroid injection.       RTC in 2 to 3 months  Discharge Instructions were provided as documented in AVS summary.  The patient was in agreement with the assessment and plan.  All questions were answered.  There were no barriers to learning.         1. Facet syndrome, lumbar    2. Trigger point of neck    3. DDD (degenerative disc disease), cervical    4. Bulge of lumbar disc without myelopathy    5. Mechanical low back pain    6. Sacroiliac joint dysfunction of right side    7. Biomechanical lesion    8. Myalgia    9. DDD (degenerative disc disease), lumbosacral    10. Lumbar spondylosis    11. High-tone pelvic floor dysfunction    12. SI (sacroiliac) joint dysfunction        Alex B. Behar MD  Physical Medicine and Rehabilitation/Sports Medicine  [facility]

## 2024-08-12 NOTE — PATIENT INSTRUCTIONS
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 This will be permeant restrictions.   2.Continue home exercise program   3) Tylenol 500-1000 mg every 6-8 hours as needed for pain.  No more than 3000 mg daily.  4)  Ice 20 minutes at a time 3-4 times per day

## 2024-08-16 ENCOUNTER — LAB ENCOUNTER (OUTPATIENT)
Dept: LAB | Facility: HOSPITAL | Age: 69
End: 2024-08-16
Attending: UROLOGY
Payer: MEDICARE

## 2024-08-16 DIAGNOSIS — R97.20 ELEVATED PSA: Primary | ICD-10-CM

## 2024-08-16 PROCEDURE — 36415 COLL VENOUS BLD VENIPUNCTURE: CPT

## 2024-08-16 PROCEDURE — 84153 ASSAY OF PSA TOTAL: CPT

## 2024-08-16 PROCEDURE — 84154 ASSAY OF PSA FREE: CPT

## 2024-08-17 LAB
% FREE PSA: 22.7 %
% FREE PSA: 22.7 %
PROSTATE SPECIFIC AG: 3.3 NG/ML
PROSTATE SPECIFIC AG: 3.3 NG/ML
PSA, FREE: 0.75 NG/ML
PSA, FREE: 0.75 NG/ML

## 2024-08-19 ENCOUNTER — NURSE TRIAGE (OUTPATIENT)
Dept: TELEHEALTH | Age: 69
End: 2024-08-19

## 2024-08-19 ENCOUNTER — TELEPHONE (OUTPATIENT)
Dept: PHYSICAL MEDICINE AND REHAB | Facility: CLINIC | Age: 69
End: 2024-08-19

## 2024-08-19 ENCOUNTER — HOSPITAL ENCOUNTER (EMERGENCY)
Facility: HOSPITAL | Age: 69
Discharge: HOME | End: 2024-08-19
Attending: EMERGENCY MEDICINE
Payer: MEDICARE

## 2024-08-19 ENCOUNTER — APPOINTMENT (OUTPATIENT)
Dept: CT IMAGING | Facility: HOSPITAL | Age: 69
End: 2024-08-19
Attending: EMERGENCY MEDICINE
Payer: MEDICARE

## 2024-08-19 VITALS
OXYGEN SATURATION: 98 % | WEIGHT: 130.31 LBS | DIASTOLIC BLOOD PRESSURE: 74 MMHG | RESPIRATION RATE: 18 BRPM | HEART RATE: 65 BPM | BODY MASS INDEX: 20.94 KG/M2 | HEIGHT: 66 IN | TEMPERATURE: 97 F | SYSTOLIC BLOOD PRESSURE: 117 MMHG

## 2024-08-19 DIAGNOSIS — M54.50 RIGHT-SIDED LOW BACK PAIN WITHOUT SCIATICA, UNSPECIFIED CHRONICITY: Primary | ICD-10-CM

## 2024-08-19 PROCEDURE — 99284 EMERGENCY DEPT VISIT MOD MDM: CPT

## 2024-08-19 PROCEDURE — 72131 CT LUMBAR SPINE W/O DYE: CPT | Performed by: EMERGENCY MEDICINE

## 2024-08-19 RX ORDER — ACETAMINOPHEN 500 MG
500 TABLET ORAL ONCE
Status: COMPLETED | OUTPATIENT
Start: 2024-08-19 | End: 2024-08-19

## 2024-08-19 NOTE — TELEPHONE ENCOUNTER
Patient called again the nurse triage line wanted to know if he can call his pcp and notify if they can place orders. In formed patient that yes he can call pcp office if needed.     Patient stated he doesn't want to do a CT, he can do an MRI. Educated patient that he needs to be examined first, and usually if imaging is needed they might order an MRI right away but other tests if necessary.     Patient asked that Dr Behar was informed that if he has an injection done, his pain can go away and he can return to work.    Explained to the patient again that if the pain is severe and he is not able to wait for his appointment he has to go to immediate care or ER.    Patient asked for locations of immediate care. Patient was provided with locations close to his home address.     Dr Behar was notified by this writer on patient condition update.

## 2024-08-19 NOTE — TELEPHONE ENCOUNTER
Patient called office with a condition update.     Patient stated that on last Friday he fell at work after he tripped on something and landed and hurt hi right buttocks and hand. Hand is healed per patient. The later on that that day he lifted something heavy from after the pain started.  Pain worsened on Saturday, then Sunday was at it's worst.     Per patient verbalizes pain in the right SI joint,  6/10, worsening with sitting, doesn't radiate.    Patient stated he is afraid to move and is afraid that he ruptured something.     Patient denies any RX for pain and does not prefer medication.     Patient is asking if he can come today for an SI injection and also if he needs to do any imaging.    Informed patient that Dr Behar is fully booked today, however I will notify Dr Behar and call patient back once I have recommendations.

## 2024-08-19 NOTE — TELEPHONE ENCOUNTER
Spoke with Dr Behar who stated he has to see patient in the office first for an examination prior to any imaging orders or injection. Unfortunately Dr Behar has no availabilities today and we have to schedule first available appointment, then place the appointment on the waitlist.     Educated patient on Dr Behar's recommendations. Scheduled patient on 8/29/24 and placed the appointment on the waitlist.    Educated patient that if his condition worsens or if he is not able to wait until the appointment, is best that he is evaluated in urgent care.     Closing this encounter at this time.

## 2024-08-19 NOTE — TELEPHONE ENCOUNTER
Pt called to inform Dr. Behar and nurses that he has had too many CT scans so he does not want to do anymore CT scans. He says he is okay to do MRI.

## 2024-08-19 NOTE — ED INITIAL ASSESSMENT (HPI)
Pt arrives via EMS for fall on Friday, pt's back began hurting Saturday. Pt states he tripped on a pad while at work. Pt states lower back pain. Pt states hx of L4/5 disc issues. Aox4,. Speaking in full sentences.

## 2024-08-20 ENCOUNTER — TELEPHONE (OUTPATIENT)
Dept: PHYSICAL MEDICINE AND REHAB | Facility: CLINIC | Age: 69
End: 2024-08-20

## 2024-08-20 NOTE — ED PROVIDER NOTES
Patient Seen in: [facility] Emergency Department      History     Chief Complaint   Patient presents with    Fall    Back Pain     Stated Complaint: fall, back pain    Subjective:   HPI    69-year-old male with history of esophageal reflux, carotid stenosis, mitral regurg, chronic right sided low back pain presents with complaints of significantly increased pain after an injury.  The patient reports that he slipped and fell at work 3 days ago landing on his low back and right hip.  He then lifted a heavy object the next day.  He reports significantly increased pain over the past 2 days primarily to his low back worsened on the right side.  He denies radiation of pain to his lower extremities.  He does report some tingling to his feet today after arrival in the emergency department but denies focal weakness.  He denies incontinence of bowel or bladder.  He took 1 tramadol earlier today for the pain.  He denies other pain medications.  He has been following with physiatry regarding his back pain and has exercises that he has been doing along with Tylenol for pain.  He denies any known fevers.  He denies other injuries with the fall.    Objective:   Past Medical History:    Arthritis    Back pain    Bilateral carotid artery disease (HCC)    Carotid stenosis, bilateral    < 50% bilat at [place], recheck 2022    Compression fracture of cervical spine (HCC)    Esophageal reflux    HNP (herniated nucleus pulposus), cervical    Left axis deviation    stable from 2001, early repol    Mitral regurgitation    MMT (medial meniscus tear)    Palpitations    Refused influenza vaccine    Screening for cardiovascular condition    ECHO with ef 60%, mild pulmonic and tricuspid insuff, mild MR    Vitamin D deficiency              Past Surgical History:   Procedure Laterality Date    Colonoscopy,diagnostic  2006     normal, EGD as well     Other surgical history      right foot screw    Other surgical history      multiple  lipoma approx 30 yr ago    Other surgical history  2012    left knee meniscus    Repair ing hernia,5+y/o,reducibl                  Social History     Socioeconomic History    Marital status: Single    Number of children: 0   Occupational History    Occupation: painting   Tobacco Use    Smoking status: Never    Smokeless tobacco: Never   Substance and Sexual Activity    Alcohol use: Yes     Alcohol/week: 5.8 - 11.7 standard drinks of alcohol     Types: 7 - 14 Standard drinks or equivalent per week     Comment: social     Drug use: No    Sexual activity: Not Currently     Partners: Female   Other Topics Concern     Service No    Blood Transfusions No    Caffeine Concern No    Exercise Yes     Comment: active    Seat Belt Yes    Self-Exams Yes     Social Determinants of Health     Physical Activity: High Risk (4/3/2024)    Received from [employer]    Exercise Vital Sign     On average, how many days per week do you engage in moderate to strenuous exercise (like a brisk walk)?: 0 days     On average, how many minutes do you engage in exercise at this level?: 0 min    Received from [employer], [employer]    Stress              Review of Systems    Positive for stated Chief Complaint: Fall and Back Pain    Other systems are as noted in HPI.  Constitutional and vital signs reviewed.      All other systems reviewed and negative except as noted above.    Physical Exam     ED Triage Vitals [08/19/24 1726]   /75   Pulse 68   Resp 18   Temp 97.3 °F (36.3 °C)   Temp src Temporal   SpO2 98 %   O2 Device None (Room air)       Current Vitals:   Vital Signs  BP: 120/75  Pulse: 68  Resp: 18  Temp: 97.3 °F (36.3 °C)  Temp src: Temporal    Oxygen Therapy  SpO2: 98 %  O2 Device: None (Room air)            Physical Exam      General Appearance: alert, no distress  Eyes: pupils equal and round no pallor or injection  ENT, Mouth: mucous membranes moist  Respiratory: there are no  retractions, lungs are clear to auscultation  Cardiovascular: regular rate and rhythm  Gastrointestinal:  abdomen is soft and non tender, no masses, bowel sounds normal  Neurological: Speech normal.  Motor and sensation is intact and symmetric to bilateral lower extremities.  Skin: warm and dry, no rashes.  Musculoskeletal: neck is supple non tender        Extremities are symmetrical, full range of motion  Back: Minimal tenderness to palpation of the midline of the lumbar spinal area extending to the right paraspinal region.  No overlying erythema or skin changes noted.  No pain with straight leg raise bilaterally.  Psychiatric: patient is oriented X 3, there is no agitation    DIFFERENTIAL DIAGNOSIS: After history and physical exam differential diagnosis was considered for contusion, fracture, acute on chronic back pain, or other        ED Course   Labs Reviewed - No data to display                 MDM      I reveiewed the lumbar spine CT and agree with the radiologist report that showed no acute fracture or malalignment.  The patient declines NSAIDs as he states he has hyperacusis when taking NSAIDs.  He also declines steroids as he states they make him too jittery and anxious.  He states he will take Tylenol for pain until he can get in to see his physiatrist.                                   Medical Decision Making      Disposition and Plan     Clinical Impression:  1. Right-sided low back pain without sciatica, unspecified chronicity         Disposition:  Discharge  8/19/2024  9:14 pm    Follow-up:  Behar, Alex, MD  [address] [address]  [phone]    Follow up      We recommend that you schedule follow up care with a primary care provider within the next three months to obtain basic health screening including reassessment of your blood pressure.      Medications Prescribed:  Current Discharge Medication List

## 2024-08-20 NOTE — DISCHARGE INSTRUCTIONS
Continue Tylenol as needed for pain.  Follow-up with physiatry as discussed.  Return to the emergency department if greatly increased pain, focal weakness, incontinence, or other new symptoms develop.

## 2024-08-22 ENCOUNTER — TELEPHONE (OUTPATIENT)
Dept: INTERNAL MEDICINE | Age: 69
End: 2024-08-22

## 2024-08-26 ENCOUNTER — TELEPHONE (OUTPATIENT)
Dept: PHYSICAL MEDICINE AND REHAB | Facility: CLINIC | Age: 69
End: 2024-08-26

## 2024-08-26 ENCOUNTER — OFFICE VISIT (OUTPATIENT)
Dept: PHYSICAL MEDICINE AND REHAB | Facility: CLINIC | Age: 69
End: 2024-08-26
Payer: MEDICARE

## 2024-08-26 VITALS — WEIGHT: 130 LBS | BODY MASS INDEX: 20.89 KG/M2 | HEIGHT: 66 IN

## 2024-08-26 DIAGNOSIS — M51.37 DDD (DEGENERATIVE DISC DISEASE), LUMBOSACRAL: ICD-10-CM

## 2024-08-26 DIAGNOSIS — M99.9 BIOMECHANICAL LESION: ICD-10-CM

## 2024-08-26 DIAGNOSIS — M47.816 FACET SYNDROME, LUMBAR: Primary | ICD-10-CM

## 2024-08-26 DIAGNOSIS — M47.816 LUMBAR SPONDYLOSIS: ICD-10-CM

## 2024-08-26 DIAGNOSIS — M53.3 SACROILIAC JOINT DYSFUNCTION OF RIGHT SIDE: ICD-10-CM

## 2024-08-26 DIAGNOSIS — M79.10 MYALGIA: ICD-10-CM

## 2024-08-26 DIAGNOSIS — M50.30 DDD (DEGENERATIVE DISC DISEASE), CERVICAL: ICD-10-CM

## 2024-08-26 DIAGNOSIS — M54.59 MECHANICAL LOW BACK PAIN: ICD-10-CM

## 2024-08-26 DIAGNOSIS — M51.36 BULGE OF LUMBAR DISC WITHOUT MYELOPATHY: ICD-10-CM

## 2024-08-26 DIAGNOSIS — M53.3 SI (SACROILIAC) JOINT DYSFUNCTION: ICD-10-CM

## 2024-08-26 DIAGNOSIS — M48.061 LUMBAR FORAMINAL STENOSIS: ICD-10-CM

## 2024-08-26 NOTE — PATIENT INSTRUCTIONS
1) My office will call you to schedule the RIGHT SI joint and myofascial trigger point CSI under US once the procedure is approved by your insurance carrier.    2) Tylenol 500-1000 mg every 6-8 hours as needed for pain.  No more than 3000 mg daily.  3)  Ice 20 minutes at a time 3-4 times per day  4) Follow up with me in about 6-8 weeks.     Post Injection Instructions     Please do not do anything strenuous over the next two days (if you had a knee injection do not walk more than 2 city blocks, do not attend any aerobic classes, do not run, no heavy lifting, no prolong standing).  You may resume your day to day activities after your injection.  You may experience some mild amount of swelling after the procedure.  Please ice your joint that was injected at least 5-6 times a day (15 minutes) for two days after (this will help prevent worsening pain that sometimes occurs after an injection).  Only take tylenol if needed for pain for the first few days.  Watch for signs of infection which include redness, warmth, worsening pain, fevers or chills.  If you develop any of these signs call the office immediately at [phone]    Everyone responds differently to injections, but you can expect your peak effects a few weeks after your last injection.  Alex B. Behar MD  Physical Medicine and Rehabilitation/Sports Medicine  [facility]

## 2024-08-26 NOTE — PROCEDURES
[facility]   Sacroiliac Joint and Myofascial Trigger Point Injection Procedure Note    CHIEF COMPLAINT:    Chief Complaint   Patient presents with    Low Back Pain     LOV: 8/12/2024 pt comes in with bilateral low back aching pain. Denies T/N. Denies weakness. Rates pain 5/10. States he tripped and fell while at work. Denies medication.       PROCEDURE PERFORMED:  Right Sacroiliac joint corticosteroid injection and myofascial trigger points under ultrasound guidance    INDICATIONS:  Right sacroiliitis and myalgia    PRIMARY PROCEDURALIST:  Alex Behar, MD    INFORMED CONSENT & TIME OUT:   As documented in the Time Out and Pre-Procedure Check Lists.  Verbal consent was obtained    Vitals: [unfilled]  Labs (document last wbc, plts, hgb, and PT/INR):    [facility] Lab Encounter on 08/16/2024   Component Date Value Ref Range Status    Prostate Specific Antigen 08/16/2024 3.3  0.0 - 4.0 ng/mL Final    Free PSA 08/16/2024 0.75  N/A ng/mL Final    %Free PSA 08/16/2024 22.7  % Final   ]    PROCEDURE:    The procedure risks, benefits, and alternatives were discussed with the patient.  All questions were answered.  The patient acknowledged understanding and agreed to proceed with ultrasound-guided intra- articular injection of the Right sacroiliac joints with corticosteroid. The patient's Right low back was prepped and draped in the usual sterile fashion using 3 betadine swabs.  The Right SI joint, lumbar paraspinal, and thoracolumbar fascia was identified using ultrasound guidance with a curvilinear probe.  Approximately 3 cc of 1% lidocaine were used to anesthetize the skin and deep soft tissues under ultrasound guidance using a  25-gauge 2.5 inch needle.  This was introduced into the Right sacroiliac joint, lumbar paraspinal, and thoracolumbar fascia. Aspiration was attempted.  There was no fluid able to be aspirated.  A mixture of 4 cc of 1% lidocaine and 1 cc of Kenalog containing 40  mg of corticosteroid was injected into the intra-articular space and surrounding myofascial tissue.  The fluid was seen flowing into the intra-articular region.  The needle was then removed and hemostasis obtained.            Patient verbalized understanding of assessment and plan.  Patient is in agreement with the plan.  All questions were answered.  No barriers to learning identified. Permanent pictures were saved.       INSTRUCTIONS GIVEN TO PATIENT:    \"You will see an effect in the next 2-3 days.  Please contact me if you have fevers, worsening swelling, worsening pain, decreased range of motion, increased redness, chills, or anything that makes you concerned about how the joint we injected feels/looks.  If you do not reach me in a reasonable time, please report directly to the emergency room for further evaluation\"    6 to 8 weeks    Alex B. Behar MD, [facility]  Physical Medicine and Rehabilitation/Sports Medicine  [facility]

## 2024-08-26 NOTE — TELEPHONE ENCOUNTER
Per CMS Guidelines -no authorization is required for RIGHT SI joint and myofascial trigger point CSI under US   CPT codes: 50682, 87648, 25114,        Status: Authorization is not required based on medical necessity however is not a guarantee of payment and may be subject to review once claim is submitted-Covered Benefit.

## 2024-08-26 NOTE — PROGRESS NOTES
[facility]  Progress Note    CHIEF COMPLAINT:    Chief Complaint   Patient presents with    Low Back Pain     LOV: 8/12/2024 pt comes in with bilateral low back aching pain. Denies T/N. Denies weakness. Rates pain 5/10. States he tripped and fell while at work. Denies medication.       History of Present Illness:  The patient is a 69 year old RIGHT handed male with significant past medical history of carotid stenosis, mitral regurg, GERD, who presents for follow-up of his right-sided low back pain without radicular symptoms.  He had a fall about a week and a half ago where he was at work and landed on his buttocks as well as right hand.  He states he felt his discs move at that moment and has been having axial back pain since then.  His pain is mostly in the right SI joint area and will sometimes be on the left side of his low back.  His symptoms are aggravated by sitting for long periods of time as well as standing and working as a .  He denies any paresthesias or focal weakness.  He has been taking Tylenol as needed for pain.    PAST MEDICAL HISTORY:  Past Medical History:    Arthritis    Back pain    Bilateral carotid artery disease (HCC)    Carotid stenosis, bilateral    < 50% bilat at [place], recheck 2022    Compression fracture of cervical spine (HCC)    Esophageal reflux    HNP (herniated nucleus pulposus), cervical    Left axis deviation    stable from 2001, early repol    Mitral regurgitation    MMT (medial meniscus tear)    Palpitations    Refused influenza vaccine    Screening for cardiovascular condition    ECHO with ef 60%, mild pulmonic and tricuspid insuff, mild MR    Vitamin D deficiency       SURGICAL HISTORY:  Past Surgical History:   Procedure Laterality Date    Colonoscopy,diagnostic  2006     normal, EGD as well     Other surgical history      right foot screw    Other surgical history      multiple lipoma approx 30 yr ago    Other  surgical history  2012    left knee meniscus    Repair ing hernia,5+y/o,reducibl         SOCIAL HISTORY:   Social History     Occupational History    Occupation: painting   Tobacco Use    Smoking status: Never    Smokeless tobacco: Never   Substance and Sexual Activity    Alcohol use: Yes     Alcohol/week: 5.8 - 11.7 standard drinks of alcohol     Types: 7 - 14 Standard drinks or equivalent per week     Comment: social     Drug use: No    Sexual activity: Not Currently     Partners: Female       FAMILY HISTORY:   Family History   Problem Relation Age of Onset    Heart Disorder Father     Diabetes Father     Psychiatric Father         dementia    Heart Disorder Mother         CAD with stent    Other (Other) Mother        CURRENT MEDICATIONS:   Current Outpatient Medications   Medication Sig Dispense Refill    Propranolol HCl 10 MG Oral Tab Take 1 tablet (10 mg total) by mouth every evening.         ALLERGIES:   Allergies   Allergen Reactions    Naproxen OTHER (SEE COMMENTS)     Developed ear problem      Cyclobenzaprine OTHER (SEE COMMENTS)     Difficulty urinating and leg spasms    Doxycycline Calcium NAUSEA ONLY     Elevated hr and gi upset    Epinephrine UNKNOWN     Elevated heart rate          REVIEW OF SYSTEMS:   Review of Systems   Constitutional: Negative.    HENT: Negative.    Eyes: Negative.    Respiratory: Negative.    Cardiovascular: Negative.    Gastrointestinal: Negative.    Genitourinary: Negative.    Musculoskeletal: As per HPI  Skin: Negative.    Neurological: As per HPI  Endo/Heme/Allergies: Negative.    Psychiatric/Behavioral: Negative.      All other systems reviewed and are negative. Pertinent positives and negatives noted in the HPI.        PHYSICAL EXAM:   Ht 66\"   Wt 130 lb (59 kg)   BMI 20.98 kg/m²     Body mass index is 20.98 kg/m².      General: No immediate distress  Head: Normocephalic/ Atraumatic  Eyes: Extra-occular movements intact.   Ears: No auricular hematoma or deformities  Mouth:  No lesions or ulcerations  Heart: peripheral pulses intact. Normal capillary refill.   Lungs: Non-labored respirations  Abdomen: No abdominal guarding  Extremities: No lower extremity edema bilaterally   Skin: No lesions noted.   Cognition: alert & oriented x 3, attentive, able to follow 2 step commands, comprehention intact, spontaneous speech intact  Motor:    Musculoskeletal:    Tender to palpation over the right SI joint with myofascial trigger points    Data  Pending sale to [facility] Lab Encounter on 08/16/2024   Component Date Value Ref Range Status    Prostate Specific Antigen 08/16/2024 3.3  0.0 - 4.0 ng/mL Final    Free PSA 08/16/2024 0.75  N/A ng/mL Final    %Free PSA 08/16/2024 22.7  % Final   ]      Radiology Imaging:  I reviewed with the patient his CT of the lumbar spine from 8/19/2024  CT SPINE LUMBAR (CPT=72131)  Narrative: PROCEDURE: CT SPINE LUMBAR (CPT=72131)     COMPARISON: External Exams, MRI SPINE LUMBAR (CPT=72148), 6/03/2022, 9:52 AM.     INDICATIONS: Low back pain s/p fall x4 days.     TECHNIQUE:   Multi-planar CT images were obtained without intravenous contrast material.  Automated exposure control for dose reduction was used. Adjustment of the mA and/or kV was done based on the patient's size. Use of iterative reconstruction   technique for dose reduction was used.  Dose information is transmitted to the ACR (American College of Radiology) NRDR (National Radiology Data Registry) which includes the Dose Index Registry.        FINDINGS:   PARASPINAL AREA: Normal with no visible mass.    BONES:   The bones are demineralized.  The vertebral body heights are maintained.  No acute fracture.  No aggressive osseous lesion.  There are scattered subcentimeter bone islands involving the lumbar spine.  There are scattered Schmorl's nodes, which   are most noticeable involving the inferior endplate of L2.  Moderate bilateral sacroiliac joint degenerative change.  There is a 1.3 cm right iliac bone island (3:125).  There  is a 2.4 cm S1 hemangioma.  There is a 1.1 cm L4 hemangioma.  ALIGNMENT:   The lumbar lordosis is intact.  No significant listhesis.  There is a rudimentary disc at S1-S2.     LUMBAR DISC LEVELS:     There are multilevel degenerative changes of the lumbar spine.  There is mild to moderate disc height loss L3-L4 and L4-L5 with associated disc bulges.  There is mild multilevel facet arthropathy.  There are few scattered ventral disc osteophyte   complexes.  There is multilevel ligamentum flavum hypertrophy.  There is mild canal stenosis at L3-L4.  Moderate canal stenosis at L4-L5.  Mild-to-moderate bilateral foraminal narrowing at L4-L5.  Mild foraminal narrowing at L3-L4.  There is a trace disc   bulge at L2-L3 with mild bilateral foraminal narrowing.        OTHER: Mild atherosclerotic calcification of the abdominal aorta and its branching vessels.              Impression: CONCLUSION:      1. No acute fracture or traumatic listhesis of the lumbar spine.  2. Multilevel degenerative changes of the lumbar spine, which are most advanced at L4-L5 and result in moderate canal stenosis and mild-to-moderate bilateral foraminal narrowing.  3. Demineralized osseous structures.  4. Lesser incidental findings described above.              Dictated by (CST): Gilberto Dozier MD on 8/19/2024 at 8:44 PM       Finalized by (CST): Gilberto Dozier MD on 8/19/2024 at 8:52 PM              ASSESSMENT AND PLAN:  Max is a 69-year-old male who presents for follow-up with complaints of bilateral low back pain (right greater than left) without radicular symptoms.  He has tenderness to palpation with myofascial trigger points over the right SI joint and gluteal muscles.  I am recommending a diagnostic and therapeutic right SI joint with myofascial trigger point corticosteroid injection under ultrasound guidance.  He should continue his home exercise program and use Tylenol for pain.  He will follow-up with me in about 6 to 8 weeks.       RTC  in 6 to 8 weeks  Discharge Instructions were provided as documented in AVS summary.  The patient was in agreement with the assessment and plan.  All questions were answered.  There were no barriers to learning.         1. Facet syndrome, lumbar    2. DDD (degenerative disc disease), cervical    3. Mechanical low back pain    4. Bulge of lumbar disc without myelopathy    5. Sacroiliac joint dysfunction of right side    6. Biomechanical lesion    7. Myalgia    8. DDD (degenerative disc disease), lumbosacral    9. Lumbar spondylosis    10. SI (sacroiliac) joint dysfunction    11. Lumbar foraminal stenosis        Alex B. Behar MD  Physical Medicine and Rehabilitation/Sports Medicine  [facility]

## 2024-09-02 ENCOUNTER — DOCUMENTATION ONLY (OUTPATIENT)
Dept: PHYSICAL MEDICINE AND REHAB | Facility: CLINIC | Age: 69
End: 2024-09-02

## 2024-09-02 NOTE — PROGRESS NOTES
Paged by patient at 430pm on 9/2/24. Patient underwent SIJ injection on 8/26/24. States that throughout his course over the last week, noted a dull ache in the posterior pelvis. States that he feels his low back is unstable. States that he feels better with his leg elevated but still has pain despite this pain. He states that he has been Icing the low back without help.     He had a CT scan recently.     States that sitting up straight bothers him.    Patient states that he can't take NSAIDs, or steroids, or pain medications.     He had a number of questions about Tylenol dosing, these were answered.     He will rest and use heat and tylenol for now.         Max Gunderson,   Interventional Spine and Sports Medicine Specialist   Physical Medicine and Rehabilitation  [address] [address]    [address] [place] [address]

## 2024-09-03 ENCOUNTER — TELEPHONE (OUTPATIENT)
Dept: PHYSICAL MEDICINE AND REHAB | Facility: CLINIC | Age: 69
End: 2024-09-03

## 2024-09-03 DIAGNOSIS — G89.29 CHRONIC BILATERAL LOW BACK PAIN WITHOUT SCIATICA: Primary | ICD-10-CM

## 2024-09-03 DIAGNOSIS — M54.50 CHRONIC BILATERAL LOW BACK PAIN WITHOUT SCIATICA: Primary | ICD-10-CM

## 2024-09-03 PROBLEM — I49.1 PAC (PREMATURE ATRIAL CONTRACTION): Status: ACTIVE | Noted: 2024-09-03

## 2024-09-03 PROBLEM — I34.0 NONRHEUMATIC MITRAL VALVE REGURGITATION: Status: ACTIVE | Noted: 2023-10-05

## 2024-09-03 PROBLEM — R13.10 DYSPHAGIA: Status: ACTIVE | Noted: 2024-09-03

## 2024-09-03 PROBLEM — R97.20 ELEVATED PSA: Status: ACTIVE | Noted: 2024-08-08

## 2024-09-03 PROBLEM — K64.1 GRADE II HEMORRHOIDS: Status: ACTIVE | Noted: 2023-05-02

## 2024-09-03 PROBLEM — K63.8219 SMALL INTESTINAL BACTERIAL OVERGROWTH (SIBO): Status: ACTIVE | Noted: 2024-09-03

## 2024-09-03 PROBLEM — K21.9 GASTRO-ESOPHAGEAL REFLUX DISEASE WITHOUT ESOPHAGITIS: Status: ACTIVE | Noted: 2024-09-03

## 2024-09-03 PROBLEM — H93.233 HYPERACUSIS OF BOTH EARS: Status: ACTIVE | Noted: 2024-08-08

## 2024-09-03 PROBLEM — E78.2 MIXED HYPERLIPIDEMIA: Status: ACTIVE | Noted: 2023-10-05

## 2024-09-03 RX ORDER — METHYLPREDNISOLONE 4 MG
TABLET, DOSE PACK ORAL
Qty: 1 EACH | Refills: 0 | Status: SHIPPED | OUTPATIENT
Start: 2024-09-03

## 2024-09-03 NOTE — TELEPHONE ENCOUNTER
Location of Pain: \"Left and Right of the disc\"  Old or new pain: Old & New  Date Pain Began: 3 days after Injection with Dr. Behar on: 08/26/2024: Right Sacroiliac joint corticosteroid injection and myofascial trigger points.          Pt reporting he felt relief for 3 days post injection and now his pain is returned/worse.    Denies B/B incontinence. Pt educated if B/B incontinence occurs, to seek ER/UC immediately. Pt verbalized understanding.      Distribution of Pain:    bilateral  Quality of Pain:  sore to touch (low back) aching - per Dr. Gunderson's 09/02/2024 note: \"noted a dull ache in the posterior pelvis. States that he feels his low back is unstable.\"  - Denies N/T. States B/L feet are cold  - Denies weaknesses.  Aggravating Factors:    Other When laying down pt stating that his pain \"moves around all over the place\"  - Note: pt reporting that he feels some constipation. Pt stating his last BM was: 09/02/2024 in the AM, was hard and large. Pt advised that he may take Miralax if he feels that he is constipated, however do not take for no more than a couple of days according to the packaging instructions. Pt educated that if stool becomes frequent and loose to rehydrate with more than just water (I.e. Gatorade, Pedialyte, etc.), pt verbalized understanding.  Current pain treatment:  Ice PRN w/o relief, Acetaminophen 1000MG TID w/o relief, Blueberries (for anti-inflammatory properties) w/o relief.  Per Otolaryngology MD - Dr. Villa (09/02/2024): \"Patient called to inquire about safety of taking 1000 mg Tylenol TID for his back pain. He states that he experiences worsening of tinnitus when taking Tylenol and NSAIDs.\" & \"No hearing loss, in fact he feels that sounds are too loud when taking the Tylenol. If he experiences any hearing loss, he should let us know and consider doing an audiogram.\"    LOV:8/26/2024 Behar, Alex, MD   NOV: 10/7/2024 Behar, Alex, MD     Summary of patient request: STAT MRI? Another  Injection? PO Steroids? Pt inquiring as to what he should do for pain relief.        Will discuss pt's update with on call provider to determine further orders/instructions if appropriate. Once new instructions are received, will return pt call. Pt verbalized understanding.

## 2024-09-03 NOTE — TELEPHONE ENCOUNTER
S/w Dr. Gunderson who stated that if pt is agreeable to Medrol for severe pain - OK to prescribe Medrol dosepak. MRI to be determined upon Dr. Behar's return to office. Upon relaying this information, patient wanted RN to chart: \"I think I'm going to do more damage here, jot that down.\"      Medrol prescribed. Educated provided. Nothing additional needed at this time.

## 2024-09-04 NOTE — TELEPHONE ENCOUNTER
Patient called and requested to speak to JAYDEN Martinez.  This RN offered to assist patient three times, but patient declined stating he had a lengthy conversation with her on 9/3/2024 and preferred to stay with same person.     This RN let patient know she would route a message to return his call if this is what he preferred.    Message routed to JAYDEN Martinez

## 2024-09-04 NOTE — TELEPHONE ENCOUNTER
RN s/w patient who states that he started taking the PO steroid this AM. Pt reports some improvement in pain when lying flat. However, pain becomes severe/worsens when sitting. Pt also stating that his \"peroneal nerve starting acting up\" after he started taking the steroid medication. Pt reports LEFT foot cramping which pt states is an old symptom that is returning/worsening. Pt also reporting that he had a BM today.       Pt requesting on call provider reconsider MRI order as he has a job starting 09/12/2024 and is fearful that upon awaiting Dr. Behar's return it will be too late and he will not be able to complete this job/receive money which is contributing greatly to his anxiety. RN informed pt that his request will be routed and once a response has been received by on call provider, will reach back out to patient. Pt verbalized understanding.

## 2024-09-05 ENCOUNTER — HOSPITAL ENCOUNTER (OUTPATIENT)
Dept: MRI IMAGING | Age: 69
Discharge: HOME | End: 2024-09-05
Attending: PHYSICAL MEDICINE & REHABILITATION
Payer: MEDICARE

## 2024-09-05 DIAGNOSIS — M54.50 CHRONIC BILATERAL LOW BACK PAIN WITHOUT SCIATICA: ICD-10-CM

## 2024-09-05 DIAGNOSIS — G89.29 CHRONIC BILATERAL LOW BACK PAIN WITHOUT SCIATICA: ICD-10-CM

## 2024-09-05 PROCEDURE — 72148 MRI LUMBAR SPINE W/O DYE: CPT | Performed by: PHYSICAL MEDICINE & REHABILITATION

## 2024-09-05 NOTE — TELEPHONE ENCOUNTER
Incoming call from patient stating that he had his MRI completed today. Pt reporting that his pain feels somewhat improved. Pt stating he is only taking Tylenol 500MG PRN, at night and 1 tablet prior to MRI today. Pt reports pain becomes severe after sitting.       Pt repeatedly expressed concerns that he will be unable to perform his job on 09/12/2024 and with this, expressed concerns in several social determinants of health including: stress, financial resource strain, food insecurity, utilities, and housing stability. Informed pt that as soon as Dr. Behar returns to the office Tuesday Morning, will bring patient's update to Dr. Behar's attention for his recommendations/orders if applicable. Pt verbalized understanding and was thankful.

## 2024-09-05 NOTE — TELEPHONE ENCOUNTER
Forwarding pt's chart as delayed to Dr. Behar as high priority, dated/timed for Dr. Behar's return to office.

## 2024-09-06 ENCOUNTER — TELEPHONE (OUTPATIENT)
Dept: PHYSICAL MEDICINE AND REHAB | Facility: CLINIC | Age: 69
End: 2024-09-06

## 2024-09-06 NOTE — TELEPHONE ENCOUNTER
Per on call provider Dr Gunderson \"No significant changes on MRI, recommend he follows up with Behar as scheduled. \"    Left a detailed voice mail with Dr Gunderson's recommendation to primary phone #[phone] (HIPAA compliant)

## 2024-09-19 ENCOUNTER — OFFICE VISIT (OUTPATIENT)
Dept: PHYSICAL MEDICINE AND REHAB | Facility: CLINIC | Age: 69
End: 2024-09-19
Payer: MEDICARE

## 2024-09-19 VITALS — BODY MASS INDEX: 20.89 KG/M2 | WEIGHT: 130 LBS | HEIGHT: 66 IN

## 2024-09-19 DIAGNOSIS — M51.37 DDD (DEGENERATIVE DISC DISEASE), LUMBOSACRAL: ICD-10-CM

## 2024-09-19 DIAGNOSIS — M54.16 LUMBAR RADICULOPATHY: ICD-10-CM

## 2024-09-19 DIAGNOSIS — M53.3 SACROILIAC JOINT DYSFUNCTION OF RIGHT SIDE: ICD-10-CM

## 2024-09-19 DIAGNOSIS — M48.061 LUMBAR FORAMINAL STENOSIS: ICD-10-CM

## 2024-09-19 DIAGNOSIS — M62.89 HIGH-TONE PELVIC FLOOR DYSFUNCTION: ICD-10-CM

## 2024-09-19 DIAGNOSIS — M51.36 BULGE OF LUMBAR DISC WITHOUT MYELOPATHY: ICD-10-CM

## 2024-09-19 DIAGNOSIS — M79.10 MYALGIA: ICD-10-CM

## 2024-09-19 DIAGNOSIS — M47.816 FACET SYNDROME, LUMBAR: ICD-10-CM

## 2024-09-19 DIAGNOSIS — M54.59 MECHANICAL LOW BACK PAIN: ICD-10-CM

## 2024-09-19 DIAGNOSIS — M54.50 CHRONIC BILATERAL LOW BACK PAIN WITHOUT SCIATICA: Primary | ICD-10-CM

## 2024-09-19 DIAGNOSIS — M99.9 BIOMECHANICAL LESION: ICD-10-CM

## 2024-09-19 DIAGNOSIS — G89.29 CHRONIC BILATERAL LOW BACK PAIN WITHOUT SCIATICA: Primary | ICD-10-CM

## 2024-09-19 DIAGNOSIS — M50.30 DDD (DEGENERATIVE DISC DISEASE), CERVICAL: ICD-10-CM

## 2024-09-19 DIAGNOSIS — M47.816 LUMBAR SPONDYLOSIS: ICD-10-CM

## 2024-09-19 PROCEDURE — 99214 OFFICE O/P EST MOD 30 MIN: CPT | Performed by: PHYSICAL MEDICINE & REHABILITATION

## 2024-09-19 NOTE — PATIENT INSTRUCTIONS
1) Restart a home exercise program  2) Tylenol 500-1000 mg every 6-8 hours as needed for pain.  No more than 3000 mg daily.  3) Use heat 20 minutes at a time  4) Follow up with me in about 2-3 months.   5) Make an appointment with Dr. Emily Rich

## 2024-09-20 NOTE — PROGRESS NOTES
[facility]  Progress Note    CHIEF COMPLAINT:    Chief Complaint   Patient presents with    Follow - Up     LOV: 9/5/24 Patient f/u on MRI Lumbar spine.        History of Present Illness:  The patient is a 69 year old  RIGHT handed male with significant past medical history of carotid stenosis, mitral regurg, GERD, who presents for follow-up of his bilateral low back.  Had an exacerbation of his low back pain.  He was prescribed oral steroids by Dr. Gunderson.  As well as an MRI.  He had the MRI of the lumbar spine performed which I independently reviewed.  He currently has 0 out of 10 pain but is concerned with working as a  which can exacerbate his symptoms.  He is currently not taking any medications for pain.    PAST MEDICAL HISTORY:  Past Medical History:    Arthritis    Back pain    Bilateral carotid artery disease (HCC)    Carotid stenosis, bilateral    < 50% bilat at [place], recheck 2022    Compression fracture of cervical spine (HCC)    Esophageal reflux    HNP (herniated nucleus pulposus), cervical    Left axis deviation    stable from 2001, early repol    Mitral regurgitation    MMT (medial meniscus tear)    Palpitations    Refused influenza vaccine    Screening for cardiovascular condition    ECHO with ef 60%, mild pulmonic and tricuspid insuff, mild MR    Vitamin D deficiency       SURGICAL HISTORY:  Past Surgical History:   Procedure Laterality Date    Colonoscopy,diagnostic  2006     normal, EGD as well     Other surgical history      right foot screw    Other surgical history      multiple lipoma approx 30 yr ago    Other surgical history  2012    left knee meniscus    Repair ing hernia,5+y/o,reducibl         SOCIAL HISTORY:   Social History     Occupational History    Occupation: painting   Tobacco Use    Smoking status: Never    Smokeless tobacco: Never   Substance and Sexual Activity    Alcohol use: Yes     Alcohol/week: 5.8 - 11.7 standard  drinks of alcohol     Types: 7 - 14 Standard drinks or equivalent per week     Comment: social     Drug use: No    Sexual activity: Not Currently     Partners: Female       FAMILY HISTORY:   Family History   Problem Relation Age of Onset    Heart Disorder Father     Diabetes Father     Psychiatric Father         dementia    Heart Disorder Mother         CAD with stent    Other (Other) Mother        CURRENT MEDICATIONS:   Current Outpatient Medications   Medication Sig Dispense Refill    Propranolol HCl 10 MG Oral Tab Take 1 tablet (10 mg total) by mouth every evening.      methylPREDNISolone (MEDROL) 4 MG Oral Tablet Therapy Pack Take as directed (Patient not taking: Reported on 9/19/2024) 1 each 0       ALLERGIES:   Allergies   Allergen Reactions    Celecoxib SWELLING     Facial numbness    Naproxen OTHER (SEE COMMENTS)     Developed ear problem      Cyclobenzaprine OTHER (SEE COMMENTS)     Difficulty urinating and leg spasms    Doxycycline Calcium NAUSEA ONLY     Elevated hr and gi upset    Epinephrine UNKNOWN     Elevated heart rate          REVIEW OF SYSTEMS:   Review of Systems   Constitutional: Negative.    HENT: Negative.    Eyes: Negative.    Respiratory: Negative.    Cardiovascular: Negative.    Gastrointestinal: Negative.    Genitourinary: Negative.    Musculoskeletal: As per HPI  Skin: Negative.    Neurological: As per HPI  Endo/Heme/Allergies: Negative.    Psychiatric/Behavioral: Negative.      All other systems reviewed and are negative. Pertinent positives and negatives noted in the HPI.        PHYSICAL EXAM:   Ht 66\"   Wt 130 lb (59 kg)   BMI 20.98 kg/m²     Body mass index is 20.98 kg/m².      General: No immediate distress  Head: Normocephalic/ Atraumatic  Eyes: Extra-occular movements intact.   Ears: No auricular hematoma or deformities  Mouth: No lesions or ulcerations  Heart: peripheral pulses intact. Normal capillary refill.   Lungs: Non-labored respirations  Abdomen: No abdominal  guarding  Extremities: No lower extremity edema bilaterally   Skin: No lesions noted.   Cognition: alert & oriented x 3, attentive, able to follow 2 step commands, comprehention intact, spontaneous speech intact  Motor:    Musculoskeletal:    Tender to palpation over the bilateral lower lumbar facets and paraspinals as well as midline lumbar spine    Data  [facility] Lab Encounter on 08/16/2024   Component Date Value Ref Range Status    Prostate Specific Antigen 08/16/2024 3.3  0.0 - 4.0 ng/mL Final    Free PSA 08/16/2024 0.75  N/A ng/mL Final    %Free PSA 08/16/2024 22.7  % Final   ]      Radiology Imaging:  I reviewed with the patient his MRI of the lumbar spine from 9/5/2024  MRI SPINE LUMBAR (CPT=72148)  Narrative: PROCEDURE: MRI SPINE LUMBAR (CPT=72148)     COMPARISON: [facility], CT SPINE LUMBAR (CPT=72131), 8/19/2024, 8:16 PM.  External Exams, MRI SPINE LUMBAR (CPT=72148), 1/04/2023, 12:21 PM.     INDICATIONS: G89.29 Chronic bilateral low back pain without sciatica M54.50 Chronic bilateral low back pain without sciatica     TECHNIQUE: A variety of imaging planes and parameters were utilized for visualization of suspected pathology.     FINDINGS:   NUMERATION: For the purposes of this examination, the lowest fully formed disc space is designated as L5-S1.  ALIGNMENT: There is preservation of the expected lumbar lordosis.  BONES: No acute lumbar spine fracture.  Stable likely benign atypical hemangioma in the S1 vertebral body.  Probable tiny additional benign atypical hemangioma in the L4 vertebral body.  Small degenerative Schmorl's nodes along the inferior endplates of   the L2 and L3 vertebrae.  CORD/CAUDA EQUINA: The distal cord and nerve roots have normal caliber, contour, and signal intensity.  PARASPINAL AREA: No visible mass.    OTHER: Probable bilateral renal cysts.     LUMBAR DISC LEVELS:  L1-L2: No significant disc/facet abnormality, spinal stenosis, or foraminal stenosis.    L2-L3: Mild  disc desiccation and degeneration.  No significant resultant spinal canal or neural foraminal stenosis.  L3-L4: Small diffuse disc bulge with mild left facet arthropathy, which results in mild left neural foraminal stenosis but no other significant neural compromise.  L4-L5: Diffuse disc bulge with superimposed right paracentral disc protrusion/annular fissure in addition to mild ligamentum flavum redundancy and mild bilateral facet arthropathy.  Mild multifactorial spinal canal with mild right greater than left   lateral recess stenosis but no neural foraminal compromise.  L5-S1: No significant disc/facet abnormality, spinal stenosis, or foraminal stenosis.                Impression: CONCLUSION:      1. Multilevel degenerative changes of the lumbar spine as detailed. Notable levels as follows:       2. L4-L5:  Right paracentral disc protrusion with associated annular fissure, which results in mild right greater than left lateral recess as well as mild spinal canal and minor left neural foraminal stenosis.  3. L3-L4:  Small left eccentric disc bulge, which results in mild left neural foraminal stenosis.     4. Stable likely benign hemangiomas with atypical features in the S1 and to a lesser degree L4 vertebral bodies.     5. Lesser incidental findings as above.        Dictated by (CST): Mickey Mcgregor MD on 9/05/2024 at 11:47 AM       Finalized by (CST): Mickey Mcgregor MD on 9/05/2024 at 11:54 AM              ASSESSMENT AND PLAN:  Max is a pleasant 69-year-old male who presents for follow-up of his bilateral low back pain without radicular symptoms which I believe is due to the disc protrusion and annular tear at L4-L5.  I am recommending he continue a home exercise program and use heat for pain control.  He can also use Tylenol for pain.  He will call us and let us know if his symptoms return and we can consider a bilateral L5 transforaminal epidural steroid injection which has helped him in the past.  He  would also like a referral to see a rheumatologist for further workup.  I have referred him to Dr. Pandya.       RTC in as needed  Discharge Instructions were provided as documented in AVS summary.  The patient was in agreement with the assessment and plan.  All questions were answered.  There were no barriers to learning.         1. Chronic bilateral low back pain without sciatica    2. Facet syndrome, lumbar    3. DDD (degenerative disc disease), cervical    4. Mechanical low back pain    5. Bulge of lumbar disc without myelopathy    6. Biomechanical lesion    7. Sacroiliac joint dysfunction of right side    8. Myalgia    9. DDD (degenerative disc disease), lumbosacral    10. Lumbar spondylosis    11. Lumbar foraminal stenosis    12. High-tone pelvic floor dysfunction    13. Lumbar radiculopathy        Alex B. Behar MD  Physical Medicine and Rehabilitation/Sports Medicine  [facility]

## 2024-10-04 ENCOUNTER — TELEPHONE (OUTPATIENT)
Dept: INTERNAL MEDICINE | Age: 69
End: 2024-10-04

## 2024-10-08 ENCOUNTER — LAB SERVICES (OUTPATIENT)
Dept: LAB | Age: 69
End: 2024-10-08

## 2024-10-08 ENCOUNTER — TELEPHONE (OUTPATIENT)
Dept: INTERNAL MEDICINE | Age: 69
End: 2024-10-08

## 2024-10-08 ENCOUNTER — OFFICE VISIT (OUTPATIENT)
Dept: INTERNAL MEDICINE | Age: 69
End: 2024-10-08

## 2024-10-08 VITALS
HEART RATE: 67 BPM | OXYGEN SATURATION: 99 % | SYSTOLIC BLOOD PRESSURE: 124 MMHG | TEMPERATURE: 97.1 F | BODY MASS INDEX: 21.47 KG/M2 | WEIGHT: 133.6 LBS | HEIGHT: 66 IN | DIASTOLIC BLOOD PRESSURE: 74 MMHG

## 2024-10-08 DIAGNOSIS — R63.4 WEIGHT LOSS: ICD-10-CM

## 2024-10-08 DIAGNOSIS — R63.4 WEIGHT LOSS: Primary | ICD-10-CM

## 2024-10-08 LAB
ALBUMIN SERPL-MCNC: 3.6 G/DL (ref 3.4–5)
ALBUMIN/GLOB SERPL: 1.2 {RATIO} (ref 1–2.4)
ALP SERPL-CCNC: 54 UNITS/L (ref 45–117)
ALT SERPL-CCNC: 20 UNITS/L
ANION GAP SERPL CALC-SCNC: 7 MMOL/L (ref 7–19)
APPEARANCE UR: CLEAR
AST SERPL-CCNC: 19 UNITS/L
BASOPHILS # BLD: 0 K/MCL (ref 0–0.3)
BASOPHILS NFR BLD: 1 %
BILIRUB SERPL-MCNC: 0.5 MG/DL (ref 0.2–1)
BILIRUB UR QL STRIP: NEGATIVE
BUN SERPL-MCNC: 27 MG/DL (ref 6–20)
BUN/CREAT SERPL: 35 (ref 7–25)
CALCIUM SERPL-MCNC: 8.9 MG/DL (ref 8.4–10.2)
CHLORIDE SERPL-SCNC: 112 MMOL/L (ref 97–110)
CO2 SERPL-SCNC: 29 MMOL/L (ref 21–32)
COLOR UR: ABNORMAL
CREAT SERPL-MCNC: 0.78 MG/DL (ref 0.67–1.17)
DEPRECATED RDW RBC: 43.8 FL (ref 39–50)
EGFRCR SERPLBLD CKD-EPI 2021: >90 ML/MIN/{1.73_M2}
EOSINOPHIL # BLD: 0.1 K/MCL (ref 0–0.5)
EOSINOPHIL NFR BLD: 2 %
ERYTHROCYTE [DISTWIDTH] IN BLOOD: 12.6 % (ref 11–15)
ERYTHROCYTE [SEDIMENTATION RATE] IN BLOOD BY WESTERGREN METHOD: 8 MM/HR (ref 0–20)
FASTING DURATION TIME PATIENT: ABNORMAL H
GLOBULIN SER-MCNC: 3.1 G/DL (ref 2–4)
GLUCOSE SERPL-MCNC: 96 MG/DL (ref 70–99)
GLUCOSE UR STRIP-MCNC: NEGATIVE MG/DL
HCT VFR BLD CALC: 41.8 % (ref 39–51)
HGB BLD-MCNC: 13.6 G/DL (ref 13–17)
HGB UR QL STRIP: NEGATIVE
IMM GRANULOCYTES # BLD AUTO: 0 K/MCL (ref 0–0.2)
IMM GRANULOCYTES # BLD: 0 %
KETONES UR STRIP-MCNC: NEGATIVE MG/DL
LEUKOCYTE ESTERASE UR QL STRIP: NEGATIVE
LYMPHOCYTES # BLD: 1.3 K/MCL (ref 1–4)
LYMPHOCYTES NFR BLD: 25 %
MCH RBC QN AUTO: 30.8 PG (ref 26–34)
MCHC RBC AUTO-ENTMCNC: 32.5 G/DL (ref 32–36.5)
MCV RBC AUTO: 94.6 FL (ref 78–100)
MONOCYTES # BLD: 0.6 K/MCL (ref 0.3–0.9)
MONOCYTES NFR BLD: 12 %
NEUTROPHILS # BLD: 3 K/MCL (ref 1.8–7.7)
NEUTROPHILS NFR BLD: 60 %
NITRITE UR QL STRIP: NEGATIVE
NRBC BLD MANUAL-RTO: 0 /100 WBC
PH UR STRIP: 6 [PH] (ref 5–7)
PLATELET # BLD AUTO: 161 K/MCL (ref 140–450)
POTASSIUM SERPL-SCNC: 4.6 MMOL/L (ref 3.4–5.1)
PROT SERPL-MCNC: 6.7 G/DL (ref 6.4–8.2)
PROT UR STRIP-MCNC: NEGATIVE MG/DL
RBC # BLD: 4.42 MIL/MCL (ref 4.5–5.9)
SODIUM SERPL-SCNC: 143 MMOL/L (ref 135–145)
SP GR UR STRIP: >1.03 (ref 1–1.03)
TSH SERPL-ACNC: 1.7 MCUNITS/ML (ref 0.35–5)
UROBILINOGEN UR STRIP-MCNC: 0.2 MG/DL
WBC # BLD: 5.1 K/MCL (ref 4.2–11)

## 2024-10-08 PROCEDURE — 80053 COMPREHEN METABOLIC PANEL: CPT | Performed by: CLINICAL MEDICAL LABORATORY

## 2024-10-08 PROCEDURE — 85025 COMPLETE CBC W/AUTO DIFF WBC: CPT | Performed by: CLINICAL MEDICAL LABORATORY

## 2024-10-08 PROCEDURE — 85652 RBC SED RATE AUTOMATED: CPT | Performed by: CLINICAL MEDICAL LABORATORY

## 2024-10-08 PROCEDURE — 36415 COLL VENOUS BLD VENIPUNCTURE: CPT | Performed by: INTERNAL MEDICINE

## 2024-10-08 RX ORDER — METHYLPREDNISOLONE 4 MG
TABLET, DOSE PACK ORAL
COMMUNITY
Start: 2024-09-03

## 2024-10-10 ENCOUNTER — APPOINTMENT (OUTPATIENT)
Dept: CARDIOLOGY | Age: 69
End: 2024-10-10

## 2024-10-11 ENCOUNTER — LAB SERVICES (OUTPATIENT)
Dept: LAB | Age: 69
End: 2024-10-11

## 2024-10-11 ENCOUNTER — TELEPHONE (OUTPATIENT)
Dept: INTERNAL MEDICINE | Age: 69
End: 2024-10-11

## 2024-10-11 DIAGNOSIS — E55.9 VITAMIN D DEFICIENCY: ICD-10-CM

## 2024-10-11 DIAGNOSIS — M25.532 ACUTE PAIN OF LEFT WRIST: ICD-10-CM

## 2024-10-11 DIAGNOSIS — I49.3 PVC'S (PREMATURE VENTRICULAR CONTRACTIONS): ICD-10-CM

## 2024-10-11 DIAGNOSIS — R73.9 HYPERGLYCEMIA: ICD-10-CM

## 2024-10-11 DIAGNOSIS — E78.2 MIXED HYPERLIPIDEMIA: ICD-10-CM

## 2024-10-11 DIAGNOSIS — I34.0 NONRHEUMATIC MITRAL VALVE REGURGITATION: ICD-10-CM

## 2024-10-11 LAB
ALT SERPL-CCNC: 23 UNITS/L
ANION GAP SERPL CALC-SCNC: 11 MMOL/L (ref 7–19)
AST SERPL-CCNC: 24 UNITS/L
BUN SERPL-MCNC: 23 MG/DL (ref 6–20)
BUN/CREAT SERPL: 32 (ref 7–25)
CALCIUM SERPL-MCNC: 8.8 MG/DL (ref 8.4–10.2)
CHLORIDE SERPL-SCNC: 109 MMOL/L (ref 97–110)
CHOLEST SERPL-MCNC: 160 MG/DL
CHOLEST/HDLC SERPL: 2.1 {RATIO}
CO2 SERPL-SCNC: 26 MMOL/L (ref 21–32)
CREAT SERPL-MCNC: 0.71 MG/DL (ref 0.67–1.17)
EGFRCR SERPLBLD CKD-EPI 2021: >90 ML/MIN/{1.73_M2}
FASTING DURATION TIME PATIENT: ABNORMAL H
GLUCOSE SERPL-MCNC: 90 MG/DL (ref 70–99)
HDLC SERPL-MCNC: 75 MG/DL
LDLC SERPL CALC-MCNC: 76 MG/DL
NONHDLC SERPL-MCNC: 85 MG/DL
POTASSIUM SERPL-SCNC: 4.1 MMOL/L (ref 3.4–5.1)
SODIUM SERPL-SCNC: 142 MMOL/L (ref 135–145)
TRIGL SERPL-MCNC: 47 MG/DL

## 2024-10-11 PROCEDURE — 36415 COLL VENOUS BLD VENIPUNCTURE: CPT | Performed by: CLINICAL MEDICAL LABORATORY

## 2024-10-11 PROCEDURE — 80061 LIPID PANEL: CPT | Performed by: CLINICAL MEDICAL LABORATORY

## 2024-10-11 PROCEDURE — 80048 BASIC METABOLIC PNL TOTAL CA: CPT | Performed by: CLINICAL MEDICAL LABORATORY

## 2024-10-11 PROCEDURE — 84460 ALANINE AMINO (ALT) (SGPT): CPT | Performed by: CLINICAL MEDICAL LABORATORY

## 2024-10-11 PROCEDURE — 84450 TRANSFERASE (AST) (SGOT): CPT | Performed by: CLINICAL MEDICAL LABORATORY

## 2024-10-15 ENCOUNTER — APPOINTMENT (OUTPATIENT)
Dept: CARDIOLOGY | Age: 69
End: 2024-10-15

## 2024-10-15 ENCOUNTER — TELEPHONE (OUTPATIENT)
Dept: CARDIOLOGY | Age: 69
End: 2024-10-15

## 2024-10-15 ENCOUNTER — CLINICAL ABSTRACT (OUTPATIENT)
Dept: HEALTH INFORMATION MANAGEMENT | Facility: OTHER | Age: 69
End: 2024-10-15

## 2024-10-15 VITALS
WEIGHT: 131.72 LBS | OXYGEN SATURATION: 100 % | SYSTOLIC BLOOD PRESSURE: 122 MMHG | HEART RATE: 61 BPM | DIASTOLIC BLOOD PRESSURE: 69 MMHG | HEIGHT: 66 IN | BODY MASS INDEX: 21.17 KG/M2

## 2024-10-15 DIAGNOSIS — I77.4 CELIAC ARTERY STENOSIS (CMD): Primary | ICD-10-CM

## 2024-10-15 DIAGNOSIS — I49.3 PVC'S (PREMATURE VENTRICULAR CONTRACTIONS): ICD-10-CM

## 2024-10-15 DIAGNOSIS — R93.1 ELEVATED CORONARY ARTERY CALCIUM SCORE: ICD-10-CM

## 2024-10-15 DIAGNOSIS — E78.2 MIXED HYPERLIPIDEMIA: ICD-10-CM

## 2024-10-15 DIAGNOSIS — I25.10 ATHEROSCLEROSIS OF NATIVE CORONARY ARTERY OF NATIVE HEART WITHOUT ANGINA PECTORIS: ICD-10-CM

## 2024-10-15 DIAGNOSIS — I34.0 NONRHEUMATIC MITRAL VALVE REGURGITATION: ICD-10-CM

## 2024-10-15 DIAGNOSIS — R00.2 PALPITATIONS: ICD-10-CM

## 2024-10-18 ENCOUNTER — TELEPHONE (OUTPATIENT)
Dept: INTERNAL MEDICINE | Age: 69
End: 2024-10-18

## 2024-10-24 ENCOUNTER — TELEPHONE (OUTPATIENT)
Dept: PHYSICAL MEDICINE AND REHAB | Facility: CLINIC | Age: 69
End: 2024-10-24

## 2024-10-24 DIAGNOSIS — M47.816 FACET SYNDROME, LUMBAR: Primary | ICD-10-CM

## 2024-10-24 DIAGNOSIS — M54.59 MECHANICAL LOW BACK PAIN: ICD-10-CM

## 2024-10-24 DIAGNOSIS — M48.061 LUMBAR FORAMINAL STENOSIS: ICD-10-CM

## 2024-10-24 DIAGNOSIS — G89.29 CHRONIC BILATERAL LOW BACK PAIN WITHOUT SCIATICA: ICD-10-CM

## 2024-10-24 DIAGNOSIS — M54.16 LUMBAR RADICULOPATHY: ICD-10-CM

## 2024-10-24 DIAGNOSIS — M51.369 BULGE OF LUMBAR DISC WITHOUT MYELOPATHY: ICD-10-CM

## 2024-10-24 DIAGNOSIS — M54.50 CHRONIC BILATERAL LOW BACK PAIN WITHOUT SCIATICA: ICD-10-CM

## 2024-10-24 DIAGNOSIS — M47.816 LUMBAR SPONDYLOSIS: ICD-10-CM

## 2024-10-24 RX ORDER — METHYLPREDNISOLONE 4 MG/1
TABLET ORAL
Qty: 1 EACH | Refills: 0 | Status: SHIPPED | OUTPATIENT
Start: 2024-10-24

## 2024-10-24 NOTE — TELEPHONE ENCOUNTER
Patient wants staff to be aware that he is not on Medicaid and has been taken off for over a year, so Medicaid is not to be billed, patient is only on Medicare.

## 2024-10-24 NOTE — TELEPHONE ENCOUNTER
Estimated body mass index is 20.98 kg/m² as calculated from the following:    Height as of 9/19/24: 66\".    Weight as of 9/19/24: 130 lb.  No restrictions w/ procedure.     Patient has been scheduled for Bilateral L4-5 and L5-S1 zygapophyseal joint injection on 10/30/24 at the [facility] with Dr. Behar.   Anesthesia type:  Local  Please note: The [facility] will call the business day prior to discuss the exact time/arrival and additional instructions for your appointment.  Patient was advised that if he/she does receive the covid vaccine it needs to be at least 2 weeks before or after the injection.  Medications and allergies reviewed.  Patient informed of [facility]'s  policy:  The patient will require transportation arrangements to and from the procedure, with the  present on site for the entire visit.  Without a , the appointment is subject to cancellation.    [facility] is located in the [facility] [address] [address].   may park in the yellow/purple parking lot.  Patient verbalized understanding and agrees with plan.  Scheduled in Epic: Yes  Scheduled in Surgical Case: Yes  Follow up appointment made: NOV: 12/4/2024 Behar, Alex, MD Patient will call/msg 24hrs post procedure for a condition update.

## 2024-10-24 NOTE — TELEPHONE ENCOUNTER
S/w Dr. Behar who gave authorization for medrol dosepack.       S/w patient to update - verbalized understanding and was thankful.

## 2024-10-24 NOTE — TELEPHONE ENCOUNTER
Per CMS Guidelines -no authorization is required for Bilateral L4-5 and L5-S1 Z-joint/facet injection done under fluoroscopic guidance   CPT codes: 98102-86, 08792 x's 2       Status: Authorization is not required based on medical necessity however is not a guarantee of payment and may be subject to review once claim is submitted-Covered Benefit.

## 2024-10-24 NOTE — TELEPHONE ENCOUNTER
Patient called to advise Nurse Michelle that he picked up the steroid prescription, stated the pharamacy told him to take all six at once today to catch up on the dosing for day 1, patient took the 6 and then saw one of the pills had a red alejo on it, and he called the  and they have begun an investigation.    He is going to pharmacy and will see if they will be able to exchange the pack or not.  This RN advised if he will require a new prescription, to let us know.     Nothing further needed at this time. Patient will advise if he requires another script.

## 2024-10-24 NOTE — TELEPHONE ENCOUNTER
Location of symptoms: Middle low back; does not radiate.  - \"The disc is moving again\", \"It's not the SI Joint\"  Date symptoms Began: 3 days ago   Old or New Pain?  Old  Current treatment: heat/ice and OTC medications w/o relief; heat provides slight relief.  Seen in ER/Urgent care: No    Denies any red flag symptoms.      Quality of Pain:   sharp/stabbing  Aggravating Factors:    Sitting and Other coughing. Cannot sit  or stand straight up. Tylenol with worsening pain.   Relieving Factors, if any?: none    LOV:9/19/2024 Behar, Alex, MD   NOV: 12/4/2024 Behar, Alex, MD       Summary of patient request: Anything for the pain - PO steroids, injection, etc. \"I'm begging him, please.\"

## 2024-10-24 NOTE — TELEPHONE ENCOUNTER
S/w Dr. Behar who verbalized authorization for an repeat injection of: Bilateral L4-5 and L5-S1 Z-joint/facet injection done under fluoroscopic guidance. Order placed per protocol.    While discussing pt's plan with Dr. Behar incoming call from patient who states that the pain is severe and he is barely able to walk or lay down without severe pain. Pt inquiring if he can have a steroid pack since Dr. Behar's next available won't be until 10/30/2024. Informed pt that this RN will need to discuss with Dr. Behar if this will be an appropriate order. Pt verbalized understanding and was thankful.

## 2024-10-28 NOTE — TELEPHONE ENCOUNTER
Incoming call from patient who states that so far the medrol dose pack has not provided relief, he is on his second to last day as of today.       RN informed pt that relief may come later after the steroid pack has already been completed. Informed that his injection is still scheduled to be completed on Wednesday and he should be hearing from the [facility] tomorrow regarding timing for injection.

## 2024-11-07 ENCOUNTER — TELEPHONE (OUTPATIENT)
Dept: PHYSICAL MEDICINE AND REHAB | Facility: CLINIC | Age: 69
End: 2024-11-07

## 2024-11-07 NOTE — TELEPHONE ENCOUNTER
Pt called requesting to speak with JAYDEN Martinez as she knows his situation. He has some questions and concerns regarding injection he just had. Please advise, thank you.

## 2024-11-13 NOTE — TELEPHONE ENCOUNTER
Per Dr. Behar - patient needs to be seen for a virtual f/u to discuss post-procedure status and next steps. Pt does not have MyChart - need MD authorization to schedule telephone visit.

## 2024-11-13 NOTE — TELEPHONE ENCOUNTER
Patient called to provide a condition update following the injection he had with Dr. Behar on 10/30/2024.    Patient declined giving this RN his condition update stating he only wanted to speak to Nurse Martinez as he did not want to start over explaining everything.     This RN attempted to educate patient that all the nurses are able to  where the other leaves off as we all document in encounters, and his condition update is new.     Patient again declined providing this RN his condition update and stated he will only speak to Nurse Martinez.    This RN verbally notified Nurse Martinez that the patient wanted to speak only to her and routed this encounter to that RN

## 2024-11-13 NOTE — TELEPHONE ENCOUNTER
Condition update after Procedure.    - Patient had Bilateral L4-5 and L5-S1 zygapophyseal joint injection on 10/30/2024 with Dr.Behar.  - 40-50% relief for 6 days; pt reports upper back spasms starting after injection. Pt reports 0% relief as of today, pt is back to baseline.      - Current pain level:  5    - Pain location: Middle low back.  - Pain description: sharp/stabbing; \"I'm walking with an arched back.\"  - It should be noted pt reports sharp pain at SI Joint during BM, pt stating this is an ongoing issue and denies incontinence, however, reports increase in BM frequency (4-5x/day).   - Current pain treatment: heat/ice, OTC medications, and HEP    - LOV: 9/19/2024 Behar, Alex, MD    - NOV: 12/4/2024 Behar, Alex, MD   - Plan from LOV: Per Dr. Behar:  1) Restart a home exercise program  2) Tylenol 500-1000 mg every 6-8 hours as needed for pain.  No more than 3000 mg daily.  3) Use heat 20 minutes at a time  4) Follow up with me in about 2-3 months.   5) Make an appointment with Dr. Emily Rich            Patient requesting the injection for his: \"L4-L5 disc, bilateral\"; \"the one that I've done over 16 times and it lasted months and months.\"       Previous procedures as documented by Dr. Behar in OV note 08/28/2023:   2/1/2023 - bilateral L5 transforaminal epidural steroid injection with 60% improvement  March 15, 2023 - bilateral L5 transforaminal epidural steroid injection with 80% improvement that lasted for about 2 weeks  5/24/2023 - bilateral L5 transforaminal epidural steroid injection which provided about 80% improvement that lasted for about 2 months  8/9/2023 - bilateral L4-L5 and L5-S1 facet joint injections which provided about 80 to 90% improvement   11/21/2021 L4-L5 interlaminar epidural steroid injection by Dr. Bustillo  December 6, 2021 right L4 transforaminal epidural steroid injection by Dr. Krzysztof Landis  January 7, 2022 right L4 transforaminal epidural steroid injection by Dr. Carreon  Boby  April 8, 2022 right L4 transforaminal epidural steroid injection by Dr. Lauri Landis  June 16, 2022 Bilateral L4 transforaminal epidural injection by Dr. Will Carter    July 11, 2022  By Dr. Ryder bilateral knee corticosteroid injection  November 9, 2022 bilateral L4 and L5 transforaminal epidural steroid injection by Dr. Parviz Rojas  Bilateral L5 transforaminal epidural steroid injection on 2/1/2023  Bilateral L5 transforaminal epidural steroid injection on 3/15/2023  Bilateral L5 transforaminal epidural steroid injection on 5/24/2023  Bilateral L4-L5 and L5-S1 facet joint injections on 8/9/2023

## 2024-11-18 ENCOUNTER — VIRTUAL PHONE E/M (OUTPATIENT)
Dept: PHYSICAL MEDICINE AND REHAB | Facility: CLINIC | Age: 69
End: 2024-11-18
Payer: MEDICARE

## 2024-11-18 DIAGNOSIS — M79.10 MYALGIA: ICD-10-CM

## 2024-11-18 DIAGNOSIS — M51.369 BULGE OF LUMBAR DISC WITHOUT MYELOPATHY: ICD-10-CM

## 2024-11-18 DIAGNOSIS — M47.816 LUMBAR SPONDYLOSIS: ICD-10-CM

## 2024-11-18 DIAGNOSIS — M99.9 BIOMECHANICAL LESION: ICD-10-CM

## 2024-11-18 DIAGNOSIS — M54.50 CHRONIC BILATERAL LOW BACK PAIN WITHOUT SCIATICA: ICD-10-CM

## 2024-11-18 DIAGNOSIS — M54.16 LUMBAR RADICULOPATHY: ICD-10-CM

## 2024-11-18 DIAGNOSIS — M47.816 FACET SYNDROME, LUMBAR: Primary | ICD-10-CM

## 2024-11-18 DIAGNOSIS — M50.30 DDD (DEGENERATIVE DISC DISEASE), CERVICAL: ICD-10-CM

## 2024-11-18 DIAGNOSIS — G89.29 CHRONIC BILATERAL LOW BACK PAIN WITHOUT SCIATICA: ICD-10-CM

## 2024-11-18 DIAGNOSIS — M54.59 MECHANICAL LOW BACK PAIN: ICD-10-CM

## 2024-11-18 DIAGNOSIS — M48.061 LUMBAR FORAMINAL STENOSIS: ICD-10-CM

## 2024-11-18 NOTE — PROGRESS NOTES
Virtual Telephone Check-In    Max Hagen verbally consents to a Virtual/Telephone Check-In visit on 11/18/24.  Patient has been referred to the [facility] website at [email or web address] to review the yearly Consent to Treat document.    Patient understands and accepts financial responsibility for any deductible, co-insurance and/or co-pays associated with this service.    Duration of the service: >5 minutes      Summary of topics discussed:   Patient is status post bilateral L4-L5 and L5-S1 facet joint injections on 10/30/2024.  He has improved over the last few weeks.  He did have an episode of recurrence of pain but feels that the steroids that were injected have helped.  He denies any radicular symptoms including paresthesias or weakness.  He has noted some increased fluttering of his heart after taking the oral steroids where he took 6 pills at once.  Overall, he is doing okay and is working.  We will hold off on any interventions right now although if symptoms do return, I would recommend bilateral L5 transforaminal epidural steroid injection.      As needed    Alex B. Behar MD, [facility]  Physical Medicine and Rehabilitation/Sports Medicine  [facility]

## 2024-12-04 ENCOUNTER — HOSPITAL ENCOUNTER (OUTPATIENT)
Dept: GENERAL RADIOLOGY | Facility: HOSPITAL | Age: 69
Discharge: HOME | End: 2024-12-04
Attending: PHYSICAL MEDICINE & REHABILITATION
Payer: MEDICARE

## 2024-12-04 ENCOUNTER — TELEPHONE (OUTPATIENT)
Dept: PHYSICAL MEDICINE AND REHAB | Facility: CLINIC | Age: 69
End: 2024-12-04

## 2024-12-04 ENCOUNTER — OFFICE VISIT (OUTPATIENT)
Dept: PHYSICAL MEDICINE AND REHAB | Facility: CLINIC | Age: 69
End: 2024-12-04
Payer: MEDICARE

## 2024-12-04 VITALS — BODY MASS INDEX: 20.89 KG/M2 | HEIGHT: 66 IN | WEIGHT: 130 LBS

## 2024-12-04 DIAGNOSIS — M54.59 MECHANICAL LOW BACK PAIN: ICD-10-CM

## 2024-12-04 DIAGNOSIS — F41.9 ANXIETY: ICD-10-CM

## 2024-12-04 DIAGNOSIS — G89.29 CHRONIC BILATERAL LOW BACK PAIN WITHOUT SCIATICA: ICD-10-CM

## 2024-12-04 DIAGNOSIS — M79.10 MYALGIA: ICD-10-CM

## 2024-12-04 DIAGNOSIS — M47.816 FACET SYNDROME, LUMBAR: ICD-10-CM

## 2024-12-04 DIAGNOSIS — M99.9 BIOMECHANICAL LESION: ICD-10-CM

## 2024-12-04 DIAGNOSIS — M53.3 SI (SACROILIAC) JOINT DYSFUNCTION: ICD-10-CM

## 2024-12-04 DIAGNOSIS — M53.3 SACROILIAC JOINT DYSFUNCTION OF RIGHT SIDE: ICD-10-CM

## 2024-12-04 DIAGNOSIS — M25.571 ACUTE RIGHT ANKLE PAIN: ICD-10-CM

## 2024-12-04 DIAGNOSIS — M47.816 LUMBAR SPONDYLOSIS: ICD-10-CM

## 2024-12-04 DIAGNOSIS — M51.369 BULGE OF LUMBAR DISC WITHOUT MYELOPATHY: ICD-10-CM

## 2024-12-04 DIAGNOSIS — M48.061 LUMBAR FORAMINAL STENOSIS: ICD-10-CM

## 2024-12-04 DIAGNOSIS — M54.50 CHRONIC BILATERAL LOW BACK PAIN WITHOUT SCIATICA: ICD-10-CM

## 2024-12-04 DIAGNOSIS — M47.816 FACET SYNDROME, LUMBAR: Primary | ICD-10-CM

## 2024-12-04 DIAGNOSIS — M54.16 LUMBAR RADICULOPATHY: ICD-10-CM

## 2024-12-04 DIAGNOSIS — M50.30 DDD (DEGENERATIVE DISC DISEASE), CERVICAL: ICD-10-CM

## 2024-12-04 PROCEDURE — 73610 X-RAY EXAM OF ANKLE: CPT | Performed by: PHYSICAL MEDICINE & REHABILITATION

## 2024-12-04 NOTE — PROGRESS NOTES
[facility]  Progress Note    CHIEF COMPLAINT:    Chief Complaint   Patient presents with    Follow - Up     LOV: 10/31/24 Patient f/u on bilateral low back pain.        History of Present Illness:  The patient is a 69 year old RIGHT handed male with significant past medical history of carotid stenosis, mitral regurg, GERD, who presents for follow-up of his bilateral low back also complaining of right medial heel pain.  He does have a history of a calcaneal surgery where he had a screw placed.  He is status post bilateral L4-L5 and L5-S1 facet joint injections on 10/30/2024.  He has been doing pretty well thus far.  He denies any radicular symptoms right now.  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  He is not taking any medications for pain.  PAST MEDICAL HISTORY:  Past Medical History:    Arthritis    Back pain    Bilateral carotid artery disease (HCC)    Carotid stenosis, bilateral    < 50% bilat at [place], recheck 2022    Compression fracture of cervical spine (HCC)    Esophageal reflux    HNP (herniated nucleus pulposus), cervical    Left axis deviation    stable from 2001, early repol    Mitral regurgitation    MMT (medial meniscus tear)    Palpitations    Refused influenza vaccine    Screening for cardiovascular condition    ECHO with ef 60%, mild pulmonic and tricuspid insuff, mild MR    Vitamin D deficiency       SURGICAL HISTORY:  Past Surgical History:   Procedure Laterality Date    Colonoscopy,diagnostic  2006     normal, EGD as well     Other surgical history      right foot screw    Other surgical history      multiple lipoma approx 30 yr ago    Other surgical history  2012    left knee meniscus    Repair ing hernia,5+y/o,reducibl         SOCIAL HISTORY:   Social History     Occupational History    Occupation: painting   Tobacco Use    Smoking status: Never    Smokeless tobacco: Never   Vaping Use     Vaping status: Never Used   Substance and Sexual Activity    Alcohol use: Yes     Alcohol/week: 5.8 - 11.7 standard drinks of alcohol     Types: 7 - 14 Standard drinks or equivalent per week     Comment: social     Drug use: No    Sexual activity: Not Currently     Partners: Female       FAMILY HISTORY:   Family History   Problem Relation Age of Onset    Heart Disorder Father     Diabetes Father     Psychiatric Father         dementia    Heart Disorder Mother         CAD with stent    Other (Other) Mother        CURRENT MEDICATIONS:   Current Outpatient Medications   Medication Sig Dispense Refill    acetaminophen 500 MG Oral Tab Take 2 tablets (1,000 mg total) by mouth every 6 (six) hours as needed for Pain.      Propranolol HCl 10 MG Oral Tab Take 1 tablet (10 mg total) by mouth every evening.         ALLERGIES:   Allergies[1]    REVIEW OF SYSTEMS:   Review of Systems   Constitutional: Negative.    HENT: Negative.    Eyes: Negative.    Respiratory: Negative.    Cardiovascular: Negative.    Gastrointestinal: Negative.    Genitourinary: Negative.    Musculoskeletal: As per HPI  Skin: Negative.    Neurological: As per HPI  Endo/Heme/Allergies: Negative.    Psychiatric/Behavioral: Negative.      All other systems reviewed and are negative. Pertinent positives and negatives noted in the HPI.        PHYSICAL EXAM:   Ht 66\"   Wt 130 lb (59 kg)   BMI 20.98 kg/m²     Body mass index is 20.98 kg/m².      General: No immediate distress  Head: Normocephalic/ Atraumatic  Eyes: Extra-occular movements intact.   Ears: No auricular hematoma or deformities  Mouth: No lesions or ulcerations  Heart: peripheral pulses intact. Normal capillary refill.   Lungs: Non-labored respirations  Abdomen: No abdominal guarding  Extremities: No lower extremity edema bilaterally   Skin: No lesions noted.   Cognition: alert & oriented x 3, attentive, able to follow 2 step commands, comprehention intact, spontaneous speech  intact  Motor:    Musculoskeletal:        Data  [facility] Lab Encounter on 08/16/2024   Component Date Value Ref Range Status    Prostate Specific Antigen 08/16/2024 3.3  0.0 - 4.0 ng/mL Final    Free PSA 08/16/2024 0.75  N/A ng/mL Final    %Free PSA 08/16/2024 22.7  % Final   ]      Radiology Imaging:  I reviewed with the patient his MRI of the lumbar spine from 9/5/2024  XR ANKLE (MIN 3 VIEWS), RIGHT (CPT=73610)  Narrative: PROCEDURE: XR ANKLE (MIN 3 VIEWS), RIGHT (CPT=73610)     COMPARISON: None.     INDICATIONS: Chornic planta right ankle pain.  No injury.  History of calcaneus surgery 35 years ago.     TECHNIQUE: 3. views were obtained.       FINDINGS:   BONES: Threaded screw within the posterior aspect of the calcaneus, without perihardware lucency or hardware fracture.  Healed fracture of the calcaneus with mild irregularity along the dorsal aspect of the calcaneus likely related to remote healing   changes.  No acute osseous fracture or malalignment.  Visualized joint spaces are maintained.  Ankle mortise is maintained.  SOFT TISSUES: Minimal vascular/soft tissue calcifications along the dorsal aspect of the ankle.  EFFUSION: None visible.   OTHER: Negative.               Impression: CONCLUSION:      No acute fracture or dislocation.  No significant osteoarthritis.     Prior calcaneal fixation, without radiographic evidence of hardware failure.                 Dictated by (CST): Irma Alvares MD on 12/04/2024 at 3:30 PM       Finalized by (CST): Irma Alvares MD on 12/04/2024 at 3:32 PM              ASSESSMENT AND PLAN:  Lion is a pleasant 69-year-old male presents for follow-up of his bilateral low back pain.  He is currently doing well following the bilateral L4-L5 and L5-S1 facet joint injections.  I recommend he continue working on his home exercise program and follow-up with me as needed.  As a pertains to his ankle discomfort, I have ordered x-rays of the ankle to evaluate his hardware.  We have also  had a discussion regarding his anxiety and I have recommended he see a psychotherapist although he is adamant he does not want to do that.  We discussed his ongoing weight loss and I have asked him to follow-up with his primary care doctor.  He is also scheduled to see rheumatology.  He will follow-up with me as needed in the future.       RTC in as needed  Discharge Instructions were provided as documented in AVS summary.  The patient was in agreement with the assessment and plan.  All questions were answered.  There were no barriers to learning.    I have spent >40 minutes discussing the key components of this visit including the history, physical exam, diagnostic imaging, and labs, and medical decision making with greater than 50% of the total visit counseling/coordinating care.       1. Facet syndrome, lumbar    2. Chronic bilateral low back pain without sciatica    3. Bulge of lumbar disc without myelopathy    4. Lumbar spondylosis    5. Lumbar foraminal stenosis    6. Lumbar radiculopathy    7. Mechanical low back pain    8. Biomechanical lesion    9. Myalgia    10. DDD (degenerative disc disease), cervical    11. Sacroiliac joint dysfunction of right side    12. SI (sacroiliac) joint dysfunction    13. Acute right ankle pain    14. Anxiety        Alex B. Behar MD  Physical Medicine and Rehabilitation/Sports Medicine  [facility]         [1]   Allergies  Allergen Reactions    Celecoxib SWELLING     Facial numbness    Naproxen OTHER (SEE COMMENTS)     Developed ear problem      Cyclobenzaprine OTHER (SEE COMMENTS)     Difficulty urinating and leg spasms    Doxycycline Calcium NAUSEA ONLY     Elevated hr and gi upset    Epinephrine UNKNOWN     Elevated heart rate

## 2024-12-04 NOTE — PATIENT INSTRUCTIONS
1) Please get X-rays of the RIGHT ankle today on your way out.   2) Continue home exercise program  3) Follow up with me if needed  4) Continue plan to see rheumatology

## 2024-12-04 NOTE — TELEPHONE ENCOUNTER
Pt would like to pass this message along to Dr. Behar: Pt had been seen at [facility] by COREY Rodriguez on behalf of Dr. Cortés and was told that we need to call their department to get the number for medical records.

## 2024-12-04 NOTE — TELEPHONE ENCOUNTER
Printed authorization for [facility]ERR[facility]. Need patient signature, left patient detailed voicemail regarding this. Form left at  for pt to sign at earliest convenience. Patient may call back if he has any questions regarding this.

## 2024-12-10 ENCOUNTER — OFFICE VISIT (OUTPATIENT)
Dept: INTERNAL MEDICINE | Age: 69
End: 2024-12-10

## 2024-12-10 ENCOUNTER — HOSPITAL ENCOUNTER (OUTPATIENT)
Dept: GENERAL RADIOLOGY | Age: 69
Discharge: HOME | End: 2024-12-10
Attending: INTERNAL MEDICINE

## 2024-12-10 VITALS
WEIGHT: 130.07 LBS | DIASTOLIC BLOOD PRESSURE: 66 MMHG | HEART RATE: 71 BPM | HEIGHT: 66 IN | SYSTOLIC BLOOD PRESSURE: 119 MMHG | OXYGEN SATURATION: 95 % | BODY MASS INDEX: 20.9 KG/M2 | TEMPERATURE: 97.1 F

## 2024-12-10 DIAGNOSIS — R63.4 WEIGHT LOSS: ICD-10-CM

## 2024-12-10 DIAGNOSIS — R63.4 WEIGHT LOSS: Primary | ICD-10-CM

## 2024-12-10 PROCEDURE — 99213 OFFICE O/P EST LOW 20 MIN: CPT | Performed by: INTERNAL MEDICINE

## 2024-12-10 PROCEDURE — 71046 X-RAY EXAM CHEST 2 VIEWS: CPT

## 2024-12-10 ASSESSMENT — PATIENT HEALTH QUESTIONNAIRE - PHQ9
CLINICAL INTERPRETATION OF PHQ2 SCORE: NO FURTHER SCREENING NEEDED
2. FEELING DOWN, DEPRESSED OR HOPELESS: NOT AT ALL
1. LITTLE INTEREST OR PLEASURE IN DOING THINGS: NOT AT ALL
SUM OF ALL RESPONSES TO PHQ9 QUESTIONS 1 AND 2: 0
SUM OF ALL RESPONSES TO PHQ9 QUESTIONS 1 AND 2: 0

## 2024-12-12 ENCOUNTER — TELEPHONE (OUTPATIENT)
Dept: INTERNAL MEDICINE | Age: 69
End: 2024-12-12

## 2024-12-18 ENCOUNTER — TELEPHONE (OUTPATIENT)
Dept: INTERNAL MEDICINE | Age: 69
End: 2024-12-18

## 2024-12-26 ENCOUNTER — TELEPHONE (OUTPATIENT)
Dept: CARDIOLOGY | Age: 69
End: 2024-12-26

## 2024-12-27 ENCOUNTER — APPOINTMENT (OUTPATIENT)
Dept: ULTRASOUND IMAGING | Age: 69
End: 2024-12-27
Attending: NURSE PRACTITIONER
Payer: MEDICARE

## 2024-12-27 ENCOUNTER — HOSPITAL ENCOUNTER (OUTPATIENT)
Age: 69
Discharge: HOME | End: 2024-12-27
Payer: MEDICARE

## 2024-12-27 ENCOUNTER — APPOINTMENT (OUTPATIENT)
Dept: GENERAL RADIOLOGY | Age: 69
End: 2024-12-27
Attending: NURSE PRACTITIONER
Payer: MEDICARE

## 2024-12-27 VITALS
DIASTOLIC BLOOD PRESSURE: 68 MMHG | OXYGEN SATURATION: 100 % | SYSTOLIC BLOOD PRESSURE: 127 MMHG | TEMPERATURE: 97 F | HEART RATE: 57 BPM | RESPIRATION RATE: 16 BRPM

## 2024-12-27 DIAGNOSIS — S20.212A CONTUSION OF RIB ON LEFT SIDE, INITIAL ENCOUNTER: Primary | ICD-10-CM

## 2024-12-27 DIAGNOSIS — D17.22 LIPOMA OF LEFT UPPER EXTREMITY: ICD-10-CM

## 2024-12-27 PROCEDURE — 99213 OFFICE O/P EST LOW 20 MIN: CPT

## 2024-12-27 PROCEDURE — 99215 OFFICE O/P EST HI 40 MIN: CPT

## 2024-12-27 PROCEDURE — 93971 EXTREMITY STUDY: CPT | Performed by: NURSE PRACTITIONER

## 2024-12-27 PROCEDURE — 71101 X-RAY EXAM UNILAT RIBS/CHEST: CPT | Performed by: NURSE PRACTITIONER

## 2024-12-27 NOTE — ED INITIAL ASSESSMENT (HPI)
Presents with c/o \"left bicep\" pain and tenderness. No injury. + distal CMS. Concerned about a blood clot. Also states he was painting the bathroom yesterday and leaned on the toilet to reach injuring left anterior ribs. Pain worse with inspiration.

## 2024-12-27 NOTE — ED PROVIDER NOTES
He    Patient Seen in: [facility]      History     Chief Complaint   Patient presents with    Arm Pain    Chest Pain     Stated Complaint: Arm and Rib Issue  Subjective:   69-year-old male presents for 2 complaints.  He states he is a  and leaned over a toilet to take the wall feels like he may have fractured a rib on the left side when doing so.  He has point tenderness.  No shortness of breath.  There is some pain with deep breathing.  No chest pain or palpitations.  No abdominal pain.  No flank pain.  No hematuria or other urinary symptoms.  The patient is a reproducible with movement.  He states he also had some pain to his left bicep a couple days ago and is concerned about a blood clot.  Where he has pain, there is an overlying lipoma.  There is no gross swelling.  No erythema.  No numbness or tingling.  No known injury or trauma.  No weakness.  He is right-hand dominant.  He appears nontoxic.      Objective:   Past Medical History:    Arthritis    Back pain    Bilateral carotid artery disease (HCC)    Carotid stenosis, bilateral    < 50% bilat at [place], recheck 2022    Compression fracture of cervical spine (HCC)    Esophageal reflux    HNP (herniated nucleus pulposus), cervical    Left axis deviation    stable from 2001, early repol    Mitral regurgitation    MMT (medial meniscus tear)    Palpitations    Refused influenza vaccine    Screening for cardiovascular condition    ECHO with ef 60%, mild pulmonic and tricuspid insuff, mild MR    Vitamin D deficiency            Past Surgical History:   Procedure Laterality Date    Colonoscopy,diagnostic  2006     normal, EGD as well     Other surgical history      right foot screw    Other surgical history      multiple lipoma approx 30 yr ago    Other surgical history  2012    left knee meniscus    Repair ing hernia,5+y/o,reducibl                Social History     Socioeconomic History    Marital status: Single    Number of children: 0    Occupational History    Occupation: painting   Tobacco Use    Smoking status: Never    Smokeless tobacco: Never   Vaping Use    Vaping status: Never Used   Substance and Sexual Activity    Alcohol use: Yes     Alcohol/week: 5.8 - 11.7 standard drinks of alcohol     Types: 7 - 14 Standard drinks or equivalent per week     Comment: social     Drug use: No    Sexual activity: Not Currently     Partners: Female   Other Topics Concern     Service No    Blood Transfusions No    Caffeine Concern No    Exercise Yes     Comment: active    Seat Belt Yes    Self-Exams Yes     Social Drivers of Health     Physical Activity: High Risk (4/3/2024)    Received from [facility]    Exercise Vital Sign     On average, how many days per week do you engage in moderate to strenuous exercise (like a brisk walk)?: 0 days     On average, how many minutes do you engage in exercise at this level?: 0 min    Received from [facility]    Stress            Review of Systems    Positive for stated complaint: Arm Pain and Chest Pain     Other systems are as noted in HPI.  Constitutional and vital signs reviewed.      All other systems reviewed and negative except as noted above.    Physical Exam     ED Triage Vitals [12/27/24 0950]   /68   Pulse 57   Resp 16   Temp 97.4 °F (36.3 °C)   Temp src Oral   SpO2 100 %   O2 Device None (Room air)     Current:/68   Pulse 57   Temp 97.4 °F (36.3 °C) (Oral)   Resp 16   SpO2 100%     Physical Exam  Vitals and nursing note reviewed.   Constitutional:       General: He is not in acute distress.     Appearance: He is not toxic-appearing.   HENT:      Mouth/Throat:      Mouth: Mucous membranes are moist.   Eyes:      Pupils: Pupils are equal, round, and reactive to light.   Cardiovascular:      Rate and Rhythm: Normal rate and regular rhythm.   Pulmonary:      Effort: Pulmonary effort is normal.      Breath sounds: Normal breath sounds. No  wheezing, rhonchi or rales.      Comments: Pain to the left lateral and anterior ribs with palpation.  No swelling.  No visible bruising.  Nonlabored respirations.  Chest:      Chest wall: Tenderness present.   Abdominal:      Palpations: Abdomen is soft.      Tenderness: There is no abdominal tenderness. There is no right CVA tenderness or left CVA tenderness.   Musculoskeletal:         General: Tenderness present. No swelling, deformity or signs of injury.      Comments: Pain to the left bicep with certain movements.  There is an overlying lipoma.  There is some point tenderness.  No erythema.  No gross swelling.  No loss of ROM.  CMS intact.  +5 strength upper and lower extremities.   Skin:     General: Skin is warm and dry.      Capillary Refill: Capillary refill takes less than 2 seconds.      Findings: No bruising or erythema.   Neurological:      General: No focal deficit present.      Mental Status: He is alert and oriented to person, place, and time.   Psychiatric:         Mood and Affect: Mood normal.         ED Course   XR RIBS WITH CHEST (3 VIEWS), LEFT  (CPT=71101)    Result Date: 12/27/2024  CONCLUSION: No acute left rib fracture.    Dictated by (CST): Dante Ramirez MD on 12/27/2024 at 11:56 AM     Finalized by (CST): Dante Ramirez MD on 12/27/2024 at 11:58 AM          US VENOUS DOPPLER ARM LEFT - DIAG IMG (CPT=93971)    Result Date: 12/27/2024  CONCLUSION: No sonographic evidence of left upper extremity DVT.    Dictated by (CST): Gabe Olivier MD on 12/27/2024 at 11:15 AM     Finalized by (CST): Gabe Olivier MD on 12/27/2024 at 11:23 AM         Labs Reviewed - No data to display    MDM     Medical Decision Making  The patient is aware of his testing results below.  We discussed that he most likely has a contusion to the ribs and that the lipoma is causing him discomfort to the left bicep area.  He states he does not want to take any medication.  We discussed ice and rest.  He will  follow-up closely with his primary care doctor.    Amount and/or Complexity of Data Reviewed  Radiology: ordered.     Details: The x-ray of the chest and left ribs is negative for any rib fracture or other cardiopulmonary process  Ultrasound of the left upper extremity is negative for DVT.    Risk  OTC drugs.  Risk Details: DVT versus lipoma versus muscle strain  Rib fracture versus contusion        Disposition and Plan     Clinical Impression:  1. Contusion of rib on left side, initial encounter    2. Lipoma of left upper extremity         Disposition:  Discharge  12/27/2024 12:12 pm    Follow-up:  Fredrick Adorno MD  [address] [address]  [phone]    Schedule an appointment as soon as possible for a visit in 3 days            Medications Prescribed:  Discharge Medication List as of 12/27/2024 12:16 PM

## 2024-12-27 NOTE — DISCHARGE INSTRUCTIONS
Ice to the area of soreness.  Rest.  Follow-up with your primary care doctor.  Return for any concerns.

## 2025-01-09 ENCOUNTER — TELEPHONE (OUTPATIENT)
Dept: INTERNAL MEDICINE | Age: 70
End: 2025-01-09

## 2025-02-12 ENCOUNTER — HOSPITAL ENCOUNTER (OUTPATIENT)
Dept: ULTRASOUND IMAGING | Age: 70
Discharge: HOME | End: 2025-02-12
Attending: INTERNAL MEDICINE

## 2025-02-12 DIAGNOSIS — R63.4 WEIGHT LOSS: ICD-10-CM

## 2025-02-12 PROCEDURE — 76700 US EXAM ABDOM COMPLETE: CPT

## 2025-02-13 ENCOUNTER — TELEPHONE (OUTPATIENT)
Dept: INTERNAL MEDICINE | Age: 70
End: 2025-02-13

## 2025-04-18 PROBLEM — I49.3 PVC'S (PREMATURE VENTRICULAR CONTRACTIONS): Status: RESOLVED | Noted: 2019-06-24 | Resolved: 2025-04-18

## 2025-04-22 ENCOUNTER — APPOINTMENT (OUTPATIENT)
Dept: CARDIOLOGY | Age: 70
End: 2025-04-22

## 2025-05-12 ENCOUNTER — LAB SERVICES (OUTPATIENT)
Dept: LAB | Age: 70
End: 2025-05-12

## 2025-05-12 ENCOUNTER — RESULTS FOLLOW-UP (OUTPATIENT)
Dept: CARDIOLOGY | Age: 70
End: 2025-05-12

## 2025-05-12 DIAGNOSIS — E78.2 MIXED HYPERLIPIDEMIA: ICD-10-CM

## 2025-05-12 DIAGNOSIS — I34.0 NONRHEUMATIC MITRAL VALVE REGURGITATION: ICD-10-CM

## 2025-05-12 DIAGNOSIS — M25.532 ACUTE PAIN OF LEFT WRIST: ICD-10-CM

## 2025-05-12 DIAGNOSIS — E55.9 VITAMIN D DEFICIENCY: ICD-10-CM

## 2025-05-12 DIAGNOSIS — I49.3 PVC'S (PREMATURE VENTRICULAR CONTRACTIONS): ICD-10-CM

## 2025-05-12 DIAGNOSIS — R93.1 ELEVATED CORONARY ARTERY CALCIUM SCORE: ICD-10-CM

## 2025-05-12 DIAGNOSIS — R00.2 PALPITATIONS: ICD-10-CM

## 2025-05-12 DIAGNOSIS — R73.9 HYPERGLYCEMIA: ICD-10-CM

## 2025-05-12 DIAGNOSIS — I25.10 ATHEROSCLEROSIS OF NATIVE CORONARY ARTERY OF NATIVE HEART WITHOUT ANGINA PECTORIS: ICD-10-CM

## 2025-05-12 DIAGNOSIS — I77.4 CELIAC ARTERY STENOSIS (CMD): ICD-10-CM

## 2025-05-12 LAB
ALBUMIN SERPL-MCNC: 3.8 G/DL (ref 3.4–5)
ALBUMIN/GLOB SERPL: 1.5 {RATIO} (ref 1–2.4)
ALP SERPL-CCNC: 63 UNITS/L (ref 45–117)
ALT SERPL-CCNC: 27 UNITS/L
ANION GAP SERPL CALC-SCNC: 5 MMOL/L (ref 7–19)
AST SERPL-CCNC: 25 UNITS/L
BASOPHILS # BLD: 0 K/MCL (ref 0–0.3)
BASOPHILS NFR BLD: 1 %
BILIRUB SERPL-MCNC: 0.7 MG/DL (ref 0.2–1)
BUN SERPL-MCNC: 27 MG/DL (ref 6–20)
BUN/CREAT SERPL: 35 (ref 7–25)
CALCIUM SERPL-MCNC: 8.6 MG/DL (ref 8.4–10.2)
CHLORIDE SERPL-SCNC: 111 MMOL/L (ref 97–110)
CHOLEST SERPL-MCNC: 171 MG/DL
CHOLEST/HDLC SERPL: 2.3 {RATIO}
CO2 SERPL-SCNC: 28 MMOL/L (ref 21–32)
CREAT SERPL-MCNC: 0.78 MG/DL (ref 0.67–1.17)
DEPRECATED RDW RBC: 41 FL (ref 39–50)
EGFRCR SERPLBLD CKD-EPI 2021: >90 ML/MIN/{1.73_M2}
EOSINOPHIL # BLD: 0.1 K/MCL (ref 0–0.5)
EOSINOPHIL NFR BLD: 3 %
ERYTHROCYTE [DISTWIDTH] IN BLOOD: 11.9 % (ref 11–15)
FASTING DURATION TIME PATIENT: ABNORMAL H
GLOBULIN SER-MCNC: 2.6 G/DL (ref 2–4)
GLUCOSE SERPL-MCNC: 83 MG/DL (ref 70–99)
HBA1C MFR BLD: 5 % (ref 4.5–5.6)
HCT VFR BLD CALC: 42.5 % (ref 39–51)
HDLC SERPL-MCNC: 76 MG/DL
HGB BLD-MCNC: 14.3 G/DL (ref 13–17)
IMM GRANULOCYTES # BLD AUTO: 0 K/MCL (ref 0–0.2)
IMM GRANULOCYTES # BLD: 0 %
LDLC SERPL CALC-MCNC: 87 MG/DL
LYMPHOCYTES # BLD: 1 K/MCL (ref 1–4)
LYMPHOCYTES NFR BLD: 22 %
MCH RBC QN AUTO: 31 PG (ref 26–34)
MCHC RBC AUTO-ENTMCNC: 33.6 G/DL (ref 32–36.5)
MCV RBC AUTO: 92.2 FL (ref 78–100)
MONOCYTES # BLD: 0.4 K/MCL (ref 0.3–0.9)
MONOCYTES NFR BLD: 8 %
NEUTROPHILS # BLD: 3.1 K/MCL (ref 1.8–7.7)
NEUTROPHILS NFR BLD: 66 %
NONHDLC SERPL-MCNC: 95 MG/DL
NRBC BLD MANUAL-RTO: 0 /100 WBC
PLATELET # BLD AUTO: 152 K/MCL (ref 140–450)
POTASSIUM SERPL-SCNC: 4.3 MMOL/L (ref 3.4–5.1)
PROT SERPL-MCNC: 6.4 G/DL (ref 6.4–8.2)
RBC # BLD: 4.61 MIL/MCL (ref 4.5–5.9)
SODIUM SERPL-SCNC: 140 MMOL/L (ref 135–145)
TRIGL SERPL-MCNC: 39 MG/DL
WBC # BLD: 4.6 K/MCL (ref 4.2–11)

## 2025-05-12 PROCEDURE — 80061 LIPID PANEL: CPT | Performed by: CLINICAL MEDICAL LABORATORY

## 2025-05-12 PROCEDURE — 85025 COMPLETE CBC W/AUTO DIFF WBC: CPT | Performed by: CLINICAL MEDICAL LABORATORY

## 2025-05-12 PROCEDURE — 36415 COLL VENOUS BLD VENIPUNCTURE: CPT | Performed by: CLINICAL MEDICAL LABORATORY

## 2025-05-12 PROCEDURE — 83036 HEMOGLOBIN GLYCOSYLATED A1C: CPT | Performed by: CLINICAL MEDICAL LABORATORY

## 2025-05-12 PROCEDURE — 80053 COMPREHEN METABOLIC PANEL: CPT | Performed by: CLINICAL MEDICAL LABORATORY

## 2025-05-13 ENCOUNTER — APPOINTMENT (OUTPATIENT)
Dept: CARDIOLOGY | Age: 70
End: 2025-05-13

## 2025-05-13 VITALS
HEIGHT: 66 IN | BODY MASS INDEX: 21.19 KG/M2 | WEIGHT: 131.84 LBS | SYSTOLIC BLOOD PRESSURE: 119 MMHG | DIASTOLIC BLOOD PRESSURE: 76 MMHG | HEART RATE: 60 BPM

## 2025-05-13 DIAGNOSIS — R93.1 ELEVATED CORONARY ARTERY CALCIUM SCORE: ICD-10-CM

## 2025-05-13 DIAGNOSIS — I25.10 ATHEROSCLEROSIS OF NATIVE CORONARY ARTERY OF NATIVE HEART WITHOUT ANGINA PECTORIS: ICD-10-CM

## 2025-05-13 DIAGNOSIS — I34.0 NONRHEUMATIC MITRAL VALVE REGURGITATION: ICD-10-CM

## 2025-05-13 DIAGNOSIS — R00.2 PALPITATIONS: ICD-10-CM

## 2025-05-13 DIAGNOSIS — E78.2 MIXED HYPERLIPIDEMIA: ICD-10-CM

## 2025-05-13 DIAGNOSIS — I77.4 CELIAC ARTERY STENOSIS (CMD): Primary | ICD-10-CM

## 2025-05-13 PROCEDURE — 99214 OFFICE O/P EST MOD 30 MIN: CPT | Performed by: INTERNAL MEDICINE

## 2025-05-29 ENCOUNTER — NURSE TRIAGE (OUTPATIENT)
Dept: TELEHEALTH | Age: 70
End: 2025-05-29

## 2025-06-02 ENCOUNTER — APPOINTMENT (OUTPATIENT)
Dept: INTERNAL MEDICINE | Age: 70
End: 2025-06-02

## 2025-06-02 VITALS
WEIGHT: 134.04 LBS | RESPIRATION RATE: 16 BRPM | BODY MASS INDEX: 21.54 KG/M2 | DIASTOLIC BLOOD PRESSURE: 70 MMHG | HEART RATE: 70 BPM | SYSTOLIC BLOOD PRESSURE: 116 MMHG | HEIGHT: 66 IN | TEMPERATURE: 96.8 F | OXYGEN SATURATION: 99 %

## 2025-06-02 DIAGNOSIS — E78.2 MIXED HYPERLIPIDEMIA: ICD-10-CM

## 2025-06-02 DIAGNOSIS — I77.4 CELIAC ARTERY STENOSIS (CMD): ICD-10-CM

## 2025-06-02 DIAGNOSIS — Z00.00 MEDICARE ANNUAL WELLNESS VISIT, SUBSEQUENT: ICD-10-CM

## 2025-06-02 DIAGNOSIS — R20.0 LEFT FACIAL NUMBNESS: ICD-10-CM

## 2025-06-02 DIAGNOSIS — R43.0 ANOSMIA: Primary | ICD-10-CM

## 2025-06-02 ASSESSMENT — ACTIVITIES OF DAILY LIVING (ADL)
NEEDS ASSISTANCE WITH FOOD: INDEPENDENT
RECENT_DECLINE_ADL: NO
NEEDS_ASSIST: NO
ADL_SHORT_OF_BREATH: NO
EATING: INDEPENDENT
PILL BOX USED: NO
PREPARING MEALS: INDEPENDENT
RECENT_DECLINE_ADL: NO
DOING HOUSEWORK: INDEPENDENT
USING TELEPHONE: INDEPENDENT
USING TRANSPORTATION: INDEPENDENT
MANAGING FINANCES: INDEPENDENT
ADL_SHORT_OF_BREATH: NO
STIL DRIVING: YES
SENSORY_SUPPORT_DEVICES: EYEGLASSES
TAKING MEDICATION: INDEPENDENT
GROCERY SHOPPING: INDEPENDENT

## 2025-06-02 ASSESSMENT — MINI COG
TOTAL SCORE: 5
PATIENT ABLE TO REPEAT THE 3 WORDS GIVEN PREVIOUSLY?: WAS ABLE TO REPEAT BACK 3 WORDS CORRECTLY
PATIENT ABLE TO FILL IN THE CLOCK FACE WITH 10 MINUTES PAST 11 O'CLOCK?: YES, CLOCK IS CORRECT
PATIENT WAS GIVEN REPEAT BACK WORDS FROM VERSION: 1 - BANANA SUNRISE CHAIR

## 2025-06-02 ASSESSMENT — PAIN SCALES - GENERAL: PAINLEVEL_OUTOF10: 0

## 2025-06-04 ENCOUNTER — HOSPITAL ENCOUNTER (OUTPATIENT)
Dept: MRI IMAGING | Age: 70
Discharge: HOME | End: 2025-06-04
Attending: INTERNAL MEDICINE

## 2025-06-04 DIAGNOSIS — R20.0 LEFT FACIAL NUMBNESS: ICD-10-CM

## 2025-06-04 PROCEDURE — 72141 MRI NECK SPINE W/O DYE: CPT

## 2025-06-05 ENCOUNTER — RESULTS FOLLOW-UP (OUTPATIENT)
Dept: INTERNAL MEDICINE | Age: 70
End: 2025-06-05

## 2025-06-05 ENCOUNTER — TELEPHONE (OUTPATIENT)
Dept: PHYSICAL MEDICINE AND REHAB | Facility: CLINIC | Age: 70
End: 2025-06-05

## 2025-06-05 NOTE — TELEPHONE ENCOUNTER
S/w patient who states that the facial numbness is positional. Patient s/w cardiologist and PCP who stated they do not believe it to be cardiology or stroke related.     Patient had CERVICAL MRI, report available via care everywhere. Patient will bring the disc in for us to copy to the [place] office sometime this week.    Patient has been scheduled for next available appointment: 06/10/2025 at 7:20AM. Pt's update forwarded to Dr. Behar for his review prior to appt. Will await patient to bring in CD for staff to upload.

## 2025-06-05 NOTE — TELEPHONE ENCOUNTER
Incoming call from patient stating he has developed left sided facial numbness and coldness. His PCP ordered massage therapy after reviewing cervical MRI recently completed. Patient prefers to see Dr. Behar's recommended massage therapist and schedule follow up with Dr. Behar. RN instructed patient to bring in disc of cervical MRI images to which patient agreed to bringing into the clinic this week. RN offered to schedule patient for first available appointment with Dr. Behar on 6/16/25, but patient refused. Patient requesting that Michelle CARPENTER calls him back to discuss new condition and sooner follow up appt than 6/16/25. RN explained to patient that Michelle is not available at this time and we cannot guarantee when she will be able to get back to him. Pt acknowledges that this RN is trying to assist, but insisting that he only wants to speak with Michelle - stating \"I have a certain process\" to things and \"I've gotta do it my way\".     Encounter routed to Michelle CARPENTER and Dr. Behar.

## 2025-06-06 ENCOUNTER — TELEPHONE (OUTPATIENT)
Dept: PHYSICAL MEDICINE AND REHAB | Facility: CLINIC | Age: 70
End: 2025-06-06

## 2025-06-09 NOTE — TELEPHONE ENCOUNTER
Spoke with patient who wanted to make sure his CD was received as the office was already closed when he dropped it off last week. Informed patient CD was received on 6/6/25, but has not been uploaded yet. Patient asking for CD to be uploaded today as he has his appointment with Dr.Behar tomorrow in [place].    In office staff notified.

## 2025-06-10 ENCOUNTER — OFFICE VISIT (OUTPATIENT)
Dept: PHYSICAL MEDICINE AND REHAB | Facility: CLINIC | Age: 70
End: 2025-06-10
Payer: MEDICARE

## 2025-06-10 VITALS
SYSTOLIC BLOOD PRESSURE: 106 MMHG | HEIGHT: 66 IN | WEIGHT: 127 LBS | BODY MASS INDEX: 20.41 KG/M2 | DIASTOLIC BLOOD PRESSURE: 62 MMHG

## 2025-06-10 DIAGNOSIS — M51.369 BULGE OF LUMBAR DISC WITHOUT MYELOPATHY: ICD-10-CM

## 2025-06-10 DIAGNOSIS — R20.0 NUMBNESS AND TINGLING OF LEFT SIDE OF FACE: Primary | ICD-10-CM

## 2025-06-10 DIAGNOSIS — M47.816 FACET SYNDROME, LUMBAR: ICD-10-CM

## 2025-06-10 DIAGNOSIS — M47.812 CERVICAL FACET SYNDROME: ICD-10-CM

## 2025-06-10 DIAGNOSIS — G89.29 CHRONIC BILATERAL LOW BACK PAIN WITHOUT SCIATICA: ICD-10-CM

## 2025-06-10 DIAGNOSIS — M47.816 LUMBAR SPONDYLOSIS: ICD-10-CM

## 2025-06-10 DIAGNOSIS — M48.02 CERVICAL STENOSIS OF SPINAL CANAL: ICD-10-CM

## 2025-06-10 DIAGNOSIS — M48.02 FORAMINAL STENOSIS OF CERVICAL REGION: ICD-10-CM

## 2025-06-10 DIAGNOSIS — R20.2 NUMBNESS AND TINGLING OF LEFT SIDE OF FACE: Primary | ICD-10-CM

## 2025-06-10 DIAGNOSIS — M54.50 CHRONIC BILATERAL LOW BACK PAIN WITHOUT SCIATICA: ICD-10-CM

## 2025-06-10 DIAGNOSIS — M50.30 DDD (DEGENERATIVE DISC DISEASE), CERVICAL: ICD-10-CM

## 2025-06-10 PROCEDURE — 99214 OFFICE O/P EST MOD 30 MIN: CPT | Performed by: PHYSICAL MEDICINE & REHABILITATION

## 2025-06-10 NOTE — PROGRESS NOTES
{Vanishing suggested script  \"DrJoselito [X] would like to utilize a tool that securely records the visit conversation to help write his/her medical notes so he/she can pay closer attention to you and less time on the computer\".:[id number]}     The following individual(s) verbally consented to be recorded using ambient AI listening technology and understand that they can each withdraw their consent to this listening technology at any point by asking the clinician to turn off or pause the recording:    Patient name: Max Hagen  Additional names:

## 2025-06-10 NOTE — PATIENT INSTRUCTIONS
1) Make an appointment with neurology to discuss the face numbness (Dr. Michaud or Dr. Shipley)

## 2025-06-10 NOTE — PROGRESS NOTES
[facility]  Progress Note    CHIEF COMPLAINT:    Chief Complaint   Patient presents with    Follow - Up     Lov 12/4/24. Pt presents with N/T in L side of face, coldness in L neck. Pain 0/10. Been happening for 1.5 months. Symptoms present when sitting in desk, painting. No pain meds. No tx.       History of Present Illness:  The patient is a 70 year old  RIGHT handed male with significant past medical history of carotid stenosis, mitral regurg, GERD, who presents for follow-up with complaints of left facial intermittent numbness and tingling which he believes is positional every time he shifts his head forward.  He is not having any pain.  His symptoms have been ongoing for about the last 6 weeks without an inciting event.  He is not taking any medications for pain.  He has had an MRI of the cervical spine performed which I independently reviewed with him today.  He has been seen by his cardiologist and primary care doctor for this.    PAST MEDICAL HISTORY:  Past Medical History[1]    SURGICAL HISTORY:  Past Surgical History[2]    SOCIAL HISTORY:   Social History     Occupational History    Occupation: painting   Tobacco Use    Smoking status: Never    Smokeless tobacco: Never   Vaping Use    Vaping status: Never Used   Substance and Sexual Activity    Alcohol use: Yes     Alcohol/week: 5.8 - 11.7 standard drinks of alcohol     Types: 7 - 14 Standard drinks or equivalent per week     Comment: social     Drug use: No    Sexual activity: Not Currently     Partners: Female       FAMILY HISTORY:   Family History[3]    CURRENT MEDICATIONS:   Current Medications[4]    ALLERGIES:   Allergies[5]    REVIEW OF SYSTEMS:   Review of Systems   Constitutional: Negative.    HENT: Negative.    Eyes: Negative.    Respiratory: Negative.    Cardiovascular: Negative.    Gastrointestinal: Negative.    Genitourinary: Negative.    Musculoskeletal: As per HPI  Skin: Negative.     Neurological: As per HPI  Endo/Heme/Allergies: Negative.    Psychiatric/Behavioral: Negative.      All other systems reviewed and are negative. Pertinent positives and negatives noted in the HPI.        PHYSICAL EXAM:   /62   Ht 66\"   Wt 127 lb (57.6 kg)   BMI 20.50 kg/m²     Body mass index is 20.5 kg/m².      General: No immediate distress  Head: Normocephalic/ Atraumatic  Eyes: Extra-occular movements intact.   Ears: No auricular hematoma or deformities  Mouth: No lesions or ulcerations  Heart: peripheral pulses intact. Normal capillary refill.   Lungs: Non-labored respirations  Abdomen: No abdominal guarding  Extremities: No lower extremity edema bilaterally   Skin: No lesions noted.   Cognition: alert & oriented x 3, attentive, able to follow 2 step commands, comprehension intact, spontaneous speech intact  Motor:    Musculoskeletal:    CERVICAL SPINE:  Inspection: no erythema, swelling, or obvious deformity  Palpation: No tenderness to palpation throughout the cervical spine  ROM: intact to all planes of motion of cervical spine including side-bend bilaterally, rotation bilaterally, flexion, and extension   Strength: 5/5 in all myotomes of the BILATERAL upper extremities except 4 out of 5 strength during left shoulder abduction  Sensation: Intact to light touch in all dermatomes of the BILATERAL upper extremities   Reflexes: 2/4 at C5, C6, C7 with a negative Martin's sign  Spurling Test: negative for radicular symptoms down either extremity bilaterally        Data  No visits with results within 6 Month(s) from this visit.   Latest known visit with results is:   [facility] Lab Encounter on 08/16/2024   Component Date Value Ref Range Status    Prostate Specific Antigen 08/16/2024 3.3  0.0 - 4.0 ng/mL Final    Free PSA 08/16/2024 0.75  N/A ng/mL Final    %Free PSA 08/16/2024 22.7  % Final   ]      Radiology Imaging:  I reviewed with the patient his MRI of the cervical spine   Multilevel degenerative changes  with cervical stenosis noted at C3-C4 and C4-C5 without myelomalacia.    ASSESSMENT AND PLAN:  Max is a pleasant 70-year-old male who presents for follow-up with complaints of left facial numbness and tingling without an inciting event over the last 6 weeks.  His symptoms are in the distribution of the trigeminal and facial nerve.  I have reviewed his MRI of the cervical spine where he has multilevel degenerative changes with cervical stenosis at C3-C4 and C4-C5.  I am recommending a neurology consultation to discuss treatment and further workup.  He will follow-up with me as needed going forward.  As a pertains to his low back pain, this is currently stable.  He should continue his home exercises and use Tylenol as needed for pain.       RTC in as needed  Discharge Instructions were provided as documented in AVS summary.  The patient was in agreement with the assessment and plan.  All questions were answered.  There were no barriers to learning.         1. Numbness and tingling of left side of face    2. Foraminal stenosis of cervical region    3. Cervical facet syndrome    4. DDD (degenerative disc disease), cervical    5. Cervical stenosis of spinal canal    6. Facet syndrome, lumbar    7. Chronic bilateral low back pain without sciatica    8. Bulge of lumbar disc without myelopathy    9. Lumbar spondylosis        Alex B. Behar MD  Physical Medicine and Rehabilitation/Sports Medicine  [address] Cures Act Notice to Patient: Medical documents like this are made available to patients in the interest of transparency. However, be advised this is a medical document and it is intended as [id number] communication between your medical providers. This medical document may contain abbreviations, assessments, medical data, and results or other terms that are unfamiliar. Medical documents are intended to carry relevant information, facts as evident, and the clinical opinion of the  practitioner. As such, this medical document may be written in language that appears blunt or direct. You are encouraged to contact your medical provider and/or [facility] Experience if you have any questions about this medical document.          [1]   Past Medical History:   Arthritis    Back pain    Bilateral carotid artery disease    Carotid stenosis, bilateral    < 50% bilat at [facility], recheck 2022    Compression fracture of cervical spine (HCC)    Esophageal reflux    HNP (herniated nucleus pulposus), cervical    Left axis deviation    stable from 2001, early repol    Mitral regurgitation    MMT (medial meniscus tear)    Palpitations    Refused influenza vaccine    Screening for cardiovascular condition    ECHO with ef 60%, mild pulmonic and tricuspid insuff, mild MR    Vitamin D deficiency   [2]   Past Surgical History:  Procedure Laterality Date    Colonoscopy,diagnostic  2006     normal, EGD as well     Other surgical history      right foot screw    Other surgical history      multiple lipoma approx 30 yr ago    Other surgical history  2012    left knee meniscus    Repair ing hernia,5+y/o,reducibl     [3]   Family History  Problem Relation Age of Onset    Heart Disorder Father     Diabetes Father     Psychiatric Father         dementia    Heart Disorder Mother         CAD with stent    Other (Other) Mother    [4]   Current Outpatient Medications   Medication Sig Dispense Refill    Propranolol HCl 10 MG Oral Tab Take 1 tablet (10 mg total) by mouth every evening.      acetaminophen 500 MG Oral Tab Take 2 tablets (1,000 mg total) by mouth every 6 (six) hours as needed for Pain. (Patient not taking: Reported on 12/27/2024)     [5]   Allergies  Allergen Reactions    Celecoxib SWELLING     Facial numbness    Naproxen OTHER (SEE COMMENTS)     Developed ear problem      Cyclobenzaprine OTHER (SEE COMMENTS)     Difficulty urinating and leg spasms    Doxycycline Calcium NAUSEA ONLY     Elevated hr and  gi upset    Epinephrine UNKNOWN     Elevated heart rate

## 2025-06-17 ENCOUNTER — MED REC SCAN ONLY (OUTPATIENT)
Dept: PHYSICAL MEDICINE AND REHAB | Facility: CLINIC | Age: 70
End: 2025-06-17

## 2025-07-31 ENCOUNTER — OFFICE VISIT (OUTPATIENT)
Dept: NEUROLOGY | Facility: CLINIC | Age: 70
End: 2025-07-31
Payer: MEDICARE

## 2025-07-31 DIAGNOSIS — R20.0 LEFT FACIAL NUMBNESS: Primary | ICD-10-CM

## 2025-07-31 PROCEDURE — 99214 OFFICE O/P EST MOD 30 MIN: CPT | Performed by: OTHER

## 2025-08-01 ENCOUNTER — HOSPITAL ENCOUNTER (OUTPATIENT)
Dept: MRI IMAGING | Facility: HOSPITAL | Age: 70
Discharge: HOME | End: 2025-08-01
Attending: Other

## 2025-08-01 DIAGNOSIS — R20.0 LEFT FACIAL NUMBNESS: ICD-10-CM

## 2025-08-01 PROCEDURE — 70551 MRI BRAIN STEM W/O DYE: CPT | Performed by: OTHER

## 2025-08-06 ENCOUNTER — RESULTS FOLLOW-UP (OUTPATIENT)
Dept: NEUROLOGY | Facility: CLINIC | Age: 70
End: 2025-08-06

## 2025-08-06 DIAGNOSIS — H93.233 HYPERACUSIS OF BOTH EARS: Primary | ICD-10-CM

## 2025-08-18 ENCOUNTER — TELEPHONE (OUTPATIENT)
Dept: NEUROLOGY | Facility: CLINIC | Age: 70
End: 2025-08-18

## 2025-08-18 DIAGNOSIS — R20.0 LEFT FACIAL NUMBNESS: Primary | ICD-10-CM

## 2025-08-26 ENCOUNTER — LAB ENCOUNTER (OUTPATIENT)
Dept: LAB | Facility: HOSPITAL | Age: 70
End: 2025-08-26
Attending: Other

## 2025-08-26 ENCOUNTER — RESULTS FOLLOW-UP (OUTPATIENT)
Dept: NEUROLOGY | Facility: CLINIC | Age: 70
End: 2025-08-26

## 2025-08-26 DIAGNOSIS — R20.0 LEFT FACIAL NUMBNESS: ICD-10-CM

## 2025-08-26 LAB — VIT B12 SERPL-MCNC: 276 PG/ML (ref 211–911)

## 2025-08-26 PROCEDURE — 36415 COLL VENOUS BLD VENIPUNCTURE: CPT

## 2025-08-26 PROCEDURE — 82607 VITAMIN B-12: CPT

## 2025-08-26 RX ORDER — PROPRANOLOL HYDROCHLORIDE 10 MG/1
10 TABLET ORAL 2 TIMES DAILY
Qty: 180 TABLET | Refills: 3 | Status: SHIPPED | OUTPATIENT
Start: 2025-08-26

## 2025-08-27 ENCOUNTER — OFFICE VISIT (OUTPATIENT)
Facility: LOCATION | Age: 70
End: 2025-08-27

## 2025-08-27 DIAGNOSIS — G50.9 TRIGEMINAL NERVE DISORDER: Primary | ICD-10-CM

## 2025-08-27 DIAGNOSIS — R43.0 ANOSMIA: ICD-10-CM

## 2025-08-27 PROCEDURE — 99203 OFFICE O/P NEW LOW 30 MIN: CPT | Performed by: OTOLARYNGOLOGY

## (undated) DEVICE — STERILE POLYISOPRENE POWDER-FREE SURGICAL GLOVES: Brand: PROTEXIS

## (undated) DEVICE — TOWEL OR BLU 16X26 STRL

## (undated) DEVICE — OMNIPAQUE 240ML VIAL

## (undated) DEVICE — Device

## (undated) DEVICE — PERIFIX® 18 GA. X 3-1/2 IN. (90 MM) TUOHY, 5 ML GLASS LUER LOCK LOR TRAY (KIT): Brand: PERIFIX®

## (undated) DEVICE — SYRINGE MNJCT 10ML LF STRL REG

## (undated) DEVICE — CHLORAPREP 26ML APPLICATOR

## (undated) NOTE — LETTER
24          Max LEVY Dylanrolycarson  :  1955      To Whom It May Concern:    This patient was seen in our office on 24 .  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 This will be permeant restrictions. .    May return to work status per above effective 2024.    If this office may be of further assistance, please do not hesitate to contact us.      Sincerely,        Alex Behar, MD

## (undated) NOTE — LETTER
AUTHORIZATION FOR SURGICAL OPERATION OR OTHER PROCEDURE    1. I hereby authorize Dr. Terra Toure and the [facility] Office staff assigned to my case to perform the following operation and/or procedure at the [facility] Office:    RIGHT SI joint and myofascial trigger point CSI under ultrasound guidance     2. My physician has explained the nature and purpose of the operation or other procedure, possible alternative methods of treatment, the risks involved, and the possibility of complication to me. I acknowledge that no guarantee has been made as to the result that may be obtained. 3.  I recognize that, during the course of this operation, or other procedure, unforseen conditions may necessitate additional or different procedure than those listed above. I, therefore, further authorize and request that the above named physician, his/her physician assistants or designees perform such procedures as are, in his/her professional opinion, necessary and desirable. 4.  Any tissue or organs removed in the operation or other procedure may be disposed of by and at the discretion of the [facility] Office staff and [facility] AT [facility]. 5.  I understand that in the event of a medical emergency, I will be transported by local paramedics to [facility] or other hospital emergency department. 6.  I certify that I have read and fully understand the above consent to operation and/or other procedure. 7.  I acknowledge that my physician has explained sedation/analgesia administration to me including the risks and benefits. I consent to the administration of sedation/analgesia as may be necessary or desirable in the judgement of my physician. Witness signature: ___________________________________________________ Date:  ______/______/_____                    Time:  ________ A. M.  P.M.        Patient Name:  Skip Abdi  2/22/1955  [id number]         Patient signature: ___________________________________________________                     Statement of Physician  My signature below affirms that prior to the time of the procedure, I have explained to the patient and/or his/her guardian, the risks and benefits involved in the proposed treatment and any reasonable alternative to the proposed treatment. I have also explained the risks and benefits involved in the refusal of the proposed treatment and have answered the patient's questions.                         Date:  ______/______/_______  Provider                      Signature:  __________________________________________________________       Time:  ___________ AMARIA DOLORES DAIGLE

## (undated) NOTE — LETTER
AUTHORIZATION FOR SURGICAL OPERATION OR OTHER PROCEDURE    1. I hereby authorize Dr. Alex Behar and the [facility] Office staff assigned to my case to perform the following operation and/or procedure at the [facility] Office:    RIGHT SI joint and myofascial trigger point CSI under US     2.  My physician has explained the nature and purpose of the operation or other procedure, possible alternative methods of treatment, the risks involved, and the possibility of complication to me.  I acknowledge that no guarantee has been made as to the result that may be obtained.  3.  I recognize that, during the course of this operation, or other procedure, unforseen conditions may necessitate additional or different procedure than those listed above.  I, therefore, further authorize and request that the above named physician, his/her physician assistants or designees perform such procedures as are, in his/her professional opinion, necessary and desirable.  4.  Any tissue or organs removed in the operation or other procedure may be disposed of by and at the discretion of the [facility] Office staff and [facility].  5.  I understand that in the event of a medical emergency, I will be transported by local paramedics to [facility] or other hospital emergency department.  6.  I certify that I have read and fully understand the above consent to operation and/or other procedure.    7.  I acknowledge that my physician has explained sedation/analgesia administration to me including the risks and benefits.  I consent to the administration of sedation/analgesia as may be necessary or desirable in the judgement of my physician.    Witness signature: ___________________________________________________ Date:  ______/______/_____                    Time:  ________ A.M.  P.M.       Patient Name:  Max Hagen  2/22/1955  [id number]         Patient signature:   ___________________________________________________                 Statement of Physician  My signature below affirms that prior to the time of the procedure, I have explained to the patient and/or his/her guardian, the risks and benefits involved in the proposed treatment and any reasonable alternative to the proposed treatment.  I have also explained the risks and benefits involved in the refusal of the proposed treatment and have answered the patient's questions.                        Date:  ______/______/_______  Provider                      Signature:  __________________________________________________________       Time:  ___________ AMARIA DOLORES DAIGEL

## (undated) NOTE — ED AVS SNAPSHOT
Andrea Hagen   MRN: [id number]    Department:  [facility] Emergency Department   Date of Visit:  12/10/2017           Disclosure     Insurance plans vary and the physician(s) referred by the ER may not be covered by your plan.  Please conta CARE PHYSICIAN AT ONCE OR RETURN IMMEDIATELY TO THE EMERGENCY DEPARTMENT. If you have been prescribed any medication(s), please fill your prescription right away and begin taking the medication(s) as directed.   If you believe that any of the medications

## (undated) NOTE — LETTER
[address] [place] [address] Rd, [place], [place]     AUTHORIZATION FOR SURGICAL OPERATION OR PROCEDURE    I hereby authorize Dr. Sma Mendieta, my Physician(s) and whomever may be designated as the doctor's Assistant, to perform the following opera 4. I consent to the photographing of procedure(s) to be performed for the purposes of advancing medicine, science and/or education, provided my identity is not revealed.  If the procedure has been videotaped, the physician/surgeon will obtain the original v (Witness signature)                                                                                                  (Date)                                (Time)  STATEMENT OF PHYSICIAN My signature below affirms that prior to the time of the procedure;  I

## (undated) NOTE — LETTER
Date & Time: 5/7/2018, 5:26 PM  Patient: Tatiana Mcknight  Encounter Provider(s):    ALEXANDRA Fierro       To Whom It May Concern:    Jennifer Mcdermott was seen and treated in our department on 5/7/2018.  He should not return to work until 5/9/

## (undated) NOTE — LETTER
AUTHORIZATION FOR SURGICAL OPERATION OR OTHER PROCEDURE    1. I hereby authorize Dr. Devyn Moore and the [facility] Office staff assigned to my case to perform the following operation and/or procedure at the [facility] Office:    _RIGHT Si joint and myofascial trigger point CSI under ultrasound guidance   2. My physician has explained the nature and purpose of the operation or other procedure, possible alternative methods of treatment, the risks involved, and the possibility of complication to me. I acknowledge that no guarantee has been made as to the result that may be obtained. 3.  I recognize that, during the course of this operation, or other procedure, unforseen conditions may necessitate additional or different procedure than those listed above. I, therefore, further authorize and request that the above named physician, his/her physician assistants or designees perform such procedures as are, in his/her professional opinion, necessary and desirable. 4.  Any tissue or organs removed in the operation or other procedure may be disposed of by and at the discretion of the [facility] Office staff and [facility] AT [facility]. 5.  I understand that in the event of a medical emergency, I will be transported by local paramedics to [facility] or other hospital emergency department. 6.  I certify that I have read and fully understand the above consent to operation and/or other procedure. 7.  I acknowledge that my physician has explained sedation/analgesia administration to me including the risks and benefits. I consent to the administration of sedation/analgesia as may be necessary or desirable in the judgement of my physician. Witness signature: ___________________________________________________ Date:  ______/______/_____                    Time:  ________ A. M.  P.M.        Patient Name:  Dionicio Gillis 2/22/1955 [id number]           Patient signature: ___________________________________________________             Relationship to Patient:           []  Parent    Responsible person                          []  Spouse  In case of minor or                    [] Other  _____________   Incompetent name:  __________________________________________________                               (please print)      _____________      Responsible person  In case of minor or  Incompetent signature:  _______________________________________________    Statement of Physician  My signature below affirms that prior to the time of the procedure, I have explained to the patient and/or his/her guardian, the risks and benefits involved in the proposed treatment and any reasonable alternative to the proposed treatment. I have also explained the risks and benefits involved in the refusal of the proposed treatment and have answered the patient's questions.                         Date:  ______/______/_______  Provider                      Signature:  __________________________________________________________       Time:  ___________ A.M    P.M.

## (undated) NOTE — LETTER
ROSARIO Notifier: [employer]. Patient Name: Max Hagen Identification Number: [id number]                          Advance Beneficiary Notice of Noncoverage (ABN)   NOTE:  If Medicare doesn’t pay for D. item/service(s) below, you may have to pay.  Medicare does not pay for everything, even some care that you or your health care provider have good reason to think you need. We expect Medicare may not pay for the D. item/service(s) below.  D. Items or Services  RIGHT SI joint and myofascial trigger point CSI under US  E. Reason Medicare May Not Pay: F. Estimated Cost   __ EKG ($87.00)  __ Pap smear ($101) __Pelvic/Breast ($147.00)  __ Ear Irrigation ($138)  _x_ Injection(s)  ___ Tdap ($181)       ___ Meningitis ($290)   __Prevnar ($555)  ___ Td ($66)              ___ Prevnar 20 ($549)  ___ Hep A ($152)     ___ Prolia ($1827)         __ Xiaflex ($            )   ___ Hep B ($150)     ___ Pneumovax ($287)                                         ___ Vaccine Administration ($65)   __ Medicare does not cover this service      __ Medicare may not pay for this   item/service for your condition     __ Medicare may not pay for this item/service as often as this        WHAT YOU NEED TO DO NOW:  Read this notice, so you can make an informed decision about your care.  Ask us any questions that you may have after you finish reading.  Choose an option below about whether to receive the D. item/service(s) listed above.  Note: If you choose Option 1 or 2, we may help you to use any other insurance that you might have, but Medicare cannot require us to do this.  G. OPTIONS: Check only one box.  We cannot choose a box for you.   OPTION 1. I want the D. item/service(s) listed above. You may ask to be paid now, but I also want Medicare billed for an official decision on payment, which is sent to me on a Medicare Summary Notice (MSN). I understand that if Medicare doesn’t pay, I am responsible for payment, but I can  appeal to Medicare by following the directions on the MSN. If Medicare does pay, you will refund any payments I made to you, less co-pays or deductibles.  OPTION 2. I want the D. item/service(s) listed above, but do not bill Medicare. You may ask to be paid now as I am responsible for payment. I cannot appeal if Medicare is not billed.  OPTION 3. I don't want the D. item/service(s) listed above. I understand with this choice I am not responsible for payment, and I cannot appeal to see if Medicare would pay.    H. Additional Information:    This notice gives our opinion, not an official Medicare decision. If you have other questions on this notice or Medicare billing, call 1-800-MEDICARE (1-621.756.9151/TTY: 1-210.258.4412). Signing below means that you have received and understand this notice. You also receive a copy.  I. Signature: J. Date:       You have the right to get Medicare information in an accessible format, like large print, Braille, or audio. You also have the right to file a complaint if you feel you’ve been discriminated against. Visit Medicare.gov/about- us/gkyfbptavfqev-kiskcslhzhucjbgao-tblabn.  According to the Paperwork Reduction Act of 1995, no persons are required to respond to a collection of information unless it displays a valid OMB control number. The valid OMB control number for this information collection is 0389-6480. The time required to complete this information collection is estimated to average 7 minutes per response, including the time to review instructions, search existing data resources, gather the data needed, and complete and review the information collection. If you have comments concerning the accuracy of the time estimate or suggestions for improving this form, please write to: CMS, [facility] Security     Will Attn: SHAHRZAD Reports Clearance Officer, [place], [place] [address].  Form CMS-R-131 (Exp. 1/31/2026) Form Approved OMB No. [id number]

## (undated) NOTE — LETTER
Dear Dr. Sheri Delaney    This letter is to inform you that Che Ellison has been attending Physical Therapy with me. See below for my most recent plan of care.        Patient Name: Che Ellison, : 1955, MRN: [id number]   Date:  10/9/2017

## (undated) NOTE — IP AVS SNAPSHOT
Patient Demographics     Address  [address] [place] [address] Phone  [phone] [facility])  [phone] (Mobile) *Preferred*      Emergency Contact(s)     Name Relation Home Work Mobile    Rosemary Rosales   [phone]    Queenie Tucker Commonly known as:  COLACE  Next dose due: Tonight 9/19/17 at bedtime      Take 100 mg by mouth 2 (two) times daily as needed for constipation. Sofi Castro,          Propranolol HCl 10 MG Tabs  Commonly known as:  INDERAL  Next dose due:   Tomorrow 9/20 Glucose 137 70 - 99 mg/dL [facility]   Sodium 132 136 - 144 mmol/L [facility]   Potassium 4.1 3.3 - 5.1 mmol/L — [facility]   Chloride 100 95 - 110 mmol/L — [facility]   CO2 23 22 - 32 mmol/L — [facility]   BUN 16 8 - 20 mg/dL [facility] Testing Performed By     Ray Solis Name Director Address Valid Date Range    [address] [facility] [place] [facility] Christian Martinez. Roly Dickens M.D. [facility]. [address] [place] [id number] 04/29/14 1547 - Present            Microbiology Results (All)     None Patient and/or patient's family given opportunity to ask questions and note understanding and agree with therapeutic plan as outlined    DO Gus Valdivia [facility]  Answering Service number: [phone]    HPI       History of Present Illness: Tanya Comment:Elevated hr and gi upset     Home Medications:    No outpatient prescriptions have been marked as taking for the 9/18/17 encounter [facility]OS[facility] Encounter).       Soc Hx     Smoking status: Never Smoker    Smokeless tobacco: Never Used    Alcohol use saddle anesthesia, sensation otherwise intact, minimal response reflex testing L4 b/l, however, pt was tensed with difficulty relaxing     Diagnostic Data:    CBC/Chem    Recent Labs   Lab  09/18/17   1452   RBC  5.03   HGB  15.5   HCT  47.3   MCV  94.0 Attribution Smith    LA. 1 - Sofi Castro DO on 9/18/2017  8:17 PM                     D/C Summary     No notes of this type exist for this encounter.          Physical Therapy Notes (last 72 hours) (Notes from 9/16/2017  9:38 PM through 9/19/2017  9:38 PM) Stairs to Bedroom: 12  Railing: Yes    Lives With: Parent(s)  Drives: Yes  Patient Owned Equipment: None  Patient Regularly Uses: None    Prior Level of [place]: Patient was independent with functional mobility, ADLs, and driving. 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  Pt's living quarters are in the basement, 14 sta Skilled Therapy Provided: Spine precautions reviewed w/ emphasis on adls, transfers and job duties. Le dressing strategies reviewed along w/ car transfers. Lifting and body mechanics stressed w/ emphasis on completing job responsibilities.  Need to organize No notes of this type exist for this encounter. SLP Notes     No notes of this type exist for this encounter.             Immunizations     Name Date      TDAP 07/01/10

## (undated) NOTE — ED AVS SNAPSHOT
Leighannjohangorge Hagen   MRN: [id number]    Department:  [facility] Emergency Department   Date of Visit:  5/7/2018           Disclosure     Insurance plans vary and the physician(s) referred by the ER may not be covered by your plan.  Please contact CARE PHYSICIAN AT ONCE OR RETURN IMMEDIATELY TO THE EMERGENCY DEPARTMENT. If you have been prescribed any medication(s), please fill your prescription right away and begin taking the medication(s) as directed.   If you believe that any of the medications